# Patient Record
Sex: MALE | Race: ASIAN | Employment: OTHER | URBAN - METROPOLITAN AREA
[De-identification: names, ages, dates, MRNs, and addresses within clinical notes are randomized per-mention and may not be internally consistent; named-entity substitution may affect disease eponyms.]

---

## 2020-10-15 ENCOUNTER — APPOINTMENT (OUTPATIENT)
Dept: CT IMAGING | Age: 60
DRG: 177 | End: 2020-10-15
Attending: EMERGENCY MEDICINE
Payer: COMMERCIAL

## 2020-10-15 ENCOUNTER — HOSPITAL ENCOUNTER (INPATIENT)
Age: 60
LOS: 25 days | Discharge: NURSING, REHAB OR LONG-TERM CARE | DRG: 177 | End: 2020-11-09
Attending: EMERGENCY MEDICINE | Admitting: HOSPITALIST
Payer: COMMERCIAL

## 2020-10-15 ENCOUNTER — APPOINTMENT (OUTPATIENT)
Dept: GENERAL RADIOLOGY | Age: 60
DRG: 177 | End: 2020-10-15
Attending: EMERGENCY MEDICINE
Payer: COMMERCIAL

## 2020-10-15 DIAGNOSIS — J96.01 ACUTE RESPIRATORY FAILURE WITH HYPOXIA (HCC): Primary | ICD-10-CM

## 2020-10-15 DIAGNOSIS — J18.9 PNEUMONITIS: ICD-10-CM

## 2020-10-15 DIAGNOSIS — R53.1 WEAKNESS GENERALIZED: ICD-10-CM

## 2020-10-15 DIAGNOSIS — U07.1 COVID-19: ICD-10-CM

## 2020-10-15 DIAGNOSIS — Z20.822 SUSPECTED COVID-19 VIRUS INFECTION: ICD-10-CM

## 2020-10-15 PROBLEM — J96.00 ACUTE RESPIRATORY FAILURE (HCC): Status: ACTIVE | Noted: 2020-10-15

## 2020-10-15 PROBLEM — J96.00 ACUTE RESPIRATORY FAILURE DUE TO COVID-19 (HCC): Status: ACTIVE | Noted: 2020-10-15

## 2020-10-15 LAB
ALBUMIN SERPL-MCNC: 3.4 G/DL (ref 3.5–5)
ALBUMIN/GLOB SERPL: 0.8 {RATIO} (ref 1.1–2.2)
ALP SERPL-CCNC: 135 U/L (ref 45–117)
ALT SERPL-CCNC: 55 U/L (ref 12–78)
ANION GAP SERPL CALC-SCNC: 7 MMOL/L (ref 5–15)
ARTERIAL PATENCY WRIST A: POSITIVE
AST SERPL W P-5'-P-CCNC: 71 U/L (ref 15–37)
BASE DEFICIT BLDA-SCNC: 0.2 MMOL/L (ref 0–2)
BASOPHILS # BLD: 0 K/UL (ref 0–0.1)
BASOPHILS NFR BLD: 0 % (ref 0–1)
BDY SITE: ABNORMAL
BILIRUB SERPL-MCNC: 0.9 MG/DL (ref 0.2–1)
BNP SERPL-MCNC: 63 PG/ML
BUN SERPL-MCNC: 10 MG/DL (ref 6–20)
BUN/CREAT SERPL: 11 (ref 12–20)
CA-I BLD-MCNC: 8.5 MG/DL (ref 8.5–10.1)
CHLORIDE SERPL-SCNC: 104 MMOL/L (ref 97–108)
CK MB CFR SERPL CALC: 1 %
CK MB SERPL-MCNC: 3 NG/ML (ref 5–25)
CK SERPL-CCNC: 309 NG/ML (ref 39–308)
CO2 SERPL-SCNC: 26 MMOL/L (ref 21–32)
CREAT SERPL-MCNC: 0.94 MG/DL (ref 0.7–1.3)
DIFFERENTIAL METHOD BLD: ABNORMAL
EOSINOPHIL # BLD: 0 K/UL (ref 0–0.4)
EOSINOPHIL NFR BLD: 0 % (ref 0–7)
ERYTHROCYTE [DISTWIDTH] IN BLOOD BY AUTOMATED COUNT: 13.3 % (ref 11.5–14.5)
FIO2 ON VENT: 100 %
GAS FLOW.O2 O2 DELIVERY SYS: 15 L/MIN
GLOBULIN SER CALC-MCNC: 4.5 G/DL (ref 2–4)
GLUCOSE SERPL-MCNC: 105 MG/DL (ref 65–100)
HCO3 BLDA-SCNC: 24 MMOL/L (ref 22–26)
HCT VFR BLD AUTO: 44.2 % (ref 36.6–50.3)
HGB BLD-MCNC: 15.5 G/DL (ref 12.1–17)
IMM GRANULOCYTES # BLD AUTO: 0 K/UL (ref 0–0.04)
IMM GRANULOCYTES NFR BLD AUTO: 0 % (ref 0–0.5)
LACTATE SERPL-SCNC: 1.9 MMOL/L (ref 0.4–2)
LYMPHOCYTES # BLD: 0.8 K/UL (ref 0.8–3.5)
LYMPHOCYTES NFR BLD: 14 % (ref 12–49)
MCH RBC QN AUTO: 30.8 PG (ref 26–34)
MCHC RBC AUTO-ENTMCNC: 35.1 G/DL (ref 30–36.5)
MCV RBC AUTO: 87.7 FL (ref 80–99)
MONOCYTES # BLD: 0.6 K/UL (ref 0–1)
MONOCYTES NFR BLD: 10 % (ref 5–13)
NEUTS SEG # BLD: 4.6 K/UL (ref 1.8–8)
NEUTS SEG NFR BLD: 76 % (ref 32–75)
PCO2 BLDA: 32 MMHG (ref 35–45)
PH BLDA: 7.46 [PH] (ref 7.35–7.45)
PLATELET # BLD AUTO: 159 K/UL (ref 150–400)
PMV BLD AUTO: 9.6 FL (ref 8.9–12.9)
PO2 BLDA: 68 MMHG (ref 75–100)
POTASSIUM SERPL-SCNC: 3.7 MMOL/L (ref 3.5–5.1)
PROT SERPL-MCNC: 7.9 G/DL (ref 6.4–8.2)
RBC # BLD AUTO: 5.04 M/UL (ref 4.1–5.7)
SAO2 % BLD: 93 %
SAO2% DEVICE SAO2% SENSOR NAME: ABNORMAL
SARS-COV-2, COV2: NORMAL
SODIUM SERPL-SCNC: 137 MMOL/L (ref 136–145)
SPECIMEN SITE: ABNORMAL
TROPONIN I SERPL-MCNC: <0.05 NG/ML
WBC # BLD AUTO: 6.1 K/UL (ref 4.1–11.1)

## 2020-10-15 PROCEDURE — 82550 ASSAY OF CK (CPK): CPT

## 2020-10-15 PROCEDURE — 85379 FIBRIN DEGRADATION QUANT: CPT

## 2020-10-15 PROCEDURE — 65270000029 HC RM PRIVATE

## 2020-10-15 PROCEDURE — 86140 C-REACTIVE PROTEIN: CPT

## 2020-10-15 PROCEDURE — 82803 BLOOD GASES ANY COMBINATION: CPT

## 2020-10-15 PROCEDURE — 74011000636 HC RX REV CODE- 636: Performed by: EMERGENCY MEDICINE

## 2020-10-15 PROCEDURE — 80053 COMPREHEN METABOLIC PANEL: CPT

## 2020-10-15 PROCEDURE — 82553 CREATINE MB FRACTION: CPT

## 2020-10-15 PROCEDURE — 87040 BLOOD CULTURE FOR BACTERIA: CPT

## 2020-10-15 PROCEDURE — 85025 COMPLETE CBC W/AUTO DIFF WBC: CPT

## 2020-10-15 PROCEDURE — 83880 ASSAY OF NATRIURETIC PEPTIDE: CPT

## 2020-10-15 PROCEDURE — 94761 N-INVAS EAR/PLS OXIMETRY MLT: CPT

## 2020-10-15 PROCEDURE — 85730 THROMBOPLASTIN TIME PARTIAL: CPT

## 2020-10-15 PROCEDURE — 87635 SARS-COV-2 COVID-19 AMP PRB: CPT

## 2020-10-15 PROCEDURE — 71275 CT ANGIOGRAPHY CHEST: CPT

## 2020-10-15 PROCEDURE — 84484 ASSAY OF TROPONIN QUANT: CPT

## 2020-10-15 PROCEDURE — 85384 FIBRINOGEN ACTIVITY: CPT

## 2020-10-15 PROCEDURE — 71045 X-RAY EXAM CHEST 1 VIEW: CPT

## 2020-10-15 PROCEDURE — 85610 PROTHROMBIN TIME: CPT

## 2020-10-15 PROCEDURE — 83605 ASSAY OF LACTIC ACID: CPT

## 2020-10-15 PROCEDURE — 74011250636 HC RX REV CODE- 250/636: Performed by: EMERGENCY MEDICINE

## 2020-10-15 PROCEDURE — 93005 ELECTROCARDIOGRAM TRACING: CPT

## 2020-10-15 PROCEDURE — 82728 ASSAY OF FERRITIN: CPT

## 2020-10-15 PROCEDURE — 36415 COLL VENOUS BLD VENIPUNCTURE: CPT

## 2020-10-15 PROCEDURE — 99285 EMERGENCY DEPT VISIT HI MDM: CPT

## 2020-10-15 PROCEDURE — 83615 LACTATE (LD) (LDH) ENZYME: CPT

## 2020-10-15 PROCEDURE — 65610000006 HC RM INTENSIVE CARE

## 2020-10-15 RX ORDER — SODIUM CHLORIDE 9 MG/ML
75 INJECTION, SOLUTION INTRAVENOUS CONTINUOUS
Status: DISPENSED | OUTPATIENT
Start: 2020-10-15 | End: 2020-10-16

## 2020-10-15 RX ADMIN — IOPAMIDOL 100 ML: 755 INJECTION, SOLUTION INTRAVENOUS at 20:21

## 2020-10-15 RX ADMIN — SODIUM CHLORIDE 500 ML: 9 INJECTION, SOLUTION INTRAVENOUS at 19:46

## 2020-10-15 NOTE — ED PROVIDER NOTES
EMERGENCY DEPARTMENT HISTORY AND PHYSICAL EXAM        Date: 10/15/2020  Patient Name: An Collins Betancourt    History of Presenting Illness     Chief Complaint   Patient presents with    Shortness of Breath       History Provided By: Patient    HPI: An Collins Betancourt, 56 y.o. male who presents with dyspnea. States he has been having chest discomfort and dyspnea for about 2 weeks now. Today worsened. Patient denies any leg swelling, vomiting, fevers. Unable to obtain further history due to difficulty breathing. PCP: UNKNOWN        Past History     Past Medical History:  No past medical history on file. Past Surgical History:  No past surgical history on file. Family History:  No family history on file. Social History:  Social History     Tobacco Use    Smoking status: Not on file   Substance Use Topics    Alcohol use: Not on file    Drug use: Not on file       Allergies:  No Known Allergies    Review of Systems   Review of Systems   Constitutional: Negative for fever. HENT: Negative for congestion. Eyes: Negative for visual disturbance. Respiratory: Positive for shortness of breath. Cardiovascular: Positive for chest pain. Gastrointestinal: Negative for abdominal pain. Genitourinary: Negative for dysuria. Musculoskeletal: Negative for arthralgias. Skin: Negative for rash. Neurological: Negative for headaches. Physical Exam   General: Moderate distress. Well-nourished. Skin: No rash. Head: Normocephalic. Atraumatic. Eye: No proptosis or conjunctival injections. Respiratory: Moderate respiratory distress. Leaning forward. Tachypneic. Diminished breath sounds but no wheezing. No stridor. Gastrointestinal: Nondistended. Musculoskeletal: No obvious bony deformities. No leg edema. Psychiatric: Cooperative. Appropriate mood and affect.     Diagnostic Study Results     Labs -     Recent Results (from the past 24 hour(s))   CBC WITH AUTOMATED DIFF    Collection Time: 10/15/20  7:15 PM Result Value Ref Range    WBC 6.1 4.1 - 11.1 K/uL    RBC 5.04 4.10 - 5.70 M/uL    HGB 15.5 12.1 - 17.0 g/dL    HCT 44.2 36.6 - 50.3 %    MCV 87.7 80.0 - 99.0 FL    MCH 30.8 26.0 - 34.0 PG    MCHC 35.1 30.0 - 36.5 g/dL    RDW 13.3 11.5 - 14.5 %    PLATELET 424 741 - 955 K/uL    MPV 9.6 8.9 - 12.9 FL    NEUTROPHILS 76 (H) 32 - 75 %    LYMPHOCYTES 14 12 - 49 %    MONOCYTES 10 5 - 13 %    EOSINOPHILS 0 0 - 7 %    BASOPHILS 0 0 - 1 %    IMMATURE GRANULOCYTES 0 0.0 - 0.5 %    ABS. NEUTROPHILS 4.6 1.8 - 8.0 K/UL    ABS. LYMPHOCYTES 0.8 0.8 - 3.5 K/UL    ABS. MONOCYTES 0.6 0.0 - 1.0 K/UL    ABS. EOSINOPHILS 0.0 0.0 - 0.4 K/UL    ABS. BASOPHILS 0.0 0.0 - 0.1 K/UL    ABS. IMM. GRANS. 0.0 0.00 - 0.04 K/UL    DF AUTOMATED     METABOLIC PANEL, COMPREHENSIVE    Collection Time: 10/15/20  7:15 PM   Result Value Ref Range    Sodium 137 136 - 145 mmol/L    Potassium 3.7 3.5 - 5.1 mmol/L    Chloride 104 97 - 108 mmol/L    CO2 26 21 - 32 mmol/L    Anion gap 7 5 - 15 mmol/L    Glucose 105 (H) 65 - 100 mg/dL    BUN 10 6 - 20 mg/dL    Creatinine 0.94 0.70 - 1.30 mg/dL    BUN/Creatinine ratio 11 (L) 12 - 20      GFR est AA >60 >60 ml/min/1.73m2    GFR est non-AA >60 >60 ml/min/1.73m2    Calcium 8.5 8.5 - 10.1 mg/dL    Bilirubin, total 0.9 0.2 - 1.0 mg/dL    AST (SGOT) 71 (H) 15 - 37 U/L    ALT (SGPT) 55 12 - 78 U/L    Alk.  phosphatase 135 (H) 45 - 117 U/L    Protein, total 7.9 6.4 - 8.2 g/dL    Albumin 3.4 (L) 3.5 - 5.0 g/dL    Globulin 4.5 (H) 2.0 - 4.0 g/dL    A-G Ratio 0.8 (L) 1.1 - 2.2     LACTIC ACID    Collection Time: 10/15/20  7:15 PM   Result Value Ref Range    Lactic acid 1.9 0.4 - 2.0 mmol/L   CK W/ REFLX CKMB    Collection Time: 10/15/20  7:15 PM   Result Value Ref Range    .0 (H) 39 - 308 ng/mL   BNP    Collection Time: 10/15/20  7:15 PM   Result Value Ref Range    NT pro-BNP 63 <125 pg/mL   BLOOD GAS, ARTERIAL    Collection Time: 10/15/20  7:15 PM   Result Value Ref Range    pH 7.46 (H) 7.35 - 7.45      PCO2 32 (L) 35 - 45 mmHg    PO2 68 (L) 75 - 100 mmHg    O2 SAT 93 (L) >95 %    BICARBONATE 24 22 - 26 mmol/L    BASE DEFICIT 0.2 0 - 2 mmol/L    O2 METHOD NRB mask      O2 FLOW RATE 15 L/min    FIO2 100 %    Sample source Arterial      SITE Right Radial      KAT'S TEST Positive     TROPONIN I    Collection Time: 10/15/20  7:15 PM   Result Value Ref Range    Troponin-I, Qt. <0.05 <0.05 ng/mL   SARS-COV-2    Collection Time: 10/15/20  9:00 PM   Result Value Ref Range    SARS-CoV-2 Please find results under separate order         Radiologic Studies -   CTA CHEST W OR W WO CONT   Final Result   IMPRESSION: Widespread patchy bilateral pneumonitis      XR CHEST SNGL V   Final Result        CT Results  (Last 48 hours)               10/15/20 2021  CTA CHEST W OR W WO CONT Final result    Impression:  IMPRESSION: Widespread patchy bilateral pneumonitis       Narrative:  CT dose reduction was achieved through use of a standardized protocol tailored   for this examination and automatic exposure control for dose modulation. Contrast study maximum intensity projections shows pronounced groundglass   opacification, of variable density, mainly in the dependent portions of each   lung, apex to base. Air bronchograms are preserved in the densest portions. Mild   geographic groundglass opacification of lower density in the nondependent   portions. Central airways are open       Pulmonary arteries are well-opacified and show no filling defect or distortion. Aorta shows normal dimensions, without dissection. Tiny middle mediastinal lymph   nodes. Cardiac finding. No pleural pericardial effusion. No significant upper   abdominal or chest wall finding               CXR Results  (Last 48 hours)               10/15/20 1927  XR CHEST SNGL V Final result    Narrative:  1       Mild atelectasis in the bases with upper lungs clear. No effusion or   pneumothorax.  Normal heart and no congestion             Medical Decision Making and ED Course I reviewed the available vital signs, nursing notes, past medical history, past surgical history, family history, and social history. Vital Signs - Reviewed the patient's vital signs. Patient Vitals for the past 12 hrs:   Temp Pulse Resp BP SpO2   10/15/20 2113  88 28 (!) 127/94 95 %   10/15/20 2043  91 29 122/88 96 %   10/15/20 1837 98.8 °F (37.1 °C) 89 24 138/83 (!) 89 %       EKG interpretation: Normal sinus rhythm at 99 bpm.  Normal [place] interval, QRS duration, and QTc interval.  No ST segment abnormalities. Normal axis. Medical Decision Making:   Presented with difficulty breathing. The  differential diagnosis is pneumonia, aspiration, ACS, COVID-19. Work-up shows pneumonitis. Patient requires oxygenation is currently on BiPAP. Could have COVID-19. Will test for this. Admitted to hospitalist for further care. Procedures     Critical Care Note:   6:56 PM  Amount of critical care time: 35 minutes  Impending deterioration: Respiratory and Cardiovascular  Associated risk factors: Hypoxia and Metabolic changes  Management: Bedside Assessment and Supervision of Care  Interpretation: Xrays, Blood Gases and ECG  Interventions: BiPAP, supplemental oxygen  Case review: Hospitalist, nursing  Treatment response: Guarded  Performed by: Self  Notes: I have spent critical care time involved in lab review, decision making, bedside attention, and documentation. This time excludes time spent in any separate billed procedures. During this entire length of time I was immediately available to the patient. Dorann Lesch, DO    Disposition     Admitted    Diagnosis     Clinical impression:   1. Acute respiratory failure with hypoxia ([address]    2. Pneumonitis       Attestation:  Please note that this dictation was completed with [employer], the [employer] voice recognition software.  Quite often unanticipated grammatical, syntax, homophones, and other interpretive errors are inadvertently transcribed by the computer software. Please disregard these errors. Please excuse any errors that have escaped final proofreading. Thank you.   Taurus Atkins, DO

## 2020-10-16 LAB
ALBUMIN SERPL-MCNC: 2.8 G/DL (ref 3.5–5)
ALBUMIN/GLOB SERPL: 0.7 {RATIO} (ref 1.1–2.2)
ALP SERPL-CCNC: 121 U/L (ref 45–117)
ALT SERPL-CCNC: 51 U/L (ref 12–78)
ANION GAP SERPL CALC-SCNC: 7 MMOL/L (ref 5–15)
APPEARANCE UR: CLEAR
APTT PPP: 29.7 SEC (ref 23–35.7)
APTT PPP: 33.5 SEC (ref 23–35.7)
AST SERPL W P-5'-P-CCNC: 72 U/L (ref 15–37)
ATRIAL RATE: 99 BPM
BACTERIA URNS QL MICRO: NEGATIVE /HPF
BASOPHILS # BLD: 0 K/UL (ref 0–0.1)
BASOPHILS NFR BLD: 0 % (ref 0–1)
BILIRUB SERPL-MCNC: 0.8 MG/DL (ref 0.2–1)
BILIRUB UR QL: NEGATIVE
BUN SERPL-MCNC: 9 MG/DL (ref 6–20)
BUN/CREAT SERPL: 11 (ref 12–20)
CA-I BLD-MCNC: 8.4 MG/DL (ref 8.5–10.1)
CALCULATED P AXIS, ECG09: 38 DEGREES
CALCULATED R AXIS, ECG10: 43 DEGREES
CALCULATED T AXIS, ECG11: 46 DEGREES
CHLORIDE SERPL-SCNC: 106 MMOL/L (ref 97–108)
CK SERPL-CCNC: 306 U/L (ref 39–308)
CO2 SERPL-SCNC: 25 MMOL/L (ref 21–32)
COLOR UR: ABNORMAL
CREAT SERPL-MCNC: 0.84 MG/DL (ref 0.7–1.3)
CRP SERPL-MCNC: 12.4 MG/DL (ref 0–0.6)
D DIMER PPP FEU-MCNC: 1.11 UG/ML(FEU)
DIAGNOSIS, 93000: NORMAL
DIFFERENTIAL METHOD BLD: NORMAL
EOSINOPHIL # BLD: 0 K/UL (ref 0–0.4)
EOSINOPHIL NFR BLD: 0 % (ref 0–7)
ERYTHROCYTE [DISTWIDTH] IN BLOOD BY AUTOMATED COUNT: 13.4 % (ref 11.5–14.5)
FERRITIN SERPL-MCNC: 2182 NG/ML (ref 26–388)
FIBRINOGEN PPP-MCNC: 620 MG/DL (ref 220–535)
GLOBULIN SER CALC-MCNC: 4.2 G/DL (ref 2–4)
GLUCOSE SERPL-MCNC: 109 MG/DL (ref 65–100)
GLUCOSE UR STRIP.AUTO-MCNC: >300 MG/DL
HCT VFR BLD AUTO: 40.3 % (ref 36.6–50.3)
HGB BLD-MCNC: 14.1 G/DL (ref 12.1–17)
HGB UR QL STRIP: NEGATIVE
IMM GRANULOCYTES # BLD AUTO: 0 K/UL (ref 0–0.04)
IMM GRANULOCYTES NFR BLD AUTO: 0 % (ref 0–0.5)
INR PPP: 0.9 (ref 0.9–1.1)
KETONES UR QL STRIP.AUTO: 5 MG/DL
LDH SERPL L TO P-CCNC: 754 U/L (ref 85–241)
LEUKOCYTE ESTERASE UR QL STRIP.AUTO: NEGATIVE
LYMPHOCYTES # BLD: 0.8 K/UL (ref 0.8–3.5)
LYMPHOCYTES NFR BLD: 13 % (ref 12–49)
MCH RBC QN AUTO: 30.7 PG (ref 26–34)
MCHC RBC AUTO-ENTMCNC: 35 G/DL (ref 30–36.5)
MCV RBC AUTO: 87.8 FL (ref 80–99)
MONOCYTES # BLD: 0.7 K/UL (ref 0–1)
MONOCYTES NFR BLD: 12 % (ref 5–13)
MUCOUS THREADS URNS QL MICRO: ABNORMAL /LPF
NEUTS SEG # BLD: 4.6 K/UL (ref 1.8–8)
NEUTS SEG NFR BLD: 75 % (ref 32–75)
NITRITE UR QL STRIP.AUTO: NEGATIVE
P-R INTERVAL, ECG05: 172 MS
PH UR STRIP: 6 [PH] (ref 5–8)
PLATELET # BLD AUTO: 172 K/UL (ref 150–400)
PMV BLD AUTO: 9.9 FL (ref 8.9–12.9)
POTASSIUM SERPL-SCNC: 3.6 MMOL/L (ref 3.5–5.1)
PROCALCITONIN SERPL-MCNC: 0.14 NG/ML
PROT SERPL-MCNC: 7 G/DL (ref 6.4–8.2)
PROT UR STRIP-MCNC: 100 MG/DL
PROTHROMBIN TIME: 12.5 SEC (ref 11.9–14.7)
Q-T INTERVAL, ECG07: 342 MS
QRS DURATION, ECG06: 90 MS
QTC CALCULATION (BEZET), ECG08: 438 MS
RBC # BLD AUTO: 4.59 M/UL (ref 4.1–5.7)
RBC #/AREA URNS HPF: ABNORMAL /HPF (ref 0–5)
SODIUM SERPL-SCNC: 138 MMOL/L (ref 136–145)
SP GR UR REFRACTOMETRY: >1.03 (ref 1–1.03)
THERAPEUTIC RANGE,PTTT: NORMAL SEC (ref 68–109)
THERAPEUTIC RANGE,PTTT: NORMAL SEC (ref 68–109)
UA: UC IF INDICATED,UAUC: ABNORMAL
UROBILINOGEN UR QL STRIP.AUTO: 2 EU/DL (ref 0.1–1)
VENTRICULAR RATE, ECG03: 99 BPM
WBC # BLD AUTO: 6.1 K/UL (ref 4.1–11.1)
WBC URNS QL MICRO: ABNORMAL /HPF (ref 0–4)

## 2020-10-16 PROCEDURE — 74011250636 HC RX REV CODE- 250/636: Performed by: INTERNAL MEDICINE

## 2020-10-16 PROCEDURE — 77010033678 HC OXYGEN DAILY

## 2020-10-16 PROCEDURE — 74011250636 HC RX REV CODE- 250/636: Performed by: HOSPITALIST

## 2020-10-16 PROCEDURE — 36415 COLL VENOUS BLD VENIPUNCTURE: CPT

## 2020-10-16 PROCEDURE — 80053 COMPREHEN METABOLIC PANEL: CPT

## 2020-10-16 PROCEDURE — XW033E5 INTRODUCTION OF REMDESIVIR ANTI-INFECTIVE INTO PERIPHERAL VEIN, PERCUTANEOUS APPROACH, NEW TECHNOLOGY GROUP 5: ICD-10-PCS | Performed by: INTERNAL MEDICINE

## 2020-10-16 PROCEDURE — 74011250637 HC RX REV CODE- 250/637: Performed by: INTERNAL MEDICINE

## 2020-10-16 PROCEDURE — 74011000250 HC RX REV CODE- 250: Performed by: INTERNAL MEDICINE

## 2020-10-16 PROCEDURE — 74011250637 HC RX REV CODE- 250/637: Performed by: HOSPITALIST

## 2020-10-16 PROCEDURE — 86900 BLOOD TYPING SEROLOGIC ABO: CPT

## 2020-10-16 PROCEDURE — 65610000006 HC RM INTENSIVE CARE

## 2020-10-16 PROCEDURE — 74011000258 HC RX REV CODE- 258: Performed by: INTERNAL MEDICINE

## 2020-10-16 PROCEDURE — 99221 1ST HOSP IP/OBS SF/LOW 40: CPT | Performed by: INTERNAL MEDICINE

## 2020-10-16 PROCEDURE — 85730 THROMBOPLASTIN TIME PARTIAL: CPT

## 2020-10-16 PROCEDURE — 84145 PROCALCITONIN (PCT): CPT

## 2020-10-16 PROCEDURE — 81001 URINALYSIS AUTO W/SCOPE: CPT

## 2020-10-16 PROCEDURE — 85025 COMPLETE CBC W/AUTO DIFF WBC: CPT

## 2020-10-16 RX ORDER — DEXAMETHASONE SODIUM PHOSPHATE 4 MG/ML
4 INJECTION, SOLUTION INTRA-ARTICULAR; INTRALESIONAL; INTRAMUSCULAR; INTRAVENOUS; SOFT TISSUE EVERY 6 HOURS
Status: DISCONTINUED | OUTPATIENT
Start: 2020-10-16 | End: 2020-10-26

## 2020-10-16 RX ORDER — ENOXAPARIN SODIUM 100 MG/ML
30 INJECTION SUBCUTANEOUS EVERY 12 HOURS
Status: DISCONTINUED | OUTPATIENT
Start: 2020-10-16 | End: 2020-10-29

## 2020-10-16 RX ORDER — ACETAMINOPHEN 650 MG/1
650 SUPPOSITORY RECTAL
Status: DISCONTINUED | OUTPATIENT
Start: 2020-10-16 | End: 2020-11-09 | Stop reason: HOSPADM

## 2020-10-16 RX ORDER — SODIUM CHLORIDE 0.9 % (FLUSH) 0.9 %
5-40 SYRINGE (ML) INJECTION EVERY 8 HOURS
Status: DISCONTINUED | OUTPATIENT
Start: 2020-10-16 | End: 2020-11-09 | Stop reason: HOSPADM

## 2020-10-16 RX ORDER — ONDANSETRON 4 MG/1
4 TABLET, ORALLY DISINTEGRATING ORAL
Status: DISCONTINUED | OUTPATIENT
Start: 2020-10-16 | End: 2020-11-09 | Stop reason: HOSPADM

## 2020-10-16 RX ORDER — ACETAMINOPHEN 325 MG/1
650 TABLET ORAL
Status: DISCONTINUED | OUTPATIENT
Start: 2020-10-16 | End: 2020-11-09 | Stop reason: HOSPADM

## 2020-10-16 RX ORDER — DEXAMETHASONE 4 MG/1
6 TABLET ORAL DAILY
Status: DISCONTINUED | OUTPATIENT
Start: 2020-10-16 | End: 2020-10-16

## 2020-10-16 RX ORDER — ONDANSETRON 2 MG/ML
4 INJECTION INTRAMUSCULAR; INTRAVENOUS
Status: DISCONTINUED | OUTPATIENT
Start: 2020-10-16 | End: 2020-11-09 | Stop reason: HOSPADM

## 2020-10-16 RX ORDER — BENZONATATE 100 MG/1
200 CAPSULE ORAL EVERY 8 HOURS
Status: DISCONTINUED | OUTPATIENT
Start: 2020-10-16 | End: 2020-11-03

## 2020-10-16 RX ORDER — SODIUM CHLORIDE 0.9 % (FLUSH) 0.9 %
5-40 SYRINGE (ML) INJECTION AS NEEDED
Status: DISCONTINUED | OUTPATIENT
Start: 2020-10-16 | End: 2020-11-09 | Stop reason: HOSPADM

## 2020-10-16 RX ADMIN — BENZONATATE 200 MG: 100 CAPSULE ORAL at 14:57

## 2020-10-16 RX ADMIN — ENOXAPARIN SODIUM 30 MG: 30 INJECTION SUBCUTANEOUS at 17:46

## 2020-10-16 RX ADMIN — ACETAMINOPHEN 650 MG: 325 TABLET, FILM COATED ORAL at 12:12

## 2020-10-16 RX ADMIN — DEXAMETHASONE SODIUM PHOSPHATE 4 MG: 4 INJECTION, SOLUTION INTRA-ARTICULAR; INTRALESIONAL; INTRAMUSCULAR; INTRAVENOUS; SOFT TISSUE at 11:39

## 2020-10-16 RX ADMIN — SODIUM CHLORIDE 200 MG: 9 INJECTION, SOLUTION INTRAVENOUS at 14:54

## 2020-10-16 RX ADMIN — DEXAMETHASONE SODIUM PHOSPHATE 4 MG: 4 INJECTION, SOLUTION INTRA-ARTICULAR; INTRALESIONAL; INTRAMUSCULAR; INTRAVENOUS; SOFT TISSUE at 17:46

## 2020-10-16 RX ADMIN — ENOXAPARIN SODIUM 30 MG: 30 INJECTION SUBCUTANEOUS at 09:11

## 2020-10-16 RX ADMIN — Medication 10 ML: at 22:38

## 2020-10-16 RX ADMIN — Medication 10 ML: at 14:57

## 2020-10-16 RX ADMIN — TOCILIZUMAB 400 MG: 20 INJECTION, SOLUTION, CONCENTRATE INTRAVENOUS at 13:35

## 2020-10-16 RX ADMIN — SODIUM CHLORIDE 75 ML/HR: 9 INJECTION, SOLUTION INTRAVENOUS at 03:00

## 2020-10-16 RX ADMIN — BENZONATATE 200 MG: 100 CAPSULE ORAL at 22:36

## 2020-10-16 RX ADMIN — DEXAMETHASONE SODIUM PHOSPHATE 4 MG: 4 INJECTION, SOLUTION INTRA-ARTICULAR; INTRALESIONAL; INTRAMUSCULAR; INTRAVENOUS; SOFT TISSUE at 22:37

## 2020-10-16 RX ADMIN — DEXAMETHASONE 6 MG: 4 TABLET ORAL at 05:35

## 2020-10-16 RX ADMIN — ACETAMINOPHEN 650 MG: 325 TABLET, FILM COATED ORAL at 05:35

## 2020-10-16 RX ADMIN — AZITHROMYCIN MONOHYDRATE 500 MG: 500 INJECTION, POWDER, LYOPHILIZED, FOR SOLUTION INTRAVENOUS at 11:38

## 2020-10-16 NOTE — ROUTINE PROCESS
TRANSFER - OUT REPORT: 
 
Verbal report given to Flex RN(name) on An Jenniffer Gunnels  being transferred to 285(unit) for routine progression of care Report consisted of patients Situation, Background, Assessment and  
Recommendations(SBAR). Information from the following report(s) SBAR, ED Summary, Intake/Output, MAR and Recent Results was reviewed with the receiving nurse. Lines:  
Peripheral IV 10/15/20 Right Antecubital (Active) Site Assessment Clean, dry, & intact 10/15/20 1940 Phlebitis Assessment 0 10/15/20 1940 Infiltration Assessment 0 10/15/20 1940 Dressing Status Clean, dry, & intact 10/15/20 1940 Dressing Type Transparent 10/15/20 1940 Hub Color/Line Status Flushed;Pink 10/15/20 1940 Peripheral IV 10/15/20 Left Antecubital (Active) Site Assessment Clean, dry, & intact 10/15/20 2350 Phlebitis Assessment 0 10/15/20 2350 Infiltration Assessment 0 10/15/20 2350 Dressing Type Transparent 10/15/20 2350 Hub Color/Line Status Pink;Flushed 10/15/20 2350 Opportunity for questions and clarification was provided. Patient transported with: 
 Monitor Registered Nurse

## 2020-10-16 NOTE — PROGRESS NOTES
Hospitalist Progress Note               Daily Progress Note: 10/16/2020      Subjective: The patient is seen for follow  up.   56 y.o. male with no significant medical problems presents to the emergency room complaining of shortness of breath. Medications reviewed  Current Facility-Administered Medications   Medication Dose Route Frequency    enoxaparin (LOVENOX) injection 30 mg  30 mg SubCUTAneous Q12H    acetaminophen (TYLENOL) tablet 650 mg  650 mg Oral Q6H PRN    Or    acetaminophen (TYLENOL) suppository 650 mg  650 mg Rectal Q6H PRN    sodium chloride (NS) flush 5-40 mL  5-40 mL IntraVENous Q8H    sodium chloride (NS) flush 5-40 mL  5-40 mL IntraVENous PRN    ondansetron (ZOFRAN ODT) tablet 4 mg  4 mg Oral Q6H PRN    Or    ondansetron (ZOFRAN) injection 4 mg  4 mg IntraVENous Q6H PRN    azithromycin (ZITHROMAX) 500 mg in 0.9% sodium chloride 250 mL (VIAL-MATE)  500 mg IntraVENous Q24H    tocilizumab 400 mg in 0.9% sodium chloride 100 mL infusion  400 mg IntraVENous DAILY    dexamethasone (DECADRON) 4 mg/mL injection 4 mg  4 mg IntraVENous Q6H    remdesivir 100 mg in 0.9% sodium chloride 250 mL IVPB  100 mg IntraVENous Q24H    remdesivir 200 mg in 0.9% sodium chloride 250 mL IVPB  200 mg IntraVENous ONCE    benzonatate (TESSALON) capsule 200 mg  200 mg Oral Q8H    0.9% sodium chloride infusion  75 mL/hr IntraVENous CONTINUOUS       Review of Systems:   A comprehensive review of systems was negative except for that written in the HPI. Objective:   Physical Exam:     Visit Vitals  BP (!) 121/45 (BP 1 Location: Left arm)   Pulse 100   Temp 99 °F (37.2 °C)   Resp 29   Ht 5' 9\" (1.753 m)   Wt 77.1 kg (170 lb)   SpO2 97%   BMI 25.10 kg/m²    O2 Flow Rate (L/min): 50 l/min O2 Device: Hi flow nasal cannula    Temp (24hrs), Av.6 °F (37.6 °C), Min:98.8 °F (37.1 °C), Max:100.5 °F (38.1 °C)    No intake/output data recorded. No intake/output data recorded.     PHYSICAL EXAM:  General: Alert and awake. Skin: Extremities and face reveal no rashes. HEENT: Sclerae anicteric. Extra-occular muscles are intact. No oral ulcers. No ENT discharge. The neck is supple. Cardiovascular: Regular rate and rhythm. No murmurs, gallops, or rubs. PMI nondisplaced. Carotids without bruits. Respiratory: Comfortable breathing with no accessory muscle use. Clear breath sounds with no wheezes, rales, or rhonchi. GI: Abdomen nondistended, soft, and nontender. Normal active bowel sounds. No enlargement of the liver or spleen. No masses palpable. Rectal: Deferred   Musculoskeletal: No pitting edema of the lower legs. Extremities have good range of motion. No costovertebral tenderness. Neurological: Gross memory appears intact. Patient is alert and oriented. Power 5/5, Cranial nerves II-XII intact  Psychiatric: Mood appears appropriate with judgement intact.      Data Review:       Recent Days:  Recent Labs     10/16/20  0530 10/15/20  1915   WBC 6.1 6.1   HGB 14.1 15.5   HCT 40.3 44.2    159     Recent Labs     10/16/20  0530 10/15/20  2347 10/15/20  1915     --  137   K 3.6  --  3.7     --  104   CO2 25  --  26   *  --  105*   BUN 9  --  10   CREA 0.84  --  0.94   CA 8.4*  --  8.5   ALB 2.8*  --  3.4*   TBILI 0.8  --  0.9   ALT 51  --  55   INR  --  0.9  --      Recent Labs     10/15/20  1915   PH 7.46*   PCO2 32*   PO2 68*   HCO3 24   FIO2 100       24 Hour Results:  Recent Results (from the past 24 hour(s))   EKG, 12 LEAD, INITIAL    Collection Time: 10/15/20  6:48 PM   Result Value Ref Range    Ventricular Rate 99 BPM    Atrial Rate 99 BPM    P-R Interval 172 ms    QRS Duration 90 ms    Q-T Interval 342 ms    QTC Calculation (Bezet) 438 ms    Calculated P Axis 38 degrees    Calculated R Axis 43 degrees    Calculated T Axis 46 degrees    Diagnosis       Normal sinus rhythm  Normal ECG  No previous ECGs available  Confirmed by Chika Ortiz MD, Hosea Russo ([id number]) on 10/16/2020 7:55:13 AM     CULTURE, BLOOD, PAIRED    Collection Time: 10/15/20  7:10 PM    Specimen: Blood   Result Value Ref Range    Special Requests: No Special Requests      Culture result: No growth after 12 hours     CBC WITH AUTOMATED DIFF    Collection Time: 10/15/20  7:15 PM   Result Value Ref Range    WBC 6.1 4.1 - 11.1 K/uL    RBC 5.04 4.10 - 5.70 M/uL    HGB 15.5 12.1 - 17.0 g/dL    HCT 44.2 36.6 - 50.3 %    MCV 87.7 80.0 - 99.0 FL    MCH 30.8 26.0 - 34.0 PG    MCHC 35.1 30.0 - 36.5 g/dL    RDW 13.3 11.5 - 14.5 %    PLATELET 349 024 - 853 K/uL    MPV 9.6 8.9 - 12.9 FL    NEUTROPHILS 76 (H) 32 - 75 %    LYMPHOCYTES 14 12 - 49 %    MONOCYTES 10 5 - 13 %    EOSINOPHILS 0 0 - 7 %    BASOPHILS 0 0 - 1 %    IMMATURE GRANULOCYTES 0 0.0 - 0.5 %    ABS. NEUTROPHILS 4.6 1.8 - 8.0 K/UL    ABS. LYMPHOCYTES 0.8 0.8 - 3.5 K/UL    ABS. MONOCYTES 0.6 0.0 - 1.0 K/UL    ABS. EOSINOPHILS 0.0 0.0 - 0.4 K/UL    ABS. BASOPHILS 0.0 0.0 - 0.1 K/UL    ABS. IMM. GRANS. 0.0 0.00 - 0.04 K/UL    DF AUTOMATED     METABOLIC PANEL, COMPREHENSIVE    Collection Time: 10/15/20  7:15 PM   Result Value Ref Range    Sodium 137 136 - 145 mmol/L    Potassium 3.7 3.5 - 5.1 mmol/L    Chloride 104 97 - 108 mmol/L    CO2 26 21 - 32 mmol/L    Anion gap 7 5 - 15 mmol/L    Glucose 105 (H) 65 - 100 mg/dL    BUN 10 6 - 20 mg/dL    Creatinine 0.94 0.70 - 1.30 mg/dL    BUN/Creatinine ratio 11 (L) 12 - 20      GFR est AA >60 >60 ml/min/1.73m2    GFR est non-AA >60 >60 ml/min/1.73m2    Calcium 8.5 8.5 - 10.1 mg/dL    Bilirubin, total 0.9 0.2 - 1.0 mg/dL    AST (SGOT) 71 (H) 15 - 37 U/L    ALT (SGPT) 55 12 - 78 U/L    Alk.  phosphatase 135 (H) 45 - 117 U/L    Protein, total 7.9 6.4 - 8.2 g/dL    Albumin 3.4 (L) 3.5 - 5.0 g/dL    Globulin 4.5 (H) 2.0 - 4.0 g/dL    A-G Ratio 0.8 (L) 1.1 - 2.2     LACTIC ACID    Collection Time: 10/15/20  7:15 PM   Result Value Ref Range    Lactic acid 1.9 0.4 - 2.0 mmol/L   CK W/ REFLX CKMB    Collection Time: 10/15/20  7:15 PM   Result Value Ref Range    CK 309.0 (H) 39 - 308 ng/mL   BNP    Collection Time: 10/15/20  7:15 PM   Result Value Ref Range    NT pro-BNP 63 <125 pg/mL   BLOOD GAS, ARTERIAL    Collection Time: 10/15/20  7:15 PM   Result Value Ref Range    pH 7.46 (H) 7.35 - 7.45      PCO2 32 (L) 35 - 45 mmHg    PO2 68 (L) 75 - 100 mmHg    O2 SAT 93 (L) >95 %    BICARBONATE 24 22 - 26 mmol/L    BASE DEFICIT 0.2 0 - 2 mmol/L    O2 METHOD NRB mask      O2 FLOW RATE 15 L/min    FIO2 100 %    Sample source Arterial      SITE Right Radial      KAT'S TEST Positive     TROPONIN I    Collection Time: 10/15/20  7:15 PM   Result Value Ref Range    Troponin-I, Qt. <0.05 <0.05 ng/mL   CK-MB,QUANT.     Collection Time: 10/15/20  7:15 PM   Result Value Ref Range    CK - MB 3.0 <3.6 ng/mL    CK-MB Index 1.0     SARS-COV-2    Collection Time: 10/15/20  9:00 PM   Result Value Ref Range    SARS-CoV-2 Please find results under separate order     LD    Collection Time: 10/15/20 11:47 PM   Result Value Ref Range     (H) 85 - 241 U/L   FERRITIN    Collection Time: 10/15/20 11:47 PM   Result Value Ref Range    Ferritin 2,182 (H) 26 - 388 ng/mL   C REACTIVE PROTEIN, QT    Collection Time: 10/15/20 11:47 PM   Result Value Ref Range    C-Reactive protein 12.40 (H) 0.00 - 0.60 mg/dL   CK    Collection Time: 10/15/20 11:47 PM   Result Value Ref Range     39 - 308 U/L   PROTHROMBIN TIME + INR    Collection Time: 10/15/20 11:47 PM   Result Value Ref Range    Prothrombin time 12.5 11.9 - 14.7 sec    INR 0.9 0.9 - 1.1     PTT    Collection Time: 10/15/20 11:47 PM   Result Value Ref Range    aPTT 33.5 23.0 - 35.7 sec    aPTT, therapeutic range   68 - 109 sec   D DIMER    Collection Time: 10/15/20 11:47 PM   Result Value Ref Range    D DIMER 1.11 (H) <0.50 ug/ml(FEU)   FIBRINOGEN    Collection Time: 10/15/20 11:47 PM   Result Value Ref Range    Fibrinogen 620 (H) 220 - 535 mg/dL   TYPE & SCREEN    Collection Time: 10/16/20  5:15 AM   Result Value Ref Range    Crossmatch Expiration 10/19/2020,2359     ABO/Rh(D) PENDING     Antibody screen PENDING    PROCALCITONIN    Collection Time: 10/16/20  5:30 AM   Result Value Ref Range    Procalcitonin 0.14 (H) 0 ng/mL   CBC WITH AUTOMATED DIFF    Collection Time: 10/16/20  5:30 AM   Result Value Ref Range    WBC 6.1 4.1 - 11.1 K/uL    RBC 4.59 4. 10 - 5.70 M/uL    HGB 14.1 12.1 - 17.0 g/dL    HCT 40.3 36.6 - 50.3 %    MCV 87.8 80.0 - 99.0 FL    MCH 30.7 26.0 - 34.0 PG    MCHC 35.0 30.0 - 36.5 g/dL    RDW 13.4 11.5 - 14.5 %    PLATELET 162 937 - 131 K/uL    MPV 9.9 8.9 - 12.9 FL    NEUTROPHILS 75 32 - 75 %    LYMPHOCYTES 13 12 - 49 %    MONOCYTES 12 5 - 13 %    EOSINOPHILS 0 0 - 7 %    BASOPHILS 0 0 - 1 %    IMMATURE GRANULOCYTES 0 0.0 - 0.5 %    ABS. NEUTROPHILS 4.6 1.8 - 8.0 K/UL    ABS. LYMPHOCYTES 0.8 0.8 - 3.5 K/UL    ABS. MONOCYTES 0.7 0.0 - 1.0 K/UL    ABS. EOSINOPHILS 0.0 0.0 - 0.4 K/UL    ABS. BASOPHILS 0.0 0.0 - 0.1 K/UL    ABS. IMM. GRANS. 0.0 0.00 - 0.04 K/UL    DF AUTOMATED     METABOLIC PANEL, COMPREHENSIVE    Collection Time: 10/16/20  5:30 AM   Result Value Ref Range    Sodium 138 136 - 145 mmol/L    Potassium 3.6 3.5 - 5.1 mmol/L    Chloride 106 97 - 108 mmol/L    CO2 25 21 - 32 mmol/L    Anion gap 7 5 - 15 mmol/L    Glucose 109 (H) 65 - 100 mg/dL    BUN 9 6 - 20 mg/dL    Creatinine 0.84 0.70 - 1.30 mg/dL    BUN/Creatinine ratio 11 (L) 12 - 20      GFR est AA >60 >60 ml/min/1.73m2    GFR est non-AA >60 >60 ml/min/1.73m2    Calcium 8.4 (L) 8.5 - 10.1 mg/dL    Bilirubin, total 0.8 0.2 - 1.0 mg/dL    AST (SGOT) 72 (H) 15 - 37 U/L    ALT (SGPT) 51 12 - 78 U/L    Alk.  phosphatase 121 (H) 45 - 117 U/L    Protein, total 7.0 6.4 - 8.2 g/dL    Albumin 2.8 (L) 3.5 - 5.0 g/dL    Globulin 4.2 (H) 2.0 - 4.0 g/dL    A-G Ratio 0.7 (L) 1.1 - 2.2     PTT    Collection Time: 10/16/20  5:30 AM   Result Value Ref Range    aPTT 29.7 23.0 - 35.7 sec    aPTT, therapeutic range   68 - 109 sec       CTA CHEST W OR W WO CONT   Final Result   IMPRESSION: Widespread patchy bilateral pneumonitis      XR CHEST SNGL V   Final Result      XR CHEST PORT    (Results Pending)          Assessment/     Problem List:  Hospital Problems  Date Reviewed: 10/15/2020          Codes Class Noted POA    Acute respiratory failure due to COVID-19 [facility]) ICD-10-CM: U07.1, J96.00  ICD-9-CM: 518.81, 079.89  10/15/2020 Yes        Pneumonia ICD-10-CM: J18.9  ICD-9-CM: 839  10/15/2020 Yes        Acute respiratory failure (HCC) ICD-10-CM: J96.00  ICD-9-CM: 518.81  10/15/2020 Unknown                     Plan:  Acute hypoxic respiratory failure: Etiology most likely pneumonia suspected from COVID-19. On high flow nasal cannula. Started on zithromax, actemra, decadrone and remdisavir.      Acute pneumonia confirmed with x-ray and CT scan with widespread patchy infiltrate highly suggestive of viral pneumonia. 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     Dehydration: Secondary to above, started on IV fluids.     Admitted to ICU due to acute respiratory failure, his full CODE STATUS, has no home medications. Her spouse and family who helps making decisions. Care Plan discussed with: Patient/Family and Nurse    Total time spent with patient: 30 minutes.     Alondra Daley MD

## 2020-10-16 NOTE — PROGRESS NOTES
SKIN ASSESSMENT ON ADMISSION:    A full skin assessment and whole assessment was done on Mr. Narciso Burns upon admission to unit at 0105am., on 10/16/20. There are no open wounds or skin breaks. Sacrum is without skin breaks and a mepilex was placed as a protective measure. VSS with the exception of temperature of 100.5 orally.

## 2020-10-16 NOTE — CONSULTS
Consult Date: 10/16/2020    Consults:  Covid-19 pneumonia    Subjective      This is 56year old male with 2 week history of fever, malaise and SOB triaged from [facility] ED for further evaluation. He was afebrile and hypoxic. WBC was normal but procalcitonin, LDH, CRP and ferritin were all elevated. CXR showed mild atelectasis in the bases with upper lungs clear with no  effusion or pneumothorax (images reviewed by me, see below). He was admitted to ICU with severe sepsis with acute respiratory failure secondary to pneumonia including concern for Covid-19 pneumonia. Seen by Pulmonary and started on Decadron, Remdesivir and Actemra. ID has been consulted for this same reason. Patient seen in the ICU. He is resting comfortably but on high flow nasal O2. He affirms SOB and states that he was told his oxygen was low. 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 He reports a friend with pneumonia but reportedly not Covid-19. Also report fever and body aches. History reviewed. No pertinent past medical history. History reviewed. No pertinent surgical history.   Family History   Problem Relation Age of Onset    No Known Problems Mother     No Known Problems Father       Social History     Tobacco Use    Smoking status: Never Smoker    Smokeless tobacco: Never Used   Substance Use Topics    Alcohol use: Never     Frequency: Never       Current Facility-Administered Medications   Medication Dose Route Frequency Provider Last Rate Last Dose    enoxaparin (LOVENOX) injection 30 mg  30 mg SubCUTAneous Q12H Clayton Garcia MD   30 mg at 10/16/20 0911    acetaminophen (TYLENOL) tablet 650 mg  650 mg Oral Q6H PRN Clayton Garcia MD   650 mg at 10/16/20 1212    Or    acetaminophen (TYLENOL) suppository 650 mg  650 mg Rectal Q6H PRN Clayton Garcia MD        sodium chloride (NS) flush 5-40 mL  5-40 mL IntraVENous Q8H Garry Augustin MD        sodium chloride (NS) flush 5-40 mL  5-40 mL IntraVENous PRN Garry Augustin MD        ondansetron (ZOFRAN ODT) tablet 4 mg  4 mg Oral Q6H PRN Garry Augustin MD        Or    ondansetron [facility]) injection 4 mg  4 mg IntraVENous Q6H PRN Garry Augustin MD        azithromycin (ZITHROMAX) 500 mg in 0.9% sodium chloride 250 mL (VIAL-MATE)  500 mg IntraVENous Q24H Linda Mccarty MD   500 mg at 10/16/20 1138    tocilizumab 400 mg in 0.9% sodium chloride 100 mL infusion  400 mg IntraVENous DAILY Joy Street MD        dexamethasone (DECADRON) 4 mg/mL injection 4 mg  4 mg IntraVENous Q6H Joy Street MD   4 mg at 10/16/20 1139    remdesivir 200 mg in 0.9% sodium chloride 250 mL IVPB  200 mg IntraVENous ONCE Joy Street MD        benzonatate (TESSALON) capsule 200 mg  200 mg Oral Q8H Joy Street MD        [START ON 10/17/2020] remdesivir 100 mg in 0.9% sodium chloride 250 mL IVPB  100 mg IntraVENous Q24H Joy Street MD            Review of Systems   Constitutional: Positive for fever. Negative for chills. HENT: Negative. Eyes: Negative. Respiratory: Positive for shortness of breath. Negative for cough. Cardiovascular: Negative. Gastrointestinal: Negative. Endocrine: Negative. Genitourinary: Negative. Musculoskeletal: Positive for myalgias. Skin: Negative. Allergic/Immunologic: Negative. Neurological: Negative. Hematological: Negative. Psychiatric/Behavioral: Negative. Objective     Vital signs for last 24 hours:  Visit Vitals  BP (!) 144/99 (BP 1 Location: Left arm)   Pulse 100   Temp 98.9 °F (37.2 °C)   Resp 30   Ht 5' 9\" (1.753 m)   Wt 170 lb (77.1 kg)   SpO2 99%   BMI 25.10 kg/m²       Intake/Output this shift:  Current Shift: No intake/output data recorded. Last 3 Shifts: No intake/output data recorded.     Data Review:   Recent Results (from the past 24 hour(s))   EKG, 12 LEAD, INITIAL Collection Time: 10/15/20  6:48 PM   Result Value Ref Range    Ventricular Rate 99 BPM    Atrial Rate 99 BPM    P-R Interval 172 ms    QRS Duration 90 ms    Q-T Interval 342 ms    QTC Calculation (Bezet) 438 ms    Calculated P Axis 38 degrees    Calculated R Axis 43 degrees    Calculated T Axis 46 degrees    Diagnosis       Normal sinus rhythm  Normal ECG  No previous ECGs available  Confirmed by Charlie Aragon MD, German Mast ([id number]) on 10/16/2020 7:55:13 AM     CULTURE, BLOOD, PAIRED    Collection Time: 10/15/20  7:10 PM    Specimen: Blood   Result Value Ref Range    Special Requests: No Special Requests      Culture result: No growth after 12 hours     CBC WITH AUTOMATED DIFF    Collection Time: 10/15/20  7:15 PM   Result Value Ref Range    WBC 6.1 4.1 - 11.1 K/uL    RBC 5.04 4.10 - 5.70 M/uL    HGB 15.5 12.1 - 17.0 g/dL    HCT 44.2 36.6 - 50.3 %    MCV 87.7 80.0 - 99.0 FL    MCH 30.8 26.0 - 34.0 PG    MCHC 35.1 30.0 - 36.5 g/dL    RDW 13.3 11.5 - 14.5 %    PLATELET 804 142 - 790 K/uL    MPV 9.6 8.9 - 12.9 FL    NEUTROPHILS 76 (H) 32 - 75 %    LYMPHOCYTES 14 12 - 49 %    MONOCYTES 10 5 - 13 %    EOSINOPHILS 0 0 - 7 %    BASOPHILS 0 0 - 1 %    IMMATURE GRANULOCYTES 0 0.0 - 0.5 %    ABS. NEUTROPHILS 4.6 1.8 - 8.0 K/UL    ABS. LYMPHOCYTES 0.8 0.8 - 3.5 K/UL    ABS. MONOCYTES 0.6 0.0 - 1.0 K/UL    ABS. EOSINOPHILS 0.0 0.0 - 0.4 K/UL    ABS. BASOPHILS 0.0 0.0 - 0.1 K/UL    ABS. IMM.  GRANS. 0.0 0.00 - 0.04 K/UL    DF AUTOMATED     METABOLIC PANEL, COMPREHENSIVE    Collection Time: 10/15/20  7:15 PM   Result Value Ref Range    Sodium 137 136 - 145 mmol/L    Potassium 3.7 3.5 - 5.1 mmol/L    Chloride 104 97 - 108 mmol/L    CO2 26 21 - 32 mmol/L    Anion gap 7 5 - 15 mmol/L    Glucose 105 (H) 65 - 100 mg/dL    BUN 10 6 - 20 mg/dL    Creatinine 0.94 0.70 - 1.30 mg/dL    BUN/Creatinine ratio 11 (L) 12 - 20      GFR est AA >60 >60 ml/min/1.73m2    GFR est non-AA >60 >60 ml/min/1.73m2    Calcium 8.5 8.5 - 10.1 mg/dL    Bilirubin, total 0.9 0.2 - 1.0 mg/dL    AST (SGOT) 71 (H) 15 - 37 U/L    ALT (SGPT) 55 12 - 78 U/L    Alk. phosphatase 135 (H) 45 - 117 U/L    Protein, total 7.9 6.4 - 8.2 g/dL    Albumin 3.4 (L) 3.5 - 5.0 g/dL    Globulin 4.5 (H) 2.0 - 4.0 g/dL    A-G Ratio 0.8 (L) 1.1 - 2.2     LACTIC ACID    Collection Time: 10/15/20  7:15 PM   Result Value Ref Range    Lactic acid 1.9 0.4 - 2.0 mmol/L   CK W/ REFLX CKMB    Collection Time: 10/15/20  7:15 PM   Result Value Ref Range    .0 (H) 39 - 308 ng/mL   BNP    Collection Time: 10/15/20  7:15 PM   Result Value Ref Range    NT pro-BNP 63 <125 pg/mL   BLOOD GAS, ARTERIAL    Collection Time: 10/15/20  7:15 PM   Result Value Ref Range    pH 7.46 (H) 7.35 - 7.45      PCO2 32 (L) 35 - 45 mmHg    PO2 68 (L) 75 - 100 mmHg    O2 SAT 93 (L) >95 %    BICARBONATE 24 22 - 26 mmol/L    BASE DEFICIT 0.2 0 - 2 mmol/L    O2 METHOD NRB mask      O2 FLOW RATE 15 L/min    FIO2 100 %    Sample source Arterial      SITE Right Radial      KAT'S TEST Positive     TROPONIN I    Collection Time: 10/15/20  7:15 PM   Result Value Ref Range    Troponin-I, Qt. <0.05 <0.05 ng/mL   CK-MB,QUANT.     Collection Time: 10/15/20  7:15 PM   Result Value Ref Range    CK - MB 3.0 <3.6 ng/mL    CK-MB Index 1.0     SARS-COV-2    Collection Time: 10/15/20  9:00 PM   Result Value Ref Range    SARS-CoV-2 Please find results under separate order     LD    Collection Time: 10/15/20 11:47 PM   Result Value Ref Range     (H) 85 - 241 U/L   FERRITIN    Collection Time: 10/15/20 11:47 PM   Result Value Ref Range    Ferritin 2,182 (H) 26 - 388 ng/mL   C REACTIVE PROTEIN, QT    Collection Time: 10/15/20 11:47 PM   Result Value Ref Range    C-Reactive protein 12.40 (H) 0.00 - 0.60 mg/dL   CK    Collection Time: 10/15/20 11:47 PM   Result Value Ref Range     39 - 308 U/L   PROTHROMBIN TIME + INR    Collection Time: 10/15/20 11:47 PM   Result Value Ref Range    Prothrombin time 12.5 11.9 - 14.7 sec    INR 0.9 0.9 - 1.1     PTT Collection Time: 10/15/20 11:47 PM   Result Value Ref Range    aPTT 33.5 23.0 - 35.7 sec    aPTT, therapeutic range   68 - 109 sec   D DIMER    Collection Time: 10/15/20 11:47 PM   Result Value Ref Range    D DIMER 1.11 (H) <0.50 ug/ml(FEU)   FIBRINOGEN    Collection Time: 10/15/20 11:47 PM   Result Value Ref Range    Fibrinogen 620 (H) 220 - 535 mg/dL   TYPE & SCREEN    Collection Time: 10/16/20  5:15 AM   Result Value Ref Range    Crossmatch Expiration 10/19/2020,2359     ABO/Rh(D) PENDING     Antibody screen PENDING    PROCALCITONIN    Collection Time: 10/16/20  5:30 AM   Result Value Ref Range    Procalcitonin 0.14 (H) 0 ng/mL   CBC WITH AUTOMATED DIFF    Collection Time: 10/16/20  5:30 AM   Result Value Ref Range    WBC 6.1 4.1 - 11.1 K/uL    RBC 4.59 4. 10 - 5.70 M/uL    HGB 14.1 12.1 - 17.0 g/dL    HCT 40.3 36.6 - 50.3 %    MCV 87.8 80.0 - 99.0 FL    MCH 30.7 26.0 - 34.0 PG    MCHC 35.0 30.0 - 36.5 g/dL    RDW 13.4 11.5 - 14.5 %    PLATELET 349 709 - 634 K/uL    MPV 9.9 8.9 - 12.9 FL    NEUTROPHILS 75 32 - 75 %    LYMPHOCYTES 13 12 - 49 %    MONOCYTES 12 5 - 13 %    EOSINOPHILS 0 0 - 7 %    BASOPHILS 0 0 - 1 %    IMMATURE GRANULOCYTES 0 0.0 - 0.5 %    ABS. NEUTROPHILS 4.6 1.8 - 8.0 K/UL    ABS. LYMPHOCYTES 0.8 0.8 - 3.5 K/UL    ABS. MONOCYTES 0.7 0.0 - 1.0 K/UL    ABS. EOSINOPHILS 0.0 0.0 - 0.4 K/UL    ABS. BASOPHILS 0.0 0.0 - 0.1 K/UL    ABS. IMM.  GRANS. 0.0 0.00 - 0.04 K/UL    DF AUTOMATED     METABOLIC PANEL, COMPREHENSIVE    Collection Time: 10/16/20  5:30 AM   Result Value Ref Range    Sodium 138 136 - 145 mmol/L    Potassium 3.6 3.5 - 5.1 mmol/L    Chloride 106 97 - 108 mmol/L    CO2 25 21 - 32 mmol/L    Anion gap 7 5 - 15 mmol/L    Glucose 109 (H) 65 - 100 mg/dL    BUN 9 6 - 20 mg/dL    Creatinine 0.84 0.70 - 1.30 mg/dL    BUN/Creatinine ratio 11 (L) 12 - 20      GFR est AA >60 >60 ml/min/1.73m2    GFR est non-AA >60 >60 ml/min/1.73m2    Calcium 8.4 (L) 8.5 - 10.1 mg/dL    Bilirubin, total 0.8 0.2 - 1.0 mg/dL    AST (SGOT) 72 (H) 15 - 37 U/L    ALT (SGPT) 51 12 - 78 U/L    Alk. phosphatase 121 (H) 45 - 117 U/L    Protein, total 7.0 6.4 - 8.2 g/dL    Albumin 2.8 (L) 3.5 - 5.0 g/dL    Globulin 4.2 (H) 2.0 - 4.0 g/dL    A-G Ratio 0.7 (L) 1.1 - 2.2     PTT    Collection Time: 10/16/20  5:30 AM   Result Value Ref Range    aPTT 29.7 23.0 - 35.7 sec    aPTT, therapeutic range   68 - 109 sec     CXR (10/15) see HPI      Physical Exam  Vitals signs and nursing note reviewed. Constitutional:       General: He is not in acute distress. Appearance: Normal appearance. He is ill-appearing. He is not toxic-appearing or diaphoretic. HENT:      Head: Normocephalic and atraumatic. Nose: Nose normal.      Comments: High flow nasal O2  Eyes:      Extraocular Movements: Extraocular movements intact. Pupils: Pupils are equal, round, and reactive to light. Neck:      Musculoskeletal: Neck supple. Cardiovascular:      Rate and Rhythm: Normal rate and regular rhythm. Heart sounds: No murmur. Pulmonary:      Breath sounds: Rales present. No wheezing or rhonchi. Abdominal:      General: Abdomen is flat. Palpations: Abdomen is soft. Tenderness: There is no abdominal tenderness. Genitourinary:     Comments: No Granados  Musculoskeletal:      Right lower leg: No edema. Left lower leg: No edema. Skin:     Findings: No rash. Neurological:      General: No focal deficit present. Mental Status: He is alert and oriented to person, place, and time. Psychiatric:         Mood and Affect: Mood normal.         Behavior: Behavior normal.         Thought Content: Thought content normal.         Judgment: Judgment normal.         ASSESSMENT/PLAN:    1. Possible pneumonitis, etiology unclear, rule out Covid-19  2. Acute respiratory failure, on high flow nasal O2  3. Elevated CRP, LDH and ferritin, possibly secondary to #1.     Comment:  Moderate to high index of suspicion for Covid-19 given potential exposure despite masking, supported by elevated CRP, LDH and ferritin with normal WBC. 1. Continue Remdesivir, Decadron, Azithromycin  2. Follow-up Covid-19 results  3. Follow-up blood cultures  4. Monitor CRP, LDH, ferritin    Reynaldo Dc MD

## 2020-10-16 NOTE — CONSULTS
Consult  Pulmonary, Critical Care    Name: Ekaterina Green MRN: [id number]   : 1960 Hospital: [address] [place] [address]   Date: 10/16/2020  Admission date: 10/15/2020 Hospital Day: 2       Subjective/Interval History:   Patient seen in the ICU on high flow nasal oxygen at 70% with oxygen saturation 99. He gives a history of gradual worsening illness over the last week to 10 days with nonproductive cough fever generalized malaise nausea denies any vomiting although the ER note states that he had some vomiting. He seems awake and alert. Does not have any Covid contacts that he is aware of. States that his sense of taste and smell have been normal but terribly anorexic has not eaten anything in 3 or 4 days. Patient Active Problem List   Diagnosis Code    Acute respiratory failure due to COVID-19 ([address] U07.1, J96.00    Pneumonia J18.9    Acute respiratory failure ([address] J96.00       IMPRESSION:   1. Acute hypoxic respiratory failure requiring high flow nasal oxygen  2. Diffuse pulmonary infiltrates questionable atypical pneumonia very likely COVID-19 pneumonia  3. Nausea probable due to COVID-19 infection  4. Body mass index is 25.1 kg/m². RECOMMENDATIONS/PLAN:   1. Rehab seen on admission  2. Continue nasal high flow oxygen taper if possible  3. We will change Decadron to IV  4. Will empirically start remdesivir and Actemra  5. Await COVID-19 testing  6. We will suppress cough Tessalon         Subjective/Initial History:   I have reviewed the flowsheet and previous days notes. Seen earlier today on rounds. I was asked by Severo Montane, MD to see An Marybeth Green a 56 y.o.  male  in consultation for a chief complaint of acute hypoxic respiratory failure cough and community-acquired pneumonia          Pt now in CCU. Patient PCP: UNKNOWN  PMH:  has no past medical history on file. PSH:   has no past surgical history on file.    FHX: family history includes No Known Problems in his father and mother. SHX:  reports that he has never smoked. He has never used smokeless tobacco. He reports that he does not drink alcohol or use drugs. Systemic review:  General no chronic illness but over the last week he has had fever generalized malaise weight loss no appetite  Eyes no double vision or momentary blindness  ENT no sinus congestion drainage or facial pain  Skeletal has diffuse aches and pains no swollen tender joints  Endocrinologic no polyuria polydipsia  Neurologic no seizures or syncope  Gastrointestinal normally he has no problems but over this last week he has had nausea anorexia marked weight loss has not had any alteration of his sense of taste or smell  Genitourinary no discomfort or drainage on urination  Cardiovascular no history of heart disease chest pain has felt sweaty but no history of ankle edema.   Respiratory as mentioned above    No Known Allergies   MEDS:   Current Facility-Administered Medications   Medication    enoxaparin (LOVENOX) injection 30 mg    acetaminophen (TYLENOL) tablet 650 mg    Or    acetaminophen (TYLENOL) suppository 650 mg    sodium chloride (NS) flush 5-40 mL    sodium chloride (NS) flush 5-40 mL    ondansetron (ZOFRAN ODT) tablet 4 mg    Or    ondansetron (ZOFRAN) injection 4 mg    azithromycin (ZITHROMAX) 500 mg in 0.9% sodium chloride 250 mL (VIAL-MATE)    remdesivir 200 mg in 0.9% sodium chloride 250 mL IVPB    tocilizumab 400 mg in 0.9% sodium chloride 100 mL infusion    dexamethasone (DECADRON) 4 mg/mL injection 4 mg    [START ON 10/17/2020] remdesivir 100 mg in 0.9% sodium chloride 250 mL IVPB    0.9% sodium chloride infusion        Current Facility-Administered Medications:     enoxaparin (LOVENOX) injection 30 mg, 30 mg, SubCUTAneous, Q12H, Prudencio Story MD, 30 mg at 10/16/20 0911    acetaminophen (TYLENOL) tablet 650 mg, 650 mg, Oral, Q6H PRN, 650 mg at 10/16/20 0535 **OR** acetaminophen (TYLENOL) suppository 650 mg, 650 mg, Rectal, Q6H PRN, Malu Meneses MD    sodium chloride (NS) flush 5-40 mL, 5-40 mL, IntraVENous, Q8H, Parvin Sherman MD    sodium chloride (NS) flush 5-40 mL, 5-40 mL, IntraVENous, PRN, Malu Meneses MD    ondansetron (ZOFRAN ODT) tablet 4 mg, 4 mg, Oral, Q6H PRN **OR** ondansetron (ZOFRAN) injection 4 mg, 4 mg, IntraVENous, Q6H PRN, Malu Meneses MD    azithromycin (ZITHROMAX) 500 mg in 0.9% sodium chloride 250 mL (VIAL-MATE), 500 mg, IntraVENous, Q24H, Nabila Napoles MD    remdesivir 200 mg in 0.9% sodium chloride 250 mL IVPB, 200 mg, IntraVENous, ONCE, Leilani Bowie MD    tocilizumab 400 mg in 0.9% sodium chloride 100 mL infusion, 400 mg, IntraVENous, DAILY, Leilani Bowie MD    dexamethasone (DECADRON) 4 mg/mL injection 4 mg, 4 mg, IntraVENous, Q6H, Leilani Bowie MD    [START ON 10/17/2020] remdesivir 100 mg in 0.9% sodium chloride 250 mL IVPB, 100 mg, IntraVENous, Q24H, Leilani Bowie MD    0.9% sodium chloride infusion, 75 mL/hr, IntraVENous, CONTINUOUS, Malu Meneses MD, Last Rate: 75 mL/hr at 10/16/20 0300, 75 mL/hr at 10/16/20 0300      Objective:     Vital Signs: Telemetry:    normal sinus rhythm Intake/Output:   Visit Vitals  BP (!) 121/45 (BP 1 Location: Left arm)   Pulse 100   Temp 99 °F (37.2 °C)   Resp 29   Ht 5' 9\" (1.753 m)   Wt 77.1 kg (170 lb)   SpO2 97%   BMI 25.10 kg/m²       Temp (24hrs), Av.6 °F (37.6 °C), Min:98.8 °F (37.1 °C), Max:100.5 °F (38.1 °C)        O2 Device: Hi flow nasal cannula O2 Flow Rate (L/min): 50 l/min         Body mass index is 25.1 kg/m². Wt Readings from Last 4 Encounters:   10/15/20 77.1 kg (170 lb)        No intake or output data in the 24 hours ending 10/16/20 1027    Last shift:      No intake/output data recorded. Last 3 shifts: No intake/output data recorded. Ventilator Settings:      Mode Rate TV Press PEEP FiO2 PIP Min.  Vent               85 %            Physical Exam:    General:  male; no distress chronic cough mildly diaphoretic on nasal high flow oxygen  HEENT: NCAT, oral mucosa clear  Eyes: anicteric; conjunctiva clear extraocular movements intact   neck: no node no JVD no accessory muscle use  Chest: no deformity,   Cardiac: Regular rate and rhythm no murmurs no ankle edema  Lungs: Clear anteriorly with rales laterally and posteriorly  Abd: Soft nontender normal bowel sounds no organomegaly  Ext: no edema; no joint swelling; No clubbing  :clear urine  Neuro: Awake alert speech is clear moves all 4 extremities well  Psych- no agitation, oriented to person;   Skin: warm,, no cyanosis; very mildly diaphoretic  Pulses: Pulses intact  Capillary: Normal capillary refill      Labs:    Recent Labs     10/16/20  0530 10/15/20  2347 10/15/20  1915   WBC 6.1  --  6.1   HGB 14.1  --  15.5     --  159   INR  --  0.9  --    APTT 29.7 33.5  --      Recent Labs     10/16/20  0530 10/15/20  1915    137   K 3.6 3.7    104   CO2 25 26   * 105*   BUN 9 10   CREA 0.84 0.94   CA 8.4* 8.5   LAC  --  1.9   ALB 2.8* 3.4*   ALT 51 55     Recent Labs     10/15/20  1915   PH 7.46*   PCO2 32*   PO2 68*   HCO3 24   FIO2 100   On nasal high flow 60%  Recent Labs     10/15/20  2347 10/15/20  1915     --    CKNDX  --  1.0   TROIQ  --  <0.05       Procalcitonin 0.14  CRP 12.4  Lab Results   Component Value Date/Time    Culture result: No growth after 12 hours 10/15/2020 07:10 PM      Lab Results   Component Value Date/Time     10/15/2020 11:47 PM       Imaging:  I have personally reviewed the patients radiographs and have reviewed the reports:    CXR Results  (Last 48 hours)               10/15/20 1927  XR CHEST SNGL V Final result    Narrative:  1       Mild atelectasis in the bases with upper lungs clear. No effusion or   pneumothorax.  Normal heart and no congestion  Disagree to me there is bilateral infiltrate           Results from [facility] Encounter encounter on 10/15/20   XR CHEST SNGL V    Narrative 1    Mild atelectasis in the bases with upper lungs clear. No effusion or  pneumothorax. Normal heart and no congestion     Results from Hospital Encounter encounter on 10/15/20   CTA CHEST W OR W WO CONT    Narrative CT dose reduction was achieved through use of a standardized protocol tailored  for this examination and automatic exposure control for dose modulation. Contrast study maximum intensity projections shows pronounced groundglass  opacification, of variable density, mainly in the dependent portions of each  lung, apex to base. Air bronchograms are preserved in the densest portions. Mild  geographic groundglass opacification of lower density in the nondependent  portions. Central airways are open    Pulmonary arteries are well-opacified and show no filling defect or distortion. Aorta shows normal dimensions, without dissection. Tiny middle mediastinal lymph  nodes. Cardiac finding. No pleural pericardial effusion. No significant upper  abdominal or chest wall finding      Impression IMPRESSION: Widespread patchy bilateral pneumonitis       Discussion patient with very high likelihood of COVID-19 pneumonitis with hypoxia diffuse patchy infiltrates on CT scan and elevated LDH. Will start remdesivir and Actemra today. Switch Decadron to IV dosing. Continue nasal high flow oxygen. He is at high risk of requiring intubation and mechanical ventilation if he should have worsening. Time of care 50 minutes         Thank you for allowing us to participate in the care of this patient.   We will follow along with you    Weston Estrada MD

## 2020-10-16 NOTE — PROGRESS NOTES
Patient complaining of lower abdomen tenderness. Lower right abdomen tender when touched. Patient states he cannot void. Called Doctor Lesia Yuan. Urinary catheter ordered and placed and 900cc of jone urine immediately drained and catheter is still draining urine.

## 2020-10-16 NOTE — H&P
History and Physical              Subjective :   Chief Complaint : Fever, shortness of breath, hypoxia    Source of information : Patient himself, ED provider. History of present illness:   56 y.o. male with no significant medical problems presents to the emergency room complaining of shortness of breath. States for the last 10 days having fever associated with body aches. It is getting worse in intensity, started having nonproductive cough, tiredness and nausea. He also started having some vomiting. As started having difficulty breathing and no improvement in his symptoms went to the patient first, was found with hypoxia recommended to come to the emergency room. He is in significant respiratory distress, required high flow nasal cannula, patient did not realize how bad it is. He is stable when I seen him but still having temperature. Past medical history no medical problems    Past surgical history: Denies any significant medical problems. Family History   Problem Relation Age of Onset    No Known Problems Mother     No Known Problems Father       Social History     Tobacco Use    Smoking status: Never Smoker    Smokeless tobacco: Never Used   Substance Use Topics    Alcohol use: Never     Frequency: Never       No home medications. No Known Allergies          Review of Systems:  Constitutional: Appetite is not good, denies weight loss, +++ fever, +++ chills, generalized body aches. Eye: No recent visual disturbances, no discharge, no double vision. Ear/nose/mouth/throat : No hearing disturbance, no ear pain, no nasal congestion, no sore throat, no trouble swallowing. Respiratory : ++ trouble breathing, ++ nonproductive cough, +++ shortness of breath, no hemoptysis, no wheezing. Cardiovascular : No chest pain, no palpitation, no racing of heart, no orthopnea, no paroxysmal nocturnal dyspnea, no peripheral edema.   Gastrointestinal : No nausea, no vomiting, no diarrhea, No constipation, No heartburn, No abdominal pain. Genitourinary : No dysuria, no hematuria, no increased frequency, No incontinence. Lymphatics : No swollen glands -Neck, axillary, inguinal.  Endocrine : No excessive thirst, No polyuria No cold intolerance, No heat intolerance. Immunologic : No hives, urticaria, No seasonal allergies. Musculoskeletal : No joint swelling, No pain, No effusion,  No back pain, No neck pain. Integumentary : No rash, No pruritus, No ecchymosis. Hematology : No petechiae, No easy bruising,  No tendency to bleed easy. Neurology : Denies change in mental status, No abnormal balance, No headache, No confusion, No numbness or tingling. Psychiatric : No mood swings, No anxiety, No depression. Vitals:     Patient Vitals for the past 12 hrs:   Temp Pulse Resp BP SpO2   10/15/20 2113  88 28 (!) 127/94 95 %   10/15/20 2043  91 29 122/88 96 %   10/15/20 1837 98.8 °F (37.1 °C) 89 24 138/83 (!) 89 %       Physical Exam:   General : Looks very tired, no acute distress on high flow nasal cannula when I seen him. HEENT : PERRLA, normal oral mucosa, atraumatic normocephalic, Normal ear and nose. Neck : Supple, no JVD, no masses noted, no carotid bruit. Lungs : Breath sounds with good air entry bilaterally, no wheezes or rales, no accessory muscle use. CVS : Rhythm rate regular, S1+, S2+, no murmur or gallop. Abdomen : Soft, nontender, no organomegaly, bowel sounds active. Extremities : No edema noted,  pedal pulses palpable. Musculoskeletal : Fair range of motion, no joint swelling or effusion, muscle tone and power appears normal.   Skin : Moist, warm, skin turgor, no pathological rash. Lymphatic : No cervical lymphadenopathy. Neurological : Awake, alert, oriented to time place person. No neurological deficits. Psychiatric : Mood and affect appears appropriate to the situation.          Data Review:   Recent Results (from the past 24 hour(s))   CBC WITH AUTOMATED DIFF    Collection Time: 10/15/20 7:15 PM   Result Value Ref Range    WBC 6.1 4.1 - 11.1 K/uL    RBC 5.04 4.10 - 5.70 M/uL    HGB 15.5 12.1 - 17.0 g/dL    HCT 44.2 36.6 - 50.3 %    MCV 87.7 80.0 - 99.0 FL    MCH 30.8 26.0 - 34.0 PG    MCHC 35.1 30.0 - 36.5 g/dL    RDW 13.3 11.5 - 14.5 %    PLATELET 199 072 - 861 K/uL    MPV 9.6 8.9 - 12.9 FL    NEUTROPHILS 76 (H) 32 - 75 %    LYMPHOCYTES 14 12 - 49 %    MONOCYTES 10 5 - 13 %    EOSINOPHILS 0 0 - 7 %    BASOPHILS 0 0 - 1 %    IMMATURE GRANULOCYTES 0 0.0 - 0.5 %    ABS. NEUTROPHILS 4.6 1.8 - 8.0 K/UL    ABS. LYMPHOCYTES 0.8 0.8 - 3.5 K/UL    ABS. MONOCYTES 0.6 0.0 - 1.0 K/UL    ABS. EOSINOPHILS 0.0 0.0 - 0.4 K/UL    ABS. BASOPHILS 0.0 0.0 - 0.1 K/UL    ABS. IMM. GRANS. 0.0 0.00 - 0.04 K/UL    DF AUTOMATED     METABOLIC PANEL, COMPREHENSIVE    Collection Time: 10/15/20  7:15 PM   Result Value Ref Range    Sodium 137 136 - 145 mmol/L    Potassium 3.7 3.5 - 5.1 mmol/L    Chloride 104 97 - 108 mmol/L    CO2 26 21 - 32 mmol/L    Anion gap 7 5 - 15 mmol/L    Glucose 105 (H) 65 - 100 mg/dL    BUN 10 6 - 20 mg/dL    Creatinine 0.94 0.70 - 1.30 mg/dL    BUN/Creatinine ratio 11 (L) 12 - 20      GFR est AA >60 >60 ml/min/1.73m2    GFR est non-AA >60 >60 ml/min/1.73m2    Calcium 8.5 8.5 - 10.1 mg/dL    Bilirubin, total 0.9 0.2 - 1.0 mg/dL    AST (SGOT) 71 (H) 15 - 37 U/L    ALT (SGPT) 55 12 - 78 U/L    Alk.  phosphatase 135 (H) 45 - 117 U/L    Protein, total 7.9 6.4 - 8.2 g/dL    Albumin 3.4 (L) 3.5 - 5.0 g/dL    Globulin 4.5 (H) 2.0 - 4.0 g/dL    A-G Ratio 0.8 (L) 1.1 - 2.2     LACTIC ACID    Collection Time: 10/15/20  7:15 PM   Result Value Ref Range    Lactic acid 1.9 0.4 - 2.0 mmol/L   CK W/ REFLX CKMB    Collection Time: 10/15/20  7:15 PM   Result Value Ref Range    .0 (H) 39 - 308 ng/mL   BNP    Collection Time: 10/15/20  7:15 PM   Result Value Ref Range    NT pro-BNP 63 <125 pg/mL   BLOOD GAS, ARTERIAL    Collection Time: 10/15/20  7:15 PM   Result Value Ref Range    pH 7.46 (H) 7.35 - 7.45      PCO2 32 (L) 35 - 45 mmHg    PO2 68 (L) 75 - 100 mmHg    O2 SAT 93 (L) >95 %    BICARBONATE 24 22 - 26 mmol/L    BASE DEFICIT 0.2 0 - 2 mmol/L    O2 METHOD NRB mask      O2 FLOW RATE 15 L/min    FIO2 100 %    Sample source Arterial      SITE Right Radial      KAT'S TEST Positive     TROPONIN I    Collection Time: 10/15/20  7:15 PM   Result Value Ref Range    Troponin-I, Qt. <0.05 <0.05 ng/mL   SARS-COV-2    Collection Time: 10/15/20  9:00 PM   Result Value Ref Range    SARS-CoV-2 Please find results under separate order         Radiologic Studies :   CT Results  (Last 48 hours)               10/15/20 2021  CTA CHEST W OR W WO CONT Final result    Impression:  IMPRESSION: Widespread patchy bilateral pneumonitis       Narrative:  CT dose reduction was achieved through use of a standardized protocol tailored   for this examination and automatic exposure control for dose modulation. Contrast study maximum intensity projections shows pronounced groundglass   opacification, of variable density, mainly in the dependent portions of each   lung, apex to base. Air bronchograms are preserved in the densest portions. Mild   geographic groundglass opacification of lower density in the nondependent   portions. Central airways are open       Pulmonary arteries are well-opacified and show no filling defect or distortion. Aorta shows normal dimensions, without dissection. Tiny middle mediastinal lymph   nodes. Cardiac finding. No pleural pericardial effusion. No significant upper   abdominal or chest wall finding               CXR Results  (Last 48 hours)               10/15/20 1927  XR CHEST SNGL V Final result    Narrative:  1       Mild atelectasis in the bases with upper lungs clear. No effusion or   pneumothorax. Normal heart and no congestion               Assessment and Plan :   Acute hypoxic respiratory failure: Etiology most likely pneumonia suspected from COVID-19.   On high flow nasal cannula    Acute pneumonia confirmed with x-ray and CT scan with widespread patchy infiltrate highly suggestive of viral pneumonia. 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 Dehydration: Secondary to above, started on IV fluids. Admitted to ICU due to acute respiratory failure, his full CODE STATUS, has no home medications. Her spouse and family who helps making decisions. CC : No primary care physician. Signed By: Raquel Brown MD     October 15, 2020      This dictation was done by dragon, computer voice recognition software. Often unanticipated grammatical, syntax, [place] phones and other interpretive errors are inadvertently transcribed. Please excuse errors that have escaped final proofreading.

## 2020-10-17 ENCOUNTER — APPOINTMENT (OUTPATIENT)
Dept: GENERAL RADIOLOGY | Age: 60
DRG: 177 | End: 2020-10-17
Attending: INTERNAL MEDICINE
Payer: COMMERCIAL

## 2020-10-17 LAB
SARS-COV-2, COV2: NORMAL
SARS-COV-2, COV2NT: NOT DETECTED

## 2020-10-17 PROCEDURE — 74011250637 HC RX REV CODE- 250/637: Performed by: INTERNAL MEDICINE

## 2020-10-17 PROCEDURE — 74011250636 HC RX REV CODE- 250/636: Performed by: HOSPITALIST

## 2020-10-17 PROCEDURE — 77010033678 HC OXYGEN DAILY

## 2020-10-17 PROCEDURE — 71045 X-RAY EXAM CHEST 1 VIEW: CPT

## 2020-10-17 PROCEDURE — 5A09357 ASSISTANCE WITH RESPIRATORY VENTILATION, LESS THAN 24 CONSECUTIVE HOURS, CONTINUOUS POSITIVE AIRWAY PRESSURE: ICD-10-PCS | Performed by: EMERGENCY MEDICINE

## 2020-10-17 PROCEDURE — 74011250636 HC RX REV CODE- 250/636: Performed by: INTERNAL MEDICINE

## 2020-10-17 PROCEDURE — 65610000006 HC RM INTENSIVE CARE

## 2020-10-17 PROCEDURE — 87635 SARS-COV-2 COVID-19 AMP PRB: CPT

## 2020-10-17 PROCEDURE — 94660 CPAP INITIATION&MGMT: CPT

## 2020-10-17 PROCEDURE — 74011000258 HC RX REV CODE- 258: Performed by: INTERNAL MEDICINE

## 2020-10-17 PROCEDURE — 74011250637 HC RX REV CODE- 250/637: Performed by: HOSPITALIST

## 2020-10-17 PROCEDURE — 99232 SBSQ HOSP IP/OBS MODERATE 35: CPT | Performed by: INTERNAL MEDICINE

## 2020-10-17 RX ADMIN — BENZONATATE 200 MG: 100 CAPSULE ORAL at 21:25

## 2020-10-17 RX ADMIN — ENOXAPARIN SODIUM 30 MG: 30 INJECTION SUBCUTANEOUS at 07:41

## 2020-10-17 RX ADMIN — BENZONATATE 200 MG: 100 CAPSULE ORAL at 07:41

## 2020-10-17 RX ADMIN — Medication 10 ML: at 22:00

## 2020-10-17 RX ADMIN — DEXAMETHASONE SODIUM PHOSPHATE 4 MG: 4 INJECTION, SOLUTION INTRA-ARTICULAR; INTRALESIONAL; INTRAMUSCULAR; INTRAVENOUS; SOFT TISSUE at 12:00

## 2020-10-17 RX ADMIN — ACETAMINOPHEN 650 MG: 325 TABLET, FILM COATED ORAL at 21:26

## 2020-10-17 RX ADMIN — DEXAMETHASONE SODIUM PHOSPHATE 4 MG: 4 INJECTION, SOLUTION INTRA-ARTICULAR; INTRALESIONAL; INTRAMUSCULAR; INTRAVENOUS; SOFT TISSUE at 07:41

## 2020-10-17 RX ADMIN — ACETAMINOPHEN 650 MG: 325 TABLET, FILM COATED ORAL at 16:18

## 2020-10-17 RX ADMIN — Medication 10 ML: at 07:43

## 2020-10-17 RX ADMIN — BENZONATATE 200 MG: 100 CAPSULE ORAL at 14:41

## 2020-10-17 RX ADMIN — AZITHROMYCIN MONOHYDRATE 500 MG: 500 INJECTION, POWDER, LYOPHILIZED, FOR SOLUTION INTRAVENOUS at 10:36

## 2020-10-17 RX ADMIN — DEXAMETHASONE SODIUM PHOSPHATE 4 MG: 4 INJECTION, SOLUTION INTRA-ARTICULAR; INTRALESIONAL; INTRAMUSCULAR; INTRAVENOUS; SOFT TISSUE at 17:26

## 2020-10-17 RX ADMIN — TOCILIZUMAB 400 MG: 20 INJECTION, SOLUTION, CONCENTRATE INTRAVENOUS at 12:08

## 2020-10-17 RX ADMIN — ENOXAPARIN SODIUM 30 MG: 30 INJECTION SUBCUTANEOUS at 17:27

## 2020-10-17 NOTE — PROGRESS NOTES
Consult  Pulmonary, Critical Care    Name: Nguyễn Tobar MRN: [id number]   : 1960 Hospital: [facility]   Date: 10/17/2020  Admission date: 10/15/2020 Hospital Day: 3       Subjective/Interval History:   Patient seen in the ICU on high flow nasal oxygen at 70% with oxygen saturation 99. He gives a history of gradual worsening illness over the last week to 10 days with nonproductive cough fever generalized malaise nausea denies any vomiting although the ER note states that he had some vomiting. He seems awake and alert. Does not have any Covid contacts that he is aware of. States that his sense of taste and smell have been normal but terribly anorexic has not eaten anything in 3 or 4 days. 10/17 still feels bad Cough shortness of breath but no nausea today    Patient Active Problem List   Diagnosis Code    Acute respiratory failure due to COVID-19 ([address] U07.1, J96.00    Pneumonia J18.9    Acute respiratory failure ([address] J96.00       IMPRESSION:   1. Acute hypoxic respiratory failure requiring high flow nasal oxygen and is on 70% high flow  2. Diffuse pulmonary infiltrates questionable atypical pneumonia possible COVID-19  3. Nausea probable due to COVID-19 infection  Body mass index is 25.1 kg/m². RECOMMENDATIONS/PLAN:   1. Initial COVID-19 test is negative but ferritin LDH CRP are all elevated chest x-ray is consistent with her with we will repeat testing today continue Decadron and Actemra will hold Remdesivir  2. Continue high flow nasal oxygen  3. Repeat COVID-19  4. Check Legionella antigen and titer continue azithromycin  5. We will suppress cough Tessalon         Subjective/Initial History:   I have reviewed the flowsheet and previous days notes. Seen earlier today on rounds. I was asked by Wolfgang Scott MD to see Nguyễn Tobar a 56 y.o.    male  in consultation for a chief complaint of acute hypoxic respiratory failure cough and community-acquired pneumonia          Pt now in CCU. Patient PCP: UNKNOWN  PMH:  has no past medical history on file. PSH:   has no past surgical history on file. FHX: family history includes No Known Problems in his father and mother. SHX:  reports that he has never smoked. He has never used smokeless tobacco. He reports that he does not drink alcohol or use drugs. Systemic review:  General no chronic illness but over the last week he has had fever generalized malaise weight loss no appetite  Eyes no double vision or momentary blindness  ENT no sinus congestion drainage or facial pain  Skeletal has diffuse aches and pains no swollen tender joints  Endocrinologic no polyuria polydipsia  Neurologic no seizures or syncope  Gastrointestinal normally he has no problems but over this last week he has had nausea anorexia marked weight loss has not had any alteration of his sense of taste or smell  Genitourinary no discomfort or drainage on urination  Cardiovascular no history of heart disease chest pain has felt sweaty but no history of ankle edema.   Respiratory as mentioned above    No Known Allergies   MEDS:   Current Facility-Administered Medications   Medication    tocilizumab (ACTEMRA) 400 mg in 0.9% sodium chloride 100 mL infusion    enoxaparin (LOVENOX) injection 30 mg    acetaminophen (TYLENOL) tablet 650 mg    Or    acetaminophen (TYLENOL) suppository 650 mg    sodium chloride (NS) flush 5-40 mL    sodium chloride (NS) flush 5-40 mL    ondansetron (ZOFRAN ODT) tablet 4 mg    Or    ondansetron (ZOFRAN) injection 4 mg    azithromycin (ZITHROMAX) 500 mg in 0.9% sodium chloride 250 mL (VIAL-MATE)    dexamethasone (DECADRON) 4 mg/mL injection 4 mg    benzonatate (TESSALON) capsule 200 mg        Current Facility-Administered Medications:     tocilizumab (ACTEMRA) 400 mg in 0.9% sodium chloride 100 mL infusion, 400 mg, IntraVENous, ONCE, Anthony Bedolla MD    enoxaparin (LOVENOX) injection 30 mg, 30 mg, SubCUTAneous, Q12H, Lidia Vickers MD, 30 mg at 10/17/20 0741    acetaminophen (TYLENOL) tablet 650 mg, 650 mg, Oral, Q6H PRN, 650 mg at 10/16/20 1212 **OR** acetaminophen (TYLENOL) suppository 650 mg, 650 mg, Rectal, Q6H PRN, Lidia Vickers MD    sodium chloride (NS) flush 5-40 mL, 5-40 mL, IntraVENous, Q8H, Lidia Vickers MD, 10 mL at 10/17/20 0743    sodium chloride (NS) flush 5-40 mL, 5-40 mL, IntraVENous, PRN, Lidia Vickers MD    ondansetron (ZOFRAN ODT) tablet 4 mg, 4 mg, Oral, Q6H PRN **OR** ondansetron (ZOFRAN) injection 4 mg, 4 mg, IntraVENous, Q6H PRN, Lidia Vickers MD    azithromycin (ZITHROMAX) 500 mg in 0.9% sodium chloride 250 mL (VIAL-MATE), 500 mg, IntraVENous, Q24H, Chris Carney MD, 500 mg at 10/17/20 1036    dexamethasone (DECADRON) 4 mg/mL injection 4 mg, 4 mg, IntraVENous, Q6H, Emily Dugan MD, 4 mg at 10/17/20 0741    benzonatate (TESSALON) capsule 200 mg, 200 mg, Oral, Q8H, Emily Dugan MD, 200 mg at 10/17/20 0741      Objective:     Vital Signs: Telemetry:    normal sinus rhythm Intake/Output:   Visit Vitals  /76   Pulse 100   Temp 98.3 °F (36.8 °C)   Resp 28   Ht 5' 9\" (1.753 m)   Wt 77.1 kg (170 lb)   SpO2 97%   BMI 25.10 kg/m²       Temp (24hrs), Av.6 °F (37 °C), Min:98.3 °F (36.8 °C), Max:98.9 °F (37.2 °C)        O2 Device: Heated, Hi flow nasal cannula O2 Flow Rate (L/min): 60 l/min         Body mass index is 25.1 kg/m². Wt Readings from Last 4 Encounters:   10/15/20 77.1 kg (170 lb)          Intake/Output Summary (Last 24 hours) at 10/17/2020 1047  Last data filed at 10/16/2020 1859  Gross per 24 hour   Intake 2200 ml   Output 800 ml   Net 1400 ml       Last shift:      No intake/output data recorded. Last 3 shifts: 10/15 1901 - 10/17 0700  In: 2200 [P.O.:800; I.V.:1400]  Out: 800 [Urine:800]   Ventilator Settings:      Mode Rate TV Press PEEP FiO2 PIP Min.  Vent               70 %            Physical Exam:    General:  male; no distress chronic cough nonproductive  HEENT: NCAT, oral mucosa clear  Eyes: anicteric; conjunctiva clear extraocular movements intact   neck: no node no JVD no accessory muscle use  Chest: no deformity,   Cardiac: Regular rate and rhythm no murmurs no ankle edema  Lungs: Clear anteriorly with rales laterally and posteriorly  Abd: Soft nontender normal bowel sounds no organomegaly  Ext: no edema; no joint swelling; No clubbing  :clear urine  Neuro: Awake alert speech is clear moves all 4 extremities well  Psych- no agitation, oriented to person;   Skin: warm,, no cyanosis; very mildly diaphoretic  Pulses: Pulses intact  Capillary: Normal capillary refill      Labs:    Recent Labs     10/16/20  0530 10/15/20  2347 10/15/20  1915   WBC 6.1  --  6.1   HGB 14.1  --  15.5     --  159   INR  --  0.9  --    APTT 29.7 33.5  --      Recent Labs     10/16/20  0530 10/15/20  1915    137   K 3.6 3.7    104   CO2 25 26   * 105*   BUN 9 10   CREA 0.84 0.94   CA 8.4* 8.5   LAC  --  1.9   ALB 2.8* 3.4*   ALT 51 55     Recent Labs     10/15/20  1915   PH 7.46*   PCO2 32*   PO2 68*   HCO3 24   FIO2 100   On nasal high flow 60%  Recent Labs     10/15/20  2347 10/15/20  1915     --    CKNDX  --  1.0   TROIQ  --  <0.05   10/17 nasal high flow oxygen 70% for oxygen saturation 95%  10/14 COVID-19 test negative  10 2180    Procalcitonin 0.14  CRP 12.4  Lab Results   Component Value Date/Time    Culture result: No growth after 12 hours 10/15/2020 07:10 PM      Lab Results   Component Value Date/Time     10/15/2020 11:47 PM       Imaging:  I have personally reviewed the patients radiographs and have reviewed the reports:    CXR Results  (Last 48 hours)  10/17 chest x-ray unchanged diffuse bilateral infiltrate               10/15/20 1927  XR CHEST SNGL V Final result    Narrative:  1       Mild atelectasis in the bases with upper lungs clear.  No effusion or pneumothorax. Normal heart and no congestion  Disagree to me there is bilateral infiltrate           Results from Hospital Encounter encounter on 10/15/20   XR CHEST PORT    Narrative Chest single view. Comparison single view chest October 15, 2020    Hazy alveolar opacities generally similar compared to prior imaging. Cardiac and  mediastinal structures unchanged. No pneumothorax or pleural effusion. XR CHEST SNGL V    Narrative 1    Mild atelectasis in the bases with upper lungs clear. No effusion or  pneumothorax. Normal heart and no congestion     Results from Hospital Encounter encounter on 10/15/20   CTA CHEST W OR W WO CONT    Narrative CT dose reduction was achieved through use of a standardized protocol tailored  for this examination and automatic exposure control for dose modulation. Contrast study maximum intensity projections shows pronounced groundglass  opacification, of variable density, mainly in the dependent portions of each  lung, apex to base. Air bronchograms are preserved in the densest portions. Mild  geographic groundglass opacification of lower density in the nondependent  portions. Central airways are open    Pulmonary arteries are well-opacified and show no filling defect or distortion. Aorta shows normal dimensions, without dissection. Tiny middle mediastinal lymph  nodes. Cardiac finding. No pleural pericardial effusion. No significant upper  abdominal or chest wall finding      Impression IMPRESSION: Widespread patchy bilateral pneumonitis       Clinical picture consistent with COVID-19 infection with pneumonitis with diffuse patchy pulmonary infiltrate elevation in ferritin LDH and CRP nonproductive cough and GI symptoms and yet initial COVID-19 test negative will repeat test today continue azithromycin Actemra and Decadron will hold Remdesivir    Time of care 40 minutes         Thank you for allowing us to participate in the care of this patient.   We will follow along with you    Reyes Junker, MD

## 2020-10-17 NOTE — PROGRESS NOTES
Hospitalist Progress Note               Daily Progress Note: 10/17/2020      Subjective: The patient is seen for follow  up.   56 y.o. male with no significant medical problems presents to the emergency room complaining of shortness of breath. Patient had a urinary retention which was resolved with Granados catheter placement. Patient continues to be on high flow oxygen. Medications reviewed  Current Facility-Administered Medications   Medication Dose Route Frequency    enoxaparin (LOVENOX) injection 30 mg  30 mg SubCUTAneous Q12H    acetaminophen (TYLENOL) tablet 650 mg  650 mg Oral Q6H PRN    Or    acetaminophen (TYLENOL) suppository 650 mg  650 mg Rectal Q6H PRN    sodium chloride (NS) flush 5-40 mL  5-40 mL IntraVENous Q8H    sodium chloride (NS) flush 5-40 mL  5-40 mL IntraVENous PRN    ondansetron (ZOFRAN ODT) tablet 4 mg  4 mg Oral Q6H PRN    Or    ondansetron (ZOFRAN) injection 4 mg  4 mg IntraVENous Q6H PRN    azithromycin (ZITHROMAX) 500 mg in 0.9% sodium chloride 250 mL (VIAL-MATE)  500 mg IntraVENous Q24H    dexamethasone (DECADRON) 4 mg/mL injection 4 mg  4 mg IntraVENous Q6H    benzonatate (TESSALON) capsule 200 mg  200 mg Oral Q8H    remdesivir 100 mg in 0.9% sodium chloride 250 mL IVPB  100 mg IntraVENous Q24H       Review of Systems:   A comprehensive review of systems was negative except for that written in the HPI. Objective:   Physical Exam:     Visit Vitals  /76   Pulse 100   Temp 98.3 °F (36.8 °C)   Resp 28   Ht 5' 9\" (1.753 m)   Wt 77.1 kg (170 lb)   SpO2 97%   BMI 25.10 kg/m²    O2 Flow Rate (L/min): 60 l/min O2 Device: Heated, Hi flow nasal cannula    Temp (24hrs), Av.6 °F (37 °C), Min:98.3 °F (36.8 °C), Max:98.9 °F (37.2 °C)    No intake/output data recorded. 10/15 1901 - 10/17 0700  In: 2200 [P.O.:800; I.V.:1400]  Out: 800 [Urine:800]    PHYSICAL EXAM:  General: Alert and awake. Skin: Extremities and face reveal no rashes. HEENT: Sclerae anicteric. Extra-occular muscles are intact. No oral ulcers. No ENT discharge. The neck is supple. Cardiovascular: Regular rate and rhythm. No murmurs, gallops, or rubs. PMI nondisplaced. Carotids without bruits. Respiratory: Comfortable breathing with no accessory muscle use. Clear breath sounds with no wheezes, rales, or rhonchi. GI: Abdomen nondistended, soft, and nontender. Normal active bowel sounds. No enlargement of the liver or spleen. No masses palpable. Rectal: Deferred   Musculoskeletal: No pitting edema of the lower legs. Extremities have good range of motion. No costovertebral tenderness. Neurological: Gross memory appears intact. Patient is alert and oriented. Power 5/5, Cranial nerves II-XII intact  Psychiatric: Mood appears appropriate with judgement intact.      Data Review:       Recent Days:  Recent Labs     10/16/20  0530 10/15/20  1915   WBC 6.1 6.1   HGB 14.1 15.5   HCT 40.3 44.2    159     Recent Labs     10/16/20  0530 10/15/20  2347 10/15/20  1915     --  137   K 3.6  --  3.7     --  104   CO2 25  --  26   *  --  105*   BUN 9  --  10   CREA 0.84  --  0.94   CA 8.4*  --  8.5   ALB 2.8*  --  3.4*   TBILI 0.8  --  0.9   ALT 51  --  55   INR  --  0.9  --      Recent Labs     10/15/20  1915   PH 7.46*   PCO2 32*   PO2 68*   HCO3 24   FIO2 100       24 Hour Results:  Recent Results (from the past 24 hour(s))   URINALYSIS W/ REFLEX CULTURE    Collection Time: 10/16/20  7:45 PM    Specimen: Urine   Result Value Ref Range    Color Yellow/Straw      Appearance Clear Clear      Specific gravity >1.030 (H) 1.003 - 1.030    pH (UA) 6.0 5.0 - 8.0      Protein 100 (A) Negative mg/dL    Glucose >300 (A) Negative mg/dL    Ketone 5 (A) Negative mg/dL    Bilirubin Negative Negative      Blood Negative Negative      Urobilinogen 2.0 (H) 0.1 - 1.0 EU/dL    Nitrites Negative Negative      Leukocyte Esterase Negative Negative      UA:UC IF INDICATED Culture not indicated by UA result Culture not indicated by UA result      WBC 0-4 0 - 4 /hpf    RBC 0-5 0 - 5 /hpf    Bacteria Negative Negative /hpf    Mucus Trace /lpf       XR CHEST PORT   Final Result      CTA CHEST W OR W WO CONT   Final Result   IMPRESSION: Widespread patchy bilateral pneumonitis      XR CHEST SNGL V   Final Result             Assessment/     Problem List:  Hospital Problems  Date Reviewed: 10/15/2020          Codes Class Noted POA    Acute respiratory failure due to COVID-19 [facility]) ICD-10-CM: U07.1, J96.00  ICD-9-CM: 518.81, 079.89  10/15/2020 Yes        Pneumonia ICD-10-CM: J18.9  ICD-9-CM: 387  10/15/2020 Yes        Acute respiratory failure (HCC) ICD-10-CM: J96.00  ICD-9-CM: 518.81  10/15/2020 Unknown                     Plan:  Acute hypoxic respiratory failure: Etiology most likely pneumonia suspected from COVID-19. On high flow nasal cannula. Started on zithromax, [place] and remdisavir. Clinically showing some improvement     Acute pneumonia confirmed with x-ray and CT scan with widespread patchy infiltrate highly suggestive of viral pneumonia. Patient is highly suspicious for Covid pneumonia. His Covid test has come back negative. Will repeat the test to confirm with the second result.     Dehydration: Secondary to above, started on IV fluids.     Admitted to ICU due to acute respiratory failure, his full CODE STATUS, has no home medications. Her spouse and family who helps making decisions. Care Plan discussed with: Patient/Family and Nurse    Total time spent with patient: 30 minutes.     Toney Siddiqui MD

## 2020-10-17 NOTE — PROGRESS NOTES
Spiritual Care Assessment/Progress Note  [facility]      NAME: Dhruv Tobar      MRN: [id number]  AGE: 56 y.o.  SEX: male  Zoroastrianism Affiliation: Unknown   Language: English     10/17/2020     Total Time (in minutes): 15     Spiritual Assessment begun in [facility] CCU through conversation with:         [x]Patient        [] Family    [] Friend(s)        Reason for Consult: Request by staff     Spiritual beliefs: (Please include comment if needed)     [] Identifies with a martin tradition:         [] Supported by a martin community:            [] Claims no spiritual orientation:           [] Seeking spiritual identity:                [x] Adheres to an individual form of spirituality:           [] Not able to assess:                           Identified resources for coping:      [] Prayer                               [] Music                  [] Guided Imagery     [] Family/friends                 [] Pet visits     [] Devotional reading                         [] Unknown     [x] Other: all Zoroastrianism       Interventions offered during this visit: (See comments for more details)    Patient Interventions: Catharsis/review of pertinent events in supportive environment, Initial/Spiritual assessment, Critical care, Normalization of emotional/spiritual concerns, Prayer (assurance of), Zoroastrianism beliefs/image of God discussed, Life review/legacy           Plan of Care:     [] Support spiritual and/or cultural needs    [] Support AMD and/or advance care planning process      [] Support grieving process   [] Coordinate Rites and/or Rituals    [] Coordination with community clergy   [] No spiritual needs identified at this time   [] Detailed Plan of Care below (See Comments)  [] Make referral to Music Therapy  [] Make referral to Pet Therapy     [] Make referral to Addiction services  [] Make referral to [facility]  [] Make referral to Spiritual Care Partner  [] No future visits requested        [x] Follow up visits as needed     Comments: While rounding the ICU unit, patient's nurse requested to visit his [address] patient. Only the patient was present during the visit. Patient shared about his family and immigration history as well as his recent medical history which he suspects is covid. Patient stated he does not adhere to any particular Roman Catholic and seeks support from various traditions. Patient shared thoughts that came to his mind while in the hospital such as his mortality and caring for all living creatures. Patient seemed glad his wife is not sick and seems to grieve not coming to hospital sooner. I facilitated story-telling and reflected his regret of trying to improve at home. I normalized his concerns and desire to get better. I advised of  availability should he need further support. Chaplains will follow as able and/or needed.   Chaplain Alexia Gustafson M.Div.

## 2020-10-17 NOTE — PROGRESS NOTES
Infectious Disease Progress Note               Subjective:   Pt seen on f/u for Dr Divya Upton. He was seen on consult by Dr Divya Upton on 10/16 for acute respiratory failure due to suspected COVID-19 however test for COVID-19 came back negative today. He remains in the ICU  on high flow oxygen, but reported feeling much better today. Remdesivir was discontinued earlier today by Dr Nery Tariq. He is s/p 2 doses of Actemra. Objective:   Physical Exam:     Visit Vitals  /61   Pulse 100   Temp 98.4 °F (36.9 °C)   Resp 25   Ht 5' 9\" (1.753 m)   Wt 77.1 kg (170 lb)   SpO2 96%   BMI 25.10 kg/m²    O2 Flow Rate (L/min): 60 l/min O2 Device: Heated, Hi flow nasal cannula    Temp (24hrs), Av.3 °F (36.8 °C), Min:98.2 °F (36.8 °C), Max:98.4 °F (36.9 °C)    No intake/output data recorded. 10/15 1901 - 10/17 0700  In: 2200 [P.O.:800; I.V.:1400]  Out: 800 [Urine:800]    General: NAD, alert, AAO x 4  HEENT: JUHI, Moist mucosa, High flow oxygen in place  Lungs: Decrease at the bases, no wheeze/rhonchi  Heart: S1S2+, RRR, no murmur  Abdo: Soft, NT, ND, +BS  Exts: No edema, + pulses b/l   Skin: No wounds, No rashes or lesions    Data Review:       Recent Days:  Recent Labs     10/16/20  0530 10/15/20  1915   WBC 6.1 6.1   HGB 14.1 15.5   HCT 40.3 44.2    159     Recent Labs     10/16/20  0530 10/15/20  1915   BUN 9 10   CREA 0.84 0.94       Lab Results   Component Value Date/Time    C-Reactive protein 12.40 (H) 10/15/2020 11:47 PM       Microbiology   - Blood Cx 10/15: Neg     Diagnostics   CXR Results  (Last 48 hours)               10/17/20 0308  XR CHEST PORT Final result    Narrative:  Chest single view. Comparison single view chest October 15, 2020       Hazy alveolar opacities generally similar compared to prior imaging. Cardiac and   mediastinal structures unchanged. No pneumothorax or pleural effusion.        10/15/20 1927  XR CHEST SNGL V Final result    Narrative:  1       Mild atelectasis in the bases with upper lungs clear. No effusion or   pneumothorax. Normal heart and no congestion            CT chest 10/15: Widespread patchy bilateral pneumonitis    Assessment/Plan     1. Acute hypoxic respiratory failure due to suspected COVID-19       Widespread patchy bilateral pneumonitis on CT chest (10/15)        Remains on high flow O2, reports non-productive cough        Normal WBC, LDH of 754 and Ferritin 2,182       Pt tested neg for COVID-19, ? False neg, agree w repeating test given high index of suspicion for COVID-19       S/p 2 doses of Actemra. Remdesivir discontinued by Pulmonary        Continue on decadron and Azithromycin       Routine labs in the morning     2. CAP likely due to atypical PNA: Agree w testing urine for legionella Ag     3. Generalized weakness: Due to acute illness    4.  Elevated inflammatory markers due to # 1    Lam Kong MD    10/17/2020

## 2020-10-18 ENCOUNTER — APPOINTMENT (OUTPATIENT)
Dept: GENERAL RADIOLOGY | Age: 60
DRG: 177 | End: 2020-10-18
Attending: INTERNAL MEDICINE
Payer: COMMERCIAL

## 2020-10-18 LAB
ALBUMIN SERPL-MCNC: 2.8 G/DL (ref 3.5–5)
ANION GAP SERPL CALC-SCNC: 5 MMOL/L (ref 5–15)
APTT PPP: 24.8 SEC (ref 23–35.7)
BASOPHILS # BLD: 0 K/UL (ref 0–0.1)
BASOPHILS NFR BLD: 0 % (ref 0–1)
BUN SERPL-MCNC: 19 MG/DL (ref 6–20)
BUN/CREAT SERPL: 26 (ref 12–20)
CA-I BLD-MCNC: 8.4 MG/DL (ref 8.5–10.1)
CHLORIDE SERPL-SCNC: 107 MMOL/L (ref 97–108)
CO2 SERPL-SCNC: 26 MMOL/L (ref 21–32)
CREAT SERPL-MCNC: 0.74 MG/DL (ref 0.7–1.3)
CRP SERPL-MCNC: 3 MG/DL (ref 0–0.6)
DIFFERENTIAL METHOD BLD: ABNORMAL
EOSINOPHIL # BLD: 0 K/UL (ref 0–0.4)
EOSINOPHIL NFR BLD: 0 % (ref 0–7)
ERYTHROCYTE [DISTWIDTH] IN BLOOD BY AUTOMATED COUNT: 13 % (ref 11.5–14.5)
FIBRINOGEN PPP-MCNC: 517 MG/DL (ref 220–535)
GLUCOSE SERPL-MCNC: 129 MG/DL (ref 65–100)
HCT VFR BLD AUTO: 41.8 % (ref 36.6–50.3)
HGB BLD-MCNC: 15 G/DL (ref 12.1–17)
IMM GRANULOCYTES # BLD AUTO: 0 K/UL (ref 0–0.04)
IMM GRANULOCYTES NFR BLD AUTO: 0 % (ref 0–0.5)
INR PPP: 1.1 (ref 0.9–1.1)
LYMPHOCYTES # BLD: 0.8 K/UL (ref 0.8–3.5)
LYMPHOCYTES NFR BLD: 6 % (ref 12–49)
MCH RBC QN AUTO: 31.2 PG (ref 26–34)
MCHC RBC AUTO-ENTMCNC: 35.9 G/DL (ref 30–36.5)
MCV RBC AUTO: 86.9 FL (ref 80–99)
MONOCYTES # BLD: 1.2 K/UL (ref 0–1)
MONOCYTES NFR BLD: 9 % (ref 5–13)
NEUTS SEG # BLD: 11.6 K/UL (ref 1.8–8)
NEUTS SEG NFR BLD: 85 % (ref 32–75)
PHOSPHATE SERPL-MCNC: 3.5 MG/DL (ref 2.6–4.7)
PLATELET # BLD AUTO: 288 K/UL (ref 150–400)
PMV BLD AUTO: 9.7 FL (ref 8.9–12.9)
POTASSIUM SERPL-SCNC: 3.6 MMOL/L (ref 3.5–5.1)
PROTHROMBIN TIME: 13.9 SEC (ref 11.9–14.7)
RBC # BLD AUTO: 4.81 M/UL (ref 4.1–5.7)
SODIUM SERPL-SCNC: 138 MMOL/L (ref 136–145)
THERAPEUTIC RANGE,PTTT: NORMAL SEC (ref 68–109)
WBC # BLD AUTO: 13.6 K/UL (ref 4.1–11.1)

## 2020-10-18 PROCEDURE — 74011000258 HC RX REV CODE- 258: Performed by: INTERNAL MEDICINE

## 2020-10-18 PROCEDURE — 85610 PROTHROMBIN TIME: CPT

## 2020-10-18 PROCEDURE — 85730 THROMBOPLASTIN TIME PARTIAL: CPT

## 2020-10-18 PROCEDURE — 80069 RENAL FUNCTION PANEL: CPT

## 2020-10-18 PROCEDURE — 94660 CPAP INITIATION&MGMT: CPT

## 2020-10-18 PROCEDURE — 74011250636 HC RX REV CODE- 250/636: Performed by: HOSPITALIST

## 2020-10-18 PROCEDURE — 74011250636 HC RX REV CODE- 250/636: Performed by: INTERNAL MEDICINE

## 2020-10-18 PROCEDURE — 36415 COLL VENOUS BLD VENIPUNCTURE: CPT

## 2020-10-18 PROCEDURE — 86140 C-REACTIVE PROTEIN: CPT

## 2020-10-18 PROCEDURE — 74011250637 HC RX REV CODE- 250/637: Performed by: HOSPITALIST

## 2020-10-18 PROCEDURE — 74011000250 HC RX REV CODE- 250: Performed by: INTERNAL MEDICINE

## 2020-10-18 PROCEDURE — 65610000006 HC RM INTENSIVE CARE

## 2020-10-18 PROCEDURE — 99232 SBSQ HOSP IP/OBS MODERATE 35: CPT | Performed by: INTERNAL MEDICINE

## 2020-10-18 PROCEDURE — 85384 FIBRINOGEN ACTIVITY: CPT

## 2020-10-18 PROCEDURE — 77010033678 HC OXYGEN DAILY

## 2020-10-18 PROCEDURE — 74011250637 HC RX REV CODE- 250/637: Performed by: INTERNAL MEDICINE

## 2020-10-18 PROCEDURE — 85025 COMPLETE CBC W/AUTO DIFF WBC: CPT

## 2020-10-18 PROCEDURE — 71045 X-RAY EXAM CHEST 1 VIEW: CPT

## 2020-10-18 RX ORDER — DEXTROMETHORPHAN POLISTIREX 30 MG/5ML
30 SUSPENSION ORAL EVERY 12 HOURS
Status: DISCONTINUED | OUTPATIENT
Start: 2020-10-18 | End: 2020-10-26

## 2020-10-18 RX ORDER — CODEINE SULFATE 30 MG/1
30 TABLET ORAL
Status: DISCONTINUED | OUTPATIENT
Start: 2020-10-18 | End: 2020-10-26

## 2020-10-18 RX ADMIN — DEXTROMETHORPHAN 30 MG: 30 SUSPENSION, EXTENDED RELEASE ORAL at 22:05

## 2020-10-18 RX ADMIN — ACETAMINOPHEN 650 MG: 325 TABLET, FILM COATED ORAL at 05:47

## 2020-10-18 RX ADMIN — BENZONATATE 200 MG: 100 CAPSULE ORAL at 22:05

## 2020-10-18 RX ADMIN — ENOXAPARIN SODIUM 30 MG: 30 INJECTION SUBCUTANEOUS at 09:19

## 2020-10-18 RX ADMIN — DEXAMETHASONE SODIUM PHOSPHATE 4 MG: 4 INJECTION, SOLUTION INTRA-ARTICULAR; INTRALESIONAL; INTRAMUSCULAR; INTRAVENOUS; SOFT TISSUE at 23:46

## 2020-10-18 RX ADMIN — Medication 10 ML: at 05:48

## 2020-10-18 RX ADMIN — Medication 10 ML: at 14:31

## 2020-10-18 RX ADMIN — AZITHROMYCIN MONOHYDRATE 500 MG: 500 INJECTION, POWDER, LYOPHILIZED, FOR SOLUTION INTRAVENOUS at 11:19

## 2020-10-18 RX ADMIN — DEXAMETHASONE SODIUM PHOSPHATE 4 MG: 4 INJECTION, SOLUTION INTRA-ARTICULAR; INTRALESIONAL; INTRAMUSCULAR; INTRAVENOUS; SOFT TISSUE at 11:19

## 2020-10-18 RX ADMIN — CODEINE SULFATE 30 MG: 30 TABLET ORAL at 23:46

## 2020-10-18 RX ADMIN — DEXAMETHASONE SODIUM PHOSPHATE 4 MG: 4 INJECTION, SOLUTION INTRA-ARTICULAR; INTRALESIONAL; INTRAMUSCULAR; INTRAVENOUS; SOFT TISSUE at 17:28

## 2020-10-18 RX ADMIN — DEXAMETHASONE SODIUM PHOSPHATE 4 MG: 4 INJECTION, SOLUTION INTRA-ARTICULAR; INTRALESIONAL; INTRAMUSCULAR; INTRAVENOUS; SOFT TISSUE at 00:00

## 2020-10-18 RX ADMIN — SODIUM CHLORIDE 100 MG: 9 INJECTION, SOLUTION INTRAVENOUS at 14:28

## 2020-10-18 RX ADMIN — CODEINE SULFATE 30 MG: 30 TABLET ORAL at 11:19

## 2020-10-18 RX ADMIN — DEXAMETHASONE SODIUM PHOSPHATE 4 MG: 4 INJECTION, SOLUTION INTRA-ARTICULAR; INTRALESIONAL; INTRAMUSCULAR; INTRAVENOUS; SOFT TISSUE at 05:48

## 2020-10-18 RX ADMIN — DEXTROMETHORPHAN 30 MG: 30 SUSPENSION, EXTENDED RELEASE ORAL at 11:19

## 2020-10-18 RX ADMIN — Medication 10 ML: at 23:48

## 2020-10-18 RX ADMIN — CODEINE SULFATE 30 MG: 30 TABLET ORAL at 17:28

## 2020-10-18 RX ADMIN — ENOXAPARIN SODIUM 30 MG: 30 INJECTION SUBCUTANEOUS at 17:27

## 2020-10-18 RX ADMIN — ACETAMINOPHEN 650 MG: 325 TABLET, FILM COATED ORAL at 22:05

## 2020-10-18 RX ADMIN — BENZONATATE 200 MG: 100 CAPSULE ORAL at 14:28

## 2020-10-18 RX ADMIN — BENZONATATE 200 MG: 100 CAPSULE ORAL at 05:47

## 2020-10-18 NOTE — PROGRESS NOTES
Infectious Disease Progress Note               Subjective:   Pt seen and examined at bedside. Remains on high flow O2 w O2 sats in the low 90s. Still with intermittent non-productive cough. Reported feeling slightly better today, hemodynamically stable  Objective:   Physical Exam:     Visit Vitals  /80   Pulse 100   Temp 98.4 °F (36.9 °C)   Resp (!) 33   Ht 5' 9\" (1.753 m)   Wt 77.1 kg (170 lb)   SpO2 91%   BMI 25.10 kg/m²    O2 Flow Rate (L/min): 60 l/min O2 Device: Hi flow nasal cannula    Temp (24hrs), Av.4 °F (36.9 °C), Min:98.1 °F (36.7 °C), Max:98.6 °F (37 °C)    No intake/output data recorded. 10/16 190 - 10/18 0700  In: 1550 [P.O.:400; I.V.:350]  Out: 550 [Urine:550]    General: NAD, alert, AAO x 4  HEENT: JUHI, Moist mucosa, High flow oxygen in place  Lungs: Decrease at the bases, no wheeze/rhonchi  Heart: S1S2+, RRR, no murmur  Abdo: Soft, NT, ND, +BS  Exts: No edema, + pulses b/l   Skin: No wounds, No rashes or lesions    Data Review:       Recent Days:  Recent Labs     10/18/20  0610 10/16/20  0530 10/15/20  1915   WBC 13.6* 6.1 6.1   HGB 15.0 14.1 15.5   HCT 41.8 40.3 44.2    172 159     Recent Labs     10/18/20  0610 10/16/20  0530 10/15/20  1915   BUN 19 9 10   CREA 0.74 0.84 0.94       Lab Results   Component Value Date/Time    C-Reactive protein 3.00 (H) 10/18/2020 06:10 AM       Microbiology   - Blood Cx 10/15: Neg     Diagnostics   CXR Results  (Last 48 hours)               10/18/20 0410  XR CHEST PORT Final result    Narrative:  Chest single view. Comparison single view chest 2020       Patchy alveolar opacities are advanced throughout the lungs. Consider   infectious/inflammatory process. Cardiac and mediastinal structures unchanged. No pneumothorax or sizable pleural effusion. 10/17/20 0308  XR CHEST PORT Final result    Narrative:  Chest single view.        Comparison single view chest October 15, 2020       Hazy alveolar opacities generally similar compared to prior imaging. Cardiac and   mediastinal structures unchanged. No pneumothorax or pleural effusion. CT chest 10/15: Widespread patchy bilateral pneumonitis    Assessment/Plan     1. Acute hypoxic respiratory failure due to suspected COVID-19       Widespread patchy b/l pneumonitis on CT chest (10/15), remains on high flow O2, tachypneic        Pt tested neg for COVID-19, suspected false neg, test repeated on 10/17       S/p 2 doses of Actemra, will resume Remdesivir due to high index of suspicion for COVID-19 pending repeat test       Continue Decadron and Azithromycin. CBC, CRP, and ferritin w morning labs     2. CAP likely due to atypical PNA: Urine legionella Ag is pending       Continue on Azithromycin for now      3. Generalized weakness: Due to acute illness, subjectively improved     4.  Elevated inflammatory markers due to # 1, repeat w AM labs     Court Gamino MD    10/18/2020

## 2020-10-18 NOTE — PROGRESS NOTES
Consult  Pulmonary, Critical Care    Name: Fabian Tobar MRN: [id number]   : 1960 Hospital: [address] [place] [address]   Date: 10/18/2020  Admission date: 10/15/2020 Hospital Day: 4       Subjective/Interval History:   Patient seen in the ICU on high flow nasal oxygen at 70% with oxygen saturation 99. He gives a history of gradual worsening illness over the last week to 10 days with nonproductive cough fever generalized malaise nausea denies any vomiting although the ER note states that he had some vomiting. He seems awake and alert. Does not have any Covid contacts that he is aware of. States that his sense of taste and smell have been normal but terribly anorexic has not eaten anything in 3 or 4 days. 10/17 still feels bad Cough shortness of breath but no nausea today   10/18 still complains of severe cough nonproductive although in general feels a little better    Patient Active Problem List   Diagnosis Code    Acute respiratory failure due to COVID-19 ([facility] [address] U07.1, J96.00    Pneumonia J18.9    Acute respiratory failure ([address] J96.00       IMPRESSION:   1. Acute hypoxic respiratory failure requiring high flow nasal oxygen and is on 65% high flow  2. Diffuse pulmonary infiltrates questionable atypical pneumonia possible COVID-19 repeat testing pending  3. Nausea probable due to COVID-19 infection  4. Unremitting cough  Body mass index is 25.1 kg/m². RECOMMENDATIONS/PLAN:   1. Initial COVID-19 test is negative but ferritin LDH CRP are all elevated chest x-ray is consistent with Covid pneumonitis  repeat testing 10/17 pending continue Decadron and Actemra will hold Remdesivir  2. Continue high flow nasal oxygen  3. We will add codeine and Delsym for cough already on Tessalon  4. Repeat COVID-19 testing pending  5. Check Legionella antigen and titer continue azithromycin  6. Subjective/Initial History:   I have reviewed the flowsheet and previous days notes.  Seen earlier today on breanne. I was asked by Huber Mendez MD to see Heather Townsend An a 56 y.o.  male  in consultation for a chief complaint of acute hypoxic respiratory failure cough and community-acquired pneumonia          Pt now in CCU. Patient PCP: UNKNOWN  PMH:  has no past medical history on file. PSH:   has no past surgical history on file. FHX: family history includes No Known Problems in his father and mother. SHX:  reports that he has never smoked. He has never used smokeless tobacco. He reports that he does not drink alcohol or use drugs. Systemic review:  General no chronic illness but over the last week he has had fever generalized malaise weight loss no appetite  Eyes no double vision or momentary blindness  ENT no sinus congestion drainage or facial pain  Skeletal has diffuse aches and pains no swollen tender joints  Endocrinologic no polyuria polydipsia  Neurologic no seizures or syncope  Gastrointestinal normally he has no problems but over this last week he has had nausea anorexia marked weight loss has not had any alteration of his sense of taste or smell  Genitourinary no discomfort or drainage on urination  Cardiovascular no history of heart disease chest pain has felt sweaty but no history of ankle edema.   Respiratory as mentioned above    No Known Allergies   MEDS:   Current Facility-Administered Medications   Medication    codeine sulfate tablet 30 mg    dextromethorphan (DELSYM) 30 mg/5 mL syrup 30 mg    enoxaparin (LOVENOX) injection 30 mg    acetaminophen (TYLENOL) tablet 650 mg    Or    acetaminophen (TYLENOL) suppository 650 mg    sodium chloride (NS) flush 5-40 mL    sodium chloride (NS) flush 5-40 mL    ondansetron (ZOFRAN ODT) tablet 4 mg    Or    ondansetron (ZOFRAN) injection 4 mg    azithromycin (ZITHROMAX) 500 mg in 0.9% sodium chloride 250 mL (VIAL-MATE)    dexamethasone (DECADRON) 4 mg/mL injection 4 mg    benzonatate (TESSALON) capsule 200 mg        Current Facility-Administered Medications:     codeine sulfate tablet 30 mg, 30 mg, Oral, Q8H RT, Michelle Celeste MD    dextromethorphan (DELSYM) 30 mg/5 mL syrup 30 mg, 30 mg, Oral, Q12H, Michelle Celeste MD    enoxaparin (LOVENOX) injection 30 mg, 30 mg, SubCUTAneous, Q12H, Apryl Claudio MD, 30 mg at 10/18/20 0919    acetaminophen (TYLENOL) tablet 650 mg, 650 mg, Oral, Q6H PRN, 650 mg at 10/18/20 0547 **OR** acetaminophen (TYLENOL) suppository 650 mg, 650 mg, Rectal, Q6H PRN, Apryl Claudio MD    sodium chloride (NS) flush 5-40 mL, 5-40 mL, IntraVENous, Q8H, Apryl Claudio MD, 10 mL at 10/18/20 0548    sodium chloride (NS) flush 5-40 mL, 5-40 mL, IntraVENous, PRN, Apryl Claudio MD    ondansetron (ZOFRAN ODT) tablet 4 mg, 4 mg, Oral, Q6H PRN **OR** ondansetron (ZOFRAN) injection 4 mg, 4 mg, IntraVENous, Q6H PRN, Apryl Claudio MD    azithromycin (ZITHROMAX) 500 mg in 0.9% sodium chloride 250 mL (VIAL-MATE), 500 mg, IntraVENous, Q24H, Hailey Galeana MD, 500 mg at 10/17/20 1036    dexamethasone (DECADRON) 4 mg/mL injection 4 mg, 4 mg, IntraVENous, Q6H, Michelle Celeste MD, 4 mg at 10/18/20 0548    benzonatate (TESSALON) capsule 200 mg, 200 mg, Oral, Q8H, Michelle Celeste MD, 200 mg at 10/18/20 0547      Objective:     Vital Signs: Telemetry:    normal sinus rhythm Intake/Output:   Visit Vitals  BP (!) 122/57   Pulse 100   Temp 98.3 °F (36.8 °C)   Resp 14   Ht 5' 9\" (1.753 m)   Wt 77.1 kg (170 lb)   SpO2 95%   BMI 25.10 kg/m²       Temp (24hrs), Av.3 °F (36.8 °C), Min:98.1 °F (36.7 °C), Max:98.6 °F (37 °C)        O2 Device: Hi flow nasal cannula O2 Flow Rate (L/min): 60 l/min         Body mass index is 25.1 kg/m².     Wt Readings from Last 4 Encounters:   10/15/20 77.1 kg (170 lb)          Intake/Output Summary (Last 24 hours) at 10/18/2020 1006  Last data filed at 10/18/2020 0618  Gross per 24 hour   Intake 1550 ml   Output 550 ml   Net 1000 ml       Last shift: No intake/output data recorded. Last 3 shifts: 10/16 1901 - 10/18 0700  In: 1550 [P.O.:400; I.V.:350]  Out: 550 [Urine:550]   Ventilator Settings:      Mode Rate TV Press PEEP FiO2 PIP Min. Vent               65 %            Physical Exam:    General:  male; no distress chronic cough nonproductive  HEENT: NCAT, oral mucosa clear  Eyes: anicteric; conjunctiva clear extraocular movements intact   neck: no node no JVD no accessory muscle use  Chest: no deformity,   Cardiac: Regular rate and rhythm no murmurs no ankle edema  Lungs: Clear anteriorly with rales laterally and posteriorly  Abd: Soft nontender normal bowel sounds no organomegaly  Ext: no edema; no joint swelling;  No clubbing  :clear urine  Neuro: Awake alert speech is clear moves all 4 extremities well  Psych- no agitation, oriented to person;   Skin: warm,, no cyanosis; very mildly diaphoretic  Pulses: Pulses intact  Capillary: Normal capillary refill      Labs:    Recent Labs     10/18/20  0610 10/16/20  0530 10/15/20  2347 10/15/20  1915   WBC 13.6* 6.1  --  6.1   HGB 15.0 14.1  --  15.5    172  --  159   INR 1.1  --  0.9  --    APTT 24.8 29.7 33.5  --      Recent Labs     10/18/20  0610 10/16/20  0530 10/15/20  1915    138 137   K 3.6 3.6 3.7    106 104   CO2 26 25 26   * 109* 105*   BUN 19 9 10   CREA 0.74 0.84 0.94   CA 8.4* 8.4* 8.5   PHOS 3.5  --   --    LAC  --   --  1.9   ALB 2.8* 2.8* 3.4*   ALT  --  51 55     Recent Labs     10/15/20  1915   PH 7.46*   PCO2 32*   PO2 68*   HCO3 24   FIO2 100   On nasal high flow 60%  10/18 nasal high flow 65% with oxygen saturation 94%  Recent Labs     10/15/20  2347 10/15/20  1915     --    CKNDX  --  1.0   TROIQ  --  <0.05     10/14 COVID-19 test negative  10 2180    Procalcitonin 0.14  CRP 12.4  Lab Results   Component Value Date/Time    Culture result: No growth after 12 hours 10/15/2020 07:10 PM      Lab Results   Component Value Date/Time     10/15/2020 11:47 PM       Imaging:  I have personally reviewed the patients radiographs and have reviewed the reports:    CXR Results  (Last 48 hours)  10/17 chest x-ray unchanged diffuse bilateral infiltrate               10/15/20 1927  XR CHEST SNGL V Final result    Narrative:  1       Mild atelectasis in the bases with upper lungs clear. No effusion or   pneumothorax. Normal heart and no congestion  Disagree to me there is bilateral infiltrate           Results from Hospital Encounter encounter on 10/15/20   XR CHEST PORT    Narrative Chest single view. Comparison single view chest October 17, 2020    Patchy alveolar opacities are advanced throughout the lungs. Consider  infectious/inflammatory process. Cardiac and mediastinal structures unchanged. No pneumothorax or sizable pleural effusion. XR CHEST PORT    Narrative Chest single view. Comparison single view chest October 15, 2020    Hazy alveolar opacities generally similar compared to prior imaging. Cardiac and  mediastinal structures unchanged. No pneumothorax or pleural effusion. XR CHEST SNGL V    Narrative 1    Mild atelectasis in the bases with upper lungs clear. No effusion or  pneumothorax. Normal heart and no congestion     Results from Hospital Encounter encounter on 10/15/20   CTA CHEST W OR W WO CONT    Narrative CT dose reduction was achieved through use of a standardized protocol tailored  for this examination and automatic exposure control for dose modulation. Contrast study maximum intensity projections shows pronounced groundglass  opacification, of variable density, mainly in the dependent portions of each  lung, apex to base. Air bronchograms are preserved in the densest portions. Mild  geographic groundglass opacification of lower density in the nondependent  portions. Central airways are open    Pulmonary arteries are well-opacified and show no filling defect or distortion. Aorta shows normal dimensions, without dissection.  Tiny middle mediastinal lymph  nodes. Cardiac finding. No pleural pericardial effusion. No significant upper  abdominal or chest wall finding      Impression IMPRESSION: Widespread patchy bilateral pneumonitis       Clinical picture consistent with COVID-19 infection with pneumonitis with diffuse patchy pulmonary infiltrate elevation in ferritin LDH and CRP nonproductive cough and GI symptoms and yet initial COVID-19 test negative will repeat test today continue azithromycin Actemra and Decadron will hold Remdesivir    Time of care 30 minutes         Thank you for allowing us to participate in the care of this patient.   We will follow along with you    Nestor Del Real MD

## 2020-10-18 NOTE — PROGRESS NOTES
Hospitalist Progress Note               Daily Progress Note: 10/18/2020      Subjective: The patient is seen for follow  up.   56 y.o. male with no significant medical problems presents to the emergency room complaining of shortness of breath. Patient had a urinary retention which was resolved with Granados catheter placement. Patient continues to be on high flow oxygen. Medications reviewed  Current Facility-Administered Medications   Medication Dose Route Frequency    codeine sulfate tablet 30 mg  30 mg Oral Q8H RT    dextromethorphan (DELSYM) 30 mg/5 mL syrup 30 mg  30 mg Oral Q12H    enoxaparin (LOVENOX) injection 30 mg  30 mg SubCUTAneous Q12H    acetaminophen (TYLENOL) tablet 650 mg  650 mg Oral Q6H PRN    Or    acetaminophen (TYLENOL) suppository 650 mg  650 mg Rectal Q6H PRN    sodium chloride (NS) flush 5-40 mL  5-40 mL IntraVENous Q8H    sodium chloride (NS) flush 5-40 mL  5-40 mL IntraVENous PRN    ondansetron (ZOFRAN ODT) tablet 4 mg  4 mg Oral Q6H PRN    Or    ondansetron (ZOFRAN) injection 4 mg  4 mg IntraVENous Q6H PRN    azithromycin (ZITHROMAX) 500 mg in 0.9% sodium chloride 250 mL (VIAL-MATE)  500 mg IntraVENous Q24H    dexamethasone (DECADRON) 4 mg/mL injection 4 mg  4 mg IntraVENous Q6H    benzonatate (TESSALON) capsule 200 mg  200 mg Oral Q8H       Review of Systems:   A comprehensive review of systems was negative except for that written in the HPI. Objective:   Physical Exam:     Visit Vitals  /74   Pulse 100   Temp 98.4 °F (36.9 °C)   Resp 20   Ht 5' 9\" (1.753 m)   Wt 77.1 kg (170 lb)   SpO2 91%   BMI 25.10 kg/m²    O2 Flow Rate (L/min): 60 l/min O2 Device: Hi flow nasal cannula    Temp (24hrs), Av.4 °F (36.9 °C), Min:98.1 °F (36.7 °C), Max:98.6 °F (37 °C)    No intake/output data recorded. 10/16 1901 - 10/18 0700  In: 1550 [P.O.:400; I.V.:350]  Out: 550 [Urine:550]    PHYSICAL EXAM:  General: Alert and awake. Skin: Extremities and face reveal no rashes. HEENT: Sclerae anicteric. Extra-occular muscles are intact. No oral ulcers. No ENT discharge. The neck is supple. Cardiovascular: Regular rate and rhythm. No murmurs, gallops, or rubs. PMI nondisplaced. Carotids without bruits. Respiratory: Comfortable breathing with no accessory muscle use. Clear breath sounds with no wheezes, rales, or rhonchi. GI: Abdomen nondistended, soft, and nontender. Normal active bowel sounds. No enlargement of the liver or spleen. No masses palpable. Rectal: Deferred   Musculoskeletal: No pitting edema of the lower legs. Extremities have good range of motion. No costovertebral tenderness. Neurological: Gross memory appears intact. Patient is alert and oriented. Power 5/5, Cranial nerves II-XII intact  Psychiatric: Mood appears appropriate with judgement intact.      Data Review:       Recent Days:  Recent Labs     10/18/20  0610 10/16/20  0530 10/15/20  1915   WBC 13.6* 6.1 6.1   HGB 15.0 14.1 15.5   HCT 41.8 40.3 44.2    172 159     Recent Labs     10/18/20  0610 10/16/20  0530 10/15/20  2347 10/15/20  1915    138  --  137   K 3.6 3.6  --  3.7    106  --  104   CO2 26 25  --  26   * 109*  --  105*   BUN 19 9  --  10   CREA 0.74 0.84  --  0.94   CA 8.4* 8.4*  --  8.5   PHOS 3.5  --   --   --    ALB 2.8* 2.8*  --  3.4*   TBILI  --  0.8  --  0.9   ALT  --  51  --  55   INR 1.1  --  0.9  --      Recent Labs     10/15/20  1915   PH 7.46*   PCO2 32*   PO2 68*   HCO3 24   FIO2 100       24 Hour Results:  Recent Results (from the past 24 hour(s))   PROTHROMBIN TIME + INR    Collection Time: 10/18/20  6:10 AM   Result Value Ref Range    Prothrombin time 13.9 11.9 - 14.7 sec    INR 1.1 0.9 - 1.1     PTT    Collection Time: 10/18/20  6:10 AM   Result Value Ref Range    aPTT 24.8 23.0 - 35.7 sec    aPTT, therapeutic range   68 - 109 sec   FIBRINOGEN    Collection Time: 10/18/20  6:10 AM   Result Value Ref Range    Fibrinogen 517 220 - 535 mg/dL   RENAL FUNCTION PANEL    Collection Time: 10/18/20  6:10 AM   Result Value Ref Range    Sodium 138 136 - 145 mmol/L    Potassium 3.6 3.5 - 5.1 mmol/L    Chloride 107 97 - 108 mmol/L    CO2 26 21 - 32 mmol/L    Anion gap 5 5 - 15 mmol/L    Glucose 129 (H) 65 - 100 mg/dL    BUN 19 6 - 20 mg/dL    Creatinine 0.74 0.70 - 1.30 mg/dL    BUN/Creatinine ratio 26 (H) 12 - 20      GFR est AA >60 >60 ml/min/1.73m2    GFR est non-AA >60 >60 ml/min/1.73m2    Calcium 8.4 (L) 8.5 - 10.1 mg/dL    Phosphorus 3.5 2.6 - 4.7 mg/dL    Albumin 2.8 (L) 3.5 - 5.0 g/dL   CBC WITH AUTOMATED DIFF    Collection Time: 10/18/20  6:10 AM   Result Value Ref Range    WBC 13.6 (H) 4.1 - 11.1 K/uL    RBC 4.81 4.10 - 5.70 M/uL    HGB 15.0 12.1 - 17.0 g/dL    HCT 41.8 36.6 - 50.3 %    MCV 86.9 80.0 - 99.0 FL    MCH 31.2 26.0 - 34.0 PG    MCHC 35.9 30.0 - 36.5 g/dL    RDW 13.0 11.5 - 14.5 %    PLATELET 679 085 - 921 K/uL    MPV 9.7 8.9 - 12.9 FL    NEUTROPHILS 85 (H) 32 - 75 %    LYMPHOCYTES 6 (L) 12 - 49 %    MONOCYTES 9 5 - 13 %    EOSINOPHILS 0 0 - 7 %    BASOPHILS 0 0 - 1 %    IMMATURE GRANULOCYTES 0 0.0 - 0.5 %    ABS. NEUTROPHILS 11.6 (H) 1.8 - 8.0 K/UL    ABS. LYMPHOCYTES 0.8 0.8 - 3.5 K/UL    ABS. MONOCYTES 1.2 (H) 0.0 - 1.0 K/UL    ABS. EOSINOPHILS 0.0 0.0 - 0.4 K/UL    ABS. BASOPHILS 0.0 0.0 - 0.1 K/UL    ABS. IMM.  GRANS. 0.0 0.00 - 0.04 K/UL    DF AUTOMATED     C REACTIVE PROTEIN, QT    Collection Time: 10/18/20  6:10 AM   Result Value Ref Range    C-Reactive protein 3.00 (H) 0.00 - 0.60 mg/dL       XR CHEST PORT   Final Result      XR CHEST PORT   Final Result      CTA CHEST W OR W WO CONT   Final Result   IMPRESSION: Widespread patchy bilateral pneumonitis      XR CHEST SNGL V   Final Result             Assessment/     Problem List:  Hospital Problems  Date Reviewed: 10/15/2020          Codes Class Noted POA    Acute respiratory failure due to COVID-19 [facility]) ICD-10-CM: U07.1, J96.00  ICD-9-CM: 518.81, 079.89  10/15/2020 Yes        Pneumonia ICD-10-CM: J18.9  ICD-9-CM: 830  10/15/2020 Yes        Acute respiratory failure [facility]) ICD-10-CM: J96.00  ICD-9-CM: 518.81  10/15/2020 Unknown                     Plan:  Acute hypoxic respiratory failure: Etiology most likely pneumonia suspected from COVID-19. On high flow nasal cannula. Started on zithromax, [place] and remdisavir. Remdesavir was held but after discussion has been restrted as per ID due to high suspicion of COVID. Clinically showing some improvement     Acute pneumonia confirmed with x-ray and CT scan with widespread patchy infiltrate highly suggestive of viral pneumonia. Patient is highly suspicious for Covid pneumonia. His Covid test has come back negative. Will repeat the test to confirm with the second result.     Dehydration: Secondary to above, started on IV fluids.     Admitted to ICU due to acute respiratory failure, his full CODE STATUS, has no home medications. Her spouse and family who helps making decisions.     Care Plan discussed with: Patient/Family and Nurse        Mary , MD

## 2020-10-19 LAB
ALBUMIN SERPL-MCNC: 3 G/DL (ref 3.5–5)
ANION GAP SERPL CALC-SCNC: 6 MMOL/L (ref 5–15)
APTT PPP: 25.7 SEC (ref 23–35.7)
BASOPHILS # BLD: 0 K/UL (ref 0–0.1)
BASOPHILS NFR BLD: 0 % (ref 0–1)
BUN SERPL-MCNC: 21 MG/DL (ref 6–20)
BUN/CREAT SERPL: 27 (ref 12–20)
CA-I BLD-MCNC: 8.5 MG/DL (ref 8.5–10.1)
CHLORIDE SERPL-SCNC: 106 MMOL/L (ref 97–108)
CO2 SERPL-SCNC: 26 MMOL/L (ref 21–32)
CREAT SERPL-MCNC: 0.78 MG/DL (ref 0.7–1.3)
CRP SERPL-MCNC: 1.39 MG/DL (ref 0–0.6)
DIFFERENTIAL METHOD BLD: ABNORMAL
EOSINOPHIL # BLD: 0 K/UL (ref 0–0.4)
EOSINOPHIL NFR BLD: 0 % (ref 0–7)
ERYTHROCYTE [DISTWIDTH] IN BLOOD BY AUTOMATED COUNT: 12.9 % (ref 11.5–14.5)
FERRITIN SERPL-MCNC: 1510 NG/ML (ref 26–388)
FIBRINOGEN PPP-MCNC: 447 MG/DL (ref 220–535)
FLUAV AG NPH QL IA: NEGATIVE
FLUBV AG NOSE QL IA: NEGATIVE
GLUCOSE SERPL-MCNC: 138 MG/DL (ref 65–100)
HCT VFR BLD AUTO: 43.2 % (ref 36.6–50.3)
HGB BLD-MCNC: 15.5 G/DL (ref 12.1–17)
IMM GRANULOCYTES # BLD AUTO: 0.1 K/UL (ref 0–0.04)
IMM GRANULOCYTES NFR BLD AUTO: 1 % (ref 0–0.5)
INR PPP: 1 (ref 0.9–1.1)
LDH SERPL L TO P-CCNC: 615 U/L (ref 85–241)
LYMPHOCYTES # BLD: 0.8 K/UL (ref 0.8–3.5)
LYMPHOCYTES NFR BLD: 6 % (ref 12–49)
MCH RBC QN AUTO: 31.1 PG (ref 26–34)
MCHC RBC AUTO-ENTMCNC: 35.9 G/DL (ref 30–36.5)
MCV RBC AUTO: 86.6 FL (ref 80–99)
MONOCYTES # BLD: 0.9 K/UL (ref 0–1)
MONOCYTES NFR BLD: 7 % (ref 5–13)
NEUTS SEG # BLD: 11.6 K/UL (ref 1.8–8)
NEUTS SEG NFR BLD: 86 % (ref 32–75)
PHOSPHATE SERPL-MCNC: 3.9 MG/DL (ref 2.6–4.7)
PLATELET # BLD AUTO: 327 K/UL (ref 150–400)
PMV BLD AUTO: 10 FL (ref 8.9–12.9)
POTASSIUM SERPL-SCNC: 3.8 MMOL/L (ref 3.5–5.1)
PROTHROMBIN TIME: 13.7 SEC (ref 11.9–14.7)
RBC # BLD AUTO: 4.99 M/UL (ref 4.1–5.7)
SODIUM SERPL-SCNC: 138 MMOL/L (ref 136–145)
THERAPEUTIC RANGE,PTTT: NORMAL SEC (ref 68–109)
WBC # BLD AUTO: 13.3 K/UL (ref 4.1–11.1)

## 2020-10-19 PROCEDURE — 85610 PROTHROMBIN TIME: CPT

## 2020-10-19 PROCEDURE — 74011250636 HC RX REV CODE- 250/636: Performed by: INTERNAL MEDICINE

## 2020-10-19 PROCEDURE — 74011250637 HC RX REV CODE- 250/637: Performed by: INTERNAL MEDICINE

## 2020-10-19 PROCEDURE — 85730 THROMBOPLASTIN TIME PARTIAL: CPT

## 2020-10-19 PROCEDURE — 99232 SBSQ HOSP IP/OBS MODERATE 35: CPT | Performed by: INTERNAL MEDICINE

## 2020-10-19 PROCEDURE — 83615 LACTATE (LD) (LDH) ENZYME: CPT

## 2020-10-19 PROCEDURE — 85384 FIBRINOGEN ACTIVITY: CPT

## 2020-10-19 PROCEDURE — 74011000258 HC RX REV CODE- 258: Performed by: INTERNAL MEDICINE

## 2020-10-19 PROCEDURE — 77010033711 HC HIGH FLOW OXYGEN

## 2020-10-19 PROCEDURE — 74011250636 HC RX REV CODE- 250/636: Performed by: HOSPITALIST

## 2020-10-19 PROCEDURE — 74011000250 HC RX REV CODE- 250: Performed by: INTERNAL MEDICINE

## 2020-10-19 PROCEDURE — 65610000006 HC RM INTENSIVE CARE

## 2020-10-19 PROCEDURE — 85025 COMPLETE CBC W/AUTO DIFF WBC: CPT

## 2020-10-19 PROCEDURE — 36415 COLL VENOUS BLD VENIPUNCTURE: CPT

## 2020-10-19 PROCEDURE — 87804 INFLUENZA ASSAY W/OPTIC: CPT

## 2020-10-19 PROCEDURE — 86140 C-REACTIVE PROTEIN: CPT

## 2020-10-19 PROCEDURE — 80069 RENAL FUNCTION PANEL: CPT

## 2020-10-19 PROCEDURE — 82728 ASSAY OF FERRITIN: CPT

## 2020-10-19 RX ORDER — FUROSEMIDE 10 MG/ML
40 INJECTION INTRAMUSCULAR; INTRAVENOUS ONCE
Status: COMPLETED | OUTPATIENT
Start: 2020-10-19 | End: 2020-10-19

## 2020-10-19 RX ADMIN — Medication 10 ML: at 22:00

## 2020-10-19 RX ADMIN — BENZONATATE 200 MG: 100 CAPSULE ORAL at 15:30

## 2020-10-19 RX ADMIN — ENOXAPARIN SODIUM 30 MG: 30 INJECTION SUBCUTANEOUS at 06:09

## 2020-10-19 RX ADMIN — BENZONATATE 200 MG: 100 CAPSULE ORAL at 06:10

## 2020-10-19 RX ADMIN — ENOXAPARIN SODIUM 30 MG: 30 INJECTION SUBCUTANEOUS at 18:36

## 2020-10-19 RX ADMIN — DEXAMETHASONE SODIUM PHOSPHATE 4 MG: 4 INJECTION, SOLUTION INTRA-ARTICULAR; INTRALESIONAL; INTRAMUSCULAR; INTRAVENOUS; SOFT TISSUE at 11:12

## 2020-10-19 RX ADMIN — CODEINE SULFATE 30 MG: 30 TABLET ORAL at 15:30

## 2020-10-19 RX ADMIN — SODIUM CHLORIDE 100 MG: 9 INJECTION, SOLUTION INTRAVENOUS at 14:00

## 2020-10-19 RX ADMIN — CODEINE SULFATE 30 MG: 30 TABLET ORAL at 08:45

## 2020-10-19 RX ADMIN — AZITHROMYCIN MONOHYDRATE 500 MG: 500 INJECTION, POWDER, LYOPHILIZED, FOR SOLUTION INTRAVENOUS at 10:17

## 2020-10-19 RX ADMIN — DEXAMETHASONE SODIUM PHOSPHATE 4 MG: 4 INJECTION, SOLUTION INTRA-ARTICULAR; INTRALESIONAL; INTRAMUSCULAR; INTRAVENOUS; SOFT TISSUE at 18:36

## 2020-10-19 RX ADMIN — FUROSEMIDE 40 MG: 10 INJECTION, SOLUTION INTRAMUSCULAR; INTRAVENOUS at 10:18

## 2020-10-19 RX ADMIN — DEXAMETHASONE SODIUM PHOSPHATE 4 MG: 4 INJECTION, SOLUTION INTRA-ARTICULAR; INTRALESIONAL; INTRAMUSCULAR; INTRAVENOUS; SOFT TISSUE at 06:09

## 2020-10-19 RX ADMIN — Medication 10 ML: at 15:33

## 2020-10-19 RX ADMIN — DEXTROMETHORPHAN 30 MG: 30 SUSPENSION, EXTENDED RELEASE ORAL at 08:45

## 2020-10-19 RX ADMIN — DEXTROMETHORPHAN 30 MG: 30 SUSPENSION, EXTENDED RELEASE ORAL at 21:50

## 2020-10-19 RX ADMIN — BENZONATATE 200 MG: 100 CAPSULE ORAL at 21:50

## 2020-10-19 NOTE — PROGRESS NOTES
Progress Note  Date:10/19/2020       Room:285/01  Patient Abby Tobar     YOB: 1960     Age:59 y.o. Subjective    Subjective   Patient followed for possible pneumoniitis but tested negative for Covid-19. Still remains in the ICU on high flow nasal O2. Still high index of suspicion for Covid-19 and testing has been repeated. Review of Systems  Objective         Vitals Last 24 Hours:  TEMPERATURE:  Temp  Av.1 °F (36.7 °C)  Min: 97.7 °F (36.5 °C)  Max: 98.5 °F (36.9 °C)  RESPIRATIONS RANGE: Resp  Av.7  Min: 17  Max: 27  PULSE OXIMETRY RANGE: SpO2  Av %  Min: 86 %  Max: 98 %  PULSE RANGE: Pulse  Av  Min: 55  Max: 75  BLOOD PRESSURE RANGE: Systolic (43FEJ), NSD:991 , Min:131 , LXF:358   ; Diastolic (05VXR), XVR:82, Min:85, Max:97    I/O (24Hr): Intake/Output Summary (Last 24 hours) at 10/19/2020 1343  Last data filed at 10/19/2020 5403  Gross per 24 hour   Intake 900 ml   Output 1825 ml   Net -925 ml     Objective  Labs/Imaging/Diagnostics    Labs:  CBC:  Recent Labs     10/19/20  0900 10/18/20  0610   WBC 13.3* 13.6*   RBC 4.99 4.81   HGB 15.5 15.0   HCT 43.2 41.8   MCV 86.6 86.9   RDW 12.9 13.0    288     CHEMISTRIES:  Recent Labs     10/19/20  0900 10/18/20  0610    138   K 3.8 3.6    107   CO2 26 26   BUN 21* 19   CA 8.5 8.4*   PHOS 3.9 3.5   PT/INR:  Recent Labs     10/19/20  0900 10/18/20  0610   INR 1.0 1.1     APTT:  Recent Labs     10/19/20  0900 10/18/20  0610   APTT 25.7 24.8     LIVER PROFILE:No results for input(s): AST, ALT in the last 72 hours. No lab exists for component: Thresa Pinky, ALKPHOS  Lab Results   Component Value Date/Time    ALT (SGPT) 51 10/16/2020 05:30 AM    AST (SGOT) 72 (H) 10/16/2020 05:30 AM    Alk. phosphatase 121 (H) 10/16/2020 05:30 AM    Bilirubin, total 0.8 10/16/2020 05:30 AM       Imaging Last 24 Hours:  No results found.   Assessment//Plan   Active Problems:    Acute respiratory failure due to COVID-19 [facility]) (10/15/2020)      Pneumonia (10/15/2020)      Acute respiratory failure ([facility] [address] (10/15/2020)      Assessment & Plan  1. Possible pneumonitis, etiology unclear, rule out Covid-19  2. Acute respiratory failure, on high flow nasal O2  3. Elevated CRP, LDH and ferritin, possibly secondary to #1, decreasing.     Comment:  Ongoint concern for Covid-19 despite negative initial test results. LDH and CRP decreasing suggesting clinical response to treatment.      1. Continue Remdesivir, Decadron, Azithromycin pending repeat Covid-19 results  2. Follow-up repeat Covid-19 results  3. Follow-up blood cultures  4.  In am, repeat CBC, CRP, LDH, ferritin    Electronically signed by Juventino Rangel MD on 10/19/2020 at 1:43 PM

## 2020-10-19 NOTE — PROGRESS NOTES
Hospitalist Progress Note               Daily Progress Note: 10/19/2020      Subjective: The patient is seen for follow  up.   56 y.o. male with no significant medical problems presents to the emergency room complaining of shortness of breath. Patient had a urinary retention which was resolved with Granados catheter placement. Patient continues to be on high flow oxygen. Medications reviewed  Current Facility-Administered Medications   Medication Dose Route Frequency    codeine sulfate tablet 30 mg  30 mg Oral Q8H RT    dextromethorphan (DELSYM) 30 mg/5 mL syrup 30 mg  30 mg Oral Q12H    remdesivir 100 mg in 0.9% sodium chloride 250 mL IVPB  100 mg IntraVENous Q24H    enoxaparin (LOVENOX) injection 30 mg  30 mg SubCUTAneous Q12H    acetaminophen (TYLENOL) tablet 650 mg  650 mg Oral Q6H PRN    Or    acetaminophen (TYLENOL) suppository 650 mg  650 mg Rectal Q6H PRN    sodium chloride (NS) flush 5-40 mL  5-40 mL IntraVENous Q8H    sodium chloride (NS) flush 5-40 mL  5-40 mL IntraVENous PRN    ondansetron (ZOFRAN ODT) tablet 4 mg  4 mg Oral Q6H PRN    Or    ondansetron (ZOFRAN) injection 4 mg  4 mg IntraVENous Q6H PRN    azithromycin (ZITHROMAX) 500 mg in 0.9% sodium chloride 250 mL (VIAL-MATE)  500 mg IntraVENous Q24H    dexamethasone (DECADRON) 4 mg/mL injection 4 mg  4 mg IntraVENous Q6H    benzonatate (TESSALON) capsule 200 mg  200 mg Oral Q8H       Review of Systems:   A comprehensive review of systems was negative except for that written in the HPI. Objective:   Physical Exam:     Visit Vitals  BP (!) 138/94   Pulse 75   Temp 97.8 °F (36.6 °C)   Resp 22   Ht 5' 9\" (1.753 m)   Wt 77.1 kg (170 lb)   SpO2 94%   BMI 25.10 kg/m²    O2 Flow Rate (L/min): 60 l/min O2 Device: Hi flow nasal cannula    Temp (24hrs), Av.1 °F (36.7 °C), Min:97.7 °F (36.5 °C), Max:98.5 °F (36.9 °C)    No intake/output data recorded. 101901 - 10/19 0700  In: 900 [P.O.:400;  I.V.:500]  Out: 6270 [VQUZF:0044]    PHYSICAL EXAM:  General: Alert and awake. Skin: Extremities and face reveal no rashes. HEENT: Sclerae anicteric. Extra-occular muscles are intact. No oral ulcers. No ENT discharge. The neck is supple. Cardiovascular: Regular rate and rhythm. No murmurs, gallops, or rubs. PMI nondisplaced. Carotids without bruits. Respiratory: Comfortable breathing with no accessory muscle use. Clear breath sounds with no wheezes, rales, or rhonchi. GI: Abdomen nondistended, soft, and nontender. Normal active bowel sounds. No enlargement of the liver or spleen. No masses palpable. Rectal: Deferred   Musculoskeletal: No pitting edema of the lower legs. Extremities have good range of motion. No costovertebral tenderness. Neurological: Gross memory appears intact. Patient is alert and oriented. Power 5/5, Cranial nerves II-XII intact  Psychiatric: Mood appears appropriate with judgement intact. Data Review:       Recent Days:  Recent Labs     10/19/20  0900 10/18/20  0610   WBC 13.3* 13.6*   HGB 15.5 15.0   HCT 43.2 41.8    288     Recent Labs     10/19/20  0900 10/18/20  0610    138   K 3.8 3.6    107   CO2 26 26   * 129*   BUN 21* 19   CREA 0.78 0.74   CA 8.5 8.4*   PHOS 3.9 3.5   ALB 3.0* 2.8*   INR 1.0 1.1     No results for input(s): PH, PCO2, PO2, HCO3, FIO2 in the last 72 hours.     24 Hour Results:  Recent Results (from the past 24 hour(s))   PROTHROMBIN TIME + INR    Collection Time: 10/19/20  9:00 AM   Result Value Ref Range    Prothrombin time 13.7 11.9 - 14.7 sec    INR 1.0 0.9 - 1.1     PTT    Collection Time: 10/19/20  9:00 AM   Result Value Ref Range    aPTT 25.7 23.0 - 35.7 sec    aPTT, therapeutic range   68 - 109 sec   FIBRINOGEN    Collection Time: 10/19/20  9:00 AM   Result Value Ref Range    Fibrinogen 447 220 - 535 mg/dL   CBC WITH AUTOMATED DIFF    Collection Time: 10/19/20  9:00 AM   Result Value Ref Range    WBC 13.3 (H) 4.1 - 11.1 K/uL    RBC 4.99 4.10 - 5.70 M/uL    HGB 15.5 12.1 - 17.0 g/dL    HCT 43.2 36.6 - 50.3 %    MCV 86.6 80.0 - 99.0 FL    MCH 31.1 26.0 - 34.0 PG    MCHC 35.9 30.0 - 36.5 g/dL    RDW 12.9 11.5 - 14.5 %    PLATELET 454 906 - 099 K/uL    MPV 10.0 8.9 - 12.9 FL    NEUTROPHILS 86 (H) 32 - 75 %    LYMPHOCYTES 6 (L) 12 - 49 %    MONOCYTES 7 5 - 13 %    EOSINOPHILS 0 0 - 7 %    BASOPHILS 0 0 - 1 %    IMMATURE GRANULOCYTES 1 (H) 0.0 - 0.5 %    ABS. NEUTROPHILS 11.6 (H) 1.8 - 8.0 K/UL    ABS. LYMPHOCYTES 0.8 0.8 - 3.5 K/UL    ABS. MONOCYTES 0.9 0.0 - 1.0 K/UL    ABS. EOSINOPHILS 0.0 0.0 - 0.4 K/UL    ABS. BASOPHILS 0.0 0.0 - 0.1 K/UL    ABS. IMM.  GRANS. 0.1 (H) 0.00 - 0.04 K/UL    DF AUTOMATED     RENAL FUNCTION PANEL    Collection Time: 10/19/20  9:00 AM   Result Value Ref Range    Sodium 138 136 - 145 mmol/L    Potassium 3.8 3.5 - 5.1 mmol/L    Chloride 106 97 - 108 mmol/L    CO2 26 21 - 32 mmol/L    Anion gap 6 5 - 15 mmol/L    Glucose 138 (H) 65 - 100 mg/dL    BUN 21 (H) 6 - 20 mg/dL    Creatinine 0.78 0.70 - 1.30 mg/dL    BUN/Creatinine ratio 27 (H) 12 - 20      GFR est AA >60 >60 ml/min/1.73m2    GFR est non-AA >60 >60 ml/min/1.73m2    Calcium 8.5 8.5 - 10.1 mg/dL    Phosphorus 3.9 2.6 - 4.7 mg/dL    Albumin 3.0 (L) 3.5 - 5.0 g/dL   C REACTIVE PROTEIN, QT    Collection Time: 10/19/20  9:00 AM   Result Value Ref Range    C-Reactive protein 1.39 (H) 0.00 - 0.60 mg/dL   LD    Collection Time: 10/19/20  9:00 AM   Result Value Ref Range     (H) 85 - 241 U/L       XR CHEST PORT   Final Result      XR CHEST PORT   Final Result      CTA CHEST W OR W WO CONT   Final Result   IMPRESSION: Widespread patchy bilateral pneumonitis      XR CHEST SNGL V   Final Result      XR CHEST PORT    (Results Pending)          Assessment/     Problem List:  Hospital Problems  Date Reviewed: 10/15/2020          Codes Class Noted POA    Acute respiratory failure due to COVID-19 [facility]) ICD-10-CM: U07.1, J96.00  ICD-9-CM: 518.81, 079.89  10/15/2020 Yes        Pneumonia ICD-10-CM: J18.9  ICD-9-CM: 829  10/15/2020 Yes        Acute respiratory failure [facility]) ICD-10-CM: J96.00  ICD-9-CM: 518.81  10/15/2020 Unknown                     Plan:  Acute hypoxic respiratory failure: Etiology most likely pneumonia suspected from COVID-19. On high flow nasal cannula. Started on zithromax, Ivin Espinal and remdisavir. Remdesavir was held but after discussion has been restrted as per ID due to high suspicion of COVID. Clinically showing some improvement. COVID test was sent again. Will also do influenza A/B testing to r/o that as well.      Acute pneumonia confirmed with x-ray and CT scan with widespread patchy infiltrate highly suggestive of viral pneumonia. Patient is highly suspicious for Covid pneumonia. His Covid test has come back negative. Will repeat the test to confirm with the second result.     Dehydration: Secondary to above, started on IV fluids.     Admitted to ICU due to acute respiratory failure, his full CODE STATUS, has no home medications. Her spouse and family who helps making decisions.     Care Plan discussed with: Patient/Family and Nurse        Megha Roca MD

## 2020-10-19 NOTE — PROGRESS NOTES
Consult  Pulmonary, Critical Care    Name: Wing Smooth Tobar MRN: [id number]   : 1960 Hospital: [facility]   Date: 10/19/2020  Admission date: 10/15/2020 Hospital Day: 5       Subjective/Interval History:   Patient seen in the ICU on high flow nasal oxygen at 70% with oxygen saturation 99. He gives a history of gradual worsening illness over the last week to 10 days with nonproductive cough fever generalized malaise nausea denies any vomiting although the ER note states that he had some vomiting. He seems awake and alert. Does not have any Covid contacts that he is aware of. States that his sense of taste and smell have been normal but terribly anorexic has not eaten anything in 3 or 4 days. 10/17 still feels bad Cough shortness of breath but no nausea today   10/18 still complains of severe cough nonproductive although in general feels a little better  10/19 continues to have worsening hypoxia has had decreased by flow to 100% this morning. Still complains of cough shortness of breath and just generally feeling well    Patient Active Problem List   Diagnosis Code    Acute respiratory failure due to COVID-19 ([facility] [address] U07.1, J96.00    Pneumonia J18.9    Acute respiratory failure ([facility] [address] J96.00       IMPRESSION:   1. Acute hypoxic respiratory failure requiring high flow nasal oxygen now up to 100%  2. Diffuse pulmonary infiltrates questionable atypical pneumonia possible COVID-19 repeat testing pending  3. Nausea probable due to COVID-19 infection  4. Unremitting cough  Body mass index is 25.1 kg/m². RECOMMENDATIONS/PLAN:   1. Initial COVID-19 test is negative but ferritin LDH CRP are all elevated chest x-ray is consistent with Covid pneumonitis  repeat testing 10/17 pending continue Decadron and Actemra Remdesivir was resumed yesterday  2. Continue high flow nasal oxygen if any worsening hypoxia may need BiPAP  3. Continue codeine Delsym and Tessalon for severe cough  4.  Repeat COVID-19 testing pending  5. Check Legionella antigen and titer continue azithromycin  6. We will give 1 dose Lasix today follow renal function         Subjective/Initial History:   I have reviewed the flowsheet and previous days notes. Seen earlier today on rounds. I was asked by Vivian Oliveira MD to see Jerene Alpers An a 56 y.o.  male  in consultation for a chief complaint of acute hypoxic respiratory failure cough and community-acquired pneumonia          Pt now in CCU. Patient PCP: UNKNOWN  PMH:  has no past medical history on file. PSH:   has no past surgical history on file. FHX: family history includes No Known Problems in his father and mother. SHX:  reports that he has never smoked. He has never used smokeless tobacco. He reports that he does not drink alcohol or use drugs. Systemic review:  General no chronic illness but over the last week he has had fever generalized malaise weight loss no appetite  Eyes no double vision or momentary blindness  ENT no sinus congestion drainage or facial pain  Skeletal has diffuse aches and pains no swollen tender joints  Endocrinologic no polyuria polydipsia  Neurologic no seizures or syncope  Gastrointestinal normally he has no problems but over this last week he has had nausea anorexia marked weight loss has not had any alteration of his sense of taste or smell  Genitourinary no discomfort or drainage on urination  Cardiovascular no history of heart disease chest pain has felt sweaty but no history of ankle edema.   Respiratory as mentioned above    No Known Allergies   MEDS:   Current Facility-Administered Medications   Medication    furosemide (LASIX) injection 40 mg    codeine sulfate tablet 30 mg    dextromethorphan (DELSYM) 30 mg/5 mL syrup 30 mg    remdesivir 100 mg in 0.9% sodium chloride 250 mL IVPB    enoxaparin (LOVENOX) injection 30 mg    acetaminophen (TYLENOL) tablet 650 mg    Or    acetaminophen (TYLENOL) suppository 650 mg  sodium chloride (NS) flush 5-40 mL    sodium chloride (NS) flush 5-40 mL    ondansetron (ZOFRAN ODT) tablet 4 mg    Or    ondansetron (ZOFRAN) injection 4 mg    azithromycin (ZITHROMAX) 500 mg in 0.9% sodium chloride 250 mL (VIAL-MATE)    dexamethasone (DECADRON) 4 mg/mL injection 4 mg    benzonatate (TESSALON) capsule 200 mg        Current Facility-Administered Medications:     furosemide (LASIX) injection 40 mg, 40 mg, IntraVENous, ONCE, Krystal Gonzales MD    codeine sulfate tablet 30 mg, 30 mg, Oral, Q8H RT, Krystal Gonzales MD, 30 mg at 10/19/20 0845    dextromethorphan (DELSYM) 30 mg/5 mL syrup 30 mg, 30 mg, Oral, Q12H, Krystal Gonzales MD, 30 mg at 10/19/20 0845    remdesivir 100 mg in 0.9% sodium chloride 250 mL IVPB, 100 mg, IntraVENous, Q24H, Alfa Juarez MD, 100 mg at 10/18/20 1428    enoxaparin (LOVENOX) injection 30 mg, 30 mg, SubCUTAneous, Q12H, Chika Maya MD, 30 mg at 10/19/20 0609    acetaminophen (TYLENOL) tablet 650 mg, 650 mg, Oral, Q6H PRN, 650 mg at 10/18/20 2205 **OR** acetaminophen (TYLENOL) suppository 650 mg, 650 mg, Rectal, Q6H PRN, Chika Maya MD    sodium chloride (NS) flush 5-40 mL, 5-40 mL, IntraVENous, Q8H, Chika Maya MD, 10 mL at 10/18/20 2348    sodium chloride (NS) flush 5-40 mL, 5-40 mL, IntraVENous, PRN, Chika Maya MD    ondansetron (ZOFRAN ODT) tablet 4 mg, 4 mg, Oral, Q6H PRN **OR** ondansetron (ZOFRAN) injection 4 mg, 4 mg, IntraVENous, Q6H PRN, Chika Maya MD    azithromycin (ZITHROMAX) 500 mg in 0.9% sodium chloride 250 mL (VIAL-MATE), 500 mg, IntraVENous, Q24H, Alexx Vallecillo MD, 500 mg at 10/18/20 1119    dexamethasone (DECADRON) 4 mg/mL injection 4 mg, 4 mg, IntraVENous, Q6H, Krystal Gonzales MD, 4 mg at 10/19/20 0609    benzonatate (TESSALON) capsule 200 mg, 200 mg, Oral, Q8H, Krystal Gonzales MD, 200 mg at 10/19/20 0610      Objective:     Vital Signs: Telemetry:    normal sinus rhythm Intake/Output:   Visit Vitals  BP (!) 138/94   Pulse 75   Temp 97.7 °F (36.5 °C)   Resp 22   Ht 5' 9\" (1.753 m)   Wt 77.1 kg (170 lb)   SpO2 93%   BMI 25.10 kg/m²       Temp (24hrs), Av.2 °F (36.8 °C), Min:97.7 °F (36.5 °C), Max:98.5 °F (36.9 °C)        O2 Device: Hi flow nasal cannula O2 Flow Rate (L/min): 60 l/min         Body mass index is 25.1 kg/m². Wt Readings from Last 4 Encounters:   10/15/20 77.1 kg (170 lb)          Intake/Output Summary (Last 24 hours) at 10/19/2020 1002  Last data filed at 10/19/2020 8422  Gross per 24 hour   Intake 900 ml   Output 1825 ml   Net -925 ml       Last shift:      No intake/output data recorded. Last 3 shifts: 10/17 1901 - 10/19 0700  In: 900 [P.O.:400; I.V.:500]  Out: 5849 [Urine:2375]   Ventilator Settings:      Mode Rate TV Press PEEP FiO2 PIP Min. Vent               100 %            Physical Exam:    General:  male; moderate distress chronic cough nonproductive  HEENT: NCAT, oral mucosa clear  Eyes: anicteric; conjunctiva clear extraocular movements intact   neck: no node neck veins visible  Chest: no deformity,   Cardiac: Regular rate and rhythm no murmurs trace ankle edema  Lungs: Clear anteriorly with rales laterally and posteriorly  Abd: Soft nontender normal bowel sounds no organomegaly  Ext: Mild edema; no joint swelling;  No clubbing  :clear urine  Neuro: Awake alert speech is clear moves all 4 extremities well  Psych- no agitation, oriented to person;   Skin: warm,, no cyanosis; very mildly diaphoretic  Pulses: Pulses intact  Capillary: Normal capillary refill      Labs:    Recent Labs     10/19/20  0900 10/18/20  0610   WBC 13.3* 13.6*   HGB 15.5 15.0    288   INR 1.0 1.1   APTT 25.7 24.8     Recent Labs     10/19/20  0900 10/18/20  0610    138   K 3.8 3.6    107   CO2 26 26   * 129*   BUN 21* 19   CREA 0.78 0.74   CA 8.5 8.4*   PHOS 3.9 3.5   ALB 3.0* 2.8*     10/19 100% nasal high flow with oxygen saturation 95%  10/14 COVID-19 test negative  Ferritin 2180    Procalcitonin 0.14  CRP 12.4  Lab Results   Component Value Date/Time    Culture result: No growth 4 days 10/15/2020 07:10 PM      Lab Results   Component Value Date/Time     10/15/2020 11:47 PM       Imaging:  I have personally reviewed the patients radiographs and have reviewed the reports:    CXR Results  (Last 48 hours)  10/17 chest x-ray unchanged diffuse bilateral infiltrate               10/15/20 1927  XR CHEST SNGL V Final result    Narrative:  1       Mild atelectasis in the bases with upper lungs clear. No effusion or   pneumothorax. Normal heart and no congestion  Disagree to me there is bilateral infiltrate           Results from Hospital Encounter encounter on 10/15/20   XR CHEST PORT    Narrative Chest single view. Comparison single view chest October 17, 2020    Patchy alveolar opacities are advanced throughout the lungs. Consider  infectious/inflammatory process. Cardiac and mediastinal structures unchanged. No pneumothorax or sizable pleural effusion. XR CHEST PORT    Narrative Chest single view. Comparison single view chest October 15, 2020    Hazy alveolar opacities generally similar compared to prior imaging. Cardiac and  mediastinal structures unchanged. No pneumothorax or pleural effusion. XR CHEST SNGL V    Narrative 1    Mild atelectasis in the bases with upper lungs clear. No effusion or  pneumothorax. Normal heart and no congestion     Results from Hospital Encounter encounter on 10/15/20   CTA CHEST W OR W WO CONT    Narrative CT dose reduction was achieved through use of a standardized protocol tailored  for this examination and automatic exposure control for dose modulation. Contrast study maximum intensity projections shows pronounced groundglass  opacification, of variable density, mainly in the dependent portions of each  lung, apex to base. Air bronchograms are preserved in the densest portions. Mild  geographic groundglass opacification of lower density in the nondependent  portions. Central airways are open    Pulmonary arteries are well-opacified and show no filling defect or distortion. Aorta shows normal dimensions, without dissection. Tiny middle mediastinal lymph  nodes. Cardiac finding. No pleural pericardial effusion. No significant upper  abdominal or chest wall finding      Impression IMPRESSION: Widespread patchy bilateral pneumonitis       Clinical picture consistent with COVID-19 infection with pneumonitis with diffuse patchy pulmonary infiltrate elevation in ferritin LDH and CRP nonproductive cough and GI symptoms and yet initial COVID-19 test negative repeat testing pending continue azithromycin Actemra and Decadron 10/18 Remdesivir resumed by infectious disease    Time of care 30 minutes         Thank you for allowing us to participate in the care of this patient.   We will follow along with you    Zayra Dow MD

## 2020-10-20 ENCOUNTER — APPOINTMENT (OUTPATIENT)
Dept: GENERAL RADIOLOGY | Age: 60
DRG: 177 | End: 2020-10-20
Attending: INTERNAL MEDICINE
Payer: COMMERCIAL

## 2020-10-20 LAB
ABO + RH BLD: NORMAL
ALBUMIN SERPL-MCNC: 3 G/DL (ref 3.5–5)
ANION GAP SERPL CALC-SCNC: 7 MMOL/L (ref 5–15)
APTT PPP: 24.1 SEC (ref 23–35.7)
BASOPHILS # BLD: 0 K/UL (ref 0–0.1)
BASOPHILS NFR BLD: 0 % (ref 0–1)
BLOOD GROUP ANTIBODIES SERPL: NEGATIVE
BNP SERPL-MCNC: 149 PG/ML
BUN SERPL-MCNC: 26 MG/DL (ref 6–20)
BUN/CREAT SERPL: 28 (ref 12–20)
CA-I BLD-MCNC: 8.4 MG/DL (ref 8.5–10.1)
CHLORIDE SERPL-SCNC: 107 MMOL/L (ref 97–108)
CO2 SERPL-SCNC: 25 MMOL/L (ref 21–32)
CREAT SERPL-MCNC: 0.94 MG/DL (ref 0.7–1.3)
CRP SERPL-MCNC: 0.87 MG/DL (ref 0–0.6)
DIFFERENTIAL METHOD BLD: ABNORMAL
EOSINOPHIL # BLD: 0 K/UL (ref 0–0.4)
EOSINOPHIL NFR BLD: 0 % (ref 0–7)
ERYTHROCYTE [DISTWIDTH] IN BLOOD BY AUTOMATED COUNT: 13 % (ref 11.5–14.5)
FERRITIN SERPL-MCNC: 1400 NG/ML (ref 26–388)
GLUCOSE SERPL-MCNC: 136 MG/DL (ref 65–100)
HCT VFR BLD AUTO: 44.9 % (ref 36.6–50.3)
HGB BLD-MCNC: 16.1 G/DL (ref 12.1–17)
IMM GRANULOCYTES # BLD AUTO: 0.1 K/UL (ref 0–0.04)
IMM GRANULOCYTES NFR BLD AUTO: 1 % (ref 0–0.5)
LDH SERPL L TO P-CCNC: 565 U/L (ref 85–241)
LYMPHOCYTES # BLD: 1.9 K/UL (ref 0.8–3.5)
LYMPHOCYTES NFR BLD: 14 % (ref 12–49)
MCH RBC QN AUTO: 31 PG (ref 26–34)
MCHC RBC AUTO-ENTMCNC: 35.9 G/DL (ref 30–36.5)
MCV RBC AUTO: 86.3 FL (ref 80–99)
MONOCYTES # BLD: 0.1 K/UL (ref 0–1)
MONOCYTES NFR BLD: 1 % (ref 5–13)
NEUTS SEG # BLD: 11.7 K/UL (ref 1.8–8)
NEUTS SEG NFR BLD: 84 % (ref 32–75)
PHOSPHATE SERPL-MCNC: 4.4 MG/DL (ref 2.6–4.7)
PLATELET # BLD AUTO: 335 K/UL (ref 150–400)
PMV BLD AUTO: 9.7 FL (ref 8.9–12.9)
POTASSIUM SERPL-SCNC: 3.9 MMOL/L (ref 3.5–5.1)
RBC # BLD AUTO: 5.2 M/UL (ref 4.1–5.7)
SARS-COV-2, COV2: NORMAL
SARS-COV-2, COV2NT: ABNORMAL
SODIUM SERPL-SCNC: 139 MMOL/L (ref 136–145)
SPECIMEN EXP DATE BLD: NORMAL
THERAPEUTIC RANGE,PTTT: NORMAL SEC (ref 68–109)
WBC # BLD AUTO: 13.8 K/UL (ref 4.1–11.1)

## 2020-10-20 PROCEDURE — 87077 CULTURE AEROBIC IDENTIFY: CPT

## 2020-10-20 PROCEDURE — 92610 EVALUATE SWALLOWING FUNCTION: CPT

## 2020-10-20 PROCEDURE — 99233 SBSQ HOSP IP/OBS HIGH 50: CPT | Performed by: INTERNAL MEDICINE

## 2020-10-20 PROCEDURE — 82728 ASSAY OF FERRITIN: CPT

## 2020-10-20 PROCEDURE — 85025 COMPLETE CBC W/AUTO DIFF WBC: CPT

## 2020-10-20 PROCEDURE — 77010033678 HC OXYGEN DAILY

## 2020-10-20 PROCEDURE — 87635 SARS-COV-2 COVID-19 AMP PRB: CPT

## 2020-10-20 PROCEDURE — 87186 SC STD MICRODIL/AGAR DIL: CPT

## 2020-10-20 PROCEDURE — 74011250636 HC RX REV CODE- 250/636

## 2020-10-20 PROCEDURE — 74011250636 HC RX REV CODE- 250/636: Performed by: HOSPITALIST

## 2020-10-20 PROCEDURE — 74011000258 HC RX REV CODE- 258

## 2020-10-20 PROCEDURE — 74011000258 HC RX REV CODE- 258: Performed by: INTERNAL MEDICINE

## 2020-10-20 PROCEDURE — 83880 ASSAY OF NATRIURETIC PEPTIDE: CPT

## 2020-10-20 PROCEDURE — 74011250636 HC RX REV CODE- 250/636: Performed by: INTERNAL MEDICINE

## 2020-10-20 PROCEDURE — 87040 BLOOD CULTURE FOR BACTERIA: CPT

## 2020-10-20 PROCEDURE — 85730 THROMBOPLASTIN TIME PARTIAL: CPT

## 2020-10-20 PROCEDURE — 80069 RENAL FUNCTION PANEL: CPT

## 2020-10-20 PROCEDURE — 74011250637 HC RX REV CODE- 250/637: Performed by: INTERNAL MEDICINE

## 2020-10-20 PROCEDURE — 74011000250 HC RX REV CODE- 250: Performed by: INTERNAL MEDICINE

## 2020-10-20 PROCEDURE — 87070 CULTURE OTHR SPECIMN AEROBIC: CPT

## 2020-10-20 PROCEDURE — 74011250637 HC RX REV CODE- 250/637: Performed by: FAMILY MEDICINE

## 2020-10-20 PROCEDURE — 65610000006 HC RM INTENSIVE CARE

## 2020-10-20 PROCEDURE — 36415 COLL VENOUS BLD VENIPUNCTURE: CPT

## 2020-10-20 PROCEDURE — 86140 C-REACTIVE PROTEIN: CPT

## 2020-10-20 PROCEDURE — 71045 X-RAY EXAM CHEST 1 VIEW: CPT

## 2020-10-20 PROCEDURE — 86900 BLOOD TYPING SEROLOGIC ABO: CPT

## 2020-10-20 PROCEDURE — 83615 LACTATE (LD) (LDH) ENZYME: CPT

## 2020-10-20 RX ORDER — PIPERACILLIN SODIUM, TAZOBACTAM SODIUM 3; .375 G/15ML; G/15ML
INJECTION, POWDER, LYOPHILIZED, FOR SOLUTION INTRAVENOUS
Status: COMPLETED
Start: 2020-10-20 | End: 2020-10-20

## 2020-10-20 RX ORDER — SODIUM CHLORIDE 9 MG/ML
250 INJECTION, SOLUTION INTRAVENOUS AS NEEDED
Status: DISCONTINUED | OUTPATIENT
Start: 2020-10-20 | End: 2020-11-09 | Stop reason: HOSPADM

## 2020-10-20 RX ORDER — CHOLECALCIFEROL (VITAMIN D3) 125 MCG
10 CAPSULE ORAL
Status: DISCONTINUED | OUTPATIENT
Start: 2020-10-20 | End: 2020-11-09 | Stop reason: HOSPADM

## 2020-10-20 RX ORDER — FUROSEMIDE 10 MG/ML
40 INJECTION INTRAMUSCULAR; INTRAVENOUS ONCE
Status: COMPLETED | OUTPATIENT
Start: 2020-10-20 | End: 2020-10-20

## 2020-10-20 RX ORDER — SODIUM CHLORIDE 900 MG/100ML
INJECTION INTRAVENOUS
Status: COMPLETED
Start: 2020-10-20 | End: 2020-10-20

## 2020-10-20 RX ORDER — FUROSEMIDE 10 MG/ML
INJECTION INTRAMUSCULAR; INTRAVENOUS
Status: COMPLETED
Start: 2020-10-20 | End: 2020-10-20

## 2020-10-20 RX ORDER — HYDROXYZINE PAMOATE 25 MG/1
25 CAPSULE ORAL
Status: DISCONTINUED | OUTPATIENT
Start: 2020-10-20 | End: 2020-11-09 | Stop reason: HOSPADM

## 2020-10-20 RX ADMIN — BENZONATATE 200 MG: 100 CAPSULE ORAL at 22:22

## 2020-10-20 RX ADMIN — Medication 10 ML: at 05:56

## 2020-10-20 RX ADMIN — DEXTROMETHORPHAN 30 MG: 30 SUSPENSION, EXTENDED RELEASE ORAL at 08:08

## 2020-10-20 RX ADMIN — DEXAMETHASONE SODIUM PHOSPHATE 4 MG: 4 INJECTION, SOLUTION INTRA-ARTICULAR; INTRALESIONAL; INTRAMUSCULAR; INTRAVENOUS; SOFT TISSUE at 11:21

## 2020-10-20 RX ADMIN — SALINE NASAL SPRAY 2 SPRAY: 1.5 SOLUTION NASAL at 10:55

## 2020-10-20 RX ADMIN — CODEINE SULFATE 30 MG: 30 TABLET ORAL at 08:08

## 2020-10-20 RX ADMIN — SODIUM CHLORIDE 100 MG: 9 INJECTION, SOLUTION INTRAVENOUS at 13:49

## 2020-10-20 RX ADMIN — DEXAMETHASONE SODIUM PHOSPHATE 4 MG: 4 INJECTION, SOLUTION INTRA-ARTICULAR; INTRALESIONAL; INTRAMUSCULAR; INTRAVENOUS; SOFT TISSUE at 17:34

## 2020-10-20 RX ADMIN — DEXAMETHASONE SODIUM PHOSPHATE 4 MG: 4 INJECTION, SOLUTION INTRA-ARTICULAR; INTRALESIONAL; INTRAMUSCULAR; INTRAVENOUS; SOFT TISSUE at 00:01

## 2020-10-20 RX ADMIN — BENZONATATE 200 MG: 100 CAPSULE ORAL at 06:05

## 2020-10-20 RX ADMIN — SALINE NASAL SPRAY 2 SPRAY: 1.5 SOLUTION NASAL at 22:23

## 2020-10-20 RX ADMIN — PIPERACILLIN SODIUM AND TAZOBACTAM SODIUM 3.38 G: 3; .375 INJECTION, POWDER, LYOPHILIZED, FOR SOLUTION INTRAVENOUS at 10:55

## 2020-10-20 RX ADMIN — SALINE NASAL SPRAY 2 SPRAY: 1.5 SOLUTION NASAL at 13:50

## 2020-10-20 RX ADMIN — HYDROXYZINE PAMOATE 25 MG: 25 CAPSULE ORAL at 22:22

## 2020-10-20 RX ADMIN — TOCILIZUMAB 400 MG: 20 INJECTION, SOLUTION, CONCENTRATE INTRAVENOUS at 11:21

## 2020-10-20 RX ADMIN — CODEINE SULFATE 30 MG: 30 TABLET ORAL at 15:22

## 2020-10-20 RX ADMIN — ENOXAPARIN SODIUM 30 MG: 30 INJECTION SUBCUTANEOUS at 06:05

## 2020-10-20 RX ADMIN — DEXTROMETHORPHAN 30 MG: 30 SUSPENSION, EXTENDED RELEASE ORAL at 22:22

## 2020-10-20 RX ADMIN — Medication 10 ML: at 22:23

## 2020-10-20 RX ADMIN — CODEINE SULFATE 30 MG: 30 TABLET ORAL at 02:30

## 2020-10-20 RX ADMIN — Medication 10 ML: at 13:50

## 2020-10-20 RX ADMIN — BENZONATATE 200 MG: 100 CAPSULE ORAL at 13:49

## 2020-10-20 RX ADMIN — AZITHROMYCIN MONOHYDRATE 500 MG: 500 INJECTION, POWDER, LYOPHILIZED, FOR SOLUTION INTRAVENOUS at 11:21

## 2020-10-20 RX ADMIN — FUROSEMIDE 40 MG: 10 INJECTION, SOLUTION INTRAMUSCULAR; INTRAVENOUS at 10:58

## 2020-10-20 RX ADMIN — PIPERACILLIN SODIUM AND TAZOBACTAM SODIUM 3.38 G: 3; .375 INJECTION, POWDER, LYOPHILIZED, FOR SOLUTION INTRAVENOUS at 17:47

## 2020-10-20 RX ADMIN — DEXAMETHASONE SODIUM PHOSPHATE 4 MG: 4 INJECTION, SOLUTION INTRA-ARTICULAR; INTRALESIONAL; INTRAMUSCULAR; INTRAVENOUS; SOFT TISSUE at 05:55

## 2020-10-20 RX ADMIN — PIPERACILLIN AND TAZOBACTAM 3.38 G: 3; .375 INJECTION, POWDER, LYOPHILIZED, FOR SOLUTION INTRAVENOUS at 10:58

## 2020-10-20 RX ADMIN — SODIUM CHLORIDE: 900 INJECTION INTRAVENOUS at 11:00

## 2020-10-20 RX ADMIN — SALINE NASAL SPRAY 2 SPRAY: 1.5 SOLUTION NASAL at 17:34

## 2020-10-20 RX ADMIN — ENOXAPARIN SODIUM 30 MG: 30 INJECTION SUBCUTANEOUS at 17:34

## 2020-10-20 RX ADMIN — FUROSEMIDE 40 MG: 10 INJECTION INTRAMUSCULAR; INTRAVENOUS at 10:58

## 2020-10-20 RX ADMIN — MELATONIN TAB 5 MG 10 MG: 5 TAB at 22:22

## 2020-10-20 NOTE — PROGRESS NOTES
Reason for Admission:   PNA and Acute Respiratory Failure                   RUR Score:  13%                   Plan for utilizing home health:    No HH. PCP: First and Last name:  Danilo Lopez MD    Name of Practice:    Are you a current patient: Yes/No: Yes   Approximate date of last visit: Sep 23rd, 2020   Can you participate in a virtual visit with your PCP: Yes                    Current Advanced Directive/Advance Care Plan: FULL Code. No POA or Advanced Medical Directive. Spouse (Leydi Tobar) is the primary decision maker. Mrs. Tobar can be reached @ [phone]. Transition of Care Plan:                        PCP: [place]. [place]iv [place]   [phone]      Address listed in chart/on demographics isn't where the patient physically resides, per the spouse. Last time see in PCP office Sep 23rd, 2020. [address]) is used to p/u medications. Independent w/all ADL's & IADL's and required no assistance. Prior to this inpatient admission, spouse states he was \"healthy. \"  No prior HH, SNF, and, or IRF. Patient is able to drive himself to all medical appointments'. SW to continue to follow re: discharge needs and disposition. Care Management Interventions  PCP Verified by CM: Yes  Last Visit to PCP: 09/23/20  Mode of Transport at Discharge:  Other (see comment)(Family)  Transition of Care Consult (CM Consult): Discharge Planning  Discharge Durable Medical Equipment: (No home O2 or DME)  Current Support Network: Lives with Spouse, Own Home(One story home)  Confirm Follow Up Transport: Family  Discharge Location  Discharge Placement: (Home )      ELIZABETH Horowitz

## 2020-10-20 NOTE — PROGRESS NOTES
Visited the patient in ICU by physician's referral.  Visit began with the patient, and during visit physician and nurse came in to provide care. Patient shared his experience of being in the hospital for the past 6 days and he seemed frustrated that his condition is not improving. Patient seemed to process his emotions as he shared his concerns about his well being and his hopes and expectations of improving more quickly. I reflected his emotions of frustration and normalized his wanting to get better and be able to go home. I provided silent support while physician gave him update about treatments being provided. I provided empathic listening as he shared about his family, espeically about his daughters. He requested regular visitations and I assured him that I would visit as often as I am able.   Chaplain Wan Jackson M.Div.

## 2020-10-20 NOTE — PROGRESS NOTES
Consult  Pulmonary, Critical Care    Name: Melchor Tobar MRN: [id number]   : 1960 Hospital: [address] [place] [address]   Date: 10/20/2020  Admission date: 10/15/2020 Hospital Day: 6       Subjective/Interval History:   Patient seen in the ICU on high flow nasal oxygen at 70% with oxygen saturation 99. He gives a history of gradual worsening illness over the last week to 10 days with nonproductive cough fever generalized malaise nausea denies any vomiting although the ER note states that he had some vomiting. He seems awake and alert. Does not have any Covid contacts that he is aware of. States that his sense of taste and smell have been normal but terribly anorexic has not eaten anything in 3 or 4 days. 10/17 still feels bad Cough shortness of breath but no nausea today   10/18 still complains of severe cough nonproductive although in general feels a little better  10/20 remains 100% high flow nasal oxygen. Continues to complain of it irritating him. Requested nasal saline last evening but it has not been started will reorder it. We will let him try nonrebreather mask with nasal high flow for meals    Patient Active Problem List   Diagnosis Code    Acute respiratory failure due to COVID-19 ([facility] U07.1, J96.00    Pneumonia J18.9    Acute respiratory failure ([facility]) J96.00       IMPRESSION:   1. Acute hypoxic respiratory failure requiring high flow nasal oxygen now up to 100%  2. Diffuse pulmonary infiltrates questionable atypical pneumonia possible COVID-19 repeat testing pending  3. Nausea probable due to COVID-19 infection  4. Unremitting cough  Body mass index is 27.25 kg/m². RECOMMENDATIONS/PLAN:   1. Initial COVID-19 test is negative repeat testing indeterminate but ferritin LDH CRP are all elevated chest x-ray is consistent with Covid pneumonitis  continue Decadron and Actemra Remdesivir   2. We will use nonrebreather mask as long as oxygen saturation greater than 90%  3.  Continue codeine Delsym and Tessalon for severe cough  4. Repeat COVID-19 testing determinate  5. Check Legionella antigen and titer continue azithromycin  6. We will repeat 1 dose Lasix today follow renal function         Subjective/Initial History:   I have reviewed the flowsheet and previous days notes. Seen earlier today on rounds. I was asked by Michael Good MD to see Cara Gagnon An a 56 y.o.  male  in consultation for a chief complaint of acute hypoxic respiratory failure cough and community-acquired pneumonia          Pt now in CCU. Patient PCP: UNKNOWN  PMH:  has no past medical history on file. PSH:   has no past surgical history on file. FHX: family history includes No Known Problems in his father and mother. SHX:  reports that he has never smoked. He has never used smokeless tobacco. He reports that he does not drink alcohol or use drugs. Systemic review:  General no chronic illness but over the last week he has had fever generalized malaise weight loss no appetite  Eyes no double vision or momentary blindness  ENT no sinus congestion drainage or facial pain  Skeletal has diffuse aches and pains no swollen tender joints  Endocrinologic no polyuria polydipsia  Neurologic no seizures or syncope  Gastrointestinal normally he has no problems but over this last week he has had nausea anorexia marked weight loss has not had any alteration of his sense of taste or smell  Genitourinary no discomfort or drainage on urination  Cardiovascular no history of heart disease chest pain has felt sweaty but no history of ankle edema.   Respiratory as mentioned above    No Known Allergies   MEDS:   Current Facility-Administered Medications   Medication    piperacillin-tazobactam (ZOSYN) 3.375 g in 0.9% sodium chloride (MBP/ADV) 100 mL MBP    0.9% sodium chloride infusion 250 mL    tocilizumab 400 mg in 0.9% sodium chloride 80 mL infusion    sodium chloride (OCEAN) 0.65 % nasal squeeze bottle 2 Spray  furosemide (LASIX) injection 40 mg    codeine sulfate tablet 30 mg    dextromethorphan (DELSYM) 30 mg/5 mL syrup 30 mg    remdesivir 100 mg in 0.9% sodium chloride 250 mL IVPB    enoxaparin (LOVENOX) injection 30 mg    acetaminophen (TYLENOL) tablet 650 mg    Or    acetaminophen (TYLENOL) suppository 650 mg    sodium chloride (NS) flush 5-40 mL    sodium chloride (NS) flush 5-40 mL    ondansetron (ZOFRAN ODT) tablet 4 mg    Or    ondansetron (ZOFRAN) injection 4 mg    azithromycin (ZITHROMAX) 500 mg in 0.9% sodium chloride 250 mL (VIAL-MATE)    dexamethasone (DECADRON) 4 mg/mL injection 4 mg    benzonatate (TESSALON) capsule 200 mg        Current Facility-Administered Medications:     piperacillin-tazobactam (ZOSYN) 3.375 g in 0.9% sodium chloride (MBP/ADV) 100 mL MBP, 3.375 g, IntraVENous, Q8H, Simnoa Gonzales MD    0.9% sodium chloride infusion 250 mL, 250 mL, IntraVENous, PRN, Aurelia Fan MD    tocilizumab 400 mg in 0.9% sodium chloride 80 mL infusion, 400 mg, IntraVENous, ONCE, Aurelia Fan MD    sodium chloride (OCEAN) 0.65 % nasal squeeze bottle 2 Spray, 2 Spray, Both Nostrils, Q4HWA, Lisa Knott MD    furosemide (LASIX) injection 40 mg, 40 mg, IntraVENous, ONCE, Lisa Knott MD    codeine sulfate tablet 30 mg, 30 mg, Oral, Q8H RT, Lisa Knott MD, 30 mg at 10/20/20 0808    dextromethorphan (DELSYM) 30 mg/5 mL syrup 30 mg, 30 mg, Oral, Q12H, Lisa Knott MD, 30 mg at 10/20/20 0808    remdesivir 100 mg in 0.9% sodium chloride 250 mL IVPB, 100 mg, IntraVENous, Q24H, Alfa Juarez MD, 100 mg at 10/19/20 1400    enoxaparin (LOVENOX) injection 30 mg, 30 mg, SubCUTAneous, Q12H, Kobe Ceballos MD, 30 mg at 10/20/20 0605    acetaminophen (TYLENOL) tablet 650 mg, 650 mg, Oral, Q6H PRN, 650 mg at 10/18/20 2205 **OR** acetaminophen (TYLENOL) suppository 650 mg, 650 mg, Rectal, Q6H PRN, Kobe Ceballos MD    sodium chloride (NS) flush 5-40 mL, 5-40 mL, IntraVENous, Q8H, Malu Meneses MD, 10 mL at 10/20/20 0556    sodium chloride (NS) flush 5-40 mL, 5-40 mL, IntraVENous, PRN, Malu Meneses MD    ondansetron (ZOFRAN ODT) tablet 4 mg, 4 mg, Oral, Q6H PRN **OR** ondansetron (ZOFRAN) injection 4 mg, 4 mg, IntraVENous, Q6H PRN, Malu Meneses MD    azithromycin (ZITHROMAX) 500 mg in 0.9% sodium chloride 250 mL (VIAL-MATE), 500 mg, IntraVENous, Q24H, Nabila Napoles MD, 500 mg at 10/19/20 1017    dexamethasone (DECADRON) 4 mg/mL injection 4 mg, 4 mg, IntraVENous, Q6H, Leilani Bowie MD, 4 mg at 10/20/20 0555    benzonatate (TESSALON) capsule 200 mg, 200 mg, Oral, Q8H, Leilani Bowie MD, 200 mg at 10/20/20 0605      Objective:     Vital Signs: Telemetry:    normal sinus rhythm Intake/Output:   Visit Vitals  /68   Pulse 76   Temp 97.7 °F (36.5 °C)   Resp 27   Ht 5' 9\" (1.753 m)   Wt 83.7 kg (184 lb 8.4 oz)   SpO2 95%   BMI 27.25 kg/m²       Temp (24hrs), Av.1 °F (36.7 °C), Min:97.7 °F (36.5 °C), Max:98.4 °F (36.9 °C)        O2 Device: Non-rebreather mask O2 Flow Rate (L/min): 15 l/min         Body mass index is 27.25 kg/m². Wt Readings from Last 4 Encounters:   10/20/20 83.7 kg (184 lb 8.4 oz)          Intake/Output Summary (Last 24 hours) at 10/20/2020 0951  Last data filed at 10/20/2020 0345  Gross per 24 hour   Intake    Output 2950 ml   Net -2950 ml       Last shift:      No intake/output data recorded. Last 3 shifts: 10/18 1901 - 10/20 0700  In: -   Out: 1150 [[id number]]   Ventilator Settings:      Mode Rate TV Press PEEP FiO2 PIP Min.  Vent               100 %            Physical Exam:    General:  male; moderate distress chronic cough nonproductive  HEENT: NCAT, oral mucosa clear  Eyes: anicteric; conjunctiva clear extraocular movements intact   neck: no node neck veins visible  Chest: no deformity,   Cardiac: Regular rate and rhythm no murmurs trace ankle edema  Lungs: Clear anteriorly with rales laterally and posteriorly  Abd: Soft nontender normal bowel sounds no organomegaly  Ext: Mild edema; no joint swelling; No clubbing  :clear urine  Neuro: Awake alert speech is clear moves all 4 extremities well  Psych- no agitation, oriented to person;   Skin: warm,, no cyanosis; very mildly diaphoretic  Pulses: Pulses intact  Capillary: Normal capillary refill      Labs:    Recent Labs     10/20/20  0550 10/19/20  0900 10/18/20  0610   WBC 13.8* 13.3* 13.6*   HGB 16.1 15.5 15.0    327 288   INR  --  1.0 1.1   APTT 24.1 25.7 24.8     Recent Labs     10/20/20  0550 10/19/20  0900 10/18/20  0610    138 138   K 3.9 3.8 3.6    106 107   CO2 25 26 26   * 138* 129*   BUN 26* 21* 19   CREA 0.94 0.78 0.74   CA 8.4* 8.5 8.4*   PHOS 4.4 3.9 3.5   ALB 3.0* 3.0* 2.8*     10/2000% nasal high flow with oxygen saturation 93%  10/20 nonrebreather mask oxygen saturation 91 to 92%  10/14 COVID-19 test negative  10/17 COVID-19 test indeterminate  Ferritin 1510, previous 2180  , previous 615, 754  Procalcitonin 0.14  CRP 0.87, previous 1.39, 3.0, 12.4  Lab Results   Component Value Date/Time    Culture result: No growth 4 days 10/15/2020 07:10 PM      Lab Results   Component Value Date/Time     10/15/2020 11:47 PM       Imaging:  I have personally reviewed the patients radiographs and have reviewed the reports:    CXR Results  (Last 48 hours)  10/20 chest x-ray with diffuse patchy infiltrates may be slightly less dense  10/17 chest x-ray unchanged diffuse bilateral infiltrate               10/15/20 1927  XR CHEST SNGL V Final result    Narrative:  1       Mild atelectasis in the bases with upper lungs clear. No effusion or   pneumothorax.  Normal heart and no congestion  Disagree to me there is bilateral infiltrate           Results from Hospital Encounter encounter on 10/15/20   XR CHEST PORT    Narrative Chest single view       Comparison single view chest October 18, 2020    Patchy reticular markings through lungs, generally same appearance when  differences in technique are taken into account. Cardiac and mediastinal  structures unchanged. No pneumothorax or sizable pleural effusion. XR CHEST PORT    Narrative Chest single view. Comparison single view chest October 17, 2020    Patchy alveolar opacities are advanced throughout the lungs. Consider  infectious/inflammatory process. Cardiac and mediastinal structures unchanged. No pneumothorax or sizable pleural effusion. XR CHEST PORT    Narrative Chest single view. Comparison single view chest October 15, 2020    Hazy alveolar opacities generally similar compared to prior imaging. Cardiac and  mediastinal structures unchanged. No pneumothorax or pleural effusion. Results from [place] encounter on 10/15/20   CTA CHEST W OR W WO CONT    Narrative CT dose reduction was achieved through use of a standardized protocol tailored  for this examination and automatic exposure control for dose modulation. Contrast study maximum intensity projections shows pronounced groundglass  opacification, of variable density, mainly in the dependent portions of each  lung, apex to base. Air bronchograms are preserved in the densest portions. Mild  geographic groundglass opacification of lower density in the nondependent  portions. Central airways are open    Pulmonary arteries are well-opacified and show no filling defect or distortion. Aorta shows normal dimensions, without dissection. Tiny middle mediastinal lymph  nodes. Cardiac finding. No pleural pericardial effusion.  No significant upper  abdominal or chest wall finding      Impression IMPRESSION: Widespread patchy bilateral pneumonitis       Clinical picture consistent with COVID-19 infection with pneumonitis with diffuse patchy pulmonary infiltrate elevation in ferritin LDH and CRP nonproductive cough and GI symptoms and yet initial COVID-19 test negative repeat testing indeterminate continue azithromycin Actemra  Decadron and Remdesivir and will repeat Lasix today continue to follow renal function    Time of care 30 minutes         Thank you for allowing us to participate in the care of this patient.   We will follow along with you    Zohaib Modi MD

## 2020-10-20 NOTE — PROGRESS NOTES
Hospitalist Progress Note               Daily Progress Note: 10/20/2020      Subjective: The patient is seen for follow  up.   56 y.o. male with no significant medical problems presents to the emergency room complaining of shortness of breath. Patient had a urinary retention which was resolved with Granados catheter placement. Patient continues to be on high flow oxygen. Seen and evaluated at bedside, patient feels significantly anxious, patient states there is minimal improvement in his symptoms, patient significantly anxious, overnight events reviewed, discussed with RN and patient at bedside    Medications reviewed  Current Facility-Administered Medications   Medication Dose Route Frequency    codeine sulfate tablet 30 mg  30 mg Oral Q8H RT    dextromethorphan (DELSYM) 30 mg/5 mL syrup 30 mg  30 mg Oral Q12H    remdesivir 100 mg in 0.9% sodium chloride 250 mL IVPB  100 mg IntraVENous Q24H    enoxaparin (LOVENOX) injection 30 mg  30 mg SubCUTAneous Q12H    acetaminophen (TYLENOL) tablet 650 mg  650 mg Oral Q6H PRN    Or    acetaminophen (TYLENOL) suppository 650 mg  650 mg Rectal Q6H PRN    sodium chloride (NS) flush 5-40 mL  5-40 mL IntraVENous Q8H    sodium chloride (NS) flush 5-40 mL  5-40 mL IntraVENous PRN    ondansetron (ZOFRAN ODT) tablet 4 mg  4 mg Oral Q6H PRN    Or    ondansetron (ZOFRAN) injection 4 mg  4 mg IntraVENous Q6H PRN    azithromycin (ZITHROMAX) 500 mg in 0.9% sodium chloride 250 mL (VIAL-MATE)  500 mg IntraVENous Q24H    dexamethasone (DECADRON) 4 mg/mL injection 4 mg  4 mg IntraVENous Q6H    benzonatate (TESSALON) capsule 200 mg  200 mg Oral Q8H       Review of Systems:   A comprehensive review of systems was negative except for that written in the HPI.     Objective:   Physical Exam:     Visit Vitals  /68   Pulse 76   Temp 98.2 °F (36.8 °C)   Resp 27   Ht 5' 9\" (1.753 m)   Wt 83.7 kg (184 lb 8.4 oz)   SpO2 94%   BMI 27.25 kg/m²    O2 Flow Rate (L/min): 40 l/min O2 Device: Hi flow nasal cannula    Temp (24hrs), Av.2 °F (36.8 °C), Min:97.8 °F (36.6 °C), Max:98.4 °F (36.9 °C)    No intake/output data recorded. 10/18 190 - 10/20 0700  In: -   Out: 3775 [[id number]]    PHYSICAL EXAM:  General: Alert and awake. Skin: Extremities and face reveal no rashes. HEENT: Sclerae anicteric. Extra-occular muscles are intact. No oral ulcers. No ENT discharge. The neck is supple. Cardiovascular: Regular rate and rhythm. No murmurs, gallops, or rubs. PMI nondisplaced. Carotids without bruits. Respiratory: Comfortable breathing with no accessory muscle use. Clear breath sounds with no wheezes, rales, or rhonchi. GI: Abdomen nondistended, soft, and nontender. Normal active bowel sounds. No enlargement of the liver or spleen. No masses palpable. Rectal: Deferred   Musculoskeletal: No pitting edema of the lower legs. Extremities have good range of motion. No costovertebral tenderness. Neurological: Gross memory appears intact. Patient is alert and oriented. Power 5/5, Cranial nerves II-XII intact  Psychiatric: Mood appears appropriate with judgement intact. Data Review:       Recent Days:  Recent Labs     10/20/20  0550 10/19/20  0900 10/18/20  0610   WBC 13.8* 13.3* 13.6*   HGB 16.1 15.5 15.0   HCT 44.9 43.2 41.8    327 288     Recent Labs     10/20/20  0550 10/19/20  0900 10/18/20  0610    138 138   K 3.9 3.8 3.6    106 107   CO2 25 26 26   * 138* 129*   BUN 26* 21* 19   CREA 0.94 0.78 0.74   CA 8.4* 8.5 8.4*   PHOS 4.4 3.9 3.5   ALB 3.0* 3.0* 2.8*   INR  --  1.0 1.1     No results for input(s): PH, PCO2, PO2, HCO3, FIO2 in the last 72 hours.     24 Hour Results:  Recent Results (from the past 24 hour(s))   PROTHROMBIN TIME + INR    Collection Time: 10/19/20  9:00 AM   Result Value Ref Range    Prothrombin time 13.7 11.9 - 14.7 sec    INR 1.0 0.9 - 1.1     PTT    Collection Time: 10/19/20  9:00 AM   Result Value Ref Range    aPTT 25.7 23.0 - 35.7 sec    aPTT, therapeutic range   68 - 109 sec   FIBRINOGEN    Collection Time: 10/19/20  9:00 AM   Result Value Ref Range    Fibrinogen 447 220 - 535 mg/dL   CBC WITH AUTOMATED DIFF    Collection Time: 10/19/20  9:00 AM   Result Value Ref Range    WBC 13.3 (H) 4.1 - 11.1 K/uL    RBC 4.99 4. 10 - 5.70 M/uL    HGB 15.5 12.1 - 17.0 g/dL    HCT 43.2 36.6 - 50.3 %    MCV 86.6 80.0 - 99.0 FL    MCH 31.1 26.0 - 34.0 PG    MCHC 35.9 30.0 - 36.5 g/dL    RDW 12.9 11.5 - 14.5 %    PLATELET 547 761 - 524 K/uL    MPV 10.0 8.9 - 12.9 FL    NEUTROPHILS 86 (H) 32 - 75 %    LYMPHOCYTES 6 (L) 12 - 49 %    MONOCYTES 7 5 - 13 %    EOSINOPHILS 0 0 - 7 %    BASOPHILS 0 0 - 1 %    IMMATURE GRANULOCYTES 1 (H) 0.0 - 0.5 %    ABS. NEUTROPHILS 11.6 (H) 1.8 - 8.0 K/UL    ABS. LYMPHOCYTES 0.8 0.8 - 3.5 K/UL    ABS. MONOCYTES 0.9 0.0 - 1.0 K/UL    ABS. EOSINOPHILS 0.0 0.0 - 0.4 K/UL    ABS. BASOPHILS 0.0 0.0 - 0.1 K/UL    ABS. IMM.  GRANS. 0.1 (H) 0.00 - 0.04 K/UL    DF AUTOMATED     RENAL FUNCTION PANEL    Collection Time: 10/19/20  9:00 AM   Result Value Ref Range    Sodium 138 136 - 145 mmol/L    Potassium 3.8 3.5 - 5.1 mmol/L    Chloride 106 97 - 108 mmol/L    CO2 26 21 - 32 mmol/L    Anion gap 6 5 - 15 mmol/L    Glucose 138 (H) 65 - 100 mg/dL    BUN 21 (H) 6 - 20 mg/dL    Creatinine 0.78 0.70 - 1.30 mg/dL    BUN/Creatinine ratio 27 (H) 12 - 20      GFR est AA >60 >60 ml/min/1.73m2    GFR est non-AA >60 >60 ml/min/1.73m2    Calcium 8.5 8.5 - 10.1 mg/dL    Phosphorus 3.9 2.6 - 4.7 mg/dL    Albumin 3.0 (L) 3.5 - 5.0 g/dL   C REACTIVE PROTEIN, QT    Collection Time: 10/19/20  9:00 AM   Result Value Ref Range    C-Reactive protein 1.39 (H) 0.00 - 0.60 mg/dL   FERRITIN    Collection Time: 10/19/20  9:00 AM   Result Value Ref Range    Ferritin 1,510 (H) 26 - 388 ng/mL   LD    Collection Time: 10/19/20  9:00 AM   Result Value Ref Range     (H) 85 - 241 U/L   INFLUENZA A & B AG (RAPID TEST)    Collection Time: 10/19/20  1:45 PM   Result Value Ref Range    Influenza A Antigen Negative Negative      Influenza B Antigen Negative Negative     PTT    Collection Time: 10/20/20  5:50 AM   Result Value Ref Range    aPTT 24.1 23.0 - 35.7 sec    aPTT, therapeutic range   68 - 109 sec   RENAL FUNCTION PANEL    Collection Time: 10/20/20  5:50 AM   Result Value Ref Range    Sodium 139 136 - 145 mmol/L    Potassium 3.9 3.5 - 5.1 mmol/L    Chloride 107 97 - 108 mmol/L    CO2 25 21 - 32 mmol/L    Anion gap 7 5 - 15 mmol/L    Glucose 136 (H) 65 - 100 mg/dL    BUN 26 (H) 6 - 20 mg/dL    Creatinine 0.94 0.70 - 1.30 mg/dL    BUN/Creatinine ratio 28 (H) 12 - 20      GFR est AA >60 >60 ml/min/1.73m2    GFR est non-AA >60 >60 ml/min/1.73m2    Calcium 8.4 (L) 8.5 - 10.1 mg/dL    Phosphorus 4.4 2.6 - 4.7 mg/dL    Albumin 3.0 (L) 3.5 - 5.0 g/dL   CBC WITH AUTOMATED DIFF    Collection Time: 10/20/20  5:50 AM   Result Value Ref Range    WBC 13.8 (H) 4.1 - 11.1 K/uL    RBC 5.20 4. 10 - 5.70 M/uL    HGB 16.1 12.1 - 17.0 g/dL    HCT 44.9 36.6 - 50.3 %    MCV 86.3 80.0 - 99.0 FL    MCH 31.0 26.0 - 34.0 PG    MCHC 35.9 30.0 - 36.5 g/dL    RDW 13.0 11.5 - 14.5 %    PLATELET 811 488 - 894 K/uL    MPV 9.7 8.9 - 12.9 FL    NEUTROPHILS 84 (H) 32 - 75 %    LYMPHOCYTES 14 12 - 49 %    MONOCYTES 1 (L) 5 - 13 %    EOSINOPHILS 0 0 - 7 %    BASOPHILS 0 0 - 1 %    IMMATURE GRANULOCYTES 1 (H) 0.0 - 0.5 %    ABS. NEUTROPHILS 11.7 (H) 1.8 - 8.0 K/UL    ABS. LYMPHOCYTES 1.9 0.8 - 3.5 K/UL    ABS. MONOCYTES 0.1 0.0 - 1.0 K/UL    ABS. EOSINOPHILS 0.0 0.0 - 0.4 K/UL    ABS. BASOPHILS 0.0 0.0 - 0.1 K/UL    ABS. IMM.  GRANS. 0.1 (H) 0.00 - 0.04 K/UL    DF AUTOMATED     BNP    Collection Time: 10/20/20  5:50 AM   Result Value Ref Range    NT pro- (H) <125 pg/mL   C REACTIVE PROTEIN, QT    Collection Time: 10/20/20  5:50 AM   Result Value Ref Range    C-Reactive protein 0.87 (H) 0.00 - 0.60 mg/dL   LD    Collection Time: 10/20/20  5:50 AM   Result Value Ref Range     (H) 85 - 241 U/L       XR CHEST PORT   Final Result      XR CHEST PORT   Final Result      XR CHEST PORT   Final Result      CTA CHEST W OR W WO CONT   Final Result   IMPRESSION: Widespread patchy bilateral pneumonitis      XR CHEST SNGL V   Final Result      XR CHEST PORT    (Results Pending)          Assessment/     Problem List:  Hospital Problems  Date Reviewed: 10/15/2020          Codes Class Noted POA    Acute respiratory failure due to COVID-19 [facility]) ICD-10-CM: U07.1, J96.00  ICD-9-CM: 518.81, 079.89  10/15/2020 Yes        Pneumonia ICD-10-CM: J18.9  ICD-9-CM: 123  10/15/2020 Yes        Acute respiratory failure (HCC) ICD-10-CM: J96.00  ICD-9-CM: 518.81  10/15/2020 Unknown                     Plan:    Acute hypoxic respiratory failure/pneumoniapatient presents with acute hypoxic respiratory failure 2/2 to pneumonia high flow nasal cannula at 40 L, bacterial versus viral in etiology, patient does not meet sepsis criteria at this time, of note patient's COVID-19 test was indeterminate  Obtain repeat COVID-19 testing  Obtain sputum cultures  Obtain repeat blood cultures  Continue azithromycin for antibiotic coverage, add Zosyn for additional antibiotic coverage  Continue Decadron  Continue with Redemsivir  Continue lovenox  Continue to monitor patient's respiratory status  Pulmonology recommendations appreciated, will continue to follow  Infectious disease recommendations appreciated, will continue to follow    Dehydrationcurrently resolved    PpxLovenox  FENcurrently on clear liquids, bedside swallow eval, advance diet, replete potassium and magnesium  Full code, patient surrogate decision-maker is his wife,  Dispositioncontinued ICU care pending improvement in respiratory status,  Critical care time spent 50 minutes involving direct patient care as well as reviewing patient's labs and coordination of care with nursing staff    Care Plan discussed with: Patient/Family and Nurse        Viji Mcdonald MD

## 2020-10-20 NOTE — PROGRESS NOTES
Progress Note  Date:10/20/2020       Room:[facility]  Patient Name:King SADIA Tobar     YOB: 1960     Age:59 y.o. Subjective    Subjective   Patient followed for presumptive Covid-19 pneumoniitis. Initial test was negative but second test is indeterminate. Still remains in the ICU, now on Ventimask. Now with productive cough with purulent sputum observed. He does no think he has improved. Remains on Remdesivir and Decadron. Zosyn started this morning. Review of Systems  Objective         Vitals Last 24 Hours:  TEMPERATURE:  Temp  Av.2 °F (36.8 °C)  Min: 97.8 °F (36.6 °C)  Max: 98.4 °F (36.9 °C)  RESPIRATIONS RANGE: Resp  Av.9  Min: 15  Max: 30  PULSE OXIMETRY RANGE: SpO2  Av.4 %  Min: 90 %  Max: 96 %  PULSE RANGE: Pulse  Av  Min: 54  Max: 80  BLOOD PRESSURE RANGE: Systolic (41SOG), OVT:940 , Min:104 , DRQ:106   ; Diastolic (43QTU), DFQ:27, Min:68, Max:113    I/O (24Hr): Intake/Output Summary (Last 24 hours) at 10/20/2020 0855  Last data filed at 10/20/2020 0345  Gross per 24 hour   Intake    Output 2950 ml   Net -2950 ml     Objective:  Vital signs: (most recent): Blood pressure 125/68, pulse 76, temperature 98.2 °F (36.8 °C), resp. rate 27, height 5' 9\" (1.753 m), weight 184 lb 8.4 oz (83.7 kg), SpO2 94 %.       General: Moderately ill-appearing male, NAD  HEENT: high flow nasal O2  Neck: supple  Lungs: clear bilaterally  Heart: RRR  : No Granados    Labs/Imaging/Diagnostics    Labs:  CBC:  Recent Labs     10/20/20  0550 10/19/20  0900 10/18/20  0610   WBC 13.8* 13.3* 13.6*   RBC 5.20 4.99 4.81   HGB 16.1 15.5 15.0   HCT 44.9 43.2 41.8   MCV 86.3 86.6 86.9   RDW 13.0 12.9 13.0    327 288     CHEMISTRIES:  Recent Labs     10/20/20  0550 10/19/20  0900 10/18/20  0610    138 138   K 3.9 3.8 3.6    106 107   CO2 25 26 26   BUN 26* 21* 19   CA 8.4* 8.5 8.4*   PHOS 4.4 3.9 3.5   PT/INR:  Recent Labs     10/19/20  0900 10/18/20  0610 INR 1.0 1.1     APTT:  Recent Labs     10/20/20  0550 10/19/20  0900 10/18/20  0610   APTT 24.1 25.7 24.8     LIVER PROFILE:No results for input(s): AST, ALT in the last 72 hours. No lab exists for component: Rolanda Lyme, ALKPHOS  Lab Results   Component Value Date/Time    ALT (SGPT) 51 10/16/2020 05:30 AM    AST (SGOT) 72 (H) 10/16/2020 05:30 AM    Alk. phosphatase 121 (H) 10/16/2020 05:30 AM    Bilirubin, total 0.8 10/16/2020 05:30 AM       Imaging Last 24 Hours:  Xr Chest Port    Result Date: 10/20/2020  Chest single view Comparison single view chest October 18, 2020 Patchy reticular markings through lungs, generally same appearance when differences in technique are taken into account. Cardiac and mediastinal structures unchanged. No pneumothorax or sizable pleural effusion. Assessment//Plan   Active Problems:    Acute respiratory failure due to COVID-19 [facility]) (10/15/2020)      Pneumonia (10/15/2020)      Acute respiratory failure ([facility] [address] (10/15/2020)      Assessment & Plan  1. Probable Covid-19 pneumonitis, with indeterminate result, on Remdesivir, Decadron, Azithromycinetiology unclear, rule out Covid-19  2. Acute respiratory failure, on high flow nasal O2  3. Elevated CRP, LDH and ferritin, possibly secondary to #1, decreasing. 4. Possible HCAP with purulent sputum, Day #1 IV Zosyn.     Comment:  Given clinical picture, an indeterminate Covid-19 result probably warrants continued specific therapy. CRP and LDH have been steadily decreasing.      1. Continue Remdesivir, Decadron, Azithromycin pending repeat Covid-19 results  2. Follow-up blood cultures and sputum culture  3. Continue IV Zosyn  4. In am, repeat CBC, CRP, LDH  5. Will order convalescent plasma  6.  Order Actemra 400 mg IV       Electronically signed by Greer Noyola MD on 10/20/2020 at 1:43 PM

## 2020-10-20 NOTE — PROGRESS NOTES
SPEECH LANGUAGE PATHOLOGY BEDSIDE SWALLOW EVALUATIONS  Patient: Anthony Tobar  (62 y.o. )  Date: 10/20/2020  Primary Diagnosis:   Acute respiratory failure  Precautions:  Droplet plus    ASSESSMENT :  Patient presents w/ wfl oropharyngeal fxn. Patient on non re-breather, per MD intermittent coughing spells w/ desaturations. Patient reports poor appetite and cough makes him not want to eat. Oral phase c/b is largely wfl. Pharyngeal phase c/b timely swallow and HLE is wfl upon palpation. Delayed cough following solid trials w/ desaturation to mid 80s. Overall, patient's swallow fxn appears wfl. However, given respiratory status he is at risk for aspiration s/t breathing swallow coordination. Patient anxiety negatively impacting overall fxn. Patient will benefit from skilled intervention to address the above impairments. Patients rehabilitation potential is considered to be Good     PLAN :  Recommendations and Planned Interventions:  Rec cont full liquid w/ diet upgrade to regular solids as tolerated. Rec strict asp/GERD precautions w/ energy conservation strategies. Frequency/Duration: Patient will be followed by speech-language pathology 4 times a week to address goals. Discharge Recommendations: To Be Determined     SUBJECTIVE:   Patient reports sick for 6 days and not getting better. .    OBJECTIVE:     History reviewed. No pertinent past medical history. CXR Results  (Last 48 hours)                 10/20/20 0410  XR CHEST PORT Final result    Narrative:  Chest single view            Comparison single view chest October 18, 2020       Patchy reticular markings through lungs, generally same appearance when   differences in technique are taken into account. Cardiac and mediastinal   structures unchanged. No pneumothorax or sizable pleural effusion.                Diet prior to admission: Regular  Current Diet:  DIET FULL LIQUID     Cognitive and Communication Status:  Neurologic State: Alert  Orientation Level: Oriented X4  Cognition: Appropriate decision making        Swallowing Evaluation:   Oral Assessment:  Oral Assessment  Labial: No impairment  Dentition: Intact  Oral Hygiene: wfl  Lingual: No impairment  Velum: No impairment  Mandible: No impairment  Gag Reflex: No impairment  P.O. Trials:  Patient Position: upright in bed  Vocal quality prior to P.O.: No impairment  Consistency Presented: Puree; Solid; Thin liquid  How Presented: Self-fed/presented;Cup/sip;Spoon;Straw     Bolus Acceptance: No impairment  Bolus Formation/Control: No impairment     Propulsion: No impairment  Oral Residue: None  Initiation of Swallow: No impairment  Laryngeal Elevation: Functional  Aspiration Signs/Symptoms: Strong cough         Oral Phase Severity: No impairment  Pharyngeal Phase Severity : No impairment  Voice:  Vocal Quality: No impairment     Pain:  Pain Scale 1: Numeric (0 - 10)  Pain Intensity 1: 0     After treatment:   Patient left in no apparent distress in bed, Call bell within reach, and Nursing notified    COMMUNICATION/EDUCATION:   Patient was educated regarding his deficit(s) of dysphagia as this relates to his diagnosis of respiratory failure. He demonstrated Fair understanding as evidenced by engagement. Language barrier negatively impacted communication. He declined . The patient's plan of care including recommendations, planned interventions, and recommended diet changes were discussed with: Registered nurse and Physician. Patient/family have participated as able in goal setting and plan of care. Problem: Dysphagia (Adult)  Goal: *Acute Goals and Plan of Care (Insert Text)  Description: Speech Therapy Swallow Goals  Initiated 10/20/2020  -Patient will tolerate full diet with thin liquids without clinical indicators of aspiration given no cues within 7 day(s).          [ ] Not met  [ ]  MET   [ ] Progressing  [ ] Cammy Burrell  -Patient will tolerate PO trials without clinical indicators of aspiration given no cues within 7 day(s). [ ] Not met  [ ]  MET   [ ] Progressing  [ ] Storm Alvarez  -Patient will participate in modified barium swallow study within 7 day(s). [ ] Not met  [ ]  MET   [ ] Progressing  [ ] Storm Alvarez  -Patient will demonstrate understanding of swallow safety precautions and aspiration precautions, diet recs with no cues within 7 day(s).         [ ] Not met  [ ]  MET   [ ] Celina Ward  [ ] Discontinue    Outcome: Initial   Thank you for this referral.  Navid Ayers M.S., M.Ed., CCC-SLP  Time Calculation: 20 mins

## 2020-10-21 ENCOUNTER — APPOINTMENT (OUTPATIENT)
Dept: GENERAL RADIOLOGY | Age: 60
DRG: 177 | End: 2020-10-21
Attending: INTERNAL MEDICINE
Payer: COMMERCIAL

## 2020-10-21 LAB
ALBUMIN SERPL-MCNC: 3 G/DL (ref 3.5–5)
ALBUMIN SERPL-MCNC: 3 G/DL (ref 3.5–5)
ALBUMIN/GLOB SERPL: 0.9 {RATIO} (ref 1.1–2.2)
ALP SERPL-CCNC: 124 U/L (ref 45–117)
ALT SERPL-CCNC: 68 U/L (ref 12–78)
ANION GAP SERPL CALC-SCNC: 6 MMOL/L (ref 5–15)
ANION GAP SERPL CALC-SCNC: 7 MMOL/L (ref 5–15)
APTT PPP: 24 SEC (ref 23–35.7)
AST SERPL W P-5'-P-CCNC: 24 U/L (ref 15–37)
BACTERIA SPEC CULT: NORMAL
BASOPHILS # BLD: 0 K/UL (ref 0–0.1)
BASOPHILS NFR BLD: 0 % (ref 0–1)
BILIRUB SERPL-MCNC: 0.8 MG/DL (ref 0.2–1)
BUN SERPL-MCNC: 32 MG/DL (ref 6–20)
BUN SERPL-MCNC: 32 MG/DL (ref 6–20)
BUN/CREAT SERPL: 39 (ref 12–20)
BUN/CREAT SERPL: 39 (ref 12–20)
CA-I BLD-MCNC: 8.4 MG/DL (ref 8.5–10.1)
CA-I BLD-MCNC: 8.5 MG/DL (ref 8.5–10.1)
CHLORIDE SERPL-SCNC: 105 MMOL/L (ref 97–108)
CHLORIDE SERPL-SCNC: 105 MMOL/L (ref 97–108)
CO2 SERPL-SCNC: 27 MMOL/L (ref 21–32)
CO2 SERPL-SCNC: 27 MMOL/L (ref 21–32)
CREAT SERPL-MCNC: 0.83 MG/DL (ref 0.7–1.3)
CREAT SERPL-MCNC: 0.83 MG/DL (ref 0.7–1.3)
CRP SERPL-MCNC: 0.43 MG/DL (ref 0–0.6)
DIFFERENTIAL METHOD BLD: ABNORMAL
EOSINOPHIL # BLD: 0 K/UL (ref 0–0.4)
EOSINOPHIL NFR BLD: 0 % (ref 0–7)
ERYTHROCYTE [DISTWIDTH] IN BLOOD BY AUTOMATED COUNT: 13 % (ref 11.5–14.5)
GLOBULIN SER CALC-MCNC: 3.4 G/DL (ref 2–4)
GLUCOSE SERPL-MCNC: 169 MG/DL (ref 65–100)
GLUCOSE SERPL-MCNC: 170 MG/DL (ref 65–100)
HCT VFR BLD AUTO: 44.5 % (ref 36.6–50.3)
HGB BLD-MCNC: 16 G/DL (ref 12.1–17)
IMM GRANULOCYTES # BLD AUTO: 0.1 K/UL (ref 0–0.04)
IMM GRANULOCYTES NFR BLD AUTO: 0 % (ref 0–0.5)
LDH SERPL L TO P-CCNC: 452 U/L (ref 85–241)
LYMPHOCYTES # BLD: 0.9 K/UL (ref 0.8–3.5)
LYMPHOCYTES NFR BLD: 6 % (ref 12–49)
MCH RBC QN AUTO: 31.6 PG (ref 26–34)
MCHC RBC AUTO-ENTMCNC: 36 G/DL (ref 30–36.5)
MCV RBC AUTO: 87.8 FL (ref 80–99)
MONOCYTES # BLD: 1.3 K/UL (ref 0–1)
MONOCYTES NFR BLD: 8 % (ref 5–13)
NEUTS SEG # BLD: 13.4 K/UL (ref 1.8–8)
NEUTS SEG NFR BLD: 86 % (ref 32–75)
PHOSPHATE SERPL-MCNC: 4.7 MG/DL (ref 2.6–4.7)
PLATELET # BLD AUTO: 371 K/UL (ref 150–400)
PMV BLD AUTO: 10.2 FL (ref 8.9–12.9)
POTASSIUM SERPL-SCNC: 3.9 MMOL/L (ref 3.5–5.1)
POTASSIUM SERPL-SCNC: 4 MMOL/L (ref 3.5–5.1)
PROT SERPL-MCNC: 6.4 G/DL (ref 6.4–8.2)
RBC # BLD AUTO: 5.07 M/UL (ref 4.1–5.7)
SODIUM SERPL-SCNC: 138 MMOL/L (ref 136–145)
SODIUM SERPL-SCNC: 139 MMOL/L (ref 136–145)
SPECIAL REQUESTS,SREQ: NORMAL
THERAPEUTIC RANGE,PTTT: NORMAL SEC (ref 68–109)
WBC # BLD AUTO: 15.7 K/UL (ref 4.1–11.1)

## 2020-10-21 PROCEDURE — 83615 LACTATE (LD) (LDH) ENZYME: CPT

## 2020-10-21 PROCEDURE — 65270000029 HC RM PRIVATE

## 2020-10-21 PROCEDURE — 80069 RENAL FUNCTION PANEL: CPT

## 2020-10-21 PROCEDURE — 36415 COLL VENOUS BLD VENIPUNCTURE: CPT

## 2020-10-21 PROCEDURE — 74011250636 HC RX REV CODE- 250/636: Performed by: HOSPITALIST

## 2020-10-21 PROCEDURE — 85730 THROMBOPLASTIN TIME PARTIAL: CPT

## 2020-10-21 PROCEDURE — 80053 COMPREHEN METABOLIC PANEL: CPT

## 2020-10-21 PROCEDURE — 77010033678 HC OXYGEN DAILY

## 2020-10-21 PROCEDURE — 94640 AIRWAY INHALATION TREATMENT: CPT

## 2020-10-21 PROCEDURE — 99233 SBSQ HOSP IP/OBS HIGH 50: CPT | Performed by: INTERNAL MEDICINE

## 2020-10-21 PROCEDURE — 86140 C-REACTIVE PROTEIN: CPT

## 2020-10-21 PROCEDURE — 71045 X-RAY EXAM CHEST 1 VIEW: CPT

## 2020-10-21 PROCEDURE — 85025 COMPLETE CBC W/AUTO DIFF WBC: CPT

## 2020-10-21 PROCEDURE — 74011250636 HC RX REV CODE- 250/636: Performed by: INTERNAL MEDICINE

## 2020-10-21 PROCEDURE — 74011000250 HC RX REV CODE- 250: Performed by: INTERNAL MEDICINE

## 2020-10-21 PROCEDURE — 74011250637 HC RX REV CODE- 250/637: Performed by: INTERNAL MEDICINE

## 2020-10-21 PROCEDURE — 74011250637 HC RX REV CODE- 250/637: Performed by: FAMILY MEDICINE

## 2020-10-21 PROCEDURE — 74011000258 HC RX REV CODE- 258: Performed by: INTERNAL MEDICINE

## 2020-10-21 RX ORDER — ALBUTEROL SULFATE 90 UG/1
2 AEROSOL, METERED RESPIRATORY (INHALATION)
Status: DISCONTINUED | OUTPATIENT
Start: 2020-10-21 | End: 2020-10-22 | Stop reason: ALTCHOICE

## 2020-10-21 RX ADMIN — DEXAMETHASONE SODIUM PHOSPHATE 4 MG: 4 INJECTION, SOLUTION INTRA-ARTICULAR; INTRALESIONAL; INTRAMUSCULAR; INTRAVENOUS; SOFT TISSUE at 21:43

## 2020-10-21 RX ADMIN — DEXAMETHASONE SODIUM PHOSPHATE 4 MG: 4 INJECTION, SOLUTION INTRA-ARTICULAR; INTRALESIONAL; INTRAMUSCULAR; INTRAVENOUS; SOFT TISSUE at 00:00

## 2020-10-21 RX ADMIN — CODEINE SULFATE 30 MG: 30 TABLET ORAL at 08:36

## 2020-10-21 RX ADMIN — DEXTROMETHORPHAN 30 MG: 30 SUSPENSION, EXTENDED RELEASE ORAL at 21:22

## 2020-10-21 RX ADMIN — SODIUM CHLORIDE 100 MG: 9 INJECTION, SOLUTION INTRAVENOUS at 14:59

## 2020-10-21 RX ADMIN — BENZONATATE 200 MG: 100 CAPSULE ORAL at 05:47

## 2020-10-21 RX ADMIN — ALBUTEROL SULFATE 2 PUFF: 108 AEROSOL, METERED RESPIRATORY (INHALATION) at 13:37

## 2020-10-21 RX ADMIN — CODEINE SULFATE 30 MG: 30 TABLET ORAL at 17:47

## 2020-10-21 RX ADMIN — DEXTROMETHORPHAN 30 MG: 30 SUSPENSION, EXTENDED RELEASE ORAL at 08:36

## 2020-10-21 RX ADMIN — BENZONATATE 200 MG: 100 CAPSULE ORAL at 14:58

## 2020-10-21 RX ADMIN — PIPERACILLIN SODIUM AND TAZOBACTAM SODIUM 3.38 G: 3; .375 INJECTION, POWDER, LYOPHILIZED, FOR SOLUTION INTRAVENOUS at 10:31

## 2020-10-21 RX ADMIN — SALINE NASAL SPRAY 2 SPRAY: 1.5 SOLUTION NASAL at 05:48

## 2020-10-21 RX ADMIN — Medication 10 ML: at 05:47

## 2020-10-21 RX ADMIN — CODEINE SULFATE 30 MG: 30 TABLET ORAL at 00:00

## 2020-10-21 RX ADMIN — HYDROXYZINE PAMOATE 25 MG: 25 CAPSULE ORAL at 22:31

## 2020-10-21 RX ADMIN — AZITHROMYCIN MONOHYDRATE 500 MG: 500 INJECTION, POWDER, LYOPHILIZED, FOR SOLUTION INTRAVENOUS at 10:31

## 2020-10-21 RX ADMIN — SALINE NASAL SPRAY 2 SPRAY: 1.5 SOLUTION NASAL at 10:32

## 2020-10-21 RX ADMIN — SALINE NASAL SPRAY 2 SPRAY: 1.5 SOLUTION NASAL at 15:00

## 2020-10-21 RX ADMIN — DEXAMETHASONE SODIUM PHOSPHATE 4 MG: 4 INJECTION, SOLUTION INTRA-ARTICULAR; INTRALESIONAL; INTRAMUSCULAR; INTRAVENOUS; SOFT TISSUE at 05:47

## 2020-10-21 RX ADMIN — PIPERACILLIN SODIUM AND TAZOBACTAM SODIUM 3.38 G: 3; .375 INJECTION, POWDER, LYOPHILIZED, FOR SOLUTION INTRAVENOUS at 05:46

## 2020-10-21 RX ADMIN — MELATONIN TAB 5 MG 10 MG: 5 TAB at 22:31

## 2020-10-21 RX ADMIN — Medication 10 ML: at 17:49

## 2020-10-21 RX ADMIN — BENZONATATE 200 MG: 100 CAPSULE ORAL at 21:22

## 2020-10-21 RX ADMIN — PIPERACILLIN SODIUM AND TAZOBACTAM SODIUM 3.38 G: 3; .375 INJECTION, POWDER, LYOPHILIZED, FOR SOLUTION INTRAVENOUS at 17:47

## 2020-10-21 RX ADMIN — ALBUTEROL SULFATE 2 PUFF: 108 AEROSOL, METERED RESPIRATORY (INHALATION) at 20:33

## 2020-10-21 RX ADMIN — SALINE NASAL SPRAY 2 SPRAY: 1.5 SOLUTION NASAL at 21:22

## 2020-10-21 RX ADMIN — DEXAMETHASONE SODIUM PHOSPHATE 4 MG: 4 INJECTION, SOLUTION INTRA-ARTICULAR; INTRALESIONAL; INTRAMUSCULAR; INTRAVENOUS; SOFT TISSUE at 12:20

## 2020-10-21 RX ADMIN — Medication 10 ML: at 21:22

## 2020-10-21 RX ADMIN — ENOXAPARIN SODIUM 30 MG: 30 INJECTION SUBCUTANEOUS at 05:48

## 2020-10-21 RX ADMIN — ENOXAPARIN SODIUM 30 MG: 30 INJECTION SUBCUTANEOUS at 17:47

## 2020-10-21 NOTE — PROGRESS NOTES
Progress Note  Date:10/21/2020       Room:[facility]  Patient Name:King SADIA Tobar     YOB: 1960     Age:59 y.o. Subjective    Subjective   Patient followed for presumptive Covid-19 pneumoniitis. Initial test was negative but second test is indeterminate. Still remains in the ICU, now on Ventimask. Blood cultures negative and sputum culture pending. According to Staff, patient refused convalescent plasma. Remains on Remdesivir and Decadron and received Actemra; also on IV Zosyn. He appears to be high flow O2 and nonrebreather mask. He states that he feels \"better\" today. He affirmed refusal of convalescent plasma appearing to indicate that it would be last resort. Objective         Vitals Last 24 Hours:  TEMPERATURE:  Temp  Av.9 °F (36.6 °C)  Min: 96.5 °F (35.8 °C)  Max: 98.5 °F (36.9 °C)  RESPIRATIONS RANGE: Resp  Av.3  Min: 19  Max: 29  PULSE OXIMETRY RANGE: SpO2  Av.3 %  Min: 89 %  Max: 98 %  PULSE RANGE: Pulse  Av  Min: 56  Max: 96  BLOOD PRESSURE RANGE: Systolic (75GMA), JYK:090 , Min:96 , IGE:379   ; Diastolic (11TQA), SDD:69, Min:63, Max:110    I/O (24Hr): Intake/Output Summary (Last 24 hours) at 10/21/2020 1004  Last data filed at 10/20/2020 2231  Gross per 24 hour   Intake    Output 2700 ml   Net -2700 ml     Objective:  Vital signs: (most recent): Blood pressure 96/63, pulse 69, temperature 98.1 °F (36.7 °C), resp. rate 25, height 5' 9\" (1.753 m), weight 180 lb 5.4 oz (81.8 kg), SpO2 93 %.       General: Moderately ill-appearing male, NAD  HEENT: eyes open, high flow nasal O2, ?nonrebreather mask  Neck: supple  Lungs: clear bilaterally  Heart: RRR  : Granados catheter with moderate urine sediment    Labs/Imaging/Diagnostics    Labs:  CBC:  Recent Labs     10/21/20  0515 10/20/20  0550 10/19/20  0900   WBC 15.7* 13.8* 13.3*   RBC 5.07 5.20 4.99   HGB 16.0 16.1 15.5   HCT 44.5 44.9 43.2   MCV 87.8 86.3 86.6   RDW 13.0 13.0 12.9   PLT 371 335 327     CHEMISTRIES:  Recent Labs     10/21/20  0515 10/20/20  0550 10/19/20  0900     138 139 138   K 4.0  3.9 3.9 3.8     105 107 106   CO2 27  27 25 26   BUN 32*  32* 26* 21*   CA 8.5  8.4* 8.4* 8.5   PHOS 4.7 4.4 3.9   PT/INR:  Recent Labs     10/19/20  0900   INR 1.0     APTT:  Recent Labs     10/21/20  0515 10/20/20  0550 10/19/20  0900   APTT 24.0 24.1 25.7     LIVER PROFILE:  Recent Labs     10/21/20  0515   AST 24   ALT 68     Lab Results   Component Value Date/Time    ALT (SGPT) 68 10/21/2020 05:15 AM    AST (SGOT) 24 10/21/2020 05:15 AM    Alk. phosphatase 124 (H) 10/21/2020 05:15 AM    Bilirubin, total 0.8 10/21/2020 05:15 AM      (565  CRP 0.43 (12.40    Imaging Last 24 Hours:  Xr Chest Port    Result Date: 10/21/2020  Chest single view. Comparison single view chest October 20, 2020 Unchanged appearance for the lungs; patchy peripheral reticular markings throughout the lungs. Cardiac and mediastinal structures unchanged. No pneumothorax or sizable pleural effusion. Assessment//Plan   Active Problems:    Acute respiratory failure due to COVID-19 [facility]) (10/15/2020)      Pneumonia (10/15/2020)      Acute respiratory failure ([facility] [address] (10/15/2020)      Assessment & Plan  1. Probable Covid-19 pneumonitis, with indeterminate result, on Remdesivir, Decadron, Azithromycin, Actemra x 1, patient refused convalescent plasma. 2. Acute respiratory failure, on high flow nasal O2  3. Elevated CRP, LDH and ferritin, possibly secondary to #1, decreasing. 4. Possible HCAP with purulent sputum, culture pending, Day #2 IV Zosyn.     Comment:  Given clinical picture, an indeterminate Covid-19 result probably warrants continued specific therapy. CRP and LDH have been steadily decreasing.      1. Continue Remdesivir, Decadron, Azithromycin   2. Follow-up blood cultures and sputum culture  3. Continue IV Zosyn  4.  In am, repeat CRP, LDH       Electronically signed by Arnoldo Wahl MD on 10/21/2020 at 1:43 PM

## 2020-10-21 NOTE — PROGRESS NOTES
Patient was explained what convalescent plasma was and the purpose of using it was. Patient refused the plasma. He states that plasma will be his last option and he does NOT want it right now. Blood bank made aware of same.

## 2020-10-21 NOTE — PROGRESS NOTES
Pulmonary Note  Pulmonary, Critical Care    Name: Bernard Tobar MRN: [id number]   : 1960 Hospital: [address] [place] [address]   Date: 10/21/2020  Admission date: 10/15/2020 Hospital Day: 7       Subjective/Interval History:   Patient seen in the ICU on high flow nasal oxygen at 70% with oxygen saturation 99. He gives a history of gradual worsening illness over the last week to 10 days with nonproductive cough fever generalized malaise nausea denies any vomiting although the ER note states that he had some vomiting. He seems awake and alert. Does not have any Covid contacts that he is aware of. States that his sense of taste and smell have been normal but terribly anorexic has not eaten anything in 3 or 4 days. 10/17 still feels bad Cough shortness of breath but no nausea today   10/18 still complains of severe cough nonproductive although in general feels a little better  10/20 remains 100% high flow nasal oxygen. Continues to complain of it irritating him. Requested nasal saline last evening but it has not been started will reorder it. We will let him try nonrebreather mask with nasal high flow for meals  10/21 he is alert awake on high flow nasal cannula tolerating well refusing plasma convalescent  Patient Active Problem List   Diagnosis Code    Acute respiratory failure due to COVID-19 ([facility] [address] U07.1, J96.00    Pneumonia J18.9    Acute respiratory failure ([facility] [address] J96.00       IMPRESSION:   1. Acute hypoxic respiratory failure requiring high flow nasal oxygen now up to 100%  2. History of asthma  3. Diffuse pulmonary infiltrates questionable atypical pneumonia possible COVID-19 repeat testing pending  4. Nausea probable due to COVID-19 infection  5. Unremitting cough  Body mass index is 26.63 kg/m². RECOMMENDATIONS/PLAN:   1.  Initial COVID-19 test is negative repeat testing indeterminate but ferritin LDH CRP are all elevated chest x-ray is consistent with Covid pneumonitis  continue Decadron and Actemra Remdesivir   2. We will use nonrebreather mask as long as oxygen saturation greater than 90%  3. Continue codeine Delsym and Tessalon for severe cough  4. Using Ventolin inhaler will resume cannot give nebulizer treatment because of COVID-19 results pending  5. Repeat COVID-19 testing pending  6. Check Legionella antigen and titer continue azithromycin  7. Refusing convalescent plasma treatment          Subjective/Initial History:   I have reviewed the flowsheet and previous days notes. Seen earlier today on rounds. I was asked by Severo Montane, MD to see Radha Goodman An a 56 y.o.  male  in consultation for a chief complaint of acute hypoxic respiratory failure cough and community-acquired pneumonia          Pt now in CCU. Patient PCP: UNKNOWN  PMH:  has no past medical history on file. PSH:   has no past surgical history on file. FHX: family history includes No Known Problems in his father and mother. SHX:  reports that he has never smoked. He has never used smokeless tobacco. He reports that he does not drink alcohol or use drugs. Systemic review:  General no chronic illness but over the last week he has had fever generalized malaise weight loss no appetite  Eyes no double vision or momentary blindness  ENT no sinus congestion drainage or facial pain  Skeletal has diffuse aches and pains no swollen tender joints  Endocrinologic no polyuria polydipsia  Neurologic no seizures or syncope  Gastrointestinal normally he has no problems but over this last week he has had nausea anorexia marked weight loss has not had any alteration of his sense of taste or smell  Genitourinary no discomfort or drainage on urination  Cardiovascular no history of heart disease chest pain has felt sweaty but no history of ankle edema.   Respiratory as mentioned above    No Known Allergies   MEDS:   Current Facility-Administered Medications   Medication    piperacillin-tazobactam (ZOSYN) 3.375 g in 0.9% sodium chloride (MBP/ADV) 100 mL MBP    0.9% sodium chloride infusion 250 mL    sodium chloride (OCEAN) 0.65 % nasal squeeze bottle 2 Spray    hydrOXYzine pamoate (VISTARIL) capsule 25 mg    melatonin tablet 10 mg    codeine sulfate tablet 30 mg    dextromethorphan (DELSYM) 30 mg/5 mL syrup 30 mg    remdesivir 100 mg in 0.9% sodium chloride 250 mL IVPB    enoxaparin (LOVENOX) injection 30 mg    acetaminophen (TYLENOL) tablet 650 mg    Or    acetaminophen (TYLENOL) suppository 650 mg    sodium chloride (NS) flush 5-40 mL    sodium chloride (NS) flush 5-40 mL    ondansetron (ZOFRAN ODT) tablet 4 mg    Or    ondansetron (ZOFRAN) injection 4 mg    azithromycin (ZITHROMAX) 500 mg in 0.9% sodium chloride 250 mL (VIAL-MATE)    dexamethasone (DECADRON) 4 mg/mL injection 4 mg    benzonatate (TESSALON) capsule 200 mg        Current Facility-Administered Medications:     piperacillin-tazobactam (ZOSYN) 3.375 g in 0.9% sodium chloride (MBP/ADV) 100 mL MBP, 3.375 g, IntraVENous, Q8H, Simona Gonzales MD, Last Rate: 25 mL/hr at 10/21/20 0546, 3.375 g at 10/21/20 0546    0.9% sodium chloride infusion 250 mL, 250 mL, IntraVENous, PRN, Yessi Gagnon MD    sodium chloride (OCEAN) 0.65 % nasal squeeze bottle 2 Spray, 2 Spray, Both Nostrils, Q4HWA, Linus Handley MD, 2 Spray at 10/21/20 0548    hydrOXYzine pamoate (VISTARIL) capsule 25 mg, 25 mg, Oral, TID PRN, Sue Parekh MD, 25 mg at 10/20/20 2222    melatonin tablet 10 mg, 10 mg, Oral, QHS PRN, Sue Parekh MD, 10 mg at 10/20/20 2222    codeine sulfate tablet 30 mg, 30 mg, Oral, Q8H RT, Linus Handley MD, 30 mg at 10/21/20 0836    dextromethorphan (DELSYM) 30 mg/5 mL syrup 30 mg, 30 mg, Oral, Q12H, Linus Handley MD, 30 mg at 10/21/20 0836    remdesivir 100 mg in 0.9% sodium chloride 250 mL IVPB, 100 mg, IntraVENous, Q24H, Alfa Juarez MD, 100 mg at 10/20/20 1349    enoxaparin (LOVENOX) injection 30 mg, 30 mg, SubCUTAneous, Q12H, Sierra Avila MD, 30 mg at 10/21/20 0548    acetaminophen (TYLENOL) tablet 650 mg, 650 mg, Oral, Q6H PRN, 650 mg at 10/18/20 2205 **OR** acetaminophen (TYLENOL) suppository 650 mg, 650 mg, Rectal, Q6H PRN, Sierra Avila MD    sodium chloride (NS) flush 5-40 mL, 5-40 mL, IntraVENous, Q8H, Sierra Avila MD, 10 mL at 10/21/20 0547    sodium chloride (NS) flush 5-40 mL, 5-40 mL, IntraVENous, PRN, Sierra Avila MD    ondansetron (ZOFRAN ODT) tablet 4 mg, 4 mg, Oral, Q6H PRN **OR** ondansetron (ZOFRAN) injection 4 mg, 4 mg, IntraVENous, Q6H PRN, Sierra Avila MD    azithromycin (ZITHROMAX) 500 mg in 0.9% sodium chloride 250 mL (VIAL-MATE), 500 mg, IntraVENous, Q24H, Tessa Mckeon MD, 500 mg at 10/20/20 1121    dexamethasone (DECADRON) 4 mg/mL injection 4 mg, 4 mg, IntraVENous, Q6H, Linwood Morrissey MD, 4 mg at 10/21/20 0547    benzonatate (TESSALON) capsule 200 mg, 200 mg, Oral, Q8H, Linwood Morrissey MD, 200 mg at 10/21/20 0547      Objective:     Vital Signs: Telemetry:    normal sinus rhythm Intake/Output:   Visit Vitals  BP 96/63   Pulse 69   Temp 98.1 °F (36.7 °C)   Resp 25   Ht 5' 9\" (1.753 m)   Wt 81.8 kg (180 lb 5.4 oz)   SpO2 93%   BMI 26.63 kg/m²       Temp (24hrs), Av.9 °F (36.6 °C), Min:96.5 °F (35.8 °C), Max:98.5 °F (36.9 °C)        O2 Device: Heated, Hi flow nasal cannula, Non-rebreather mask O2 Flow Rate (L/min): 15 l/min         Body mass index is 26.63 kg/m². Wt Readings from Last 4 Encounters:   10/21/20 81.8 kg (180 lb 5.4 oz)          Intake/Output Summary (Last 24 hours) at 10/21/2020 0841  Last data filed at 10/20/2020 2231  Gross per 24 hour   Intake    Output 2700 ml   Net -2700 ml       Last shift:      No intake/output data recorded. Last 3 shifts: 10/19 1901 - 10/21 0700  In: -   Out: 3650 [Urine:3650]   Ventilator Settings:      Mode Rate TV Press PEEP FiO2 PIP Min.  Vent               100 %            Physical Exam:    General:  male; moderate distress chronic cough nonproductive  HEENT: NCAT, oral mucosa clear  Eyes: anicteric; conjunctiva clear extraocular movements intact   neck: no node neck veins visible  Chest: no deformity,   Cardiac: Regular rate and rhythm no murmurs trace ankle edema  Lungs: Clear anteriorly with rales laterally and posteriorly  Abd: Soft nontender normal bowel sounds no organomegaly  Ext: Mild edema; no joint swelling;  No clubbing  :clear urine  Neuro: Awake alert speech is clear moves all 4 extremities well  Psych- no agitation, oriented to person;   Skin: warm,, no cyanosis; very mildly diaphoretic  Pulses: Pulses intact  Capillary: Normal capillary refill      Labs:    Recent Labs     10/21/20  0515 10/20/20  0550 10/19/20  0900   WBC 15.7* 13.8* 13.3*   HGB 16.0 16.1 15.5    335 327   INR  --   --  1.0   APTT 24.0 24.1 25.7     Recent Labs     10/21/20  0515 10/20/20  0550 10/19/20  0900     138 139 138   K 4.0  3.9 3.9 3.8     105 107 106   CO2 27  27 25 26   *  169* 136* 138*   BUN 32*  32* 26* 21*   CREA 0.83  0.83 0.94 0.78   CA 8.5  8.4* 8.4* 8.5   PHOS 4.7 4.4 3.9   ALB 3.0*  3.0* 3.0* 3.0*   ALT 68  --   --      10/2000% nasal high flow with oxygen saturation 93%  10/20 nonrebreather mask oxygen saturation 91 to 92%  10/14 COVID-19 test negative  10/17 COVID-19 test indeterminate  Ferritin 1510, previous 2180  , previous 615, 754  Procalcitonin 0.14  CRP 0.87, previous 1.39, 3.0, 12.4  Lab Results   Component Value Date/Time    Culture result: No growth 5 days 10/15/2020 07:10 PM      Lab Results   Component Value Date/Time     10/15/2020 11:47 PM       Imaging:  I have personally reviewed the patients radiographs and have reviewed the reports:    CXR Results  (Last 48 hours)  10/20 chest x-ray with diffuse patchy infiltrates may be slightly less dense  10/17 chest x-ray unchanged diffuse bilateral infiltrate 10/15/20 1927  XR CHEST SNGL V Final result    Narrative:  1       Mild atelectasis in the bases with upper lungs clear. No effusion or   pneumothorax. Normal heart and no congestion  Disagree to me there is bilateral infiltrate           Results from Hospital Encounter encounter on 10/15/20   XR CHEST PORT    Narrative Chest single view. Comparison single view chest October 20, 2020    Unchanged appearance for the lungs; patchy peripheral reticular markings  throughout the lungs. Cardiac and mediastinal structures unchanged. No  pneumothorax or sizable pleural effusion. XR CHEST PORT    Narrative Chest single view       Comparison single view chest October 18, 2020    Patchy reticular markings through lungs, generally same appearance when  differences in technique are taken into account. Cardiac and mediastinal  structures unchanged. No pneumothorax or sizable pleural effusion. XR CHEST PORT    Narrative Chest single view. Comparison single view chest October 17, 2020    Patchy alveolar opacities are advanced throughout the lungs. Consider  infectious/inflammatory process. Cardiac and mediastinal structures unchanged. No pneumothorax or sizable pleural effusion. Results from [place] encounter on 10/15/20   CTA CHEST W OR W WO CONT    Narrative CT dose reduction was achieved through use of a standardized protocol tailored  for this examination and automatic exposure control for dose modulation. Contrast study maximum intensity projections shows pronounced groundglass  opacification, of variable density, mainly in the dependent portions of each  lung, apex to base. Air bronchograms are preserved in the densest portions. Mild  geographic groundglass opacification of lower density in the nondependent  portions. Central airways are open    Pulmonary arteries are well-opacified and show no filling defect or distortion. Aorta shows normal dimensions, without dissection.  Tiny middle mediastinal lymph  nodes. Cardiac finding. No pleural pericardial effusion. No significant upper  abdominal or chest wall finding      Impression IMPRESSION: Widespread patchy bilateral pneumonitis       Clinical picture consistent with COVID-19 infection with pneumonitis with diffuse patchy pulmonary infiltrate elevation in ferritin LDH and CRP nonproductive cough and GI symptoms and yet initial COVID-19 test negative repeat testing indeterminate continue azithromycin Actemra  Decadron and Remdesivir and will repeat Lasix today continue to follow renal function    Time of care 30 minutes         Thank you for allowing us to participate in the care of this patient.   We will follow along with you    Tiburcio Gilbert MD

## 2020-10-21 NOTE — PROGRESS NOTES
Spiritual Care Assessment/Progress Note  [facility]      NAME: Pricilla Tobar      MRN: [id number]  AGE: 56 y.o.  SEX: male  Hinduism Affiliation: Unknown   Language: English     10/21/2020     Total Time (in minutes): 37     Spiritual Assessment begun in [facility] CCU through conversation with:         [x]Patient        [] Family    [] Friend(s)        Reason for Consult: Request by patient     Spiritual beliefs: (Please include comment if needed)     [] Identifies with a martin tradition:         [] Supported by a martin community:            [] Claims no spiritual orientation:           [x] Seeking spiritual identity:                [] Adheres to an individual form of spirituality:           [] Not able to assess:                           Identified resources for coping:      [x] Prayer                               [] Music                  [] Guided Imagery     [x] Family/friends                 [] Pet visits     [] Devotional reading                         [] Unknown     [] Other:                                              Interventions offered during this visit: (See comments for more details)    Patient Interventions: Catharsis/review of pertinent events in supportive environment, Life review/legacy, Normalization of emotional/spiritual concerns, Prayer (actual), Hinduism beliefs/image of God discussed, Reframing           Plan of Care:     [] Support spiritual and/or cultural needs    [] Support AMD and/or advance care planning process      [] Support grieving process   [] Coordinate Rites and/or Rituals    [] Coordination with community clergy   [] No spiritual needs identified at this time   [] Detailed Plan of Care below (See Comments)  [] Make referral to Music Therapy  [] Make referral to Pet Therapy     [] Make referral to Addiction services  [] Make referral to [facility]  [] Make referral to Spiritual Care Partner  [] No future visits requested        [x] Follow up visits as needed     Comments:  Visited the patient in response to his request for follow-up visit. Only the patient was present during the visit. Patient shared about his difficulty in the hospital stay and urgency to improve in his condition. Patient shared extensively about his experiences visiting various local churches and some of the challenges and struggles he has had. The patient seems to have given these spiritual experiences deep consideration as evidenced by his journaling over period of time. Nevertheless, Mr. Tobar seems to be open to spiritual realities and the sacred. Per patient's request, I provided prayer. I attempted to normalize his anxieties and fears about his health condition. I reframed his experiences with people of martin and affirmed his understanding. I advised of  availability should he need further support. Chaplains will follow as able and/or needed.   MIKAYLA ConnellyDiv.

## 2020-10-21 NOTE — PROGRESS NOTES
Patient has rested well overnight. Slept without incident. Spoke with daughter last evening who has concerns that patient isn't understanding what's going on with his care. She asked if we could get a  when speaking with patient. Patient has seemed to understand when communicating. Answers questions appropriately when asked and also asks appropriate questions. Will have  phone sent into room.

## 2020-10-21 NOTE — PROGRESS NOTES
Hospitalist Progress Note               Daily Progress Note: 10/21/2020      Subjective: The patient is seen for follow  up.   56 y.o. male with no significant medical problems presents to the emergency room complaining of shortness of breath. Patient had a urinary retention which was resolved with Granados catheter placement. Patient continues to be on high flow oxygen.     Seen and evaluated at bedside, no acute events overnight, patient's anxiety improved, discussed with RN and patient at bedside    Medications reviewed  Current Facility-Administered Medications   Medication Dose Route Frequency    piperacillin-tazobactam (ZOSYN) 3.375 g in 0.9% sodium chloride (MBP/ADV) 100 mL MBP  3.375 g IntraVENous Q8H    0.9% sodium chloride infusion 250 mL  250 mL IntraVENous PRN    sodium chloride (OCEAN) 0.65 % nasal squeeze bottle 2 Spray  2 [place] Both Nostrils Q4HWA    hydrOXYzine pamoate (VISTARIL) capsule 25 mg  25 mg Oral TID PRN    melatonin tablet 10 mg  10 mg Oral QHS PRN    codeine sulfate tablet 30 mg  30 mg Oral Q8H RT    dextromethorphan (DELSYM) 30 mg/5 mL syrup 30 mg  30 mg Oral Q12H    remdesivir 100 mg in 0.9% sodium chloride 250 mL IVPB  100 mg IntraVENous Q24H    enoxaparin (LOVENOX) injection 30 mg  30 mg SubCUTAneous Q12H    acetaminophen (TYLENOL) tablet 650 mg  650 mg Oral Q6H PRN    Or    acetaminophen (TYLENOL) suppository 650 mg  650 mg Rectal Q6H PRN    sodium chloride (NS) flush 5-40 mL  5-40 mL IntraVENous Q8H    sodium chloride (NS) flush 5-40 mL  5-40 mL IntraVENous PRN    ondansetron (ZOFRAN ODT) tablet 4 mg  4 mg Oral Q6H PRN    Or    ondansetron (ZOFRAN) injection 4 mg  4 mg IntraVENous Q6H PRN    azithromycin (ZITHROMAX) 500 mg in 0.9% sodium chloride 250 mL (VIAL-MATE)  500 mg IntraVENous Q24H    dexamethasone (DECADRON) 4 mg/mL injection 4 mg  4 mg IntraVENous Q6H    benzonatate (TESSALON) capsule 200 mg  200 mg Oral Q8H       Review of Systems:   A comprehensive review of systems was negative except for that written in the HPI. Objective:   Physical Exam:     Visit Vitals  BP 96/63   Pulse 69   Temp 98.1 °F (36.7 °C)   Resp 25   Ht 5' 9\" (1.753 m)   Wt 81.8 kg (180 lb 5.4 oz)   SpO2 93%   BMI 26.63 kg/m²    O2 Flow Rate (L/min): 15 l/min O2 Device: Heated, Hi flow nasal cannula, Non-rebreather mask    Temp (24hrs), Av.9 °F (36.6 °C), Min:96.5 °F (35.8 °C), Max:98.5 °F (36.9 °C)    No intake/output data recorded. 10/19 1901 - 10/21 0700  In: -   Out: 3650 [Urine:3650]    PHYSICAL EXAM:  General: Alert and awake. Skin: Extremities and face reveal no rashes. HEENT: Sclerae anicteric. Extra-occular muscles are intact. No oral ulcers. No ENT discharge. The neck is supple. Cardiovascular: Regular rate and rhythm. No murmurs, gallops, or rubs. PMI nondisplaced. Carotids without bruits. Respiratory: Comfortable breathing with no accessory muscle use. Clear breath sounds with no wheezes, rales, or rhonchi. GI: Abdomen nondistended, soft, and nontender. Normal active bowel sounds. No enlargement of the liver or spleen. No masses palpable. Rectal: Deferred   Musculoskeletal: No pitting edema of the lower legs. Extremities have good range of motion. No costovertebral tenderness. Neurological: Gross memory appears intact. Patient is alert and oriented. Power 5/5, Cranial nerves II-XII intact  Psychiatric: Mood appears appropriate with judgement intact.      Data Review:       Recent Days:  Recent Labs     10/21/20  0515 10/20/20  0550 10/19/20  0900   WBC 15.7* 13.8* 13.3*   HGB 16.0 16.1 15.5   HCT 44.5 44.9 43.2    335 327     Recent Labs     10/21/20  0515 10/20/20  0550 10/19/20  0900     138 139 138   K 4.0  3.9 3.9 3.8     105 107 106   CO2 27  27 25 26   *  169* 136* 138*   BUN 32*  32* 26* 21*   CREA 0.83  0.83 0.94 0.78   CA 8.5  8.4* 8.4* 8.5   PHOS 4.7 4.4 3.9   ALB 3.0*  3.0* 3.0* 3.0*   TBILI 0.8  --   --    ALT 68 --   --    INR  --   --  1.0     No results for input(s): PH, PCO2, PO2, HCO3, FIO2 in the last 72 hours. 24 Hour Results:  Recent Results (from the past 24 hour(s))   TYPE & SCREEN    Collection Time: 10/20/20 11:30 AM   Result Value Ref Range    Crossmatch Expiration 10/23/2020,2359     ABO/Rh(D) B Positive     Antibody screen Negative    PTT    Collection Time: 10/21/20  5:15 AM   Result Value Ref Range    aPTT 24.0 23.0 - 35.7 sec    aPTT, therapeutic range   68 - 467 sec   METABOLIC PANEL, COMPREHENSIVE    Collection Time: 10/21/20  5:15 AM   Result Value Ref Range    Sodium 139 136 - 145 mmol/L    Potassium 4.0 3.5 - 5.1 mmol/L    Chloride 105 97 - 108 mmol/L    CO2 27 21 - 32 mmol/L    Anion gap 7 5 - 15 mmol/L    Glucose 170 (H) 65 - 100 mg/dL    BUN 32 (H) 6 - 20 mg/dL    Creatinine 0.83 0.70 - 1.30 mg/dL    BUN/Creatinine ratio 39 (H) 12 - 20      GFR est AA >60 >60 ml/min/1.73m2    GFR est non-AA >60 >60 ml/min/1.73m2    Calcium 8.5 8.5 - 10.1 mg/dL    Bilirubin, total 0.8 0.2 - 1.0 mg/dL    AST (SGOT) 24 15 - 37 U/L    ALT (SGPT) 68 12 - 78 U/L    Alk. phosphatase 124 (H) 45 - 117 U/L    Protein, total 6.4 6.4 - 8.2 g/dL    Albumin 3.0 (L) 3.5 - 5.0 g/dL    Globulin 3.4 2.0 - 4.0 g/dL    A-G Ratio 0.9 (L) 1.1 - 2.2     C REACTIVE PROTEIN, QT    Collection Time: 10/21/20  5:15 AM   Result Value Ref Range    C-Reactive protein 0.43 0.00 - 0.60 mg/dL   LD    Collection Time: 10/21/20  5:15 AM   Result Value Ref Range     (H) 85 - 241 U/L   CBC WITH AUTOMATED DIFF    Collection Time: 10/21/20  5:15 AM   Result Value Ref Range    WBC 15.7 (H) 4.1 - 11.1 K/uL    RBC 5.07 4. 10 - 5.70 M/uL    HGB 16.0 12.1 - 17.0 g/dL    HCT 44.5 36.6 - 50.3 %    MCV 87.8 80.0 - 99.0 FL    MCH 31.6 26.0 - 34.0 PG    MCHC 36.0 30.0 - 36.5 g/dL    RDW 13.0 11.5 - 14.5 %    PLATELET 869 464 - 499 K/uL    MPV 10.2 8.9 - 12.9 FL    NEUTROPHILS 86 (H) 32 - 75 %    LYMPHOCYTES 6 (L) 12 - 49 %    MONOCYTES 8 5 - 13 % EOSINOPHILS 0 0 - 7 %    BASOPHILS 0 0 - 1 %    IMMATURE GRANULOCYTES 0 0.0 - 0.5 %    ABS. NEUTROPHILS 13.4 (H) 1.8 - 8.0 K/UL    ABS. LYMPHOCYTES 0.9 0.8 - 3.5 K/UL    ABS. MONOCYTES 1.3 (H) 0.0 - 1.0 K/UL    ABS. EOSINOPHILS 0.0 0.0 - 0.4 K/UL    ABS. BASOPHILS 0.0 0.0 - 0.1 K/UL    ABS. IMM.  GRANS. 0.1 (H) 0.00 - 0.04 K/UL    DF AUTOMATED     RENAL FUNCTION PANEL    Collection Time: 10/21/20  5:15 AM   Result Value Ref Range    Sodium 138 136 - 145 mmol/L    Potassium 3.9 3.5 - 5.1 mmol/L    Chloride 105 97 - 108 mmol/L    CO2 27 21 - 32 mmol/L    Anion gap 6 5 - 15 mmol/L    Glucose 169 (H) 65 - 100 mg/dL    BUN 32 (H) 6 - 20 mg/dL    Creatinine 0.83 0.70 - 1.30 mg/dL    BUN/Creatinine ratio 39 (H) 12 - 20      GFR est AA >60 >60 ml/min/1.73m2    GFR est non-AA >60 >60 ml/min/1.73m2    Calcium 8.4 (L) 8.5 - 10.1 mg/dL    Phosphorus 4.7 2.6 - 4.7 mg/dL    Albumin 3.0 (L) 3.5 - 5.0 g/dL       XR CHEST PORT   Final Result      XR CHEST PORT   Final Result      XR CHEST PORT   Final Result      XR CHEST PORT   Final Result      CTA CHEST W OR W WO CONT   Final Result   IMPRESSION: Widespread patchy bilateral pneumonitis      XR CHEST SNGL V   Final Result      XR CHEST PORT    (Results Pending)          Assessment/     Problem List:  Hospital Problems  Date Reviewed: 10/15/2020          Codes Class Noted POA    Acute respiratory failure due to COVID-19 [facility]) ICD-10-CM: U07.1, J96.00  ICD-9-CM: 518.81, 079.89  10/15/2020 Yes        Pneumonia ICD-10-CM: J18.9  ICD-9-CM: 090  10/15/2020 Yes        Acute respiratory failure (HCC) ICD-10-CM: J96.00  ICD-9-CM: 518.81  10/15/2020 Unknown                     Plan:    Acute hypoxic respiratory failure/pneumoniapatient presents with acute hypoxic respiratory failure 2/2 to pneumonia high flow nasal cannula at 40 L, bacterial versus viral in etiology, patient does not meet sepsis criteria at this time, of note patient's COVID-19 test was indeterminate  Follow-up repeat COVID-19 testing  Follow-up sputum cultures  Follow-up repeat blood cultures  Continue Zosyn and azithromycin for antibiotic coverage  Continue Decadron  Continue with Redemsivir  Continue lovenox  Continue to monitor patient's respiratory status  Pulmonology recommendations appreciated, will continue to follow  Infectious disease recommendations appreciated, will continue to follow    Dehydrationcurrently resolved    PpxLovenox  FENcurrently on clear liquids, bedside swallow eval, advance diet, replete potassium and magnesium  Full code, patient surrogate decision-maker is his wife,  Dispositiontransfer to telemetry  Critical care time spent 50 minutes involving direct patient care as well as reviewing patient's labs and coordination of care with nursing staff    Care Plan discussed with: Patient/Family and Nurse        Paul Molina MD

## 2020-10-22 ENCOUNTER — APPOINTMENT (OUTPATIENT)
Dept: GENERAL RADIOLOGY | Age: 60
DRG: 177 | End: 2020-10-22
Attending: INTERNAL MEDICINE
Payer: COMMERCIAL

## 2020-10-22 LAB
ALBUMIN SERPL-MCNC: 2.7 G/DL (ref 3.5–5)
ALBUMIN/GLOB SERPL: 0.8 {RATIO} (ref 1.1–2.2)
ALP SERPL-CCNC: 106 U/L (ref 45–117)
ALT SERPL-CCNC: 55 U/L (ref 12–78)
ANION GAP SERPL CALC-SCNC: 8 MMOL/L (ref 5–15)
APTT PPP: 26.1 SEC (ref 23–35.7)
ARTERIAL PATENCY WRIST A: ABNORMAL
AST SERPL W P-5'-P-CCNC: 20 U/L (ref 15–37)
BASE EXCESS BLDA CALC-SCNC: 2 MMOL/L (ref 0–2)
BDY SITE: ABNORMAL
BILIRUB SERPL-MCNC: 0.9 MG/DL (ref 0.2–1)
BUN SERPL-MCNC: 26 MG/DL (ref 6–20)
BUN/CREAT SERPL: 32 (ref 12–20)
CA-I BLD-MCNC: 8.3 MG/DL (ref 8.5–10.1)
CHLORIDE SERPL-SCNC: 105 MMOL/L (ref 97–108)
CO2 SERPL-SCNC: 26 MMOL/L (ref 21–32)
CREAT SERPL-MCNC: 0.81 MG/DL (ref 0.7–1.3)
CRP SERPL-MCNC: <0.29 MG/DL (ref 0–0.6)
EPAP/CPAP/PEEP, PAPEEP: 0
ERYTHROCYTE [DISTWIDTH] IN BLOOD BY AUTOMATED COUNT: 12.1 % (ref 11.5–14.5)
FIO2 ON VENT: 100 %
GAS FLOW.O2 O2 DELIVERY SYS: 40 L/MIN
GLOBULIN SER CALC-MCNC: 3.5 G/DL (ref 2–4)
GLUCOSE SERPL-MCNC: 160 MG/DL (ref 65–100)
HCO3 BLDA-SCNC: 26 MMOL/L (ref 22–26)
HCT VFR BLD AUTO: 44 % (ref 36.6–50.3)
HGB BLD-MCNC: 15.4 G/DL (ref 12.1–17)
LDH SERPL L TO P-CCNC: 689 U/L (ref 85–241)
MCH RBC QN AUTO: 31 PG (ref 26–34)
MCHC RBC AUTO-ENTMCNC: 35 G/DL (ref 30–36.5)
MCV RBC AUTO: 88.5 FL (ref 80–99)
PCO2 BLDA: 37 MMHG (ref 35–45)
PH BLDA: 7.45 [PH] (ref 7.35–7.45)
PLATELET # BLD AUTO: 338 K/UL (ref 150–400)
PMV BLD AUTO: 10 FL (ref 8.9–12.9)
PO2 BLDA: 67 MMHG (ref 75–100)
POTASSIUM SERPL-SCNC: 3.9 MMOL/L (ref 3.5–5.1)
PROT SERPL-MCNC: 6.2 G/DL (ref 6.4–8.2)
RBC # BLD AUTO: 4.97 M/UL (ref 4.1–5.7)
SAO2 % BLD: 94 %
SAO2% DEVICE SAO2% SENSOR NAME: ABNORMAL
SARS-COV-2, COV2NT: NOT DETECTED
SODIUM SERPL-SCNC: 139 MMOL/L (ref 136–145)
SPECIMEN SITE: ABNORMAL
THERAPEUTIC RANGE,PTTT: NORMAL SEC (ref 68–109)
WBC # BLD AUTO: 15.3 K/UL (ref 4.1–11.1)

## 2020-10-22 PROCEDURE — 82803 BLOOD GASES ANY COMBINATION: CPT

## 2020-10-22 PROCEDURE — 86140 C-REACTIVE PROTEIN: CPT

## 2020-10-22 PROCEDURE — 77010033678 HC OXYGEN DAILY

## 2020-10-22 PROCEDURE — 74011000258 HC RX REV CODE- 258: Performed by: INTERNAL MEDICINE

## 2020-10-22 PROCEDURE — 36415 COLL VENOUS BLD VENIPUNCTURE: CPT

## 2020-10-22 PROCEDURE — 74011250636 HC RX REV CODE- 250/636: Performed by: HOSPITALIST

## 2020-10-22 PROCEDURE — 36600 WITHDRAWAL OF ARTERIAL BLOOD: CPT

## 2020-10-22 PROCEDURE — 74011250637 HC RX REV CODE- 250/637: Performed by: INTERNAL MEDICINE

## 2020-10-22 PROCEDURE — 80053 COMPREHEN METABOLIC PANEL: CPT

## 2020-10-22 PROCEDURE — 74011250636 HC RX REV CODE- 250/636: Performed by: INTERNAL MEDICINE

## 2020-10-22 PROCEDURE — 85027 COMPLETE CBC AUTOMATED: CPT

## 2020-10-22 PROCEDURE — 85730 THROMBOPLASTIN TIME PARTIAL: CPT

## 2020-10-22 PROCEDURE — 65270000029 HC RM PRIVATE

## 2020-10-22 PROCEDURE — 83615 LACTATE (LD) (LDH) ENZYME: CPT

## 2020-10-22 PROCEDURE — 94640 AIRWAY INHALATION TREATMENT: CPT

## 2020-10-22 PROCEDURE — 99233 SBSQ HOSP IP/OBS HIGH 50: CPT | Performed by: INTERNAL MEDICINE

## 2020-10-22 PROCEDURE — 74011000250 HC RX REV CODE- 250: Performed by: INTERNAL MEDICINE

## 2020-10-22 PROCEDURE — 71045 X-RAY EXAM CHEST 1 VIEW: CPT

## 2020-10-22 RX ORDER — IPRATROPIUM BROMIDE AND ALBUTEROL SULFATE 2.5; .5 MG/3ML; MG/3ML
3 SOLUTION RESPIRATORY (INHALATION)
Status: DISCONTINUED | OUTPATIENT
Start: 2020-10-22 | End: 2020-11-06

## 2020-10-22 RX ADMIN — ALBUTEROL SULFATE 2 PUFF: 108 AEROSOL, METERED RESPIRATORY (INHALATION) at 09:26

## 2020-10-22 RX ADMIN — IPRATROPIUM BROMIDE AND ALBUTEROL SULFATE 3 ML: .5; 3 SOLUTION RESPIRATORY (INHALATION) at 14:02

## 2020-10-22 RX ADMIN — BENZONATATE 200 MG: 100 CAPSULE ORAL at 06:27

## 2020-10-22 RX ADMIN — BENZONATATE 200 MG: 100 CAPSULE ORAL at 22:49

## 2020-10-22 RX ADMIN — IPRATROPIUM BROMIDE AND ALBUTEROL SULFATE 3 ML: .5; 3 SOLUTION RESPIRATORY (INHALATION) at 19:32

## 2020-10-22 RX ADMIN — DEXAMETHASONE SODIUM PHOSPHATE 4 MG: 4 INJECTION, SOLUTION INTRA-ARTICULAR; INTRALESIONAL; INTRAMUSCULAR; INTRAVENOUS; SOFT TISSUE at 06:27

## 2020-10-22 RX ADMIN — DEXTROMETHORPHAN 30 MG: 30 SUSPENSION, EXTENDED RELEASE ORAL at 08:20

## 2020-10-22 RX ADMIN — SALINE NASAL SPRAY 2 SPRAY: 1.5 SOLUTION NASAL at 06:35

## 2020-10-22 RX ADMIN — ENOXAPARIN SODIUM 30 MG: 30 INJECTION SUBCUTANEOUS at 06:27

## 2020-10-22 RX ADMIN — DEXTROMETHORPHAN 30 MG: 30 SUSPENSION, EXTENDED RELEASE ORAL at 20:32

## 2020-10-22 RX ADMIN — DEXAMETHASONE SODIUM PHOSPHATE 4 MG: 4 INJECTION, SOLUTION INTRA-ARTICULAR; INTRALESIONAL; INTRAMUSCULAR; INTRAVENOUS; SOFT TISSUE at 11:34

## 2020-10-22 RX ADMIN — DEXAMETHASONE SODIUM PHOSPHATE 4 MG: 4 INJECTION, SOLUTION INTRA-ARTICULAR; INTRALESIONAL; INTRAMUSCULAR; INTRAVENOUS; SOFT TISSUE at 00:36

## 2020-10-22 RX ADMIN — CODEINE SULFATE 30 MG: 30 TABLET ORAL at 08:21

## 2020-10-22 RX ADMIN — ALBUTEROL SULFATE 2 PUFF: 108 AEROSOL, METERED RESPIRATORY (INHALATION) at 02:57

## 2020-10-22 RX ADMIN — PIPERACILLIN SODIUM AND TAZOBACTAM SODIUM 3.38 G: 3; .375 INJECTION, POWDER, LYOPHILIZED, FOR SOLUTION INTRAVENOUS at 17:34

## 2020-10-22 RX ADMIN — CODEINE SULFATE 30 MG: 30 TABLET ORAL at 17:38

## 2020-10-22 RX ADMIN — ENOXAPARIN SODIUM 30 MG: 30 INJECTION SUBCUTANEOUS at 17:34

## 2020-10-22 RX ADMIN — VANCOMYCIN HYDROCHLORIDE 1000 MG: 1 INJECTION, POWDER, LYOPHILIZED, FOR SOLUTION INTRAVENOUS at 12:14

## 2020-10-22 RX ADMIN — DEXAMETHASONE SODIUM PHOSPHATE 4 MG: 4 INJECTION, SOLUTION INTRA-ARTICULAR; INTRALESIONAL; INTRAMUSCULAR; INTRAVENOUS; SOFT TISSUE at 17:34

## 2020-10-22 RX ADMIN — AZITHROMYCIN MONOHYDRATE 500 MG: 500 INJECTION, POWDER, LYOPHILIZED, FOR SOLUTION INTRAVENOUS at 11:27

## 2020-10-22 RX ADMIN — CODEINE SULFATE 30 MG: 30 TABLET ORAL at 00:37

## 2020-10-22 RX ADMIN — SALINE NASAL SPRAY 2 SPRAY: 1.5 SOLUTION NASAL at 09:38

## 2020-10-22 RX ADMIN — BENZONATATE 200 MG: 100 CAPSULE ORAL at 15:12

## 2020-10-22 RX ADMIN — Medication 10 ML: at 09:38

## 2020-10-22 RX ADMIN — SALINE NASAL SPRAY 2 SPRAY: 1.5 SOLUTION NASAL at 22:56

## 2020-10-22 RX ADMIN — VANCOMYCIN HYDROCHLORIDE 1000 MG: 1 INJECTION, POWDER, LYOPHILIZED, FOR SOLUTION INTRAVENOUS at 22:50

## 2020-10-22 RX ADMIN — SALINE NASAL SPRAY 2 SPRAY: 1.5 SOLUTION NASAL at 15:13

## 2020-10-22 RX ADMIN — SALINE NASAL SPRAY 2 SPRAY: 1.5 SOLUTION NASAL at 20:32

## 2020-10-22 RX ADMIN — PIPERACILLIN SODIUM AND TAZOBACTAM SODIUM 3.38 G: 3; .375 INJECTION, POWDER, LYOPHILIZED, FOR SOLUTION INTRAVENOUS at 03:02

## 2020-10-22 RX ADMIN — PIPERACILLIN SODIUM AND TAZOBACTAM SODIUM 3.38 G: 3; .375 INJECTION, POWDER, LYOPHILIZED, FOR SOLUTION INTRAVENOUS at 09:36

## 2020-10-22 RX ADMIN — Medication 10 ML: at 15:12

## 2020-10-22 NOTE — PROGRESS NOTES
Report called to Esau Bermeo . Patient alert and oriented on NRB at [address] [place] [address]. VSS. Telemetry monitor intact.  Patients daughter Tavares Sellers updated on patients status and transfer to [facility].

## 2020-10-22 NOTE — PROGRESS NOTES
Problem: Falls - Risk of  Goal: *Absence of Falls  Description: Document Chary Rivero Fall Risk and appropriate interventions in the flowsheet. Outcome: Progressing Towards Goal  Note: Fall Risk Interventions:  Mobility Interventions: Patient to call before getting OOB              Elimination Interventions: Call light in reach              Problem: Pressure Injury - Risk of  Goal: *Prevention of pressure injury  Description: Document Johnnie Scale and appropriate interventions in the flowsheet.   Outcome: Progressing Towards Goal  Note: Pressure Injury Interventions:  Sensory Interventions: Minimize linen layers    Moisture Interventions: Absorbent underpads    Activity Interventions: Pressure redistribution bed/mattress(bed type)    Mobility Interventions: HOB 30 degrees or less    Nutrition Interventions: Document food/fluid/supplement intake    Friction and Shear Interventions: HOB 30 degrees or less

## 2020-10-22 NOTE — PROGRESS NOTES
Communicated to patient via  phone, regarding medications being given. Patient states even if she tells me I am on  Medication making me sleepy and I don't understand. Made aware  Daughter is concern we are not using the  phone to communicate with him. Reina Monroy ok! Dr Jose Daniel Carreno at bed side. Patient on multiple antibiotics, Pharmacy called for clarification of compatibility of antibiotics. Zosyn Held  Zithromax initiated.  Will initiate vanco mycin(se MAR)

## 2020-10-22 NOTE — PROGRESS NOTES
2 person skin assessment completed. With Henrry Mccauley Nurse Ruchi,  Kamryn Stacy discoloration to left lateral back noted, small  Red spots to left hand, patient states its from the needles and the blood. No skin breakdown noted. Performed and agree with the 2 nurse skin assessment documented BY Marcelina rea.  Asha Stanley

## 2020-10-22 NOTE — PROGRESS NOTES
Patient complain of not drinking coffee or tea, requesting fruit and apple sauce. Dietary called. Fruit plate ordered and delivered. Patient tolerated 75 persent of breakfast and 90 percent fruit plate. Denies pain.

## 2020-10-22 NOTE — PROGRESS NOTES
Visited the patient in 4th floor med oncology unit in response to patient's request. Only the patient was present during the visit. Patient shared about his ongoing medical concern for improvement. Patients seemed more hopeful about his recovery and not as pessimistic as before. Patient seemed more relaxed and shared about his life, both personal interests and work-related. I facilitated story telling and normalized his concern. I advised of  availability. Chaplains will follow as able and/or needed.   Lizy Paul M.Div.

## 2020-10-22 NOTE — PROGRESS NOTES
Progress Note  Date:10/22/2020       Room:[facility]  Patient Name:Ekaterina Denny     YOB: 1960     Age:60 y.o. Subjective    Subjective   Patient followed for presumptive Covid-19 pneumoniitis. Initial test was negative but second test is indeterminate. Still remains in the ICU, now on Ventimask. Blood cultures negative and sputum culture with GPC in clusters on Gram stain. Patient has been transferred out of the ICU. He remains afebrile with leukocytosis attributed to steroids and normal CRP. He remains on Remdesivir and Decadron and received Actemra; also on IV Zosyn. He refused convalescent plasma. Spoke with him through [employer]  today. He feels like he is getting better, still with productive cough. Objective         Vitals Last 24 Hours:  TEMPERATURE:  Temp  Av.9 °F (36.6 °C)  Min: 97.3 °F (36.3 °C)  Max: 98.4 °F (36.9 °C)  RESPIRATIONS RANGE: Resp  Av  Min: 18  Max: 18  PULSE OXIMETRY RANGE: SpO2  Av.9 %  Min: 84 %  Max: 95 %  PULSE RANGE: Pulse  Av  Min: 58  Max: 92  BLOOD PRESSURE RANGE: Systolic (03DKE), TPZ:342 , Min:108 , HEV:062   ; Diastolic (12DFU), YG, Min:76, Max:98    I/O (24Hr): Intake/Output Summary (Last 24 hours) at 10/22/2020 0944  Last data filed at 10/21/2020 1754  Gross per 24 hour   Intake    Output 825 ml   Net -825 ml     Objective:  Vital signs: (most recent): Blood pressure 132/85, pulse (!) 58, temperature 97.3 °F (36.3 °C), resp. rate 18, height 5' 9\" (1.753 m), weight 180 lb 5.4 oz (81.8 kg), SpO2 92 %.       General: Moderately ill-appearing male, NAD  HEENT: eyes open, high flow nasal O2  Neck: supple  Lungs: clear bilaterally  Heart: RRR  : Granados catheter with moderate urine sediment    Labs/Imaging/Diagnostics    Labs:  CBC:  Recent Labs     10/22/20  0750 10/21/20  0515 10/20/20  0550   WBC 15.3* 15.7* 13.8*   RBC 4.97 5.07 5.20   HGB 15.4 16.0 16.1   HCT 44.0 44.5 44.9   MCV 88.5 87.8 86.3   RDW 12.1 13.0 13.0    371 335     CHEMISTRIES:  Recent Labs     10/21/20  0515 10/20/20  0550     138 139   K 4.0  3.9 3.9     105 107   CO2 27  27 25   BUN 32*  32* 26*   CA 8.5  8.4* 8.4*   PHOS 4.7 4.4   PT/INR:  No results for input(s): INR, INREXT, INREXT in the last 72 hours. No lab exists for component: PROTIME  APTT:  Recent Labs     10/22/20  0750 10/21/20  0515 10/20/20  0550   APTT 26.1 24.0 24.1     LIVER PROFILE:  Recent Labs     10/21/20  0515   AST 24   ALT 68     Lab Results   Component Value Date/Time    ALT (SGPT) 68 10/21/2020 05:15 AM    AST (SGOT) 24 10/21/2020 05:15 AM    Alk. phosphatase 124 (H) 10/21/2020 05:15 AM    Bilirubin, total 0.8 10/21/2020 05:15 AM      (565  CRP 0.43 (12.40    Sputum culture (10/20) 3+ Gram positive cocci in clusters  Blood cultures (10/20) No growth at 1 day    Imaging Last 24 Hours:  Xr Chest Port    Result Date: 10/22/2020  Portable upright chest 8:16 AM compared to October 21, 2020. INDICATION: Covid 19. Heart size is stable. Bilateral predominantly peripheral airspace disease shows some improvement but has not resolved. No gross effusion or pneumothorax. No acute bony findings. IMPRESSION: Improving bilateral airspace disease. Assessment//Plan   Active Problems:    Acute respiratory failure due to COVID-19 [facility]) (10/15/2020)      Pneumonia (10/15/2020)      Acute respiratory failure ([facility] [address] (10/15/2020)      Assessment & Plan  1. Probable Covid-19 pneumonitis, with indeterminate result, status post Remdesivir, Decadron, Azithromycin, Actemra, patient refused convalescent plasma. 2. Acute respiratory failure, on high flow nasal O2  3. Elevated CRP, LDH and ferritin, secondary to #1, decreasing. 4. Possible HCAP with purulent sputum, culture pending, Day #3 IV Zosyn.     Comment:  Sputum culture Gram stain showing GPC in clusters consistent with S. Aureus, including MRSA. Leukocytosis related to steroids. CRP now normal.     1. Continue Remdesivir, Decadron, Azithromycin   2. Start IV Vancomycin pending sputum culture, done  3. Follow-up blood cultures and sputum culture  4. Continue IV Zosyn  5.  In am, repeat CRP, LDH, ferritin       Electronically signed by Jostin Angel MD on 10/22/2020 at 1:43 PM

## 2020-10-22 NOTE — PROGRESS NOTES
Consult for Vancomycin Dosing by Pharmacy by Dr. Mitali Burger provided for this 56y.o. year old male , for indication of PNA. Day of Therapy: 1  Goal of Level(s): 15-20mcg/dL    Other Current Antibiotics:Zosyn, Azithromycin           Serum Creatinine Creatinine   Date Value Ref Range Status   10/21/2020 0.83 0.70 - 1.30 mg/dL Final   10/21/2020 0.83 0.70 - 1.30 mg/dL Final   10/20/2020 0.94 0.70 - 1.30 mg/dL Final      Creatinine Clearance Estimated Creatinine Clearance: 92.3 mL/min (based on SCr of 0.83 mg/dL). BUN Lab Results   Component Value Date/Time    BUN 32 (H) 10/21/2020 05:15 AM    BUN 32 (H) 10/21/2020 05:15 AM      WBC Lab Results   Component Value Date/Time    WBC 15.3 (H) 10/22/2020 07:50 AM      Temp 97.3 °F (36.3 °C)       New Regimen: start Vancomycin dose of 1000 mg q12h. A trough level was scheduled for 10/23/2020 at 2100. Pharmacy to follow daily and will make changes to dose and/or frequency based on clinical status.     _________________________________     Pharmacist Federico Scott, PHARMD

## 2020-10-22 NOTE — PROGRESS NOTES
Hospitalist Progress Note               Daily Progress Note: 10/22/2020      Subjective: The patient is seen for follow  up.   56 y.o. male with no significant medical problems presents to the emergency room complaining of shortness of breath. Patient had a urinary retention which was resolved with Granados catheter placement. Patient continues to be on high flow oxygen.     Seen and evaluated at bedside, no acute events overnight, patient's anxiety improved, discussed with RN and patient at bedside    Medications reviewed  Current Facility-Administered Medications   Medication Dose Route Frequency    vancomycin (VANCOCIN) 1,000 mg in 0.9% sodium chloride (MBP/ADV) 250 mL adv  1,000 mg IntraVENous Q12H    albuterol (PROVENTIL HFA, VENTOLIN HFA, PROAIR HFA) inhaler 2 Puff  2 Puff Inhalation Q6H RT    piperacillin-tazobactam (ZOSYN) 3.375 g in 0.9% sodium chloride (MBP/ADV) 100 mL MBP  3.375 g IntraVENous Q8H    0.9% sodium chloride infusion 250 mL  250 mL IntraVENous PRN    sodium chloride (OCEAN) 0.65 % nasal squeeze bottle 2 Spray  2 [place] Both Nostrils Q4HWA    hydrOXYzine pamoate (VISTARIL) capsule 25 mg  25 mg Oral TID PRN    melatonin tablet 10 mg  10 mg Oral QHS PRN    codeine sulfate tablet 30 mg  30 mg Oral Q8H RT    dextromethorphan (DELSYM) 30 mg/5 mL syrup 30 mg  30 mg Oral Q12H    enoxaparin (LOVENOX) injection 30 mg  30 mg SubCUTAneous Q12H    acetaminophen (TYLENOL) tablet 650 mg  650 mg Oral Q6H PRN    Or    acetaminophen (TYLENOL) suppository 650 mg  650 mg Rectal Q6H PRN    sodium chloride (NS) flush 5-40 mL  5-40 mL IntraVENous Q8H    sodium chloride (NS) flush 5-40 mL  5-40 mL IntraVENous PRN    ondansetron (ZOFRAN ODT) tablet 4 mg  4 mg Oral Q6H PRN    Or    ondansetron (ZOFRAN) injection 4 mg  4 mg IntraVENous Q6H PRN    azithromycin (ZITHROMAX) 500 mg in 0.9% sodium chloride 250 mL (VIAL-MATE)  500 mg IntraVENous Q24H    dexamethasone (DECADRON) 4 mg/mL injection 4 mg  4 mg IntraVENous Q6H    benzonatate (TESSALON) capsule 200 mg  200 mg Oral Q8H       Review of Systems:   A comprehensive review of systems was negative except for that written in the HPI. Objective:   Physical Exam:     Visit Vitals  /85 (BP 1 Location: Right arm, BP Patient Position: At rest)   Pulse (!) 58   Temp 97.3 °F (36.3 °C)   Resp 18   Ht 5' 9\" (1.753 m)   Wt 81.8 kg (180 lb 5.4 oz)   SpO2 92%   BMI 26.63 kg/m²    O2 Flow Rate (L/min): 40 l/min O2 Device: Hi flow nasal cannula    Temp (24hrs), Av.9 °F (36.6 °C), Min:97.3 °F (36.3 °C), Max:98.4 °F (36.9 °C)    No intake/output data recorded. 10/20 1901 - 10/22 0700  In: -   Out: 1325 [PWVWS:3384]    PHYSICAL EXAM:  General: Alert and awake. Skin: Extremities and face reveal no rashes. HEENT: Sclerae anicteric. Extra-occular muscles are intact. No oral ulcers. No ENT discharge. The neck is supple. Cardiovascular: Regular rate and rhythm. No murmurs, gallops, or rubs. PMI nondisplaced. Carotids without bruits. Respiratory: Comfortable breathing with no accessory muscle use. Clear breath sounds with no wheezes, rales, or rhonchi. GI: Abdomen nondistended, soft, and nontender. Normal active bowel sounds. No enlargement of the liver or spleen. No masses palpable. Rectal: Deferred   Musculoskeletal: No pitting edema of the lower legs. Extremities have good range of motion. No costovertebral tenderness. Neurological: Gross memory appears intact. Patient is alert and oriented. Power 5/5, Cranial nerves II-XII intact  Psychiatric: Mood appears appropriate with judgement intact.      Data Review:       Recent Days:  Recent Labs     10/22/20  0750 10/21/20  0515 10/20/20  0550   WBC 15.3* 15.7* 13.8*   HGB 15.4 16.0 16.1   HCT 44.0 44.5 44.9    371 335     Recent Labs     10/21/20  0515 10/20/20  0550     138 139   K 4.0  3.9 3.9     105 107   CO2 27  27 25   *  169* 136*   BUN 32*  32* 26*   CREA 0.83  0.83 0.94   CA 8.5  8.4* 8.4*   PHOS 4.7 4.4   ALB 3.0*  3.0* 3.0*   TBILI 0.8  --    ALT 68  --      Recent Labs     10/22/20  0400   PH 7.449   PCO2 37   PO2 67*   HCO3 26   FIO2 100.0       24 Hour Results:  Recent Results (from the past 24 hour(s))   BLOOD GAS, ARTERIAL    Collection Time: 10/22/20  4:00 AM   Result Value Ref Range    pH 7.449 7.35 - 7.45      PCO2 37 35 - 45 mmHg    PO2 67 (L) 75 - 100 mmHg    O2 SAT 94 (L) >95 %    BICARBONATE 26 22 - 26 mmol/L    BASE EXCESS 2.0 0 - 2 mmol/L    O2 METHOD High Flow O2      O2 FLOW RATE 40.0 L/min    FIO2 100.0 %    EPAP/CPAP/PEEP 0      Sample source Arterial      SITE Right Radial      KAT'S TEST PASS     PTT    Collection Time: 10/22/20  7:50 AM   Result Value Ref Range    aPTT 26.1 23.0 - 35.7 sec    aPTT, therapeutic range   68 - 109 sec   CBC W/O DIFF    Collection Time: 10/22/20  7:50 AM   Result Value Ref Range    WBC 15.3 (H) 4.1 - 11.1 K/uL    RBC 4.97 4.10 - 5.70 M/uL    HGB 15.4 12.1 - 17.0 g/dL    HCT 44.0 36.6 - 50.3 %    MCV 88.5 80.0 - 99.0 FL    MCH 31.0 26.0 - 34.0 PG    MCHC 35.0 30.0 - 36.5 g/dL    RDW 12.1 11.5 - 14.5 %    PLATELET 161 662 - 130 K/uL    MPV 10.0 8.9 - 12.9 FL       XR CHEST PORT   Final Result   IMPRESSION: Improving bilateral airspace disease.       XR CHEST PORT   Final Result      XR CHEST PORT   Final Result      XR CHEST PORT   Final Result      XR CHEST PORT   Final Result      CTA CHEST W OR W WO CONT   Final Result   IMPRESSION: Widespread patchy bilateral pneumonitis      XR CHEST SNGL V   Final Result      XR CHEST PORT    (Results Pending)          Assessment/     Problem List:  Hospital Problems  Date Reviewed: 10/15/2020          Codes Class Noted POA    Acute respiratory failure due to COVID-19 [facility]) ICD-10-CM: U07.1, J96.00  ICD-9-CM: 518.81, 079.89  10/15/2020 Yes        Pneumonia ICD-10-CM: J18.9  ICD-9-CM: 834  10/15/2020 Yes        Acute respiratory failure (HCC) ICD-10-CM: J96.00  ICD-9-CM: 518.81 10/15/2020 Unknown                     Plan:    Acute hypoxic respiratory failure/pneumoniapatient presents with acute hypoxic respiratory failure 2/2 to pneumonia high flow nasal cannula at 40 L, bacterial versus viral in etiology, patient does not meet sepsis criteria at this time, of note patient's 3rd covid test was negative  Follow-up sputum culture for sensitivities,  Currently showing gram positive cocci  Follow-up repeat blood cultures  Continue Zosyn and azithromycin for antibiotic coverage  Continue to Vancomycin  Continue Decadron  Discontinue Remdemsivir  Continue lovenox  Continue to monitor patient's respiratory status  Pulmonology recommendations appreciated, will continue to follow  Infectious disease recommendations appreciated, will continue to follow    Dehydrationcurrently resolved    PpxLovenox  FENcurrently on clear liquids, bedside swallow eval, advance diet, replete potassium and magnesium  Full code, patient surrogate decision-maker is his wife, updated patients daughter  Dispositioncontinued inpatient treatment, pending clinical improvement    Care Plan discussed with: Patient/Family and Nurse        Enedina Sanchez MD

## 2020-10-22 NOTE — PROGRESS NOTES
Pulmonary Note      Name: Ekaterina Mcdonald MRN: [id number]   : 1960 Hospital: [address] [place] [address]   Date: 10/22/2020  Admission date: 10/15/2020 Hospital Day: 8       Subjective/Interval History:   Patient seen in the ICU on high flow nasal oxygen at 70% with oxygen saturation 99. He gives a history of gradual worsening illness over the last week to 10 days with nonproductive cough fever generalized malaise nausea denies any vomiting although the ER note states that he had some vomiting. He seems awake and alert. Does not have any Covid contacts that he is aware of. States that his sense of taste and smell have been normal but terribly anorexic has not eaten anything in 3 or 4 days. 10/17 still feels bad Cough shortness of breath but no nausea today   10/18 still complains of severe cough nonproductive although in general feels a little better  10/20 remains 100% high flow nasal oxygen. Continues to complain of it irritating him. Requested nasal saline last evening but it has not been started will reorder it. We will let him try nonrebreather mask with nasal high flow for meals  10/21 he is alert awake on high flow nasal cannula tolerating well refusing plasma convalescent  10/22 he is on high flow nasal cannula along with 100% nonrebreather mask  Patient Active Problem List   Diagnosis Code    Acute respiratory failure due to COVID-19 ([facility] U07.1, J96.00    Pneumonia J18.9    Acute respiratory failure ([facility]) J96.00       IMPRESSION:   1. Acute hypoxic respiratory failure requiring high flow nasal oxygen now up to 100%  2. History of asthma  3. Community-acquired versus aspiration with diffuse pulmonary infiltrates questionable atypical pneumonia possible COVID-19 repeat testing Negative  4. Nausea probable due to COVID-19 infection  5. Unremitting cough [place] to reflux  Body mass index is 26.63 kg/m². RECOMMENDATIONS/PLAN:   1.  Initial COVID-19 test is negative repeat testing negative also but ferritin LDH CRP are all elevated chest x-ray is consistent with Covid pneumonitis  continue Decadron and Actemra Remdesivir   2. We will use nonrebreather mask as long as oxygen saturation greater than 90%  3. Positive for gram-positive cocci patient already on mycin and Zosyn  4. Continue codeine Delsym and Tessalon for severe cough  5. Start patient on nebulizer treatment  6. Repeat COVID-19 test negative  7. Check Legionella antigen and titer continue azithromycin  8. Refusing convalescent plasma treatment          Subjective/Initial History:   I have reviewed the flowsheet and previous days notes. Seen earlier today on rounds. I was asked by Huber Mendez MD to see An Crescencio Kruse a 56 y.o.  male  in consultation for a chief complaint of acute hypoxic respiratory failure cough and community-acquired pneumonia          Pt now in CCU. Patient PCP: UNKNOWN  PMH:  has no past medical history on file. PSH:   has no past surgical history on file. FHX: family history includes No Known Problems in his father and mother. SHX:  reports that he has never smoked. He has never used smokeless tobacco. He reports that he does not drink alcohol or use drugs. Systemic review:  General no chronic illness but over the last week he has had fever generalized malaise weight loss no appetite  Eyes no double vision or momentary blindness  ENT no sinus congestion drainage or facial pain  Skeletal has diffuse aches and pains no swollen tender joints  Endocrinologic no polyuria polydipsia  Neurologic no seizures or syncope  Gastrointestinal normally he has no problems but over this last week he has had nausea anorexia marked weight loss has not had any alteration of his sense of taste or smell  Genitourinary no discomfort or drainage on urination  Cardiovascular no history of heart disease chest pain has felt sweaty but no history of ankle edema.   Respiratory as mentioned above    No Known Allergies MEDS:   Current Facility-Administered Medications   Medication    vancomycin (VANCOCIN) 1,000 mg in 0.9% sodium chloride (MBP/ADV) 250 mL adv    albuterol (PROVENTIL HFA, VENTOLIN HFA, PROAIR HFA) inhaler 2 Puff    piperacillin-tazobactam (ZOSYN) 3.375 g in 0.9% sodium chloride (MBP/ADV) 100 mL MBP    0.9% sodium chloride infusion 250 mL    sodium chloride (OCEAN) 0.65 % nasal squeeze bottle 2 Spray    hydrOXYzine pamoate (VISTARIL) capsule 25 mg    melatonin tablet 10 mg    codeine sulfate tablet 30 mg    dextromethorphan (DELSYM) 30 mg/5 mL syrup 30 mg    enoxaparin (LOVENOX) injection 30 mg    acetaminophen (TYLENOL) tablet 650 mg    Or    acetaminophen (TYLENOL) suppository 650 mg    sodium chloride (NS) flush 5-40 mL    sodium chloride (NS) flush 5-40 mL    ondansetron (ZOFRAN ODT) tablet 4 mg    Or    ondansetron (ZOFRAN) injection 4 mg    azithromycin (ZITHROMAX) 500 mg in 0.9% sodium chloride 250 mL (VIAL-MATE)    dexamethasone (DECADRON) 4 mg/mL injection 4 mg    benzonatate (TESSALON) capsule 200 mg        Current Facility-Administered Medications:     vancomycin (VANCOCIN) 1,000 mg in 0.9% sodium chloride (MBP/ADV) 250 mL adv, 1,000 mg, IntraVENous, Q12H, Simona Gonzales MD    albuterol (PROVENTIL HFA, VENTOLIN HFA, PROAIR HFA) inhaler 2 Puff, 2 Puff, Inhalation, Q6H RT, Aretha Fraga MD, 2 Puff at 10/22/20 0926    piperacillin-tazobactam (ZOSYN) 3.375 g in 0.9% sodium chloride (MBP/ADV) 100 mL MBP, 3.375 g, IntraVENous, Q8H, Simona Gonzales MD, Last Rate: 25 mL/hr at 10/22/20 0936, 3.375 g at 10/22/20 0936    0.9% sodium chloride infusion 250 mL, 250 mL, IntraVENous, PRN, Alexia Whittaker MD    sodium chloride (OCEAN) 0.65 % nasal squeeze bottle 2 Spray, 2 Spray, Both Nostrils, Q4HWA, Aretha Fraga MD, 2 [place] at 10/22/20 3737    hydrOXYzine pamoate (VISTARIL) capsule 25 mg, 25 mg, Oral, TID PRN, Primo Boyer MD, 25 mg at 10/21/20 1401   melatonin tablet 10 mg, 10 mg, Oral, QHS PRN, Jinny Dumont MD, 10 mg at 10/21/20 2231    codeine sulfate tablet 30 mg, 30 mg, Oral, Q8H RT, Carlito Pérez MD, 30 mg at 10/22/20 3557    dextromethorphan (DELSYM) 30 mg/5 mL syrup 30 mg, 30 mg, Oral, Q12H, Carlito Pérez MD, 30 mg at 10/22/20 0820    enoxaparin (LOVENOX) injection 30 mg, 30 mg, SubCUTAneous, Q12H, Debbi Garcias MD, 30 mg at 10/22/20 0627    acetaminophen (TYLENOL) tablet 650 mg, 650 mg, Oral, Q6H PRN, 650 mg at 10/18/20 2205 **OR** acetaminophen (TYLENOL) suppository 650 mg, 650 mg, Rectal, Q6H PRN, Debbi Garcias MD    sodium chloride (NS) flush 5-40 mL, 5-40 mL, IntraVENous, Q8H, Debbi Garcias MD, Stopped at 10/22/20 0635    sodium chloride (NS) flush 5-40 mL, 5-40 mL, IntraVENous, PRN, Debbi Garcias MD, 10 mL at 10/22/20 0938    ondansetron (ZOFRAN ODT) tablet 4 mg, 4 mg, Oral, Q6H PRN **OR** ondansetron (ZOFRAN) injection 4 mg, 4 mg, IntraVENous, Q6H PRN, Debbi Garcias MD    azithromycin (ZITHROMAX) 500 mg in 0.9% sodium chloride 250 mL (VIAL-MATE), 500 mg, IntraVENous, Q24H, Marietat Hendricks MD, 500 mg at 10/21/20 1031    dexamethasone (DECADRON) 4 mg/mL injection 4 mg, 4 mg, IntraVENous, Q6H, Carlito Pérez MD, 4 mg at 10/22/20 3781    benzonatate (TESSALON) capsule 200 mg, 200 mg, Oral, Q8H, Carlito Pérez MD, 200 mg at 10/22/20 6418      Objective:     Vital Signs: Telemetry:    normal sinus rhythm Intake/Output:   Visit Vitals  /85 (BP 1 Location: Right arm, BP Patient Position: At rest)   Pulse (!) 58   Temp 97.3 °F (36.3 °C)   Resp 18   Ht 5' 9\" (1.753 m)   Wt 81.8 kg (180 lb 5.4 oz)   SpO2 92%   BMI 26.63 kg/m²       Temp (24hrs), Av.9 °F (36.6 °C), Min:97.3 °F (36.3 °C), Max:98.4 °F (36.9 °C)        O2 Device: Hi flow nasal cannula O2 Flow Rate (L/min): 40 l/min         Body mass index is 26.63 kg/m².     Wt Readings from Last 4 Encounters:   10/21/20 81.8 kg (180 lb 5.4 oz)          Intake/Output Summary (Last 24 hours) at 10/22/2020 1031  Last data filed at 10/21/2020 1754  Gross per 24 hour   Intake    Output 825 ml   Net -825 ml       Last shift:      No intake/output data recorded. Last 3 shifts: 10/20 1901 - 10/22 0700  In: -   Out: 2639 [Urine:1325]   Ventilator Settings:      Mode Rate TV Press PEEP FiO2 PIP Min. Vent               100 %            Physical Exam:    General:  male; moderate distress chronic cough nonproductive  HEENT: NCAT, oral mucosa clear  Eyes: anicteric; conjunctiva clear extraocular movements intact   neck: no node neck veins visible  Chest: no deformity,   Cardiac: Regular rate and rhythm no murmurs trace ankle edema  Lungs: Clear anteriorly with rales laterally and posteriorly  Abd: Soft nontender normal bowel sounds no organomegaly  Ext: Mild edema; no joint swelling;  No clubbing  :clear urine  Neuro: Awake alert speech is clear moves all 4 extremities well  Psych- no agitation, oriented to person;   Skin: warm,, no cyanosis; very mildly diaphoretic  Pulses: Pulses intact  Capillary: Normal capillary refill      Labs:    Recent Labs     10/22/20  0750 10/21/20  0515 10/20/20  0550   WBC 15.3* 15.7* 13.8*   HGB 15.4 16.0 16.1    371 335   APTT 26.1 24.0 24.1     Recent Labs     10/21/20  0515 10/20/20  0550     138 139   K 4.0  3.9 3.9     105 107   CO2 27  27 25   *  169* 136*   BUN 32*  32* 26*   CREA 0.83  0.83 0.94   CA 8.5  8.4* 8.4*   PHOS 4.7 4.4   ALB 3.0*  3.0* 3.0*   ALT 68  --      10/2000% nasal high flow with oxygen saturation 93%  10/20 nonrebreather mask oxygen saturation 91 to 92%  10/14 COVID-19 test negative  10/17 COVID-19 test indeterminate  Ferritin 1510, previous 2180  , previous 615, 754  Procalcitonin 0.14  CRP 0.87, previous 1.39, 3.0, 12.4  Lab Results   Component Value Date/Time    Culture result: No growth 2 days 10/20/2020 11:30 AM    Culture result: PENDING 10/20/2020 08:15 AM    Culture result: No growth 6 days 10/15/2020 07:10 PM      Lab Results   Component Value Date/Time     10/15/2020 11:47 PM       Imaging:  I have personally reviewed the patients radiographs and have reviewed the reports:    CXR Results  (Last 48 hours)  10/20 chest x-ray with diffuse patchy infiltrates may be slightly less dense  10/17 chest x-ray unchanged diffuse bilateral infiltrate               10/15/20 1927  XR CHEST SNGL V Final result    Narrative:  1       Mild atelectasis in the bases with upper lungs clear. No effusion or   pneumothorax. Normal heart and no congestion  Disagree to me there is bilateral infiltrate           Results from Hospital Encounter encounter on 10/15/20   XR CHEST PORT    Narrative Portable upright chest 8:16 AM compared to October 21, 2020. INDICATION: Covid 19. Heart size is stable. Bilateral predominantly peripheral airspace disease shows  some improvement but has not resolved. No gross effusion or pneumothorax. No  acute bony findings. Impression IMPRESSION: Improving bilateral airspace disease. XR CHEST PORT    Narrative Chest single view. Comparison single view chest October 20, 2020    Unchanged appearance for the lungs; patchy peripheral reticular markings  throughout the lungs. Cardiac and mediastinal structures unchanged. No  pneumothorax or sizable pleural effusion. XR CHEST PORT    Narrative Chest single view       Comparison single view chest October 18, 2020    Patchy reticular markings through lungs, generally same appearance when  differences in technique are taken into account. Cardiac and mediastinal  structures unchanged. No pneumothorax or sizable pleural effusion. Results from [place] encounter on 10/15/20   CTA CHEST W OR W WO CONT    Narrative CT dose reduction was achieved through use of a standardized protocol tailored  for this examination and automatic exposure control for dose modulation.     Contrast study maximum intensity projections shows pronounced groundglass  opacification, of variable density, mainly in the dependent portions of each  lung, apex to base. Air bronchograms are preserved in the densest portions. Mild  geographic groundglass opacification of lower density in the nondependent  portions. Central airways are open    Pulmonary arteries are well-opacified and show no filling defect or distortion. Aorta shows normal dimensions, without dissection. Tiny middle mediastinal lymph  nodes. Cardiac finding. No pleural pericardial effusion. No significant upper  abdominal or chest wall finding      Impression IMPRESSION: Widespread patchy bilateral pneumonitis       Clinical picture consistent with COVID-19 infection with pneumonitis with diffuse patchy pulmonary infiltrate elevation in ferritin LDH and CRP nonproductive cough and GI symptoms and yet initial COVID-19 test negative repeat testing indeterminate continue azithromycin Actemra  Decadron and Remdesivir and will repeat Lasix today continue to follow renal function    Time of care 30 minutes         Thank you for allowing us to participate in the care of this patient.   We will follow along with you    Mortimer Polio, MD

## 2020-10-23 ENCOUNTER — APPOINTMENT (OUTPATIENT)
Dept: GENERAL RADIOLOGY | Age: 60
DRG: 177 | End: 2020-10-23
Attending: INTERNAL MEDICINE
Payer: COMMERCIAL

## 2020-10-23 LAB
APTT PPP: 25.6 SEC (ref 23–35.7)
BACTERIA SPEC CULT: NORMAL
BASOPHILS # BLD: 0 K/UL (ref 0–0.1)
BASOPHILS NFR BLD: 0 % (ref 0–1)
CRP SERPL-MCNC: <0.29 MG/DL (ref 0–0.6)
DIFFERENTIAL METHOD BLD: ABNORMAL
EOSINOPHIL # BLD: 0 K/UL (ref 0–0.4)
EOSINOPHIL NFR BLD: 0 % (ref 0–7)
ERYTHROCYTE [DISTWIDTH] IN BLOOD BY AUTOMATED COUNT: 13 % (ref 11.5–14.5)
FERRITIN SERPL-MCNC: 1187 NG/ML (ref 26–388)
GRAM STN SPEC: NORMAL
HCT VFR BLD AUTO: 46.4 % (ref 36.6–50.3)
HGB BLD-MCNC: 16.1 G/DL (ref 12.1–17)
IMM GRANULOCYTES # BLD AUTO: 0.3 K/UL (ref 0–0.04)
IMM GRANULOCYTES NFR BLD AUTO: 2 % (ref 0–0.5)
LDH SERPL L TO P-CCNC: 404 U/L (ref 85–241)
LYMPHOCYTES # BLD: 0.9 K/UL (ref 0.8–3.5)
LYMPHOCYTES NFR BLD: 5 % (ref 12–49)
MCH RBC QN AUTO: 30.7 PG (ref 26–34)
MCHC RBC AUTO-ENTMCNC: 34.7 G/DL (ref 30–36.5)
MCV RBC AUTO: 88.4 FL (ref 80–99)
MONOCYTES # BLD: 1.3 K/UL (ref 0–1)
MONOCYTES NFR BLD: 8 % (ref 5–13)
NEUTS SEG # BLD: 14.7 K/UL (ref 1.8–8)
NEUTS SEG NFR BLD: 85 % (ref 32–75)
NRBC # BLD: 0 K/UL (ref 0–0.01)
NRBC BLD-RTO: 0 PER 100 WBC
PLATELET # BLD AUTO: 320 K/UL (ref 150–400)
PMV BLD AUTO: 10.2 FL (ref 8.9–12.9)
RBC # BLD AUTO: 5.25 M/UL (ref 4.1–5.7)
SPECIAL REQUESTS,SREQ: NORMAL
THERAPEUTIC RANGE,PTTT: NORMAL SEC (ref 68–109)
WBC # BLD AUTO: 16.9 K/UL (ref 4.1–11.1)

## 2020-10-23 PROCEDURE — 86140 C-REACTIVE PROTEIN: CPT

## 2020-10-23 PROCEDURE — 92526 ORAL FUNCTION THERAPY: CPT

## 2020-10-23 PROCEDURE — 74011250636 HC RX REV CODE- 250/636: Performed by: INTERNAL MEDICINE

## 2020-10-23 PROCEDURE — 74011000250 HC RX REV CODE- 250: Performed by: INTERNAL MEDICINE

## 2020-10-23 PROCEDURE — 85025 COMPLETE CBC W/AUTO DIFF WBC: CPT

## 2020-10-23 PROCEDURE — 74011250637 HC RX REV CODE- 250/637: Performed by: INTERNAL MEDICINE

## 2020-10-23 PROCEDURE — 85730 THROMBOPLASTIN TIME PARTIAL: CPT

## 2020-10-23 PROCEDURE — 82728 ASSAY OF FERRITIN: CPT

## 2020-10-23 PROCEDURE — 74011000258 HC RX REV CODE- 258: Performed by: INTERNAL MEDICINE

## 2020-10-23 PROCEDURE — 99232 SBSQ HOSP IP/OBS MODERATE 35: CPT | Performed by: INTERNAL MEDICINE

## 2020-10-23 PROCEDURE — 77010033711 HC HIGH FLOW OXYGEN

## 2020-10-23 PROCEDURE — 71045 X-RAY EXAM CHEST 1 VIEW: CPT

## 2020-10-23 PROCEDURE — 65270000029 HC RM PRIVATE

## 2020-10-23 PROCEDURE — 74011250636 HC RX REV CODE- 250/636: Performed by: HOSPITALIST

## 2020-10-23 PROCEDURE — 36415 COLL VENOUS BLD VENIPUNCTURE: CPT

## 2020-10-23 PROCEDURE — 83615 LACTATE (LD) (LDH) ENZYME: CPT

## 2020-10-23 PROCEDURE — 94640 AIRWAY INHALATION TREATMENT: CPT

## 2020-10-23 RX ADMIN — CODEINE SULFATE 30 MG: 30 TABLET ORAL at 17:09

## 2020-10-23 RX ADMIN — Medication 10 ML: at 06:00

## 2020-10-23 RX ADMIN — DEXTROMETHORPHAN 30 MG: 30 SUSPENSION, EXTENDED RELEASE ORAL at 21:59

## 2020-10-23 RX ADMIN — Medication 10 ML: at 14:30

## 2020-10-23 RX ADMIN — DEXAMETHASONE SODIUM PHOSPHATE 4 MG: 4 INJECTION, SOLUTION INTRA-ARTICULAR; INTRALESIONAL; INTRAMUSCULAR; INTRAVENOUS; SOFT TISSUE at 17:07

## 2020-10-23 RX ADMIN — PIPERACILLIN SODIUM AND TAZOBACTAM SODIUM 3.38 G: 3; .375 INJECTION, POWDER, LYOPHILIZED, FOR SOLUTION INTRAVENOUS at 03:06

## 2020-10-23 RX ADMIN — BENZONATATE 200 MG: 100 CAPSULE ORAL at 14:29

## 2020-10-23 RX ADMIN — IPRATROPIUM BROMIDE AND ALBUTEROL SULFATE 3 ML: .5; 3 SOLUTION RESPIRATORY (INHALATION) at 20:18

## 2020-10-23 RX ADMIN — IPRATROPIUM BROMIDE AND ALBUTEROL SULFATE 3 ML: .5; 3 SOLUTION RESPIRATORY (INHALATION) at 01:34

## 2020-10-23 RX ADMIN — DEXAMETHASONE SODIUM PHOSPHATE 4 MG: 4 INJECTION, SOLUTION INTRA-ARTICULAR; INTRALESIONAL; INTRAMUSCULAR; INTRAVENOUS; SOFT TISSUE at 00:58

## 2020-10-23 RX ADMIN — SALINE NASAL SPRAY 2 SPRAY: 1.5 SOLUTION NASAL at 17:08

## 2020-10-23 RX ADMIN — SALINE NASAL SPRAY 2 SPRAY: 1.5 SOLUTION NASAL at 11:52

## 2020-10-23 RX ADMIN — BENZONATATE 200 MG: 100 CAPSULE ORAL at 21:59

## 2020-10-23 RX ADMIN — PIPERACILLIN SODIUM AND TAZOBACTAM SODIUM 3.38 G: 3; .375 INJECTION, POWDER, LYOPHILIZED, FOR SOLUTION INTRAVENOUS at 11:49

## 2020-10-23 RX ADMIN — DEXTROMETHORPHAN 30 MG: 30 SUSPENSION, EXTENDED RELEASE ORAL at 11:50

## 2020-10-23 RX ADMIN — DEXAMETHASONE SODIUM PHOSPHATE 4 MG: 4 INJECTION, SOLUTION INTRA-ARTICULAR; INTRALESIONAL; INTRAMUSCULAR; INTRAVENOUS; SOFT TISSUE at 06:37

## 2020-10-23 RX ADMIN — CODEINE SULFATE 30 MG: 30 TABLET ORAL at 00:58

## 2020-10-23 RX ADMIN — PIPERACILLIN SODIUM AND TAZOBACTAM SODIUM 3.38 G: 3; .375 INJECTION, POWDER, LYOPHILIZED, FOR SOLUTION INTRAVENOUS at 17:07

## 2020-10-23 RX ADMIN — CODEINE SULFATE 30 MG: 30 TABLET ORAL at 11:51

## 2020-10-23 RX ADMIN — ENOXAPARIN SODIUM 30 MG: 30 INJECTION SUBCUTANEOUS at 06:37

## 2020-10-23 RX ADMIN — IPRATROPIUM BROMIDE AND ALBUTEROL SULFATE 3 ML: .5; 3 SOLUTION RESPIRATORY (INHALATION) at 08:08

## 2020-10-23 RX ADMIN — BENZONATATE 200 MG: 100 CAPSULE ORAL at 06:37

## 2020-10-23 RX ADMIN — SALINE NASAL SPRAY 2 SPRAY: 1.5 SOLUTION NASAL at 14:31

## 2020-10-23 RX ADMIN — IPRATROPIUM BROMIDE AND ALBUTEROL SULFATE 3 ML: .5; 3 SOLUTION RESPIRATORY (INHALATION) at 14:35

## 2020-10-23 RX ADMIN — DEXAMETHASONE SODIUM PHOSPHATE 4 MG: 4 INJECTION, SOLUTION INTRA-ARTICULAR; INTRALESIONAL; INTRAMUSCULAR; INTRAVENOUS; SOFT TISSUE at 14:29

## 2020-10-23 RX ADMIN — SALINE NASAL SPRAY 2 SPRAY: 1.5 SOLUTION NASAL at 06:40

## 2020-10-23 NOTE — PROGRESS NOTES
Nutrition Assessment     Type and Reason for Visit: RD nutrition re-screen/LOS    Nutrition Recommendations/Plan:   Continue Regular diet  Reduce Ensure Enlive from TID to daily    Nutrition Assessment: Body aches, emesis pta. Concern for COVID pna, however COVID negative x2. Currently on HF NC. Previously in ICU, transferred to med floor (10/20) Regular diet ordered. Pt previously on Full Lq diet 5x days d/t pt anxiety over desatting with PO per SLP report. RD ordered supplementation of Ensure Enlive TID on Full Lq day 5 d/t concern for inadequate nutrition, however diet advanced to Regular same day per RN. Noted pt food preferences for items like fruit trays. Continued Ensure Enlive TID d/t concern for inadequate intake, however PO intakes documented >80% the past few days. Spoke with RN who reports good intakes, consuming some Ensure. RD to reduce frequency of Ensure. Labs and meds reviewed. Malnutrition Assessment:  Malnutrition Status: No malnutrition     Nutrition Related Findings:  Unable to perform NFPE 2/2 COVID unit. Last BM 10/20. No documented n/v or c/d. No dysphagia per SLP eval. No edema.       Current Nutrition Therapies:  DIET NUTRITIONAL SUPPLEMENTS Breakfast, Lunch, Dinner; Ensure [employer]  DIET REGULAR    Anthropometric Measures:  · Height:  5' 9\" (175.3 cm)  · Current Body Wt:  81.8 kg (180 lb 5.4 oz)  · BMI: 26.6    Nutrition Diagnosis:   · No nutrition diagnosis at this time related to   as evidenced by        Nutrition Intervention:  Food and/or Nutrient Delivery: Continue current diet, Modify oral nutrition supplement  Nutrition Education and Counseling: No recommendations at this time  Coordination of Nutrition Care: Continued inpatient monitoring      Nutrition Monitoring and Evaluation:   Behavioral-Environmental Outcomes:  N/A  Food/Nutrient Intake Outcomes: Food and nutrient intake  Physical Signs/Symptoms Outcomes:  N/A    Discharge Planning:    No discharge needs at this time Electronically signed by Magdalena Narvaez on 10/23/2020 at 10:45 AM    Contact Number:

## 2020-10-23 NOTE — PROGRESS NOTES
Nurse notified attending physician, Dr. Stephane Rockwell, of patient's new onset of hematuria. Nurse received permission to hold Lovenox med.

## 2020-10-23 NOTE — PROGRESS NOTES
Problem: Dysphagia (Adult)  Goal: *Acute Goals and Plan of Care (Insert Text)  Description: Speech Therapy Swallow Goals  Initiated 10/20/2020  -Patient will tolerate regular diet with thin liquids without clinical indicators of aspiration given no cues within 7 day(s). [ ] Not met  [ x]  MET   [ ] Progressing  [ ] Discontinue  -Patient will tolerate PO trials without clinical indicators of aspiration given no cues within 7 day(s). [ ] Not met  [x ]  MET   [ ] Progressing  [ ] [place]  -Patient will participate in modified barium swallow study within 7 day(s). [ ] Not met  [ ]  MET   [ ] Progressing  [ x] Discontinue  -Patient will demonstrate understanding of swallow safety precautions and aspiration precautions, diet recs with no cues within 7 day(s). [ ] Not met  [x ]  MET   [ ] Progressing  [ ] Discontinue  Outcome: Resolved/Met     SPEECH LANGUAGE PATHOLOGY DYSPHAGIA TREATMENT  Patient: Ekaterina Ponce (62 y.o. male)  Date: 10/23/2020  Diagnosis: Pneumonia [J18.9]  Acute respiratory failure ([facility] [J96.00]   <principal problem not specified>       Precautions: aspiration, presumed droplet plus due to suspected COVID 19. ASSESSMENT:  Pt seen for tx, pt declines need for  phone and is able to communicate with clinician with only occasional need for repetition, demonstrates understanding. Pt on HFNC with 02 sats 96% at start, decreases to 93% with intake and returns to 96% after rest. Pt's oropharyngeal sw function appears [place] for trials provided. Pt with delayed dry cough, appears incidental, ? Hyperactive airway vs dry cracker? Pt with negative COVID-19 testing per results. PLAN:  Recommendations and Planned Interventions:  Pt appears appropriate to cont current regular/thin diet with aspiration and GERD precautions. Pt denies dysphagia.  Pt able to demonstrate appropriate awareness of sw safety precautions and need for slow rate of intake for respiratory preservation. No further SLP Intervention is indicated, please order MBS if aspiration concerns are present. Discharge Recommendations: To Be Determined     SUBJECTIVE:   Patient alert, agreeable, pleasant. OBJECTIVE:     CXR Results  (Last 48 hours)                 10/23/20 0852  XR CHEST PORT Final result    Narrative:  1 view comparison yesterday       Little change in patchy interstitial infiltrates throughout much of each lung. No effusion or pneumothorax. Normal heart and stable mediastinal       10/22/20 0830  XR CHEST PORT Final result    Impression:  IMPRESSION: Improving bilateral airspace disease. Narrative:  Portable upright chest 8:16 AM compared to October 21, 2020. INDICATION: Covid 19. Heart size is stable. Bilateral predominantly peripheral airspace disease shows   some improvement but has not resolved. No gross effusion or pneumothorax. No   acute bony findings. Cognitive and Communication Status:  Neurologic State: Alert  Orientation Level: Oriented X4  Cognition: Appropriate for age attention/concentration    Dysphagia Treatment:  Oral Assessment:  Oral Assessment  Labial: No impairment  Dentition: Intact  Oral Hygiene: WFL  Lingual: No impairment  P.O. Trials:     Vocal quality prior to P.O.: No impairment  Consistency Presented: Solid; Thin liquid  How Presented: Self-fed/presented; Successive swallows     Propulsion: No impairment  Oral Residue: None     Aspiration Signs/Symptoms: Strong cough  Pharyngeal Phase Characteristics: No impairment, issues, or problems     Oral Phase Severity: No impairment  Pharyngeal Phase Severity : No impairment    Pain:  Pain Scale 1: Numeric (0 - 10)  Pain Intensity 1: 0       After treatment:   Patient left in no apparent distress in bed, Call bell within reach, and Nursing notified    COMMUNICATION/EDUCATION:   Patient was educated regarding swallow safety precautions, energy conservation with intake and diet recs He demonstrated Good understanding as evidenced by teach back. The patient's plan of care including recommendations, planned interventions, and recommended diet changes were discussed with: Registered nurse.      Ita Griggs M.S., CCC-SLP  Time Calculation: 14 mins

## 2020-10-23 NOTE — PROGRESS NOTES
Bedside and Verbal shift change report given to Diann Vogt RN (oncoming nurse) by Mariah Robledo LPN (offgoing nurse). Report included the following information SBAR and MAR.

## 2020-10-23 NOTE — PROGRESS NOTES
Pulmonary Note      Name: Ekaterina Cesar MRN: [id number]   : 1960 Hospital: [address] [place] [address]   Date: 10/23/2020  Admission date: 10/15/2020 Hospital Day: 9       Subjective/Interval History:   Patient seen in the ICU on high flow nasal oxygen at 70% with oxygen saturation 99. He gives a history of gradual worsening illness over the last week to 10 days with nonproductive cough fever generalized malaise nausea denies any vomiting although the ER note states that he had some vomiting. He seems awake and alert. Does not have any Covid contacts that he is aware of. States that his sense of taste and smell have been normal but terribly anorexic has not eaten anything in 3 or 4 days. 10/17 still feels bad Cough shortness of breath but no nausea today   10/18 still complains of severe cough nonproductive although in general feels a little better  10/20 remains 100% high flow nasal oxygen. Continues to complain of it irritating him. Requested nasal saline last evening but it has not been started will reorder it. We will let him try nonrebreather mask with nasal high flow for meals  10/23 on 100% high flow nasal oxygen with oxygen saturation 98%. He states he has less cough and less shortness of breath  Patient Active Problem List   Diagnosis Code    Acute respiratory failure due to COVID-19 ([facility]) U07.1, J96.00    Pneumonia J18.9    Acute respiratory failure ([facility] J96.00       IMPRESSION:   1. Acute hypoxic respiratory failure requiring high flow nasal oxygen   2. History of asthma  3. Community-acquired versus aspiration with diffuse pulmonary infiltrates questionable atypical pneumonia possible COVID-19 repeat testing Negative  4. Nausea probable due to COVID-19 infection  5. Unremitting cough secondary to pneumonia or reflux  Body mass index is 26.63 kg/m². RECOMMENDATIONS/PLAN:   1.  Initial COVID-19 test is negative repeat testing indeterminant  but ferritin LDH CRP are all elevated chest x-ray is consistent with Covid pneumonitis  continue Decadron has completed 2 doses Actemra and 5 days of Remdesivir  2. We will start tapering high flow nasal oxygen  3. Sputum culture with normal smita  4. Continue codeine Delsym and Tessalon for severe cough  5. Continue nebulizer treatment  6. Repeat COVID-19 test negative  7. Check Legionella antigen and titer continue azithromycin  8. Refusing convalescent plasma treatment          Subjective/Initial History:   I have reviewed the flowsheet and previous days notes. Seen earlier today on rounds. I was asked by Gigi Hawk MD to see An Adrián Blanchard a 56 y.o.  male  in consultation for a chief complaint of acute hypoxic respiratory failure cough and community-acquired pneumonia          Pt now in CCU. Patient PCP: UNKNOWN  PMH:  has no past medical history on file. PSH:   has no past surgical history on file. FHX: family history includes No Known Problems in his father and mother. SHX:  reports that he has never smoked. He has never used smokeless tobacco. He reports that he does not drink alcohol or use drugs. Systemic review:  General no chronic illness but over the last week he has had fever generalized malaise weight loss no appetite  Eyes no double vision or momentary blindness  ENT no sinus congestion drainage or facial pain  Skeletal has diffuse aches and pains no swollen tender joints  Endocrinologic no polyuria polydipsia  Neurologic no seizures or syncope  Gastrointestinal normally he has no problems but over this last week he has had nausea anorexia marked weight loss has not had any alteration of his sense of taste or smell  Genitourinary no discomfort or drainage on urination  Cardiovascular no history of heart disease chest pain has felt sweaty but no history of ankle edema.   Respiratory as mentioned above    No Known Allergies   MEDS:   Current Facility-Administered Medications   Medication    albuterol-ipratropium (DUO-NEB) 2.5 MG-0.5 MG/3 ML    Draw trough vancomycin level  On 10/23/2020 at 2100.     piperacillin-tazobactam (ZOSYN) 3.375 g in 0.9% sodium chloride (MBP/ADV) 100 mL MBP    0.9% sodium chloride infusion 250 mL    sodium chloride (OCEAN) 0.65 % nasal squeeze bottle 2 Spray    hydrOXYzine pamoate (VISTARIL) capsule 25 mg    melatonin tablet 10 mg    codeine sulfate tablet 30 mg    dextromethorphan (DELSYM) 30 mg/5 mL syrup 30 mg    enoxaparin (LOVENOX) injection 30 mg    acetaminophen (TYLENOL) tablet 650 mg    Or    acetaminophen (TYLENOL) suppository 650 mg    sodium chloride (NS) flush 5-40 mL    sodium chloride (NS) flush 5-40 mL    ondansetron (ZOFRAN ODT) tablet 4 mg    Or    ondansetron (ZOFRAN) injection 4 mg    dexamethasone (DECADRON) 4 mg/mL injection 4 mg    benzonatate (TESSALON) capsule 200 mg        Current Facility-Administered Medications:     albuterol-ipratropium (DUO-NEB) 2.5 MG-0.5 MG/3 ML, 3 mL, Nebulization, Q6H RT, Kannan Conde MD, 3 mL at 10/23/20 0808    Draw trough vancomycin level  On 10/23/2020 at 2100., , Other, ONCE, Max Suarez MD    piperacillin-tazobactam (ZOSYN) 3.375 g in 0.9% sodium chloride (MBP/ADV) 100 mL MBP, 3.375 g, IntraVENous, Q8H, KardarSimona MD, Last Rate: 25 mL/hr at 10/23/20 1149, 3.375 g at 10/23/20 1149    0.9% sodium chloride infusion 250 mL, 250 mL, IntraVENous, PRN, Max Suarez MD    sodium chloride (OCEAN) 0.65 % nasal squeeze bottle 2 Spray, 2 Spray, Both Nostrils, Q4HWA, Omar Lemon MD, 2 [place] at 10/23/20 1152    hydrOXYzine pamoate (VISTARIL) capsule 25 mg, 25 mg, Oral, TID PRN, Colletta Barre, MD, 25 mg at 10/21/20 2231    melatonin tablet 10 mg, 10 mg, Oral, QHS PRN, Colletta Barre, MD, 10 mg at 10/21/20 2231    codeine sulfate tablet 30 mg, 30 mg, Oral, Q8H RT, Omar Lemon MD, 30 mg at 10/23/20 1151    dextromethorphan (DELSYM) 30 mg/5 mL syrup 30 mg, 30 mg, Oral, Q12H, Omar Lemon MD, 30 mg at 10/23/20 1150    enoxaparin (LOVENOX) injection 30 mg, 30 mg, SubCUTAneous, Q12H, Ramesh Cordova MD, 30 mg at 10/23/20 2571    acetaminophen (TYLENOL) tablet 650 mg, 650 mg, Oral, Q6H PRN, 650 mg at 10/18/20 2205 **OR** acetaminophen (TYLENOL) suppository 650 mg, 650 mg, Rectal, Q6H PRN, Ramesh Cordova MD    sodium chloride (NS) flush 5-40 mL, 5-40 mL, IntraVENous, Q8H, Ramesh Cordova MD, 10 mL at 10/23/20 0600    sodium chloride (NS) flush 5-40 mL, 5-40 mL, IntraVENous, PRN, Ramesh Cordova MD, 10 mL at 10/22/20 0938    ondansetron (ZOFRAN ODT) tablet 4 mg, 4 mg, Oral, Q6H PRN **OR** ondansetron (ZOFRAN) injection 4 mg, 4 mg, IntraVENous, Q6H PRN, Ramesh Cordova MD    dexamethasone (DECADRON) 4 mg/mL injection 4 mg, 4 mg, IntraVENous, Q6H, Omar Lemon MD, 4 mg at 10/23/20 7232    benzonatate (TESSALON) capsule 200 mg, 200 mg, Oral, Q8H, Omar Lemon MD, 200 mg at 10/23/20 8331      Objective:     Vital Signs: Telemetry:    normal sinus rhythm Intake/Output:   Visit Vitals  /84 (BP 1 Location: Left arm, BP Patient Position: At rest)   Pulse 79   Temp 97.9 °F (36.6 °C)   Resp 20   Ht 5' 9\" (1.753 m)   Wt 81.8 kg (180 lb 5.4 oz)   SpO2 92%   BMI 26.63 kg/m²       Temp (24hrs), Av.3 °F (36.8 °C), Min:97.9 °F (36.6 °C), Max:98.5 °F (36.9 °C)        O2 Device: Hi flow nasal cannula O2 Flow Rate (L/min): 40 l/min         Body mass index is 26.63 kg/m². Wt Readings from Last 4 Encounters:   10/21/20 81.8 kg (180 lb 5.4 oz)          Intake/Output Summary (Last 24 hours) at 10/23/2020 1229  Last data filed at 10/22/2020 1800  Gross per 24 hour   Intake 640 ml   Output    Net 640 ml       Last shift:      No intake/output data recorded. Last 3 shifts: 10/21 1901 - 10/23 0700  In: 1260 [P.O.:1190]  Out: 350 [Urine:350]   Ventilator Settings:      Mode Rate TV Press PEEP FiO2 PIP Min.  Vent               100 %            Physical Exam:    General:  male; less distress chronic cough nonproductive  HEENT: NCAT, oral mucosa clear  Eyes: anicteric; conjunctiva clear extraocular movements intact   neck: no node neck veins visible  Chest: no deformity,   Cardiac: Regular rate and rhythm no murmurs trace ankle edema  Lungs: Clear anteriorly laterally still has rales posteriorly improved  Abd: Soft nontender normal bowel sounds no organomegaly  Ext: Mild edema; no joint swelling;  No clubbing  :clear urine  Neuro: Awake alert speech is clear moves all 4 extremities well  Psych- no agitation, oriented to person;   Skin: warm,, no cyanosis; very mildly diaphoretic  Pulses: Pulses intact  Capillary: Normal capillary refill      Labs:    Recent Labs     10/23/20  0920 10/22/20  0750 10/21/20  0515   WBC 16.9* 15.3* 15.7*   HGB 16.1 15.4 16.0    338 371   APTT 25.6 26.1 24.0     Recent Labs     10/22/20  0750 10/21/20  0515    139  138   K 3.9 4.0  3.9    105  105   CO2 26 27  27   * 170*  169*   BUN 26* 32*  32*   CREA 0.81 0.83  0.83   CA 8.3* 8.5  8.4*   PHOS  --  4.7   ALB 2.7* 3.0*  3.0*   ALT 55 68     10/23 on 100% nasal high flow oxygen saturation 98%    10/14 COVID-19 test negative  10/17 COVID-19 test indeterminate  Ferritin 1510, previous 2180  , previous 615, 754  Procalcitonin 0.14  CRP 0.87, previous 1.39, 3.0, 12.4  Lab Results   Component Value Date/Time    Culture result: No growth 2 days 10/20/2020 11:30 AM    Culture result: Heavy Staphylococcus aureus 10/20/2020 08:15 AM    Culture result: Light Klebsiella pneumoniae 10/20/2020 08:15 AM    Culture result: Heavy  Normal respiratory smita   10/20/2020 08:15 AM      Lab Results   Component Value Date/Time     10/15/2020 11:47 PM       Imaging:  I have personally reviewed the patients radiographs and have reviewed the reports:    CXR Results  (Last 48 hours)  10/20 chest x-ray with diffuse patchy infiltrates may be slightly less dense  10/17 chest x-ray unchanged diffuse bilateral infiltrate               10/15/20 1927  XR CHEST SNGL V Final result    Narrative:  1       Mild atelectasis in the bases with upper lungs clear. No effusion or   pneumothorax. Normal heart and no congestion  Disagree to me there is bilateral infiltrate           Results from Hospital Encounter encounter on 10/15/20   XR CHEST PORT    Narrative 1 view comparison yesterday    Little change in patchy interstitial infiltrates throughout much of each lung. No effusion or pneumothorax. Normal heart and stable mediastinal   XR CHEST PORT    Narrative Portable upright chest 8:16 AM compared to October 21, 2020. INDICATION: Covid 19. Heart size is stable. Bilateral predominantly peripheral airspace disease shows  some improvement but has not resolved. No gross effusion or pneumothorax. No  acute bony findings. Impression IMPRESSION: Improving bilateral airspace disease. XR CHEST PORT    Narrative Chest single view. Comparison single view chest October 20, 2020    Unchanged appearance for the lungs; patchy peripheral reticular markings  throughout the lungs. Cardiac and mediastinal structures unchanged. No  pneumothorax or sizable pleural effusion. Results from [place] encounter on 10/15/20   CTA CHEST W OR W WO CONT    Narrative CT dose reduction was achieved through use of a standardized protocol tailored  for this examination and automatic exposure control for dose modulation. Contrast study maximum intensity projections shows pronounced groundglass  opacification, of variable density, mainly in the dependent portions of each  lung, apex to base. Air bronchograms are preserved in the densest portions. Mild  geographic groundglass opacification of lower density in the nondependent  portions. Central airways are open    Pulmonary arteries are well-opacified and show no filling defect or distortion.   Aorta shows normal dimensions, without dissection. Tiny middle mediastinal lymph  nodes. Cardiac finding. No pleural pericardial effusion. No significant upper  abdominal or chest wall finding      Impression IMPRESSION: Widespread patchy bilateral pneumonitis       Clinical picture consistent with COVID-19 infection with pneumonitis with diffuse patchy pulmonary infiltrate elevation in ferritin LDH and CRP nonproductive cough and GI symptoms and yet initial COVID-19 test negative repeat testing indeterminate continue azithromycin Actemra  Decadron and Remdesivir and will repeat Lasix today continue to follow renal function    Time of care 30 minutes         Thank you for allowing us to participate in the care of this patient.   We will follow along with you    Francoise Layton MD

## 2020-10-23 NOTE — PROGRESS NOTES
Problem: Falls - Risk of  Goal: *Absence of Falls  Description: Document Sissynikolay Peterson Fall Risk and appropriate interventions in the flowsheet. Outcome: Progressing Towards Goal  Note: Fall Risk Interventions:  Mobility Interventions: Patient to call before getting OOB              Elimination Interventions: Call light in reach              Problem: Pressure Injury - Risk of  Goal: *Prevention of pressure injury  Description: Document Johnnie Scale and appropriate interventions in the flowsheet.   Outcome: Progressing Towards Goal  Note: Pressure Injury Interventions:  Sensory Interventions: Minimize linen layers    Moisture Interventions: Absorbent underpads    Activity Interventions: Pressure redistribution bed/mattress(bed type)    Mobility Interventions: HOB 30 degrees or less    Nutrition Interventions: Document food/fluid/supplement intake    Friction and Shear Interventions: HOB 30 degrees or less

## 2020-10-23 NOTE — PROGRESS NOTES
Progress Note  Date:10/23/2020       Room:[facility]  Patient Name:Ekaterina Blanchard     YOB: 1960     Age:60 y.o. Subjective    Subjective   Patient followed for presumptive Covid-19 pneumoniitis. Initial test was negative but second test is indeterminate. He was transferred out of the ICU but remains on high nasal O2. CXR has been unchanged. He is afebrile with leukocytosis attributed to steroids. Blood cultures negative so and sputum culture today reporting Gram negative rods. He is currently on IV Zosyn. He refused convalescent plasma. Patient still with significant cough. Objective         Vitals Last 24 Hours:  TEMPERATURE:  Temp  Av.2 °F (36.8 °C)  Min: 97.9 °F (36.6 °C)  Max: 98.5 °F (36.9 °C)  RESPIRATIONS RANGE: Resp  Av.6  Min: 18  Max: 20  PULSE OXIMETRY RANGE: SpO2  Av.1 %  Min: 92 %  Max: 95 %  PULSE RANGE: Pulse  Av.4  Min: 59  Max: 95  BLOOD PRESSURE RANGE: Systolic (90HWA), LGI:780 , Min:119 , WUF:446   ; Diastolic (25EOM), BFA:59, Min:77, Max:96    I/O (24Hr): Intake/Output Summary (Last 24 hours) at 10/23/2020 1019  Last data filed at 10/22/2020 1800  Gross per 24 hour   Intake 640 ml   Output 350 ml   Net 290 ml     Objective:  Vital signs: (most recent): Blood pressure 124/84, pulse (!) 59, temperature 97.9 °F (36.6 °C), resp. rate 20, height 5' 9\" (1.753 m), weight 180 lb 5.4 oz (81.8 kg), SpO2 92 %.       General: Moderately ill-appearing male, NAD  HEENT: eyes open, high flow nasal O2  Neck: supple  Lungs: clear bilaterally  Heart: RRR  : Granados catheter with moderate urine sediment    Labs/Imaging/Diagnostics    Labs:  CBC:  Recent Labs     10/22/20  0750 10/21/20  0515   WBC 15.3* 15.7*   RBC 4.97 5.07   HGB 15.4 16.0   HCT 44.0 44.5   MCV 88.5 87.8   RDW 12.1 13.0    371     CHEMISTRIES:  Recent Labs     10/22/20  0750 10/21/20  0515    139  138   K 3.9 4.0  3.9    105  105   CO2 26 27  27   BUN 26* 32*  32*   CA 8.3* 8.5  8.4*   PHOS  --  4.7   PT/INR:  No results for input(s): INR, INREXT, INREXT in the last 72 hours. No lab exists for component: PROTIME  APTT:  Recent Labs     10/22/20  0750 10/21/20  0515   APTT 26.1 24.0     LIVER PROFILE:  Recent Labs     10/22/20  0750 10/21/20  0515   AST 20 24   ALT 55 68     Lab Results   Component Value Date/Time    ALT (SGPT) 55 10/22/2020 07:50 AM    AST (SGOT) 20 10/22/2020 07:50 AM    Alk. phosphatase 106 10/22/2020 07:50 AM    Bilirubin, total 0.9 10/22/2020 07:50 AM     WBC 15,300   (452 (565  CRP <0.29 (0.43 (12.40    Sputum culture (10/20) Gram negative rods  Blood cultures (10/20) No growth at 2 days    Imaging Last 24 Hours:  Xr Chest Port    Result Date: 10/23/2020  1 view comparison yesterday Little change in patchy interstitial infiltrates throughout much of each lung. No effusion or pneumothorax. Normal heart and stable mediastinal    Assessment//Plan   Active Problems:    Acute respiratory failure due to COVID-19 ([facility] (10/15/2020)      Pneumonia (10/15/2020)      Acute respiratory failure ([facility] (10/15/2020)      Assessment & Plan  1. Probable Covid-19 pneumonitis, with indeterminate result, status post Remdesivir, Decadron, Azithromycin, Actemra, patient refused convalescent plasma. 2. Acute respiratory failure, on high flow nasal O2  3. Elevated CRP, LDH and ferritin, secondary to #1, decreasing. 4. Possible HCAP with purulent sputum, culture pending, Day #4 IV Zosyn.     Comment:  Sputum culture Gram stain showed GPC in clusters concerning for MRSA, however, today culture reporting Gram negative rods. CRP now normal, LDH increased.     1. Discontinue IV Vancomycin, done  2. Follow-up blood cultures and sputum culture  4. Continue IV Zosyn  5.  In am, repeat LDH, ferritin       Electronically signed by Stas Stafford MD on 10/23/2020 at 1:43 PM

## 2020-10-24 LAB
ANION GAP SERPL CALC-SCNC: 5 MMOL/L (ref 5–15)
APTT PPP: 23.2 SEC (ref 23–35.7)
BUN SERPL-MCNC: 25 MG/DL (ref 6–20)
BUN/CREAT SERPL: 33 (ref 12–20)
CA-I BLD-MCNC: 8.1 MG/DL (ref 8.5–10.1)
CHLORIDE SERPL-SCNC: 104 MMOL/L (ref 97–108)
CO2 SERPL-SCNC: 28 MMOL/L (ref 21–32)
CREAT SERPL-MCNC: 0.76 MG/DL (ref 0.7–1.3)
ERYTHROCYTE [DISTWIDTH] IN BLOOD BY AUTOMATED COUNT: 13.1 % (ref 11.5–14.5)
FERRITIN SERPL-MCNC: 1084 NG/ML (ref 26–388)
GLUCOSE SERPL-MCNC: 173 MG/DL (ref 65–100)
HCT VFR BLD AUTO: 42.8 % (ref 36.6–50.3)
HGB BLD-MCNC: 14.8 G/DL (ref 12.1–17)
LDH SERPL L TO P-CCNC: 405 U/L (ref 85–241)
MCH RBC QN AUTO: 30.6 PG (ref 26–34)
MCHC RBC AUTO-ENTMCNC: 34.6 G/DL (ref 30–36.5)
MCV RBC AUTO: 88.6 FL (ref 80–99)
PLATELET # BLD AUTO: 307 K/UL (ref 150–400)
PMV BLD AUTO: 10.4 FL (ref 8.9–12.9)
POTASSIUM SERPL-SCNC: 4.1 MMOL/L (ref 3.5–5.1)
RBC # BLD AUTO: 4.83 M/UL (ref 4.1–5.7)
SODIUM SERPL-SCNC: 137 MMOL/L (ref 136–145)
THERAPEUTIC RANGE,PTTT: NORMAL SEC (ref 68–109)
WBC # BLD AUTO: 16.2 K/UL (ref 4.1–11.1)

## 2020-10-24 PROCEDURE — 74011250636 HC RX REV CODE- 250/636: Performed by: HOSPITALIST

## 2020-10-24 PROCEDURE — 74011250637 HC RX REV CODE- 250/637: Performed by: INTERNAL MEDICINE

## 2020-10-24 PROCEDURE — 77010033711 HC HIGH FLOW OXYGEN

## 2020-10-24 PROCEDURE — 65270000029 HC RM PRIVATE

## 2020-10-24 PROCEDURE — 80048 BASIC METABOLIC PNL TOTAL CA: CPT

## 2020-10-24 PROCEDURE — 85730 THROMBOPLASTIN TIME PARTIAL: CPT

## 2020-10-24 PROCEDURE — 94640 AIRWAY INHALATION TREATMENT: CPT

## 2020-10-24 PROCEDURE — 74011000258 HC RX REV CODE- 258: Performed by: INTERNAL MEDICINE

## 2020-10-24 PROCEDURE — 74011000250 HC RX REV CODE- 250: Performed by: INTERNAL MEDICINE

## 2020-10-24 PROCEDURE — 77010033678 HC OXYGEN DAILY

## 2020-10-24 PROCEDURE — 85027 COMPLETE CBC AUTOMATED: CPT

## 2020-10-24 PROCEDURE — 94760 N-INVAS EAR/PLS OXIMETRY 1: CPT

## 2020-10-24 PROCEDURE — 99232 SBSQ HOSP IP/OBS MODERATE 35: CPT | Performed by: INTERNAL MEDICINE

## 2020-10-24 PROCEDURE — 36415 COLL VENOUS BLD VENIPUNCTURE: CPT

## 2020-10-24 PROCEDURE — 74011250636 HC RX REV CODE- 250/636: Performed by: INTERNAL MEDICINE

## 2020-10-24 PROCEDURE — 83615 LACTATE (LD) (LDH) ENZYME: CPT

## 2020-10-24 PROCEDURE — 82728 ASSAY OF FERRITIN: CPT

## 2020-10-24 RX ADMIN — IPRATROPIUM BROMIDE AND ALBUTEROL SULFATE 3 ML: .5; 3 SOLUTION RESPIRATORY (INHALATION) at 08:03

## 2020-10-24 RX ADMIN — PIPERACILLIN SODIUM AND TAZOBACTAM SODIUM 3.38 G: 3; .375 INJECTION, POWDER, LYOPHILIZED, FOR SOLUTION INTRAVENOUS at 02:03

## 2020-10-24 RX ADMIN — PIPERACILLIN SODIUM AND TAZOBACTAM SODIUM 3.38 G: 3; .375 INJECTION, POWDER, LYOPHILIZED, FOR SOLUTION INTRAVENOUS at 09:31

## 2020-10-24 RX ADMIN — CODEINE SULFATE 30 MG: 30 TABLET ORAL at 09:31

## 2020-10-24 RX ADMIN — DEXTROMETHORPHAN 30 MG: 30 SUSPENSION, EXTENDED RELEASE ORAL at 09:31

## 2020-10-24 RX ADMIN — BENZONATATE 200 MG: 100 CAPSULE ORAL at 22:41

## 2020-10-24 RX ADMIN — BENZONATATE 200 MG: 100 CAPSULE ORAL at 14:05

## 2020-10-24 RX ADMIN — PIPERACILLIN SODIUM AND TAZOBACTAM SODIUM 3.38 G: 3; .375 INJECTION, POWDER, LYOPHILIZED, FOR SOLUTION INTRAVENOUS at 17:16

## 2020-10-24 RX ADMIN — IPRATROPIUM BROMIDE AND ALBUTEROL SULFATE 3 ML: .5; 3 SOLUTION RESPIRATORY (INHALATION) at 01:37

## 2020-10-24 RX ADMIN — DEXAMETHASONE SODIUM PHOSPHATE 4 MG: 4 INJECTION, SOLUTION INTRA-ARTICULAR; INTRALESIONAL; INTRAMUSCULAR; INTRAVENOUS; SOFT TISSUE at 17:16

## 2020-10-24 RX ADMIN — DEXAMETHASONE SODIUM PHOSPHATE 4 MG: 4 INJECTION, SOLUTION INTRA-ARTICULAR; INTRALESIONAL; INTRAMUSCULAR; INTRAVENOUS; SOFT TISSUE at 06:09

## 2020-10-24 RX ADMIN — DEXAMETHASONE SODIUM PHOSPHATE 4 MG: 4 INJECTION, SOLUTION INTRA-ARTICULAR; INTRALESIONAL; INTRAMUSCULAR; INTRAVENOUS; SOFT TISSUE at 02:03

## 2020-10-24 RX ADMIN — SALINE NASAL SPRAY 2 SPRAY: 1.5 SOLUTION NASAL at 22:44

## 2020-10-24 RX ADMIN — SALINE NASAL SPRAY 2 SPRAY: 1.5 SOLUTION NASAL at 09:32

## 2020-10-24 RX ADMIN — Medication 10 ML: at 22:44

## 2020-10-24 RX ADMIN — IPRATROPIUM BROMIDE AND ALBUTEROL SULFATE 3 ML: .5; 3 SOLUTION RESPIRATORY (INHALATION) at 13:49

## 2020-10-24 RX ADMIN — SALINE NASAL SPRAY 2 SPRAY: 1.5 SOLUTION NASAL at 14:08

## 2020-10-24 RX ADMIN — DEXAMETHASONE SODIUM PHOSPHATE 4 MG: 4 INJECTION, SOLUTION INTRA-ARTICULAR; INTRALESIONAL; INTRAMUSCULAR; INTRAVENOUS; SOFT TISSUE at 14:05

## 2020-10-24 RX ADMIN — BENZONATATE 200 MG: 100 CAPSULE ORAL at 06:09

## 2020-10-24 RX ADMIN — ENOXAPARIN SODIUM 30 MG: 30 INJECTION SUBCUTANEOUS at 17:15

## 2020-10-24 RX ADMIN — Medication 10 ML: at 14:08

## 2020-10-24 RX ADMIN — IPRATROPIUM BROMIDE AND ALBUTEROL SULFATE 3 ML: .5; 3 SOLUTION RESPIRATORY (INHALATION) at 19:52

## 2020-10-24 RX ADMIN — SALINE NASAL SPRAY 2 SPRAY: 1.5 SOLUTION NASAL at 17:17

## 2020-10-24 RX ADMIN — CODEINE SULFATE 30 MG: 30 TABLET ORAL at 17:16

## 2020-10-24 RX ADMIN — DEXTROMETHORPHAN 30 MG: 30 SUSPENSION, EXTENDED RELEASE ORAL at 22:41

## 2020-10-24 RX ADMIN — CODEINE SULFATE 30 MG: 30 TABLET ORAL at 02:03

## 2020-10-24 NOTE — PROGRESS NOTES
Hospitalist Progress Note               Daily Progress Note: 10/24/2020      Subjective: The patient is seen for follow  up.   56 y.o. male with no significant medical problems presents to the emergency room complaining of shortness of breath. Patient had a urinary retention which was resolved with Granados catheter placement. Patient continues to be on high flow oxygen. Seen and evaluated at bedside, no acute events overnight, patient's anxiety improved, discussed with RN and patient at bedside    Medications reviewed  Current Facility-Administered Medications   Medication Dose Route Frequency    albuterol-ipratropium (DUO-NEB) 2.5 MG-0.5 MG/3 ML  3 mL Nebulization Q6H RT    piperacillin-tazobactam (ZOSYN) 3.375 g in 0.9% sodium chloride (MBP/ADV) 100 mL MBP  3.375 g IntraVENous Q8H    0.9% sodium chloride infusion 250 mL  250 mL IntraVENous PRN    sodium chloride (OCEAN) 0.65 % nasal squeeze bottle 2 Spray  2 [place] Both Nostrils Q4HWA    hydrOXYzine pamoate (VISTARIL) capsule 25 mg  25 mg Oral TID PRN    melatonin tablet 10 mg  10 mg Oral QHS PRN    codeine sulfate tablet 30 mg  30 mg Oral Q8H RT    dextromethorphan (DELSYM) 30 mg/5 mL syrup 30 mg  30 mg Oral Q12H    enoxaparin (LOVENOX) injection 30 mg  30 mg SubCUTAneous Q12H    acetaminophen (TYLENOL) tablet 650 mg  650 mg Oral Q6H PRN    Or    acetaminophen (TYLENOL) suppository 650 mg  650 mg Rectal Q6H PRN    sodium chloride (NS) flush 5-40 mL  5-40 mL IntraVENous Q8H    sodium chloride (NS) flush 5-40 mL  5-40 mL IntraVENous PRN    ondansetron (ZOFRAN ODT) tablet 4 mg  4 mg Oral Q6H PRN    Or    ondansetron (ZOFRAN) injection 4 mg  4 mg IntraVENous Q6H PRN    dexamethasone (DECADRON) 4 mg/mL injection 4 mg  4 mg IntraVENous Q6H    benzonatate (TESSALON) capsule 200 mg  200 mg Oral Q8H       Review of Systems:   A comprehensive review of systems was negative except for that written in the HPI.     Objective:   Physical Exam:     Visit Vitals  BP (!) 143/92   Pulse 94   Temp 98.1 °F (36.7 °C)   Resp 20   Ht 5' 9\" (1.753 m)   Wt 81.8 kg (180 lb 5.4 oz)   SpO2 96%   BMI 26.63 kg/m²    O2 Flow Rate (L/min): 50 l/min O2 Device: Hi flow nasal cannula    Temp (24hrs), Av.2 °F (36.8 °C), Min:98.1 °F (36.7 °C), Max:98.2 °F (36.8 °C)    No intake/output data recorded. 10/22 1901 - 10/24 0700  In: -   Out: 2300 [Urine:2300]    PHYSICAL EXAM:  General: Alert and awake. Skin: Extremities and face reveal no rashes. HEENT: Sclerae anicteric. Extra-occular muscles are intact. No oral ulcers. No ENT discharge. The neck is supple. Cardiovascular: Regular rate and rhythm. No murmurs, gallops, or rubs. PMI nondisplaced. Carotids without bruits. Respiratory: Comfortable breathing with no accessory muscle use. Clear breath sounds with no wheezes, rales, or rhonchi. GI: Abdomen nondistended, soft, and nontender. Normal active bowel sounds. No enlargement of the liver or spleen. No masses palpable. Rectal: Deferred   Musculoskeletal: No pitting edema of the lower legs. Extremities have good range of motion. No costovertebral tenderness. Neurological: Gross memory appears intact. Patient is alert and oriented. Power 5/5, Cranial nerves II-XII intact  Psychiatric: Mood appears appropriate with judgement intact.      Data Review:       Recent Days:  Recent Labs     10/23/20  0920 10/22/20  0750   WBC 16.9* 15.3*   HGB 16.1 15.4   HCT 46.4 44.0    338     Recent Labs     10/22/20  0750      K 3.9      CO2 26   *   BUN 26*   CREA 0.81   CA 8.3*   ALB 2.7*   TBILI 0.9   ALT 55     Recent Labs     10/22/20  0400   PH 7.449   PCO2 37   PO2 67*   HCO3 26   FIO2 100.0       24 Hour Results:  Recent Results (from the past 24 hour(s))   PTT    Collection Time: 10/23/20  9:20 AM   Result Value Ref Range    aPTT 25.6 23.0 - 35.7 sec    aPTT, therapeutic range   68 - 109 sec   C REACTIVE PROTEIN, QT    Collection Time: 10/23/20  9:20 AM Result Value Ref Range    C-Reactive protein <0.29 0.00 - 0.60 mg/dL   CBC WITH AUTOMATED DIFF    Collection Time: 10/23/20  9:20 AM   Result Value Ref Range    WBC 16.9 (H) 4.1 - 11.1 K/uL    RBC 5.25 4. 10 - 5.70 M/uL    HGB 16.1 12.1 - 17.0 g/dL    HCT 46.4 36.6 - 50.3 %    MCV 88.4 80.0 - 99.0 FL    MCH 30.7 26.0 - 34.0 PG    MCHC 34.7 30.0 - 36.5 g/dL    RDW 13.0 11.5 - 14.5 %    PLATELET 414 258 - 862 K/uL    MPV 10.2 8.9 - 12.9 FL    NRBC 0.0 0  WBC    ABSOLUTE NRBC 0.00 0.00 - 0.01 K/uL    NEUTROPHILS 85 (H) 32 - 75 %    LYMPHOCYTES 5 (L) 12 - 49 %    MONOCYTES 8 5 - 13 %    EOSINOPHILS 0 0 - 7 %    BASOPHILS 0 0 - 1 %    IMMATURE GRANULOCYTES 2 (H) 0.0 - 0.5 %    ABS. NEUTROPHILS 14.7 (H) 1.8 - 8.0 K/UL    ABS. LYMPHOCYTES 0.9 0.8 - 3.5 K/UL    ABS. MONOCYTES 1.3 (H) 0.0 - 1.0 K/UL    ABS. EOSINOPHILS 0.0 0.0 - 0.4 K/UL    ABS. BASOPHILS 0.0 0.0 - 0.1 K/UL    ABS. IMM. GRANS. 0.3 (H) 0.00 - 0.04 K/UL    DF AUTOMATED     LD    Collection Time: 10/23/20  9:20 AM   Result Value Ref Range     (H) 85 - 241 U/L   FERRITIN    Collection Time: 10/23/20  9:20 AM   Result Value Ref Range    Ferritin 1,187 (H) 26 - 388 ng/mL       XR CHEST PORT   Final Result      XR CHEST PORT   Final Result   IMPRESSION: Improving bilateral airspace disease.       XR CHEST PORT   Final Result      XR CHEST PORT   Final Result      XR CHEST PORT   Final Result      XR CHEST PORT   Final Result      CTA CHEST W OR W WO CONT   Final Result   IMPRESSION: Widespread patchy bilateral pneumonitis      XR CHEST SNGL V   Final Result      XR CHEST PORT    (Results Pending)          Assessment/     Problem List:  Hospital Problems  Date Reviewed: 10/15/2020          Codes Class Noted POA    Acute respiratory failure due to COVID-19 [facility]) ICD-10-CM: U07.1, J96.00  ICD-9-CM: 518.81, 079.89  10/15/2020 Yes        Pneumonia ICD-10-CM: J18.9  ICD-9-CM: 395  10/15/2020 Yes        Acute respiratory failure ([facility] [address] ICD-10-CM: J96.00  ICD-9-CM: 518.81  10/15/2020 Unknown                     Plan:    Acute hypoxic respiratory failure/pneumoniapatient presents with acute hypoxic respiratory failure 2/2 to pneumonia high flow nasal cannula at 40 L, bacterial versus viral in etiology, patient does not meet sepsis criteria at this time, of note patient's 3rd covid test was negative  Follow-up sputum culture for sensitivities,  Currently showing gram positive cocci  Follow-up repeat blood cultures  Continue Zosyn and azithromycin for antibiotic coverage  Continue to Vancomycin  Continue Decadron  Discontinue Remdemsivir  Continue lovenox  Continue to monitor patient's respiratory status  Pulmonology recommendations appreciated, will continue to follow  Infectious disease recommendations appreciated, will continue to follow    Dehydrationcurrently resolved    PpxLovenox  FENcurrently on clear liquids, bedside swallow eval, advance diet, replete potassium and magnesium  Full code, patient surrogate decision-maker is his wife, updated patients daughter  Dispositioncontinued inpatient treatment, pending clinical improvement    Care Plan discussed with: Patient/Family and Nurse        Ludwig Shaffer MD

## 2020-10-24 NOTE — PROGRESS NOTES
Hospitalist Progress Note               Daily Progress Note: 10/24/2020      Subjective: The patient is seen for follow  up.   56 y.o. male with no significant medical problems presents to the emergency room complaining of shortness of breath. Patient had a urinary retention which was resolved with Granados catheter placement. Patient continues to be on high flow oxygen.     Seen and evaluated at bedside, no acute events overnight, patient still complaining of shortness of breath, however currently downtitrating on HFNC, discussed with RN and patient at bedside    Medications reviewed  Current Facility-Administered Medications   Medication Dose Route Frequency    albuterol-ipratropium (DUO-NEB) 2.5 MG-0.5 MG/3 ML  3 mL Nebulization Q6H RT    piperacillin-tazobactam (ZOSYN) 3.375 g in 0.9% sodium chloride (MBP/ADV) 100 mL MBP  3.375 g IntraVENous Q8H    0.9% sodium chloride infusion 250 mL  250 mL IntraVENous PRN    sodium chloride (OCEAN) 0.65 % nasal squeeze bottle 2 Spray  2 [place] Both Nostrils Q4HWA    hydrOXYzine pamoate (VISTARIL) capsule 25 mg  25 mg Oral TID PRN    melatonin tablet 10 mg  10 mg Oral QHS PRN    codeine sulfate tablet 30 mg  30 mg Oral Q8H RT    dextromethorphan (DELSYM) 30 mg/5 mL syrup 30 mg  30 mg Oral Q12H    enoxaparin (LOVENOX) injection 30 mg  30 mg SubCUTAneous Q12H    acetaminophen (TYLENOL) tablet 650 mg  650 mg Oral Q6H PRN    Or    acetaminophen (TYLENOL) suppository 650 mg  650 mg Rectal Q6H PRN    sodium chloride (NS) flush 5-40 mL  5-40 mL IntraVENous Q8H    sodium chloride (NS) flush 5-40 mL  5-40 mL IntraVENous PRN    ondansetron (ZOFRAN ODT) tablet 4 mg  4 mg Oral Q6H PRN    Or    ondansetron (ZOFRAN) injection 4 mg  4 mg IntraVENous Q6H PRN    dexamethasone (DECADRON) 4 mg/mL injection 4 mg  4 mg IntraVENous Q6H    benzonatate (TESSALON) capsule 200 mg  200 mg Oral Q8H       Review of Systems:   A comprehensive review of systems was negative except for that written in the HPI. Objective:   Physical Exam:     Visit Vitals  BP (!) 143/92   Pulse 94   Temp 98.1 °F (36.7 °C)   Resp 20   Ht 5' 9\" (1.753 m)   Wt 81.8 kg (180 lb 5.4 oz)   SpO2 96%   BMI 26.63 kg/m²    O2 Flow Rate (L/min): 50 l/min O2 Device: Hi flow nasal cannula    Temp (24hrs), Av.2 °F (36.8 °C), Min:98.1 °F (36.7 °C), Max:98.2 °F (36.8 °C)    No intake/output data recorded. 10/22 1901 - 10/24 0700  In: -   Out: 2300 [Urine:2300]    PHYSICAL EXAM:  General: Alert and awake. Skin: Extremities and face reveal no rashes. HEENT: Sclerae anicteric. Extra-occular muscles are intact. No oral ulcers. No ENT discharge. The neck is supple. Cardiovascular: Regular rate and rhythm. No murmurs, gallops, or rubs. PMI nondisplaced. Carotids without bruits. Respiratory: Comfortable breathing with no accessory muscle use. Clear breath sounds with no wheezes, rales, or rhonchi. GI: Abdomen nondistended, soft, and nontender. Normal active bowel sounds. No enlargement of the liver or spleen. No masses palpable. Rectal: Deferred   Musculoskeletal: No pitting edema of the lower legs. Extremities have good range of motion. No costovertebral tenderness. Neurological: Gross memory appears intact. Patient is alert and oriented. Power 5/5, Cranial nerves II-XII intact  Psychiatric: Mood appears appropriate with judgement intact.      Data Review:       Recent Days:  Recent Labs     10/23/20  0920 10/22/20  0750   WBC 16.9* 15.3*   HGB 16.1 15.4   HCT 46.4 44.0    338     Recent Labs     10/22/20  0750      K 3.9      CO2 26   *   BUN 26*   CREA 0.81   CA 8.3*   ALB 2.7*   TBILI 0.9   ALT 55     Recent Labs     10/22/20  0400   PH 7.449   PCO2 37   PO2 67*   HCO3 26   FIO2 100.0       24 Hour Results:  Recent Results (from the past 24 hour(s))   PTT    Collection Time: 10/23/20  9:20 AM   Result Value Ref Range    aPTT 25.6 23.0 - 35.7 sec    aPTT, therapeutic range   68 - 109 sec   C REACTIVE PROTEIN, QT    Collection Time: 10/23/20  9:20 AM   Result Value Ref Range    C-Reactive protein <0.29 0.00 - 0.60 mg/dL   CBC WITH AUTOMATED DIFF    Collection Time: 10/23/20  9:20 AM   Result Value Ref Range    WBC 16.9 (H) 4.1 - 11.1 K/uL    RBC 5.25 4. 10 - 5.70 M/uL    HGB 16.1 12.1 - 17.0 g/dL    HCT 46.4 36.6 - 50.3 %    MCV 88.4 80.0 - 99.0 FL    MCH 30.7 26.0 - 34.0 PG    MCHC 34.7 30.0 - 36.5 g/dL    RDW 13.0 11.5 - 14.5 %    PLATELET 507 507 - 133 K/uL    MPV 10.2 8.9 - 12.9 FL    NRBC 0.0 0  WBC    ABSOLUTE NRBC 0.00 0.00 - 0.01 K/uL    NEUTROPHILS 85 (H) 32 - 75 %    LYMPHOCYTES 5 (L) 12 - 49 %    MONOCYTES 8 5 - 13 %    EOSINOPHILS 0 0 - 7 %    BASOPHILS 0 0 - 1 %    IMMATURE GRANULOCYTES 2 (H) 0.0 - 0.5 %    ABS. NEUTROPHILS 14.7 (H) 1.8 - 8.0 K/UL    ABS. LYMPHOCYTES 0.9 0.8 - 3.5 K/UL    ABS. MONOCYTES 1.3 (H) 0.0 - 1.0 K/UL    ABS. EOSINOPHILS 0.0 0.0 - 0.4 K/UL    ABS. BASOPHILS 0.0 0.0 - 0.1 K/UL    ABS. IMM. GRANS. 0.3 (H) 0.00 - 0.04 K/UL    DF AUTOMATED     LD    Collection Time: 10/23/20  9:20 AM   Result Value Ref Range     (H) 85 - 241 U/L   FERRITIN    Collection Time: 10/23/20  9:20 AM   Result Value Ref Range    Ferritin 1,187 (H) 26 - 388 ng/mL       XR CHEST PORT   Final Result      XR CHEST PORT   Final Result   IMPRESSION: Improving bilateral airspace disease.       XR CHEST PORT   Final Result      XR CHEST PORT   Final Result      XR CHEST PORT   Final Result      XR CHEST PORT   Final Result      CTA CHEST W OR W WO CONT   Final Result   IMPRESSION: Widespread patchy bilateral pneumonitis      XR CHEST SNGL V   Final Result      XR CHEST PORT    (Results Pending)          Assessment/     Problem List:  Hospital Problems  Date Reviewed: 10/15/2020          Codes Class Noted POA    Acute respiratory failure due to COVID-19 [facility]) ICD-10-CM: U07.1, J96.00  ICD-9-CM: 518.81, 079.89  10/15/2020 Yes        Pneumonia ICD-10-CM: J18.9  ICD-9-CM: 185  10/15/2020 Yes Acute respiratory failure [facility]) ICD-10-CM: J96.00  ICD-9-CM: 518.81  10/15/2020 Unknown                     Plan:    Acute hypoxic respiratory failure/pneumoniapatient presents with acute hypoxic respiratory failure 2/2 to pneumonia high flow nasal cannula at 40 L, bacterial versus viral in etiology, patient does not meet sepsis criteria at this time, of note patient's 3rd covid test was negative  Follow-up sputum culture for sensitivities,  Currently showing gram positive cocci  Follow-up repeat blood cultures  Continue Zosyn and azithromycin for antibiotic coverage  Pt completed vancomycin course  Continue Decadron  Completed actemra/remdemsivir  Continue lovenox  Continue to monitor patient's respiratory status  Pulmonology recommendations appreciated, will continue to follow  Infectious disease recommendations appreciated, will continue to follow    Dehydrationcurrently resolved    PpxLovenox  FENcurrently on clear liquids, bedside swallow eval, advance diet, replete potassium and magnesium  Full code, patient surrogate decision-maker is his wife, updated patients daughter as well as Primary Care Physician  Dispositioncontinued inpatient treatment, pending clinical improvement, PT/OT    Care Plan discussed with: Patient/Family and Nurse        Rizwana Williamson MD

## 2020-10-24 NOTE — PROGRESS NOTES
Physician Progress Note      PATIENT:               Angeles Ruiz  CSN #:                  [id number]  :                       1960  ADMIT DATE:       10/15/2020 6:33 PM  [address] [facility] DATE:  RESPONDING  PROVIDER #:        Caryle Glatter MD          QUERY TEXT:    Pt admitted with Pneumonia. Pt noted to have ***. If possible, please document in the progress notes and discharge summary if you are evaluating and/or treating any of the following: The medical record reflects the following:  Risk Factors: 56 M admitted with pneumonia and acute resp failure  Clinical Indicators: Per notes Pt with pneumonia, COVID test negative x 2 with 1 indeterminate test,  Sputum culture with 3+ gram positive cocci in clusters, heavy staphylococcus aureus, and light Klebsiella pneumoniae  Treatment: Zosyn IV  (Vancomycin and azithromycin DC'd)    Please call with questions. Thank you. [email or web address]  [phone]  Options provided:  -- Gram positive pneumonia  -- MSSA pneumonia  -- Bacterial pneumonia  -- Viral pneumonia  -- Other - I will add my own diagnosis  -- Disagree - Not applicable / Not valid  -- Disagree - Clinically unable to determine / Unknown  -- Refer to Clinical Documentation Reviewer    PROVIDER RESPONSE TEXT:    This patient has viral pneumonia.     Query created by: Nyla Alexander on 10/23/2020 3:26 PM      Electronically signed by:  Caryle Glatter MD 10/24/2020 7:46 AM

## 2020-10-24 NOTE — PROGRESS NOTES
Progress Note  Date:10/24/2020       Room:[facility]  Patient Name:Ekaterina Bradford     YOB: 1960     Age:60 y.o. Subjective    Subjective   Patient followed for presumptive Covid-19 pneumoniitis. Initial test was negative but second test is indeterminate. No CXR today. He is afebrile with leukocytosis attributed to steroids. Blood cultures negative and sputum culture has grown MSSA and Klebsiella. He is currently on IV Zosyn. He refused convalescent plasma. Patient reports subjective improvement and denies cough today. Objective         Vitals Last 24 Hours:  TEMPERATURE:  Temp  Av.2 °F (36.8 °C)  Min: 98.1 °F (36.7 °C)  Max: 98.2 °F (36.8 °C)  RESPIRATIONS RANGE: Resp  Av  Min: 20  Max: 20  PULSE OXIMETRY RANGE: SpO2  Av.2 %  Min: 92 %  Max: 96 %  PULSE RANGE: Pulse  Av.7  Min: 89  Max: 95  BLOOD PRESSURE RANGE: Systolic (08MRS), NYP:294 , Min:143 , STC:236   ; Diastolic (36NBU), CWC:77, Min:92, Max:93    I/O (24Hr): Intake/Output Summary (Last 24 hours) at 10/24/2020 1050  Last data filed at 10/24/2020 0655  Gross per 24 hour   Intake    Output 2300 ml   Net -2300 ml     Objective:  Vital signs: (most recent): Blood pressure (!) 143/92, pulse 94, temperature 98.1 °F (36.7 °C), resp. rate 20, height 5' 9\" (1.753 m), weight 180 lb 5.4 oz (81.8 kg), SpO2 96 %.       General: Moderately ill-appearing male, NAD  HEENT: eyes open, high flow nasal O2  Neck: supple  Lungs: clear bilaterally  Heart: RRR  : Granados catheter with moderate urine sediment    Labs/Imaging/Diagnostics    Labs:  CBC:  Recent Labs     10/24/20  0730 10/23/20  0920 10/22/20  0750   WBC 16.2* 16.9* 15.3*   RBC 4.83 5.25 4.97   HGB 14.8 16.1 15.4   HCT 42.8 46.4 44.0   MCV 88.6 88.4 88.5   RDW 13.1 13.0 12.1    320 338     CHEMISTRIES:  Recent Labs     10/24/20  0730 10/22/20  0750    139   K 4.1 3.9    105   CO2 28 26   BUN 25* 26*   CA 8.1* 8.3*   PT/INR:  No results for input(s): INR, INREXT, INREXT in the last 72 hours. No lab exists for component: PROTIME  APTT:  Recent Labs     10/24/20  0730 10/23/20  0920 10/22/20  0750   APTT 23.2 25.6 26.1     LIVER PROFILE:  Recent Labs     10/22/20  0750   AST 20   ALT 55     Lab Results   Component Value Date/Time    ALT (SGPT) 55 10/22/2020 07:50 AM    AST (SGOT) 20 10/22/2020 07:50 AM    Alk. phosphatase 106 10/22/2020 07:50 AM    Bilirubin, total 0.9 10/22/2020 07:50 AM     WBC 15,300   (689 (452 (565  Ferritin 1,187  CRP <0.29 (0.43 (12.40    Sputum culture (10/20) MSSA and Klebsiella pneumoniae  Blood cultures (10/20) No growth at 4 days    Imaging Last 24 Hours:  No results found. Assessment//Plan   Active Problems:    Acute respiratory failure due to COVID-19 [facility]) (10/15/2020)      Pneumonia (10/15/2020)      Acute respiratory failure ([facility] [address] (10/15/2020)      Assessment & Plan  1. Probable Covid-19 pneumonitis, with indeterminate result, status post Remdesivir, Decadron, Azithromycin, Actemra, improving. 2. Acute respiratory failure, on high flow nasal O2  3. Elevated CRP, LDH and ferritin, secondary to #1, decreasing. 4. Possible HCAP with purulent sputum, secondary to MSSA and Klebsiella pneumoniae, Day #5 IV Zosyn.     Comment:  Sputum culture grew MSSA and Klebsiella. MSSA should be covered by Zosyn. Leukocytosis likely due to steroids on board.     1. Continue IV Zosyn for MSSA and Klebsiella  2. Follow-up blood cultures  3.  In am, repeat LDH, ferritin       Electronically signed by Amber Kwok MD on 10/24/2020 at 1:43 PM

## 2020-10-24 NOTE — PROGRESS NOTES
Pulmonary Note      Name: Ekaterina Emmanuel MRN: [id number]   : 1960 Hospital: [facility]   Date: 10/24/2020  Admission date: 10/15/2020 Hospital Day: 10       Subjective/Interval History:   Patient seen in the ICU on high flow nasal oxygen at 70% with oxygen saturation 99. He gives a history of gradual worsening illness over the last week to 10 days with nonproductive cough fever generalized malaise nausea denies any vomiting although the ER note states that he had some vomiting. He seems awake and alert. Does not have any Covid contacts that he is aware of. States that his sense of taste and smell have been normal but terribly anorexic has not eaten anything in 3 or 4 days. 10/17 still feels bad Cough shortness of breath but no nausea today   10/18 still complains of severe cough nonproductive although in general feels a little better  10/20 remains 100% high flow nasal oxygen. Continues to complain of it irritating him. Requested nasal saline last evening but it has not been started will reorder it. We will let him try nonrebreather mask with nasal high flow for meals  10/24 on 50% nasal high flow oxygen saturation 97%. On 6 L nasal oxygen oxygen saturation 90% lying in bed at rest  Patient Active Problem List   Diagnosis Code    Acute respiratory failure due to COVID-19 ([facility] [address] U07.1, J96.00    Pneumonia J18.9    Acute respiratory failure ([facility] [address] J96.00       IMPRESSION:   1. Acute hypoxic respiratory failure requiring high flow nasal oxygen proved  2. History of asthma  3. Community-acquired versus aspiration with diffuse pulmonary infiltrates questionable atypical pneumonia possible COVID-19 repeat testing Negative  4. Nausea probable due to COVID-19 infection  5. Unremitting cough secondary to pneumonia or reflux  Body mass index is 26.63 kg/m². RECOMMENDATIONS/PLAN:   1.  Initial COVID-19 test is negative repeat testing indeterminant  but ferritin LDH CRP are all elevated chest x-ray is consistent with Covid pneumonitis decrease Decadron has completed 2 doses Actemra and 5 days of Remdesivir  2. Borderline oxygen saturation on 6 L nasal oxygen maintain high flow today try 6 L again in a.m.  3. Sputum culture with normal smita  4. Continue codeine Delsym and Tessalon for severe cough  5. Continue nebulizer treatment  6. Repeat COVID-19 test negative  7. Check Legionella antigen and titer continue azithromycin         Subjective/Initial History:   I have reviewed the flowsheet and previous days notes. Seen earlier today on rounds. I was asked by Vivian Oliveira MD to see An Adis Heaton a 56 y.o.  male  in consultation for a chief complaint of acute hypoxic respiratory failure cough and community-acquired pneumonia          Pt now in CCU. Patient PCP: UNKNOWN  PMH:  has no past medical history on file. PSH:   has no past surgical history on file. FHX: family history includes No Known Problems in his father and mother. SHX:  reports that he has never smoked. He has never used smokeless tobacco. He reports that he does not drink alcohol or use drugs. Systemic review:  General no chronic illness but over the last week he has had fever generalized malaise weight loss no appetite  Eyes no double vision or momentary blindness  ENT no sinus congestion drainage or facial pain  Skeletal has diffuse aches and pains no swollen tender joints  Endocrinologic no polyuria polydipsia  Neurologic no seizures or syncope  Gastrointestinal normally he has no problems but over this last week he has had nausea anorexia marked weight loss has not had any alteration of his sense of taste or smell  Genitourinary no discomfort or drainage on urination  Cardiovascular no history of heart disease chest pain has felt sweaty but no history of ankle edema.   Respiratory as mentioned above    No Known Allergies   MEDS:   Current Facility-Administered Medications   Medication    albuterol-ipratropium (DUO-NEB) 2.5 MG-0.5 MG/3 ML    piperacillin-tazobactam (ZOSYN) 3.375 g in 0.9% sodium chloride (MBP/ADV) 100 mL MBP    0.9% sodium chloride infusion 250 mL    sodium chloride (OCEAN) 0.65 % nasal squeeze bottle 2 Spray    hydrOXYzine pamoate (VISTARIL) capsule 25 mg    melatonin tablet 10 mg    codeine sulfate tablet 30 mg    dextromethorphan (DELSYM) 30 mg/5 mL syrup 30 mg    enoxaparin (LOVENOX) injection 30 mg    acetaminophen (TYLENOL) tablet 650 mg    Or    acetaminophen (TYLENOL) suppository 650 mg    sodium chloride (NS) flush 5-40 mL    sodium chloride (NS) flush 5-40 mL    ondansetron (ZOFRAN ODT) tablet 4 mg    Or    ondansetron (ZOFRAN) injection 4 mg    dexamethasone (DECADRON) 4 mg/mL injection 4 mg    benzonatate (TESSALON) capsule 200 mg        Current Facility-Administered Medications:     albuterol-ipratropium (DUO-NEB) 2.5 MG-0.5 MG/3 ML, 3 mL, Nebulization, Q6H RT, Kannan Conde MD, 3 mL at 10/24/20 0803    piperacillin-tazobactam (ZOSYN) 3.375 g in 0.9% sodium chloride (MBP/ADV) 100 mL MBP, 3.375 g, IntraVENous, Q8H, Simona Gonzales MD, Last Rate: 25 mL/hr at 10/24/20 0931, 3.375 g at 10/24/20 0931    0.9% sodium chloride infusion 250 mL, 250 mL, IntraVENous, PRN, Aurelia Fan MD    sodium chloride (OCEAN) 0.65 % nasal squeeze bottle 2 Spray, 2 Spray, Both Nostrils, Q4HWA, Lisa Knott MD, 2 [place] at 10/24/20 0932    hydrOXYzine pamoate (VISTARIL) capsule 25 mg, 25 mg, Oral, TID PRN, Kj Briscoe MD, 25 mg at 10/21/20 2231    melatonin tablet 10 mg, 10 mg, Oral, QHS PRN, Kj Briscoe MD, 10 mg at 10/21/20 2231    codeine sulfate tablet 30 mg, 30 mg, Oral, Q8H RT, Lisa Knott MD, 30 mg at 10/24/20 0931    dextromethorphan (DELSYM) 30 mg/5 mL syrup 30 mg, 30 mg, Oral, Q12H, Lisa Knott MD, 30 mg at 10/24/20 0931    enoxaparin (LOVENOX) injection 30 mg, 30 mg, SubCUTAneous, Q12H, Kobe Ceballos MD, Stopped at 10/23/20 1707    acetaminophen (TYLENOL) tablet 650 mg, 650 mg, Oral, Q6H PRN, 650 mg at 10/18/20 2205 **OR** acetaminophen (TYLENOL) suppository 650 mg, 650 mg, Rectal, Q6H PRN, Clayton Garcia MD    sodium chloride (NS) flush 5-40 mL, 5-40 mL, IntraVENous, Q8H, Clayton Garcia MD, Stopped at 10/23/20 2200    sodium chloride (NS) flush 5-40 mL, 5-40 mL, IntraVENous, PRN, Clayton Garcia MD, 10 mL at 10/22/20 0938    ondansetron (ZOFRAN ODT) tablet 4 mg, 4 mg, Oral, Q6H PRN **OR** ondansetron (ZOFRAN) injection 4 mg, 4 mg, IntraVENous, Q6H PRN, Clayton Garcia MD    dexamethasone (DECADRON) 4 mg/mL injection 4 mg, 4 mg, IntraVENous, Q6H, Nichole Kline MD, 4 mg at 10/24/20 0609    benzonatate (TESSALON) capsule 200 mg, 200 mg, Oral, Q8H, Nichole Kline MD, 200 mg at 10/24/20 0609      Objective:     Vital Signs: Telemetry:    normal sinus rhythm Intake/Output:   Visit Vitals  BP (!) 143/92   Pulse 94   Temp 98.1 °F (36.7 °C)   Resp 20   Ht 5' 9\" (1.753 m)   Wt 81.8 kg (180 lb 5.4 oz)   SpO2 96%   BMI 26.63 kg/m²       Temp (24hrs), Av.2 °F (36.8 °C), Min:98.1 °F (36.7 °C), Max:98.2 °F (36.8 °C)        O2 Device: Hi flow nasal cannula O2 Flow Rate (L/min): 50 l/min         Body mass index is 26.63 kg/m². Wt Readings from Last 4 Encounters:   10/21/20 81.8 kg (180 lb 5.4 oz)          Intake/Output Summary (Last 24 hours) at 10/24/2020 1135  Last data filed at 10/24/2020 0655  Gross per 24 hour   Intake    Output 2300 ml   Net -2300 ml       Last shift:      No intake/output data recorded. Last 3 shifts: 10/22 1901 - 10/24 0700  In: -   Out: 2300 [Urine:2300]   Ventilator Settings:      Mode Rate TV Press PEEP FiO2 PIP Min.  Vent               70 %            Physical Exam:    General:  male; less distress chronic cough nonproductive  HEENT: NCAT, oral mucosa clear  Eyes: anicteric; conjunctiva clear extraocular movements intact   neck: no node neck veins visible  Chest: no deformity,   Cardiac: Regular rate and rhythm no murmurs trace ankle edema  Lungs: Clear anteriorly laterally still has rales posteriorly improved  Abd: Soft nontender normal bowel sounds no organomegaly  Ext: Mild edema; no joint swelling; No clubbing  :clear urine  Neuro: Awake alert speech is clear moves all 4 extremities well  Psych- no agitation, oriented to person;   Skin: warm,, no cyanosis; very mildly diaphoretic  Pulses: Pulses intact  Capillary: Normal capillary refill      Labs:    Recent Labs     10/24/20  0730 10/23/20  0920 10/22/20  0750   WBC 16.2* 16.9* 15.3*   HGB 14.8 16.1 15.4    320 338   APTT 23.2 25.6 26.1     Recent Labs     10/24/20  0730 10/22/20  0750    139   K 4.1 3.9    105   CO2 28 26   * 160*   BUN 25* 26*   CREA 0.76 0.81   CA 8.1* 8.3*   ALB  --  2.7*   ALT  --  55     10/24 6 L nasal oxygen with oxygen saturation 90%    10/14 COVID-19 test negative  10/17 COVID-19 test indeterminate  Ferritin 1510, previous 2180  , previous 615, 754  Procalcitonin 0.14  CRP 0.87, previous 1.39, 3.0, 12.4  Lab Results   Component Value Date/Time    Culture result: No growth 4 days 10/20/2020 11:30 AM    Culture result: Heavy Staphylococcus aureus 10/20/2020 08:15 AM    Culture result: Light Klebsiella pneumoniae 10/20/2020 08:15 AM    Culture result: Heavy  Normal respiratory smita   10/20/2020 08:15 AM      Lab Results   Component Value Date/Time     10/15/2020 11:47 PM       Imaging:  I have personally reviewed the patients radiographs and have reviewed the reports:    CXR Results  (Last 48 hours)  10/20 chest x-ray with diffuse patchy infiltrates may be slightly less dense  10/17 chest x-ray unchanged diffuse bilateral infiltrate               10/15/20 1927  XR CHEST SNGL V Final result    Narrative:  1       Mild atelectasis in the bases with upper lungs clear. No effusion or   pneumothorax.  Normal heart and no congestion  Disagree to me there is bilateral infiltrate           Results from Hospital Encounter encounter on 10/15/20   XR CHEST PORT    Narrative 1 view comparison yesterday    Little change in patchy interstitial infiltrates throughout much of each lung. No effusion or pneumothorax. Normal heart and stable mediastinal   XR CHEST PORT    Narrative Portable upright chest 8:16 AM compared to October 21, 2020. INDICATION: Covid 19. Heart size is stable. Bilateral predominantly peripheral airspace disease shows  some improvement but has not resolved. No gross effusion or pneumothorax. No  acute bony findings. Impression IMPRESSION: Improving bilateral airspace disease. XR CHEST PORT    Narrative Chest single view. Comparison single view chest October 20, 2020    Unchanged appearance for the lungs; patchy peripheral reticular markings  throughout the lungs. Cardiac and mediastinal structures unchanged. No  pneumothorax or sizable pleural effusion. Results from [place] encounter on 10/15/20   CTA CHEST W OR W WO CONT    Narrative CT dose reduction was achieved through use of a standardized protocol tailored  for this examination and automatic exposure control for dose modulation. Contrast study maximum intensity projections shows pronounced groundglass  opacification, of variable density, mainly in the dependent portions of each  lung, apex to base. Air bronchograms are preserved in the densest portions. Mild  geographic groundglass opacification of lower density in the nondependent  portions. Central airways are open    Pulmonary arteries are well-opacified and show no filling defect or distortion. Aorta shows normal dimensions, without dissection. Tiny middle mediastinal lymph  nodes. Cardiac finding. No pleural pericardial effusion.  No significant upper  abdominal or chest wall finding      Impression IMPRESSION: Widespread patchy bilateral pneumonitis       Clinical picture consistent with COVID-19 infection with pneumonitis with diffuse patchy pulmonary infiltrate elevation in ferritin LDH and CRP nonproductive cough and GI symptoms and yet initial COVID-19 test negative repeat testing indeterminate continue azithromycin Actemra  Decadron and Remdesivir and will repeat Lasix today continue to follow renal function    Time of care 30 minutes         Thank you for allowing us to participate in the care of this patient.   We will follow along with you    Bertin Thompson MD

## 2020-10-24 NOTE — PROGRESS NOTES
Visited patient in response to patient's request for regular visit. Only patient  was present during the visit except when staff came in to provide care. Patient stated he was anxious to go home as soon as possible. He shared some misgivings about the manner of some of his treatments. Mr. Tobar also reflected on some spiritual realizations he has had while being in the hospital.  Patient seemed concerned about the long duration of his recovery from illness which he attempted before coming to the hospital.   I provided empathic presence and reflected his thoughts and emotions. I facilitated story-telling and attempted to normalize his experience. I affirmed his spiritual insights and advised of  availability. Chaplains will follow as able and/or needed.   MIKAYLA AdamsDiv.

## 2020-10-25 ENCOUNTER — APPOINTMENT (OUTPATIENT)
Dept: GENERAL RADIOLOGY | Age: 60
DRG: 177 | End: 2020-10-25
Attending: INTERNAL MEDICINE
Payer: COMMERCIAL

## 2020-10-25 LAB
ALBUMIN SERPL-MCNC: 2.9 G/DL (ref 3.5–5)
ALBUMIN/GLOB SERPL: 0.9 {RATIO} (ref 1.1–2.2)
ALP SERPL-CCNC: 97 U/L (ref 45–117)
ALT SERPL-CCNC: 42 U/L (ref 12–78)
ANION GAP SERPL CALC-SCNC: 9 MMOL/L (ref 5–15)
APTT PPP: 24.7 SEC (ref 23–35.7)
AST SERPL W P-5'-P-CCNC: 19 U/L (ref 15–37)
BILIRUB SERPL-MCNC: 1.2 MG/DL (ref 0.2–1)
BUN SERPL-MCNC: 26 MG/DL (ref 6–20)
BUN/CREAT SERPL: 33 (ref 12–20)
CA-I BLD-MCNC: 8.2 MG/DL (ref 8.5–10.1)
CHLORIDE SERPL-SCNC: 105 MMOL/L (ref 97–108)
CO2 SERPL-SCNC: 23 MMOL/L (ref 21–32)
CREAT SERPL-MCNC: 0.79 MG/DL (ref 0.7–1.3)
CRP SERPL-MCNC: <0.29 MG/DL (ref 0–0.6)
ERYTHROCYTE [DISTWIDTH] IN BLOOD BY AUTOMATED COUNT: 13.3 % (ref 11.5–14.5)
GLOBULIN SER CALC-MCNC: 3.1 G/DL (ref 2–4)
GLUCOSE SERPL-MCNC: 180 MG/DL (ref 65–100)
HCT VFR BLD AUTO: 43.3 % (ref 36.6–50.3)
HGB BLD-MCNC: 14.9 G/DL (ref 12.1–17)
LDH SERPL L TO P-CCNC: 403 U/L (ref 85–241)
MCH RBC QN AUTO: 30.6 PG (ref 26–34)
MCHC RBC AUTO-ENTMCNC: 34.4 G/DL (ref 30–36.5)
MCV RBC AUTO: 88.9 FL (ref 80–99)
PLATELET # BLD AUTO: 283 K/UL (ref 150–400)
PMV BLD AUTO: 10.4 FL (ref 8.9–12.9)
POTASSIUM SERPL-SCNC: 4.1 MMOL/L (ref 3.5–5.1)
PROT SERPL-MCNC: 6 G/DL (ref 6.4–8.2)
RBC # BLD AUTO: 4.87 M/UL (ref 4.1–5.7)
SODIUM SERPL-SCNC: 137 MMOL/L (ref 136–145)
THERAPEUTIC RANGE,PTTT: NORMAL SEC (ref 68–109)
WBC # BLD AUTO: 16.6 K/UL (ref 4.1–11.1)

## 2020-10-25 PROCEDURE — 99232 SBSQ HOSP IP/OBS MODERATE 35: CPT | Performed by: INTERNAL MEDICINE

## 2020-10-25 PROCEDURE — 85027 COMPLETE CBC AUTOMATED: CPT

## 2020-10-25 PROCEDURE — 86140 C-REACTIVE PROTEIN: CPT

## 2020-10-25 PROCEDURE — 74011250636 HC RX REV CODE- 250/636: Performed by: HOSPITALIST

## 2020-10-25 PROCEDURE — 85730 THROMBOPLASTIN TIME PARTIAL: CPT

## 2020-10-25 PROCEDURE — 74011250636 HC RX REV CODE- 250/636: Performed by: INTERNAL MEDICINE

## 2020-10-25 PROCEDURE — 83615 LACTATE (LD) (LDH) ENZYME: CPT

## 2020-10-25 PROCEDURE — 97162 PT EVAL MOD COMPLEX 30 MIN: CPT

## 2020-10-25 PROCEDURE — 71045 X-RAY EXAM CHEST 1 VIEW: CPT

## 2020-10-25 PROCEDURE — 74011250637 HC RX REV CODE- 250/637: Performed by: INTERNAL MEDICINE

## 2020-10-25 PROCEDURE — 80053 COMPREHEN METABOLIC PANEL: CPT

## 2020-10-25 PROCEDURE — 94640 AIRWAY INHALATION TREATMENT: CPT

## 2020-10-25 PROCEDURE — 74011000258 HC RX REV CODE- 258: Performed by: INTERNAL MEDICINE

## 2020-10-25 PROCEDURE — 74011000250 HC RX REV CODE- 250: Performed by: INTERNAL MEDICINE

## 2020-10-25 PROCEDURE — 36415 COLL VENOUS BLD VENIPUNCTURE: CPT

## 2020-10-25 PROCEDURE — 65270000029 HC RM PRIVATE

## 2020-10-25 RX ADMIN — Medication 10 ML: at 22:11

## 2020-10-25 RX ADMIN — PIPERACILLIN SODIUM AND TAZOBACTAM SODIUM 3.38 G: 3; .375 INJECTION, POWDER, LYOPHILIZED, FOR SOLUTION INTRAVENOUS at 01:13

## 2020-10-25 RX ADMIN — PIPERACILLIN SODIUM AND TAZOBACTAM SODIUM 3.38 G: 3; .375 INJECTION, POWDER, LYOPHILIZED, FOR SOLUTION INTRAVENOUS at 18:03

## 2020-10-25 RX ADMIN — DEXAMETHASONE SODIUM PHOSPHATE 4 MG: 4 INJECTION, SOLUTION INTRA-ARTICULAR; INTRALESIONAL; INTRAMUSCULAR; INTRAVENOUS; SOFT TISSUE at 01:13

## 2020-10-25 RX ADMIN — DEXAMETHASONE SODIUM PHOSPHATE 4 MG: 4 INJECTION, SOLUTION INTRA-ARTICULAR; INTRALESIONAL; INTRAMUSCULAR; INTRAVENOUS; SOFT TISSUE at 05:22

## 2020-10-25 RX ADMIN — BENZONATATE 200 MG: 100 CAPSULE ORAL at 22:10

## 2020-10-25 RX ADMIN — IPRATROPIUM BROMIDE AND ALBUTEROL SULFATE 3 ML: .5; 3 SOLUTION RESPIRATORY (INHALATION) at 06:23

## 2020-10-25 RX ADMIN — CODEINE SULFATE 30 MG: 30 TABLET ORAL at 01:13

## 2020-10-25 RX ADMIN — BENZONATATE 200 MG: 100 CAPSULE ORAL at 05:22

## 2020-10-25 RX ADMIN — SALINE NASAL SPRAY 2 SPRAY: 1.5 SOLUTION NASAL at 14:26

## 2020-10-25 RX ADMIN — CODEINE SULFATE 30 MG: 30 TABLET ORAL at 09:42

## 2020-10-25 RX ADMIN — ENOXAPARIN SODIUM 30 MG: 30 INJECTION SUBCUTANEOUS at 05:22

## 2020-10-25 RX ADMIN — IPRATROPIUM BROMIDE AND ALBUTEROL SULFATE 3 ML: .5; 3 SOLUTION RESPIRATORY (INHALATION) at 01:10

## 2020-10-25 RX ADMIN — Medication 10 ML: at 05:23

## 2020-10-25 RX ADMIN — DEXAMETHASONE SODIUM PHOSPHATE 4 MG: 4 INJECTION, SOLUTION INTRA-ARTICULAR; INTRALESIONAL; INTRAMUSCULAR; INTRAVENOUS; SOFT TISSUE at 14:25

## 2020-10-25 RX ADMIN — DEXTROMETHORPHAN 30 MG: 30 SUSPENSION, EXTENDED RELEASE ORAL at 09:42

## 2020-10-25 RX ADMIN — PIPERACILLIN SODIUM AND TAZOBACTAM SODIUM 3.38 G: 3; .375 INJECTION, POWDER, LYOPHILIZED, FOR SOLUTION INTRAVENOUS at 09:42

## 2020-10-25 RX ADMIN — SALINE NASAL SPRAY 2 SPRAY: 1.5 SOLUTION NASAL at 22:12

## 2020-10-25 RX ADMIN — IPRATROPIUM BROMIDE AND ALBUTEROL SULFATE 3 ML: .5; 3 SOLUTION RESPIRATORY (INHALATION) at 19:30

## 2020-10-25 RX ADMIN — ENOXAPARIN SODIUM 30 MG: 30 INJECTION SUBCUTANEOUS at 18:03

## 2020-10-25 RX ADMIN — SALINE NASAL SPRAY 2 SPRAY: 1.5 SOLUTION NASAL at 05:24

## 2020-10-25 RX ADMIN — DEXTROMETHORPHAN 30 MG: 30 SUSPENSION, EXTENDED RELEASE ORAL at 22:10

## 2020-10-25 RX ADMIN — SALINE NASAL SPRAY 2 SPRAY: 1.5 SOLUTION NASAL at 18:04

## 2020-10-25 RX ADMIN — IPRATROPIUM BROMIDE AND ALBUTEROL SULFATE 3 ML: .5; 3 SOLUTION RESPIRATORY (INHALATION) at 14:03

## 2020-10-25 RX ADMIN — CODEINE SULFATE 30 MG: 30 TABLET ORAL at 18:03

## 2020-10-25 RX ADMIN — DEXAMETHASONE SODIUM PHOSPHATE 4 MG: 4 INJECTION, SOLUTION INTRA-ARTICULAR; INTRALESIONAL; INTRAMUSCULAR; INTRAVENOUS; SOFT TISSUE at 18:03

## 2020-10-25 RX ADMIN — Medication 10 ML: at 14:25

## 2020-10-25 RX ADMIN — BENZONATATE 200 MG: 100 CAPSULE ORAL at 14:25

## 2020-10-25 NOTE — PROGRESS NOTES
Patient placed on 6 lpm via nc. o2 saturations stayed at 89%. The patient was subsequently placed back on HFNC  at 70% bringing the saturation up to 94%.

## 2020-10-25 NOTE — PROGRESS NOTES
Pulmonary Note      Name: Ekaterina Kim MRN: [id number]   : 1960 Hospital: [address] [place] [address]   Date: 10/25/2020  Admission date: 10/15/2020 Hospital Day: 11       Subjective/Interval History:   Patient seen in the ICU on high flow nasal oxygen at 70% with oxygen saturation 99. He gives a history of gradual worsening illness over the last week to 10 days with nonproductive cough fever generalized malaise nausea denies any vomiting although the ER note states that he had some vomiting. He seems awake and alert. Does not have any Covid contacts that he is aware of. States that his sense of taste and smell have been normal but terribly anorexic has not eaten anything in 3 or 4 days. 10/17 still feels bad Cough shortness of breath but no nausea today   10/18 still complains of severe cough nonproductive although in general feels a little better  10/20 remains 100% high flow nasal oxygen. Continues to complain of it irritating him. Requested nasal saline last evening but it has not been started will reorder it. We will let him try nonrebreather mask with nasal high flow for meals  10/24 on 50% nasal high flow oxygen saturation 97%. On 6 L nasal oxygen oxygen saturation 90% lying in bed at rest  Patient Active Problem List   Diagnosis Code    Acute respiratory failure due to COVID-19 ([facility] U07.1, J96.00    Pneumonia J18.9    Acute respiratory failure ([facility] J96.00       IMPRESSION:   1. Acute hypoxic respiratory failure requiring high flow nasal oxygen   2. History of asthma  3. Community-acquired versus aspiration with diffuse pulmonary infiltrates questionable atypical pneumonia possible COVID-19 repeat testing Negative  4. Nausea probable due to COVID-19 infection  5. Unremitting cough secondary to pneumonia or reflux  Body mass index is 26.63 kg/m². RECOMMENDATIONS/PLAN:   1.  Initial COVID-19 test is negative repeat testing indeterminant  but ferritin LDH CRP are all elevated chest x-ray is consistent with Covid pneumonitis decrease Decadron has completed 2 doses Actemra and 5 days of Remdesivir  2. Borderline oxygen saturation on 6 L nasal oxygen maintain high flow today try 6 L again in a.m.  3. Sputum culture with normal smita  4. Continue codeine Delsym and Tessalon for severe cough  5. Continue nebulizer treatment  6. Repeat COVID-19 test negative  7. Check Legionella antigen and titer continue azithromycin         Subjective/Initial History:   I have reviewed the flowsheet and previous days notes. Seen earlier today on rounds. I was asked by Saray Lucas MD to see An Faith Naranjo a 56 y.o.  male  in consultation for a chief complaint of acute hypoxic respiratory failure cough and community-acquired pneumonia          Pt now in CCU. Patient PCP: UNKNOWN  PMH:  has no past medical history on file. PSH:   has no past surgical history on file. FHX: family history includes No Known Problems in his father and mother. SHX:  reports that he has never smoked. He has never used smokeless tobacco. He reports that he does not drink alcohol or use drugs. Systemic review:  General no chronic illness but over the last week he has had fever generalized malaise weight loss no appetite  Eyes no double vision or momentary blindness  ENT no sinus congestion drainage or facial pain  Skeletal has diffuse aches and pains no swollen tender joints  Endocrinologic no polyuria polydipsia  Neurologic no seizures or syncope  Gastrointestinal normally he has no problems but over this last week he has had nausea anorexia marked weight loss has not had any alteration of his sense of taste or smell  Genitourinary no discomfort or drainage on urination  Cardiovascular no history of heart disease chest pain has felt sweaty but no history of ankle edema.   Respiratory as mentioned above    No Known Allergies   MEDS:   Current Facility-Administered Medications   Medication    albuterol-ipratropium (DUO-NEB) 2.5 MG-0.5 MG/3 ML    piperacillin-tazobactam (ZOSYN) 3.375 g in 0.9% sodium chloride (MBP/ADV) 100 mL MBP    0.9% sodium chloride infusion 250 mL    sodium chloride (OCEAN) 0.65 % nasal squeeze bottle 2 Spray    hydrOXYzine pamoate (VISTARIL) capsule 25 mg    melatonin tablet 10 mg    codeine sulfate tablet 30 mg    dextromethorphan (DELSYM) 30 mg/5 mL syrup 30 mg    enoxaparin (LOVENOX) injection 30 mg    acetaminophen (TYLENOL) tablet 650 mg    Or    acetaminophen (TYLENOL) suppository 650 mg    sodium chloride (NS) flush 5-40 mL    sodium chloride (NS) flush 5-40 mL    ondansetron (ZOFRAN ODT) tablet 4 mg    Or    ondansetron (ZOFRAN) injection 4 mg    dexamethasone (DECADRON) 4 mg/mL injection 4 mg    benzonatate (TESSALON) capsule 200 mg        Current Facility-Administered Medications:     albuterol-ipratropium (DUO-NEB) 2.5 MG-0.5 MG/3 ML, 3 mL, Nebulization, Q6H RT, Kannan Conde MD, 3 mL at 10/25/20 0623    piperacillin-tazobactam (ZOSYN) 3.375 g in 0.9% sodium chloride (MBP/ADV) 100 mL MBP, 3.375 g, IntraVENous, Q8H, Simona Gonzales MD, Last Rate: 25 mL/hr at 10/25/20 0942, 3.375 g at 10/25/20 0942    0.9% sodium chloride infusion 250 mL, 250 mL, IntraVENous, PRN, Umm Saldaña MD    sodium chloride (OCEAN) 0.65 % nasal squeeze bottle 2 Spray, 2 Spray, Both Nostrils, Q4HWA, Kwesi Najera MD, 2 [place] at 10/25/20 3640    hydrOXYzine pamoate (VISTARIL) capsule 25 mg, 25 mg, Oral, TID PRN, Brenda Haro MD, 25 mg at 10/21/20 2231    melatonin tablet 10 mg, 10 mg, Oral, QHS PRN, Brenda Haro MD, 10 mg at 10/21/20 2231    codeine sulfate tablet 30 mg, 30 mg, Oral, Q8H RT, Kwesi Najera MD, 30 mg at 10/25/20 0942    dextromethorphan (DELSYM) 30 mg/5 mL syrup 30 mg, 30 mg, Oral, Q12H, Kwesi Najera MD, 30 mg at 10/25/20 0942    enoxaparin (LOVENOX) injection 30 mg, 30 mg, SubCUTAneous, Q12H, Jerome Farr MD, 30 mg at 10/25/20 0522    acetaminophen (TYLENOL) tablet 650 mg, 650 mg, Oral, Q6H PRN, 650 mg at 10/18/20 2205 **OR** acetaminophen (TYLENOL) suppository 650 mg, 650 mg, Rectal, Q6H PRN, Aaliyah Wallis MD    sodium chloride (NS) flush 5-40 mL, 5-40 mL, IntraVENous, Q8H, Aaliyah Wallis MD, 10 mL at 10/25/20 0523    sodium chloride (NS) flush 5-40 mL, 5-40 mL, IntraVENous, PRN, Aaliyah Wallis MD, 10 mL at 10/22/20 0938    ondansetron (ZOFRAN ODT) tablet 4 mg, 4 mg, Oral, Q6H PRN **OR** ondansetron (ZOFRAN) injection 4 mg, 4 mg, IntraVENous, Q6H PRN, aAliyah Wallis MD    dexamethasone (DECADRON) 4 mg/mL injection 4 mg, 4 mg, IntraVENous, Q6H, Ryan Leyva MD, 4 mg at 10/25/20 0522    benzonatate (TESSALON) capsule 200 mg, 200 mg, Oral, Q8H, Ryan Leyva MD, 200 mg at 10/25/20 0522      Objective:     Vital Signs: Telemetry:    normal sinus rhythm Intake/Output:   Visit Vitals  /87   Pulse 79   Temp 97.6 °F (36.4 °C)   Resp 20   Ht 5' 9\" (1.753 m)   Wt 81.8 kg (180 lb 5.4 oz)   SpO2 95%   BMI 26.63 kg/m²       Temp (24hrs), Av.1 °F (36.7 °C), Min:97.6 °F (36.4 °C), Max:98.6 °F (37 °C)        O2 Device: Hi flow nasal cannula, Heated O2 Flow Rate (L/min): 50 l/min         Body mass index is 26.63 kg/m². Wt Readings from Last 4 Encounters:   10/21/20 81.8 kg (180 lb 5.4 oz)          Intake/Output Summary (Last 24 hours) at 10/25/2020 1047  Last data filed at 10/24/2020 1448  Gross per 24 hour   Intake    Output 700 ml   Net -700 ml       Last shift:      No intake/output data recorded. Last 3 shifts: 10/23 1901 - 10/25 0700  In: -   Out: 3000 [Urine:3000]   Ventilator Settings:      Mode Rate TV Press PEEP FiO2 PIP Min.  Vent               70 %            Physical Exam:    General:  male; less distress chronic cough nonproductive  HEENT: NCAT, oral mucosa clear  Eyes: anicteric; conjunctiva clear extraocular movements intact   neck: no node neck veins visible  Chest: no deformity,   Cardiac: Regular rate and rhythm no murmurs trace ankle edema  Lungs: Clear anteriorly laterally still has rales posteriorly improved  Abd: Soft nontender normal bowel sounds no organomegaly  Ext: Mild edema; no joint swelling; No clubbing  :clear urine  Neuro: Awake alert speech is clear moves all 4 extremities well  Psych- no agitation, oriented to person;   Skin: warm,, no cyanosis; very mildly diaphoretic  Pulses: Pulses intact  Capillary: Normal capillary refill      Labs:    Recent Labs     10/25/20  0800 10/24/20  0730 10/23/20  0920   WBC 16.6* 16.2* 16.9*   HGB 14.9 14.8 16.1    307 320   APTT 24.7 23.2 25.6     Recent Labs     10/25/20  0800 10/24/20  0730    137   K 4.1 4.1    104   CO2 23 28   * 173*   BUN 26* 25*   CREA 0.79 0.76   CA 8.2* 8.1*   ALB 2.9*  --    ALT 42  --      10/24 6 L nasal oxygen with oxygen saturation 90%    10/14 COVID-19 test negative  10/17 COVID-19 test indeterminate  Ferritin 1510, previous 2180  , previous 615, 754  Procalcitonin 0.14  CRP 0.87, previous 1.39, 3.0, 12.4  Lab Results   Component Value Date/Time    Culture result: No growth 5 days 10/20/2020 11:30 AM    Culture result: Heavy Staphylococcus aureus 10/20/2020 08:15 AM    Culture result: Light Klebsiella pneumoniae 10/20/2020 08:15 AM    Culture result: Heavy  Normal respiratory smita   10/20/2020 08:15 AM      Lab Results   Component Value Date/Time     10/15/2020 11:47 PM       Imaging:  I have personally reviewed the patients radiographs and have reviewed the reports:    CXR Results  (Last 48 hours)  10/20 chest x-ray with diffuse patchy infiltrates may be slightly less dense  10/17 chest x-ray unchanged diffuse bilateral infiltrate               10/15/20 1927  XR CHEST SNGL V Final result    Narrative:  1       Mild atelectasis in the bases with upper lungs clear. No effusion or   pneumothorax.  Normal heart and no congestion  Disagree to me there is bilateral infiltrate           Results from Hospital Encounter encounter on 10/15/20   XR CHEST PORT    Narrative 1 view comparison yesterday    Little change in patchy interstitial infiltrates throughout much of each lung. No effusion or pneumothorax. Normal heart and stable mediastinal   XR CHEST PORT    Narrative Portable upright chest 8:16 AM compared to October 21, 2020. INDICATION: Covid 19. Heart size is stable. Bilateral predominantly peripheral airspace disease shows  some improvement but has not resolved. No gross effusion or pneumothorax. No  acute bony findings. Impression IMPRESSION: Improving bilateral airspace disease. XR CHEST PORT    Narrative Chest single view. Comparison single view chest October 20, 2020    Unchanged appearance for the lungs; patchy peripheral reticular markings  throughout the lungs. Cardiac and mediastinal structures unchanged. No  pneumothorax or sizable pleural effusion. Results from [place] encounter on 10/15/20   CTA CHEST W OR W WO CONT    Narrative CT dose reduction was achieved through use of a standardized protocol tailored  for this examination and automatic exposure control for dose modulation. Contrast study maximum intensity projections shows pronounced groundglass  opacification, of variable density, mainly in the dependent portions of each  lung, apex to base. Air bronchograms are preserved in the densest portions. Mild  geographic groundglass opacification of lower density in the nondependent  portions. Central airways are open    Pulmonary arteries are well-opacified and show no filling defect or distortion. Aorta shows normal dimensions, without dissection. Tiny middle mediastinal lymph  nodes. Cardiac finding. No pleural pericardial effusion.  No significant upper  abdominal or chest wall finding      Impression IMPRESSION: Widespread patchy bilateral pneumonitis       Clinical picture consistent with COVID-19 infection with pneumonitis with diffuse patchy pulmonary infiltrate elevation in ferritin LDH and CRP nonproductive cough and GI symptoms and yet initial COVID-19 test negative repeat testing indeterminate continue azithromycin Actemra  Decadron and Remdesivir and will repeat Lasix today continue to follow renal function    Time of care 30 minutes         Thank you for allowing us to participate in the care of this patient.   We will follow along with you    Kamini Mayen MD

## 2020-10-25 NOTE — PROGRESS NOTES
Verbal orders received by Dr. Moreno Onofre to discontinue mishra catheter for voiding trial and to re-insert mishra catheter if unable to void or retaining urine after voiding. Explained this to patient, however, patient refuses to have mishra catheter removed at this time.

## 2020-10-25 NOTE — PROGRESS NOTES
Progress Note  Date:10/25/2020       Room:[facility]  Patient Name:Ekaterina Denny     YOB: 1960     Age:60 y.o. Subjective    Subjective   Patient followed for presumptive Covid-19 pneumoniitis. Initial test was negative but second test is indeterminate. No CXR today. He is afebrile with leukocytosis attributed to steroids. Blood cultures negative and sputum culture grew MSSA and Klebsiella. He is currently on IV Zosyn. It appears that patient desaturates easily and did not do well on nasal O2 and was placed back on high flow nasal O2. Patient affirms SOB and dizziness when he exerts himself. Objective         Vitals Last 24 Hours:  TEMPERATURE:  Temp  Av.1 °F (36.7 °C)  Min: 97.6 °F (36.4 °C)  Max: 98.6 °F (37 °C)  RESPIRATIONS RANGE: Resp  Av.5  Min: 18  Max: 20  PULSE OXIMETRY RANGE: SpO2  Av.7 %  Min: 93 %  Max: 96 %  PULSE RANGE: Pulse  Av.5  Min: 79  Max: 91  BLOOD PRESSURE RANGE: Systolic (83FZW), YFE:807 , Min:124 , IEO:247   ; Diastolic (19TLA), OZD:84, Min:84, Max:91    I/O (24Hr): Intake/Output Summary (Last 24 hours) at 10/25/2020 1037  Last data filed at 10/24/2020 1448  Gross per 24 hour   Intake    Output 700 ml   Net -700 ml     Objective:  Vital signs: (most recent): Blood pressure 135/87, pulse 79, temperature 97.6 °F (36.4 °C), resp. rate 20, height 5' 9\" (1.753 m), weight 180 lb 5.4 oz (81.8 kg), SpO2 95 %.       General: Moderately ill-appearing male, NAD  HEENT: eyes open, high flow nasal O2  Neck: supple  Lungs: clear bilaterally  Heart: RRR  : Granados catheter with moderate urine sediment    Labs/Imaging/Diagnostics    Labs:  CBC:  Recent Labs     10/25/20  0800 10/24/20  0730 10/23/20  0920   WBC 16.6* 16.2* 16.9*   RBC 4.87 4.83 5.25   HGB 14.9 14.8 16.1   HCT 43.3 42.8 46.4   MCV 88.9 88.6 88.4   RDW 13.3 13.1 13.0    307 320     CHEMISTRIES:  Recent Labs     10/25/20  0800 10/24/20  0730    137   K 4.1 4.1    104   CO2 23 28   BUN 26* 25*   CA 8.2* 8.1*   PT/INR:  No results for input(s): INR, INREXT, INREXT in the last 72 hours. No lab exists for component: PROTIME  APTT:  Recent Labs     10/25/20  0800 10/24/20  0730 10/23/20  0920   APTT 24.7 23.2 25.6     LIVER PROFILE:  Recent Labs     10/25/20  0800   AST 19   ALT 42     Lab Results   Component Value Date/Time    ALT (SGPT) 42 10/25/2020 08:00 AM    AST (SGOT) 19 10/25/2020 08:00 AM    Alk. phosphatase 97 10/25/2020 08:00 AM    Bilirubin, total 1.2 (H) 10/25/2020 08:00 AM     WBC 15,300   (405 (689 (452 (565  Ferritin 1,084 (1,187  CRP <0.29 (0.43 (12.40    Sputum culture (10/20) MSSA and Klebsiella pneumoniae  Blood cultures (10/20) No growth at 5 days    Imaging Last 24 Hours:  No results found. Assessment//Plan   Active Problems:    Acute respiratory failure due to COVID-19 [facility]) (10/15/2020)      Pneumonia (10/15/2020)      Ac[facility] (10/15/2020)      Assessment & Plan  1. Probable Covid-19 pneumonitis, with indeterminate result, status post Remdesivir, Decadron, Azithromycin, Actemra, unchanged. 2. Acute respiratory failure, on high flow nasal O2  3. Elevated CRP, LDH and ferritin, secondary to #1, decreasing. 4. Possible HCAP with purulent sputum, secondary to MSSA and Klebsiella pneumoniae, Day #6 IV Zosyn.     Comment:  Sputum culture grew MSSA and Klebsiella. MSSA should be covered by Zosyn. Leukocytosis likely due to steroids on board. LDH/ferritin/ CRP all decreasing though oxygenation remains tenuous.     1. Continue IV Zosyn for MSSA and Klebsiella  2. Follow-up blood cultures  3.  Continue to monitor LDH, ferritin       Electronically signed by Meryl Toledo MD on 10/25/2020 at 1:43 PM

## 2020-10-25 NOTE — PROGRESS NOTES
Hospitalist Progress Note               Daily Progress Note: 10/25/2020      Subjective: The patient is seen for follow  up.   56 y.o. male with no significant medical problems presents to the emergency room complaining of shortness of breath. Patient had a urinary retention which was resolved with Granados catheter placement. Patient continues to be on high flow oxygen.     Seen and evaluated at bedside, no acute events overnight, patient still complaining of shortness of breath, however currently downtitrating on HFNC, discussed with RN and patient at bedside    Medications reviewed  Current Facility-Administered Medications   Medication Dose Route Frequency    albuterol-ipratropium (DUO-NEB) 2.5 MG-0.5 MG/3 ML  3 mL Nebulization Q6H RT    piperacillin-tazobactam (ZOSYN) 3.375 g in 0.9% sodium chloride (MBP/ADV) 100 mL MBP  3.375 g IntraVENous Q8H    0.9% sodium chloride infusion 250 mL  250 mL IntraVENous PRN    sodium chloride (OCEAN) 0.65 % nasal squeeze bottle 2 Spray  2 [place] Both Nostrils Q4HWA    hydrOXYzine pamoate (VISTARIL) capsule 25 mg  25 mg Oral TID PRN    melatonin tablet 10 mg  10 mg Oral QHS PRN    codeine sulfate tablet 30 mg  30 mg Oral Q8H RT    dextromethorphan (DELSYM) 30 mg/5 mL syrup 30 mg  30 mg Oral Q12H    enoxaparin (LOVENOX) injection 30 mg  30 mg SubCUTAneous Q12H    acetaminophen (TYLENOL) tablet 650 mg  650 mg Oral Q6H PRN    Or    acetaminophen (TYLENOL) suppository 650 mg  650 mg Rectal Q6H PRN    sodium chloride (NS) flush 5-40 mL  5-40 mL IntraVENous Q8H    sodium chloride (NS) flush 5-40 mL  5-40 mL IntraVENous PRN    ondansetron (ZOFRAN ODT) tablet 4 mg  4 mg Oral Q6H PRN    Or    ondansetron (ZOFRAN) injection 4 mg  4 mg IntraVENous Q6H PRN    dexamethasone (DECADRON) 4 mg/mL injection 4 mg  4 mg IntraVENous Q6H    benzonatate (TESSALON) capsule 200 mg  200 mg Oral Q8H       Review of Systems:   A comprehensive review of systems was negative except for that written in the HPI. Objective:   Physical Exam:     Visit Vitals  /87   Pulse 79   Temp 97.6 °F (36.4 °C)   Resp 20   Ht 5' 9\" (1.753 m)   Wt 81.8 kg (180 lb 5.4 oz)   SpO2 95%   BMI 26.63 kg/m²    O2 Flow Rate (L/min): 50 l/min O2 Device: Hi flow nasal cannula, Heated    Temp (24hrs), Av.1 °F (36.7 °C), Min:97.6 °F (36.4 °C), Max:98.6 °F (37 °C)    No intake/output data recorded. 10/23 1901 - 10/25 0700  In: -   Out: 3000 [Urine:3000]    PHYSICAL EXAM:  General: Alert and awake. Skin: Extremities and face reveal no rashes. HEENT: Sclerae anicteric. Extra-occular muscles are intact. No oral ulcers. No ENT discharge. The neck is supple. Cardiovascular: Regular rate and rhythm. No murmurs, gallops, or rubs. PMI nondisplaced. Carotids without bruits. Respiratory: Comfortable breathing with no accessory muscle use. Clear breath sounds with no wheezes, rales, or rhonchi. GI: Abdomen nondistended, soft, and nontender. Normal active bowel sounds. No enlargement of the liver or spleen. No masses palpable. Rectal: Deferred   Musculoskeletal: No pitting edema of the lower legs. Extremities have good range of motion. No costovertebral tenderness. Neurological: Gross memory appears intact. Patient is alert and oriented. Power 5/5, Cranial nerves II-XII intact  Psychiatric: Mood appears appropriate with judgement intact. Data Review:       Recent Days:  Recent Labs     10/24/20  0730 10/23/20  0920   WBC 16.2* 16.9*   HGB 14.8 16.1   HCT 42.8 46.4    320     Recent Labs     10/24/20  0730      K 4.1      CO2 28   *   BUN 25*   CREA 0.76   CA 8.1*     No results for input(s): PH, PCO2, PO2, HCO3, FIO2 in the last 72 hours. 24 Hour Results:  No results found for this or any previous visit (from the past 24 hour(s)). XR CHEST PORT   Final Result      XR CHEST PORT   Final Result   IMPRESSION: Improving bilateral airspace disease.       XR CHEST PORT   Final Result XR CHEST PORT   Final Result      XR CHEST PORT   Final Result      XR CHEST PORT   Final Result      CTA CHEST W OR W WO CONT   Final Result   IMPRESSION: Widespread patchy bilateral pneumonitis      XR CHEST SNGL V   Final Result      XR CHEST PORT    (Results Pending)   XR CHEST PORT    (Results Pending)          Assessment/     Problem List:  Hospital Problems  Date Reviewed: 10/15/2020          Codes Class Noted POA    Acute respiratory failure due to COVID-19 [facility]) ICD-10-CM: U07.1, J96.00  ICD-9-CM: 518.81, 079.89  10/15/2020 Yes        Pneumonia ICD-10-CM: J18.9  ICD-9-CM: 856  10/15/2020 Yes        Acute respiratory failure [facility]) ICD-10-CM: J96.00  ICD-9-CM: 518.81  10/15/2020 Unknown                     Plan:    Acute hypoxic respiratory failure/pneumoniapatient presents with acute hypoxic respiratory failure 2/2 to pneumonia high flow nasal cannula at 40 L, bacterial versus viral in etiology, patient does not meet sepsis criteria at this time, of note patient's 3rd covid test was negative  Follow-up sputum culture for sensitivities,  Currently showing gram positive cocci  Follow-up repeat blood cultures  Continue Zosyn and azithromycin for antibiotic coverage  Pt completed vancomycin course  Continue Decadron  Completed actemra/remdemsivir  Continue lovenox  Continue to monitor patient's respiratory status  Pulmonology recommendations appreciated, will continue to follow  Infectious disease recommendations appreciated, will continue to follow    Dehydrationcurrently resolved    PpxLovenox  FENcurrently on clear liquids, bedside swallow eval, advance diet, replete potassium and magnesium  Full code, patient surrogate decision-maker is his wife, updated patients daughter as well as Primary Care Physician  Dispositioncontinued inpatient treatment, pending clinical improvement, PT/OT    Care Plan discussed with: Patient/Family and Nurse        Haile Mancera MD

## 2020-10-25 NOTE — PROGRESS NOTES
PHYSICAL THERAPY EVALUATION    Patient: Ekaterina Heaton (56 y.o. male)  Date: 10/25/2020  Primary Diagnosis: Pneumonia [J18.9]  Acute respiratory failure ([address] [J96.00]        Precautions:02 stats dropping at activity   Other (comment)    ASSESSMENT :  Based on the objective data described below, the patient presents with ongoing LE weakness ; easy fatiguability ; shortness of breath. Patient will benefit from skilled intervention to address the above impairments. Patients rehabilitation potential is considered to be good and expected to resolve as indicated; currently pt is on 6L NC high pressure 02 supplement and was able to only endure bedside activity   Factors which may influence rehabilitation potential include:   []         None noted  []         Mental ability/status  [x]         Medical condition  []         Home/family situation and support systems  []         Safety awareness  []         Pain tolerance/management  [x]         Other: proper breathing techniques      PLAN :  Recommendations and Planned Interventions:  []           Bed Mobility Training             []    Neuromuscular Re-Education  []           Transfer Training                   []    Orthotic/Prosthetic Training  [x]           Gait Training                          []    Modalities  [x]           Therapeutic Exercises          []    Edema Management/Control  []           Therapeutic Activities            []    Patient and Family Training/Education  []           Other (comment):    Frequency/Duration: Patient will be followed by physical therapy 3x a week  to address goals. Discharge Recommendations: at home - private residence with possible Home health care as needed   Further Equipment Recommendations for Discharge: none seen at this time     SUBJECTIVE:   Patient stated \"theres no problem with my legs and my strength I think ; its just that my oxygen drops when I get up \"    OBJECTIVE DATA SUMMARY:   History reviewed.  No pertinent past medical history. History reviewed. No pertinent surgical history. Barriers to Learning/Limitations: None  Compensate with: visual, verbal, tactile, kinesthetic cues/model  Prior Level of Function/Home Situation:  Home Situation  Home Environment: Private residence  One/Two Story Residence: Other (Comment)  Living Alone: No  Support Systems: Family member(s)  Patient Expects to be Discharged to[de-identified] Private residence  Current DME Used/Available at Home: None  Critical Behavior:  Neurologic State: Alert  Orientation Level: Oriented X4  Cognition: Appropriate decision making     Psychosocial  Patient Behaviors: Calm; Cooperative  Purposeful Interaction: Yes                 Strength:    Strength: Generally decreased, functional; grossly graded at 3-/5 on B hips knees and ankle ms grps                     Tone & Sensation:                  Sensation: Intact               Range Of Motion:  AROM: Within functional limits           PROM: Within functional limits           Functional Mobility:  Bed Mobility:  Rolling: Modified independent  Supine to Sit: Supervision  Sit to Supine: Supervision  Scooting: Supervision  Transfers:  Sit to Stand: Minimum assistance  Stand to Sit: Minimum assistance  Stand Pivot Transfers: Minimum assistance     Bed to Chair: Minimum assistance  Lateral Transfers: Minimum assistance           Balance:   Sitting: Intact  Standing: With support  Ambulation/Gait Training:      Pt declined to walk outside bed perimeter as pt 02 stats dropped to 89% when asked to do standing activity ; pt 02 stats returned to normal limits when pt returned to supine   Therapeutic Exercises:   [place] on B hip knee and ankle ms grps  done x 10 reps x 2 sets all planes ; transfers training; gait training  Pain:   No pain stated at this time          Precautions: Other (comment) 02 supplement at 6L NC and 02 stats precaution   Chart, physical therapy assessment, plan of care and goals were reviewed.     ASSESSMENT  Patient continues with skilled PT services and is progressing towards goals due to complexity of instructions and techniques needed to attain PLOF mobility status     Current Level of Function Impacting Discharge (mobility/balance): activity performance / endurance ; 02 stats level at rest and in activity     Other factors to consider for discharge: none seen at this time         PLAN :  Patient continues to benefit from skilled intervention to address the above impairments. Continue treatment per established plan of care. to address goals. Recommendation for discharge: (in order for the patient to meet his/her long term goals)  Physical therapy at least 2 days/week in the home     This discharge recommendation:  A follow-up discussion with the attending provider and/or case management is planned    IF patient discharges home will need the following DME: none       SUBJECTIVE:   Patient stated no new concerns; \"Im feeling better \"    OBJECTIVE DATA SUMMARY:   Critical Behavior:  Neurologic State: Alert  Orientation Level: Oriented X4  Cognition: Appropriate decision making     Functional Mobility Training:  Bed Mobility:  Rolling: Modified independent  Supine to Sit: Supervision  Sit to Supine: Supervision  Scooting: Supervision        Transfers:  Sit to Stand: Minimum assistance  Stand to Sit: Minimum assistance  Stand Pivot Transfers: Minimum assistance     Bed to Chair: Minimum assistance  Lateral Transfers: Minimum assistance                 Balance:  Sitting: Intact  Standing: With support  Ambulation/Gait Training:         Pt declined to ambulate outside bed perimeter at this time               Pt performed ambulation within bed perimeter but after 14 secs of mobilizing leg ; pt desatted to 89%                            Stairs:               Therapeutic Exercises:   Seth Butts on B LE hip knee and ankle ms grps   Pain Rating:  No pain stated at this time    Activity Tolerance:  POOR    Please refer to the flowsheet for vital signs taken during this treatment. After treatment patient left in no apparent distress:   Sitting in chair    COMMUNICATION/COLLABORATION:   The patients plan of care was discussed with: Physical therapist.     Laura Dior   Time Calculation: 23 mins              Activity Tolerance: poor ; pt graded RPE at hard when asked to do standing activity at bedside  Please refer to the flowsheet for vital signs taken during this treatment. Vital Signs  Temp: 97.6 °F (36.4 °C)     Pulse (Heart Rate): 79     BP: 135/87     Resp Rate: 20     O2 Sat (%): 95 %    After treatment:   []         Patient left in no apparent distress sitting up in chair  []         Patient left in no apparent distress in bed  [x]         Call bell left within reach  []         Nursing notified  []         Caregiver present  [x]         Bed alarm activated    1. Pt will be I with LE HEP in 7 days. 2. Pt will perform bed mobility with _MOD IND __ in 7 days. 3. Pt will perform transfers with _MOD IND__ in 7 days. 4. Pt will amb __150-200_ feet without AD safely with mod I in 7 days. 5. Pt will ascend/descend 4 steps with B handrails and CGA in 7 days to safely enter home. 6. Pt will verbalize and demonstrate compliance with _HEP and proper breathing techniques; fall risk __ precautions/restrictions to include ___ in 7 days. COMMUNICATION/EDUCATION:   [x]         Fall prevention education was provided and the patient/caregiver indicated understanding. []         Patient/family have participated as able in goal setting and plan of care. [x]         Patient/family agree to work toward stated goals and plan of care. []         Patient understands intent and goals of therapy, but is neutral about his/her participation. []         Patient is unable to participate in goal setting and plan of care.     Thank you for this referral.  Yareli Mora   Time Calculation: 23 mins

## 2020-10-25 NOTE — ROUTINE PROCESS
Bedside and Verbal shift change report given to Shon Perdomo (oncoming nurse) by Guillermo Contreras (offgoing nurse). Report included the following information SBAR and MAR.

## 2020-10-25 NOTE — PROGRESS NOTES
PT eval completed ; pt was not able to do anything outside bed perimeter ; 02 stats starting to drop upon mobility even with high pressure 02 at 90 % from 94% prior top mobility ; pt stated no feeling of fatigue when asked to do LE exercises at time of assesment  ; pt able to mobilize self with MOD IND with bed mobility and transfers ; ambulation was not accomplished as pt began coughing continuesly and only stopped as pt was asked to lie back in supine positioning ; to to continue therapy and establish OOB activity as in PLOF; pt left in room at 93% 02 stats and 102 IA  ; pt plans to return to home when medically stable ; pt seen with appropriate PPE and visited by Dr. Caryle Finical prior to activity

## 2020-10-26 ENCOUNTER — APPOINTMENT (OUTPATIENT)
Dept: NON INVASIVE DIAGNOSTICS | Age: 60
DRG: 177 | End: 2020-10-26
Attending: INTERNAL MEDICINE
Payer: COMMERCIAL

## 2020-10-26 ENCOUNTER — APPOINTMENT (OUTPATIENT)
Dept: GENERAL RADIOLOGY | Age: 60
DRG: 177 | End: 2020-10-26
Attending: INTERNAL MEDICINE
Payer: COMMERCIAL

## 2020-10-26 LAB
ANION GAP SERPL CALC-SCNC: 5 MMOL/L (ref 5–15)
APTT PPP: 23.8 SEC (ref 23–35.7)
BUN SERPL-MCNC: 25 MG/DL (ref 6–20)
BUN/CREAT SERPL: 35 (ref 12–20)
CA-I BLD-MCNC: 8.1 MG/DL (ref 8.5–10.1)
CHLORIDE SERPL-SCNC: 104 MMOL/L (ref 97–108)
CO2 SERPL-SCNC: 29 MMOL/L (ref 21–32)
CREAT SERPL-MCNC: 0.71 MG/DL (ref 0.7–1.3)
ERYTHROCYTE [DISTWIDTH] IN BLOOD BY AUTOMATED COUNT: 13.4 % (ref 11.5–14.5)
GLUCOSE SERPL-MCNC: 174 MG/DL (ref 65–100)
HCT VFR BLD AUTO: 43.5 % (ref 36.6–50.3)
HGB BLD-MCNC: 15.1 G/DL (ref 12.1–17)
MCH RBC QN AUTO: 31.2 PG (ref 26–34)
MCHC RBC AUTO-ENTMCNC: 34.7 G/DL (ref 30–36.5)
MCV RBC AUTO: 89.9 FL (ref 80–99)
PLATELET # BLD AUTO: 238 K/UL (ref 150–400)
PMV BLD AUTO: 10.4 FL (ref 8.9–12.9)
POTASSIUM SERPL-SCNC: 4.1 MMOL/L (ref 3.5–5.1)
RBC # BLD AUTO: 4.84 M/UL (ref 4.1–5.7)
SODIUM SERPL-SCNC: 138 MMOL/L (ref 136–145)
THERAPEUTIC RANGE,PTTT: NORMAL SEC (ref 68–109)
WBC # BLD AUTO: 16.9 K/UL (ref 4.1–11.1)

## 2020-10-26 PROCEDURE — 74011250636 HC RX REV CODE- 250/636: Performed by: INTERNAL MEDICINE

## 2020-10-26 PROCEDURE — 77010033711 HC HIGH FLOW OXYGEN

## 2020-10-26 PROCEDURE — 71045 X-RAY EXAM CHEST 1 VIEW: CPT

## 2020-10-26 PROCEDURE — 74011250636 HC RX REV CODE- 250/636: Performed by: HOSPITALIST

## 2020-10-26 PROCEDURE — 74011000250 HC RX REV CODE- 250: Performed by: INTERNAL MEDICINE

## 2020-10-26 PROCEDURE — 65270000029 HC RM PRIVATE

## 2020-10-26 PROCEDURE — 93970 EXTREMITY STUDY: CPT

## 2020-10-26 PROCEDURE — 97165 OT EVAL LOW COMPLEX 30 MIN: CPT

## 2020-10-26 PROCEDURE — 74011250637 HC RX REV CODE- 250/637: Performed by: INTERNAL MEDICINE

## 2020-10-26 PROCEDURE — 80048 BASIC METABOLIC PNL TOTAL CA: CPT

## 2020-10-26 PROCEDURE — 94640 AIRWAY INHALATION TREATMENT: CPT

## 2020-10-26 PROCEDURE — 97530 THERAPEUTIC ACTIVITIES: CPT

## 2020-10-26 PROCEDURE — 74011000258 HC RX REV CODE- 258: Performed by: INTERNAL MEDICINE

## 2020-10-26 PROCEDURE — 99232 SBSQ HOSP IP/OBS MODERATE 35: CPT | Performed by: INTERNAL MEDICINE

## 2020-10-26 PROCEDURE — 85730 THROMBOPLASTIN TIME PARTIAL: CPT

## 2020-10-26 PROCEDURE — 94762 N-INVAS EAR/PLS OXIMTRY CONT: CPT

## 2020-10-26 PROCEDURE — 36415 COLL VENOUS BLD VENIPUNCTURE: CPT

## 2020-10-26 PROCEDURE — 85027 COMPLETE CBC AUTOMATED: CPT

## 2020-10-26 RX ORDER — FUROSEMIDE 10 MG/ML
40 INJECTION INTRAMUSCULAR; INTRAVENOUS DAILY
Status: DISCONTINUED | OUTPATIENT
Start: 2020-10-26 | End: 2020-11-06

## 2020-10-26 RX ORDER — DEXAMETHASONE SODIUM PHOSPHATE 4 MG/ML
2 INJECTION, SOLUTION INTRA-ARTICULAR; INTRALESIONAL; INTRAMUSCULAR; INTRAVENOUS; SOFT TISSUE EVERY 12 HOURS
Status: DISCONTINUED | OUTPATIENT
Start: 2020-10-26 | End: 2020-11-02

## 2020-10-26 RX ORDER — FUROSEMIDE 10 MG/ML
40 INJECTION INTRAMUSCULAR; INTRAVENOUS ONCE
Status: DISCONTINUED | OUTPATIENT
Start: 2020-10-26 | End: 2020-10-26

## 2020-10-26 RX ORDER — DEXTROMETHORPHAN POLISTIREX 30 MG/5ML
30 SUSPENSION ORAL
Status: DISCONTINUED | OUTPATIENT
Start: 2020-10-26 | End: 2020-11-09 | Stop reason: HOSPADM

## 2020-10-26 RX ADMIN — SALINE NASAL SPRAY 2 SPRAY: 1.5 SOLUTION NASAL at 22:00

## 2020-10-26 RX ADMIN — PIPERACILLIN SODIUM AND TAZOBACTAM SODIUM 3.38 G: 3; .375 INJECTION, POWDER, LYOPHILIZED, FOR SOLUTION INTRAVENOUS at 01:19

## 2020-10-26 RX ADMIN — BENZONATATE 200 MG: 100 CAPSULE ORAL at 17:15

## 2020-10-26 RX ADMIN — CODEINE SULFATE 30 MG: 30 TABLET ORAL at 09:19

## 2020-10-26 RX ADMIN — SALINE NASAL SPRAY 2 SPRAY: 1.5 SOLUTION NASAL at 05:55

## 2020-10-26 RX ADMIN — SALINE NASAL SPRAY 2 SPRAY: 1.5 SOLUTION NASAL at 17:17

## 2020-10-26 RX ADMIN — DEXTROMETHORPHAN 30 MG: 30 SUSPENSION, EXTENDED RELEASE ORAL at 09:19

## 2020-10-26 RX ADMIN — SALINE NASAL SPRAY 2 SPRAY: 1.5 SOLUTION NASAL at 14:00

## 2020-10-26 RX ADMIN — IPRATROPIUM BROMIDE AND ALBUTEROL SULFATE 3 ML: .5; 3 SOLUTION RESPIRATORY (INHALATION) at 00:50

## 2020-10-26 RX ADMIN — PIPERACILLIN SODIUM AND TAZOBACTAM SODIUM 3.38 G: 3; .375 INJECTION, POWDER, LYOPHILIZED, FOR SOLUTION INTRAVENOUS at 17:15

## 2020-10-26 RX ADMIN — CODEINE SULFATE 30 MG: 30 TABLET ORAL at 01:19

## 2020-10-26 RX ADMIN — BENZONATATE 200 MG: 100 CAPSULE ORAL at 21:33

## 2020-10-26 RX ADMIN — Medication 10 ML: at 17:16

## 2020-10-26 RX ADMIN — BENZONATATE 200 MG: 100 CAPSULE ORAL at 05:53

## 2020-10-26 RX ADMIN — Medication 10 ML: at 22:00

## 2020-10-26 RX ADMIN — SALINE NASAL SPRAY 2 SPRAY: 1.5 SOLUTION NASAL at 09:19

## 2020-10-26 RX ADMIN — DEXAMETHASONE SODIUM PHOSPHATE 2 MG: 4 INJECTION, SOLUTION INTRAMUSCULAR; INTRAVENOUS at 21:33

## 2020-10-26 RX ADMIN — IPRATROPIUM BROMIDE AND ALBUTEROL SULFATE 3 ML: .5; 3 SOLUTION RESPIRATORY (INHALATION) at 08:52

## 2020-10-26 RX ADMIN — DEXAMETHASONE SODIUM PHOSPHATE 4 MG: 4 INJECTION, SOLUTION INTRA-ARTICULAR; INTRALESIONAL; INTRAMUSCULAR; INTRAVENOUS; SOFT TISSUE at 05:53

## 2020-10-26 RX ADMIN — IPRATROPIUM BROMIDE AND ALBUTEROL SULFATE 3 ML: .5; 3 SOLUTION RESPIRATORY (INHALATION) at 14:30

## 2020-10-26 RX ADMIN — FUROSEMIDE 40 MG: 10 INJECTION, SOLUTION INTRAMUSCULAR; INTRAVENOUS at 12:19

## 2020-10-26 RX ADMIN — IPRATROPIUM BROMIDE AND ALBUTEROL SULFATE 3 ML: .5; 3 SOLUTION RESPIRATORY (INHALATION) at 21:16

## 2020-10-26 RX ADMIN — PIPERACILLIN SODIUM AND TAZOBACTAM SODIUM 3.38 G: 3; .375 INJECTION, POWDER, LYOPHILIZED, FOR SOLUTION INTRAVENOUS at 09:19

## 2020-10-26 RX ADMIN — DEXAMETHASONE SODIUM PHOSPHATE 4 MG: 4 INJECTION, SOLUTION INTRA-ARTICULAR; INTRALESIONAL; INTRAMUSCULAR; INTRAVENOUS; SOFT TISSUE at 01:19

## 2020-10-26 RX ADMIN — Medication 10 ML: at 05:54

## 2020-10-26 RX ADMIN — ENOXAPARIN SODIUM 30 MG: 30 INJECTION SUBCUTANEOUS at 05:53

## 2020-10-26 RX ADMIN — ENOXAPARIN SODIUM 30 MG: 30 INJECTION SUBCUTANEOUS at 17:16

## 2020-10-26 RX ADMIN — BENZONATATE 200 MG: 100 CAPSULE ORAL at 12:18

## 2020-10-26 NOTE — ROUTINE PROCESS
Bedside and Verbal shift change report given to Lindsey Kraus (oncoming nurse) by Km Lucas (offgoing nurse). Report included the following information SBAR and MAR.

## 2020-10-26 NOTE — PROGRESS NOTES
Progress Note  Date:10/26/2020       Room:[facility]  Patient Name:Ekaterina Erwin     YOB: 1960     Age:60 y.o. Subjective    Subjective   Patient followed for presumptive Covid-19 pneumoniitis. Initial test was negative but second test is indeterminate. No CXR today. He is afebrile with leukocytosis attributed to steroids. Blood cultures negative and sputum culture grew MSSA and Klebsiella. He is currently on IV Zosyn. He remains on high flow nasal O2, but states that he \"feels better\". Objective         Vitals Last 24 Hours:  TEMPERATURE:  Temp  Av.2 °F (36.8 °C)  Min: 97.4 °F (36.3 °C)  Max: 98.7 °F (37.1 °C)  RESPIRATIONS RANGE: Resp  Av.7  Min: 20  Max: 22  PULSE OXIMETRY RANGE: SpO2  Av.3 %  Min: 92 %  Max: 97 %  PULSE RANGE: Pulse  Av.3  Min: 74  Max: 99  BLOOD PRESSURE RANGE: Systolic (46COL), TWH:133 , Min:138 , LDE:130   ; Diastolic (50OIT), SOLORZANO:03, Min:92, Max:100    I/O (24Hr): Intake/Output Summary (Last 24 hours) at 10/26/2020 0929  Last data filed at 10/26/2020 0630  Gross per 24 hour   Intake    Output 2100 ml   Net -2100 ml     Objective:  Vital signs: (most recent): Blood pressure (!) 138/98, pulse 74, temperature 98.4 °F (36.9 °C), resp. rate 20, height 5' 9\" (1.753 m), weight 180 lb 5.4 oz (81.8 kg), SpO2 95 %.       General: Moderately ill-appearing male, NAD  HEENT: eyes open, high flow nasal O2  Neck: supple  Lungs: clear bilaterally  Heart: RRR  : Granados catheter with moderate urine sediment    Labs/Imaging/Diagnostics    Labs:  CBC:  Recent Labs     10/26/20  0740 10/25/20  0800 10/24/20  0730   WBC 16.9* 16.6* 16.2*   RBC 4.84 4.87 4.83   HGB 15.1 14.9 14.8   HCT 43.5 43.3 42.8   MCV 89.9 88.9 88.6   RDW 13.4 13.3 13.1    283 307     CHEMISTRIES:  Recent Labs     10/26/20  0740 10/25/20  0800 10/24/20  0730    137 137   K 4.1 4.1 4.1    105 104   CO2 29 23 28   BUN 25* 26* 25*   CA 8.1* 8.2* 8.1* PT/INR:  No results for input(s): INR, INREXT, INREXT in the last 72 hours. No lab exists for component: PROTIME  APTT:  Recent Labs     10/26/20  0740 10/25/20  0800 10/24/20  0730   APTT 23.8 24.7 23.2     LIVER PROFILE:  Recent Labs     10/25/20  0800   AST 19   ALT 42     Lab Results   Component Value Date/Time    ALT (SGPT) 42 10/25/2020 08:00 AM    AST (SGOT) 19 10/25/2020 08:00 AM    Alk. phosphatase 97 10/25/2020 08:00 AM    Bilirubin, total 1.2 (H) 10/25/2020 08:00 AM     WBC 16,900   (405 (689 (452 (565  Ferritin 1,084 (1,187  CRP <0.29 (0.43 (12.40    Sputum culture (10/20) MSSA and Klebsiella pneumoniae  Blood cultures (10/20) No growth at 5 days    Imaging Last 24 Hours:  Xr Chest Port    Result Date: 10/25/2020  Comparison is with the prior exam dated 10/23/2020. Findings/impression: A portable upright view of the chest demonstrates persistent cardiomegaly and patchy interstitial and mild airspace disease which has improved slightly on the right. No pneumothorax or effusion is identified. The osseous structures are unchanged. Assessment//Plan   Active Problems:    Acute respiratory failure due to COVID-19 [facility]) (10/15/2020)      Pneumonia (10/15/2020)      Acute respiratory failure ([facility] [address] (10/15/2020)      Assessment & Plan  1. Probable Covid-19 pneumonitis, with indeterminate result, status post Remdesivir, Decadron, Azithromycin, Actemra, unchanged. 2. Acute respiratory failure, on high flow nasal O2  3. Elevated CRP, LDH and ferritin, secondary to #1, decreasing. 4. HCAP with purulent sputum, secondary to MSSA and Klebsiella pneumoniae, Day #7 IV Zosyn.     Comment:  Sputum culture grew MSSA and Klebsiella. MSSA should be covered by Zosyn. Leukocytosis likely due to steroids on board. LDH/ferritin decreasing, CRP normal.     1. Continue IV Zosyn for MSSA and Klebsiella  2. Follow-up blood cultures  3.  Continue to monitor LDH, ferritin       Electronically signed by Tung Anderson Tejas Jara MD on 10/26/2020 at 1:43 PM

## 2020-10-26 NOTE — PROGRESS NOTES
Pulmonary Note      Name: Ekaterina Finley MRN: [id number]   : 1960 Hospital: [facility]   Date: 10/26/2020  Admission date: 10/15/2020 Hospital Day: 12       Subjective/Interval History:   Patient seen in the ICU on high flow nasal oxygen at 70% with oxygen saturation 99. He gives a history of gradual worsening illness over the last week to 10 days with nonproductive cough fever generalized malaise nausea denies any vomiting although the ER note states that he had some vomiting. He seems awake and alert. Does not have any Covid contacts that he is aware of. States that his sense of taste and smell have been normal but terribly anorexic has not eaten anything in 3 or 4 days. 10/17 still feels bad Cough shortness of breath but no nausea today   10/18 still complains of severe cough nonproductive although in general feels a little better  10/20 remains 100% high flow nasal oxygen. Continues to complain of it irritating him. Requested nasal saline last evening but it has not been started will reorder it. We will let him try nonrebreather mask with nasal high flow for meals  10/26 high flow nasal oxygen back up to 70% unable to safely switch to low flow nasal oxygen at this point. He states he feels better sense of taste is normal with a good appetite  Patient Active Problem List   Diagnosis Code    Acute respiratory failure due to COVID-19 ([facility]) U07.1, J96.00    Pneumonia J18.9    Acute respiratory failure ([facility] [address] J96.00       IMPRESSION:   1. Acute hypoxic respiratory failure requiring high flow nasal oxygen will decrease to 55%  2. History of asthma  3. Community-acquired versus aspiration with diffuse pulmonary infiltrates questionable atypical pneumonia possible COVID-19 repeat testing Negative  4. Nausea probable due to COVID-19 infection  5. Unremitting cough secondary to pneumonia or reflux  Body mass index is 26.63 kg/m². RECOMMENDATIONS/PLAN:   1.  Initial COVID-19 test is negative repeat testing indeterminant  but ferritin LDH CRP are all elevated chest x-ray is consistent with Covid pneumonitis  has completed 2 doses Actemra and 5 days of Remdesivir  2. CRP less than 0.29 will decrease Decadron to every 12 dosing  3. Continued hypoxia will give IV Lasix and follow renal function. 4. Sputum culture with MSSA and Klebsiella both should be covered by Zosyn  5. Cough better continue Tessalon and as needed Delsym discontinue codeine  6. Continue nebulizer treatment  7. Repeat COVID-19 test negative  8. Legionella antigen and blood titer still not resulted question if it was actually sent         Subjective/Initial History:   I have reviewed the flowsheet and previous days notes. Seen earlier today on rounds. I was asked by Saray Lucas MD to see An Faith Naranjo a 56 y.o.  male  in consultation for a chief complaint of acute hypoxic respiratory failure cough and community-acquired pneumonia          Pt now in CCU. Patient PCP: UNKNOWN  PMH:  has no past medical history on file. PSH:   has no past surgical history on file. FHX: family history includes No Known Problems in his father and mother. SHX:  reports that he has never smoked. He has never used smokeless tobacco. He reports that he does not drink alcohol or use drugs.     Systemic review:  General no chronic illness but over the last week he has had fever generalized malaise weight loss no appetite  Eyes no double vision or momentary blindness  ENT no sinus congestion drainage or facial pain  Skeletal has diffuse aches and pains no swollen tender joints  Endocrinologic no polyuria polydipsia  Neurologic no seizures or syncope  Gastrointestinal normally he has no problems but over this last week he has had nausea anorexia marked weight loss has not had any alteration of his sense of taste or smell  Genitourinary no discomfort or drainage on urination  Cardiovascular no history of heart disease chest pain has felt sweaty but no history of ankle edema.   Respiratory as mentioned above    No Known Allergies   MEDS:   Current Facility-Administered Medications   Medication    furosemide (LASIX) injection 40 mg    albuterol-ipratropium (DUO-NEB) 2.5 MG-0.5 MG/3 ML    piperacillin-tazobactam (ZOSYN) 3.375 g in 0.9% sodium chloride (MBP/ADV) 100 mL MBP    0.9% sodium chloride infusion 250 mL    sodium chloride (OCEAN) 0.65 % nasal squeeze bottle 2 Spray    hydrOXYzine pamoate (VISTARIL) capsule 25 mg    melatonin tablet 10 mg    codeine sulfate tablet 30 mg    dextromethorphan (DELSYM) 30 mg/5 mL syrup 30 mg    enoxaparin (LOVENOX) injection 30 mg    acetaminophen (TYLENOL) tablet 650 mg    Or    acetaminophen (TYLENOL) suppository 650 mg    sodium chloride (NS) flush 5-40 mL    sodium chloride (NS) flush 5-40 mL    ondansetron (ZOFRAN ODT) tablet 4 mg    Or    ondansetron (ZOFRAN) injection 4 mg    dexamethasone (DECADRON) 4 mg/mL injection 4 mg    benzonatate (TESSALON) capsule 200 mg        Current Facility-Administered Medications:     furosemide (LASIX) injection 40 mg, 40 mg, IntraVENous, DAILY, Kesha Navarrete MD    albuterol-ipratropium (DUO-NEB) 2.5 MG-0.5 MG/3 ML, 3 mL, Nebulization, Q6H RT, Kannan Conde MD, 3 mL at 10/26/20 0852    piperacillin-tazobactam (ZOSYN) 3.375 g in 0.9% sodium chloride (MBP/ADV) 100 mL MBP, 3.375 g, IntraVENous, Q8H, Simona Gonzales MD, Last Rate: 25 mL/hr at 10/26/20 0919, 3.375 g at 10/26/20 0919    0.9% sodium chloride infusion 250 mL, 250 mL, IntraVENous, PRN, Ronna Church MD    sodium chloride (OCEAN) 0.65 % nasal squeeze bottle 2 Spray, 2 Spray, Both Nostrils, Q4HWA, Kesha Navarrete MD, 2 [place] at 10/26/20 0919    hydrOXYzine pamoate (VISTARIL) capsule 25 mg, 25 mg, Oral, TID PRN, Neida Mcdaniels MD, 25 mg at 10/21/20 2231    melatonin tablet 10 mg, 10 mg, Oral, QHS PRN, Neida Mcdaniels MD, 10 mg at 10/21/20 2231    codeine sulfate tablet 30 mg, 30 mg, Oral, Q8H RT, Krysta Kelley MD, 30 mg at 10/26/20 0919    dextromethorphan (DELSYM) 30 mg/5 mL syrup 30 mg, 30 mg, Oral, Q12H, Krysta Kelley MD, 30 mg at 10/26/20 0919    enoxaparin (LOVENOX) injection 30 mg, 30 mg, SubCUTAneous, Q12H, Sidney Hilliard MD, 30 mg at 10/26/20 0553    acetaminophen (TYLENOL) tablet 650 mg, 650 mg, Oral, Q6H PRN, 650 mg at 10/18/20 2205 **OR** acetaminophen (TYLENOL) suppository 650 mg, 650 mg, Rectal, Q6H PRN, Sidney Hilliard MD    sodium chloride (NS) flush 5-40 mL, 5-40 mL, IntraVENous, Q8H, Sidney Hilliard MD, 10 mL at 10/26/20 0554    sodium chloride (NS) flush 5-40 mL, 5-40 mL, IntraVENous, PRN, Sidney Hilliard MD, 10 mL at 10/22/20 0938    ondansetron (ZOFRAN ODT) tablet 4 mg, 4 mg, Oral, Q6H PRN **OR** ondansetron (ZOFRAN) injection 4 mg, 4 mg, IntraVENous, Q6H PRN, Sidney Hilliard MD    dexamethasone (DECADRON) 4 mg/mL injection 4 mg, 4 mg, IntraVENous, Q6H, Krysta Kelley MD, 4 mg at 10/26/20 0553    benzonatate (TESSALON) capsule 200 mg, 200 mg, Oral, Q8H, Krysta Kelley MD, 200 mg at 10/26/20 0553      Objective:     Vital Signs: Telemetry:    normal sinus rhythm Intake/Output:   Visit Vitals  BP (!) 138/98 (BP 1 Location: Right arm, BP Patient Position: At rest)   Pulse 74   Temp 98.4 °F (36.9 °C)   Resp 20   Ht 5' 9\" (1.753 m)   Wt 81.8 kg (180 lb 5.4 oz)   SpO2 95%   BMI 26.63 kg/m²       Temp (24hrs), Av.2 °F (36.8 °C), Min:97.4 °F (36.3 °C), Max:98.7 °F (37.1 °C)        O2 Device: Hi flow nasal cannula O2 Flow Rate (L/min): 50 l/min         Body mass index is 26.63 kg/m². Wt Readings from Last 4 Encounters:   10/21/20 81.8 kg (180 lb 5.4 oz)          Intake/Output Summary (Last 24 hours) at 10/26/2020 1131  Last data filed at 10/26/2020 0630  Gross per 24 hour   Intake    Output 2100 ml   Net -2100 ml       Last shift:      No intake/output data recorded.   Last 3 shifts: 10/24 1901 - 10/26 0700  In: -   Out: [address] [id number]   Ventilator Settings:      Mode Rate TV Press PEEP FiO2 PIP Min. Vent               70 %            Physical Exam:    General:  male; less distress chronic cough nonproductive  HEENT: NCAT, oral mucosa clear  Eyes: anicteric; conjunctiva clear extraocular movements intact   neck: no node neck veins visible  Chest: no deformity,   Cardiac: Regular rate and rhythm no murmurs trace ankle edema  Lungs: Clear anteriorly laterally still has rales posteriorly improved  Abd: Soft nontender normal bowel sounds no organomegaly  Ext: Mild edema; no joint swelling;  No clubbing  :clear urine  Neuro: Awake alert speech is clear moves all 4 extremities well  Psych- no agitation, oriented to person;   Skin: warm,, no cyanosis; very mildly diaphoretic  Pulses: Pulses intact  Capillary: Normal capillary refill      Labs:    Recent Labs     10/26/20  0740 10/25/20  0800 10/24/20  0730   WBC 16.9* 16.6* 16.2*   HGB 15.1 14.9 14.8    283 307   APTT 23.8 24.7 23.2     Recent Labs     10/26/20  0740 10/25/20  0800 10/24/20  0730    137 137   K 4.1 4.1 4.1    105 104   CO2 29 23 28   * 180* 173*   BUN 25* 26* 25*   CREA 0.71 0.79 0.76   CA 8.1* 8.2* 8.1*   ALB  --  2.9*  --    ALT  --  42  --      10/24 6 L nasal oxygen with oxygen saturation 90%    10/14 COVID-19 test negative  10/17 COVID-19 test indeterminate  Ferritin 1510, previous 2180  , previous 615, 754  Procalcitonin 0.14  CRP 0.87, previous 1.39, 3.0, 12.4  Lab Results   Component Value Date/Time    Culture result: No growth 5 days 10/20/2020 11:30 AM    Culture result: Heavy Staphylococcus aureus 10/20/2020 08:15 AM    Culture result: Light Klebsiella pneumoniae 10/20/2020 08:15 AM    Culture result: Heavy  Normal respiratory smita   10/20/2020 08:15 AM      Lab Results   Component Value Date/Time     10/15/2020 11:47 PM       Imaging:  I have personally reviewed the patients radiographs and have reviewed the reports:    CXR Results  (Last 48 hours)  10/20 chest x-ray with diffuse patchy infiltrates may be slightly less dense  10/17 chest x-ray unchanged diffuse bilateral infiltrate               10/15/20 1927  XR CHEST SNGL V Final result    Narrative:  1       Mild atelectasis in the bases with upper lungs clear. No effusion or   pneumothorax. Normal heart and no congestion  Disagree to me there is bilateral infiltrate           Results from Hospital Encounter encounter on 10/15/20   XR CHEST PORT    Narrative Chest, frontal view, 10/26/2020    History: Cough. Pneumonia. Comparison: Including chest 10/25/2020. Findings: The cardiac silhouette is stable. The lungs remain underexpanded. Airspace opacities in the lungs are not significantly changed as compared to the  study performed one day prior. Hydrostatic edema is not excluded. No pleural  effusion or pneumothorax is identified. The osseous structures are stable. Impression Impression: No significant interval change. XR CHEST PORT    Narrative Comparison is with the prior exam dated 10/23/2020. Impression Findings/impression: A portable upright view of the chest demonstrates  persistent cardiomegaly and patchy interstitial and mild airspace disease which  has improved slightly on the right. No pneumothorax or effusion is identified. The osseous structures are unchanged. XR CHEST PORT    Narrative 1 view comparison yesterday    Little change in patchy interstitial infiltrates throughout much of each lung. No effusion or pneumothorax. Normal heart and stable mediastinal     Results from Hospital Encounter encounter on 10/15/20   CTA CHEST W OR W WO CONT    Narrative CT dose reduction was achieved through use of a standardized protocol tailored  for this examination and automatic exposure control for dose modulation.     Contrast study maximum intensity projections shows pronounced groundglass  opacification, of variable density, mainly in the dependent portions of each  lung, apex to base. Air bronchograms are preserved in the densest portions. Mild  geographic groundglass opacification of lower density in the nondependent  portions. Central airways are open    Pulmonary arteries are well-opacified and show no filling defect or distortion. Aorta shows normal dimensions, without dissection. Tiny middle mediastinal lymph  nodes. Cardiac finding. No pleural pericardial effusion. No significant upper  abdominal or chest wall finding      Impression IMPRESSION: Widespread patchy bilateral pneumonitis       Clinical picture consistent with COVID-19 infection with pneumonitis with diffuse patchy pulmonary infiltrate elevation in ferritin LDH and CRP nonproductive cough and GI symptoms and yet initial COVID-19 test negative repeat testing indeterminate           Thank you for allowing us to participate in the care of this patient.   We will follow along with you    Raul Garber MD

## 2020-10-26 NOTE — PROGRESS NOTES
Hospitalist Progress Note               Daily Progress Note: 10/26/2020      Subjective: The patient is seen for follow  up.   56 y.o. male with no significant medical problems presents to the emergency room complaining of shortness of breath. Patient had a urinary retention which was resolved with Granados catheter placement. Patient continues to be on high flow oxygen.     Seen and evaluated at bedside, no acute events overnight, patient still complaining of shortness of breath, however currently downtitrating on HFNC, discussed with RN and patient at bedside    Medications reviewed  Current Facility-Administered Medications   Medication Dose Route Frequency    furosemide (LASIX) injection 40 mg  40 mg IntraVENous DAILY    dexamethasone (DECADRON) 4 mg/mL injection 2 mg  2 mg IntraVENous Q12H    dextromethorphan (DELSYM) 30 mg/5 mL syrup 30 mg  30 mg Oral Q12H PRN    albuterol-ipratropium (DUO-NEB) 2.5 MG-0.5 MG/3 ML  3 mL Nebulization Q6H RT    piperacillin-tazobactam (ZOSYN) 3.375 g in 0.9% sodium chloride (MBP/ADV) 100 mL MBP  3.375 g IntraVENous Q8H    0.9% sodium chloride infusion 250 mL  250 mL IntraVENous PRN    sodium chloride (OCEAN) 0.65 % nasal squeeze bottle 2 Spray  2 [place] Both Nostrils Q4HWA    hydrOXYzine pamoate (VISTARIL) capsule 25 mg  25 mg Oral TID PRN    melatonin tablet 10 mg  10 mg Oral QHS PRN    enoxaparin (LOVENOX) injection 30 mg  30 mg SubCUTAneous Q12H    acetaminophen (TYLENOL) tablet 650 mg  650 mg Oral Q6H PRN    Or    acetaminophen (TYLENOL) suppository 650 mg  650 mg Rectal Q6H PRN    sodium chloride (NS) flush 5-40 mL  5-40 mL IntraVENous Q8H    sodium chloride (NS) flush 5-40 mL  5-40 mL IntraVENous PRN    ondansetron (ZOFRAN ODT) tablet 4 mg  4 mg Oral Q6H PRN    Or    ondansetron (ZOFRAN) injection 4 mg  4 mg IntraVENous Q6H PRN    benzonatate (TESSALON) capsule 200 mg  200 mg Oral Q8H       Review of Systems:   A comprehensive review of systems was negative except for that written in the HPI. Objective:   Physical Exam:     Visit Vitals  /87   Pulse 71   Temp 98.3 °F (36.8 °C)   Resp 20   Ht 5' 9\" (1.753 m)   Wt 81.8 kg (180 lb 5.4 oz)   SpO2 92%   BMI 26.63 kg/m²    O2 Flow Rate (L/min): 50 l/min O2 Device: Hi flow nasal cannula    Temp (24hrs), Av.2 °F (36.8 °C), Min:97.4 °F (36.3 °C), Max:98.7 °F (37.1 °C)    No intake/output data recorded. 10/24 1901 - 10/26 0700  In: -   Out:  [Urine:3575]    PHYSICAL EXAM:  General: Alert and awake. Skin: Extremities and face reveal no rashes. HEENT: Sclerae anicteric. Extra-occular muscles are intact. No oral ulcers. No ENT discharge. The neck is supple. Cardiovascular: Regular rate and rhythm. No murmurs, gallops, or rubs. PMI nondisplaced. Carotids without bruits. Respiratory: Comfortable breathing with no accessory muscle use. Clear breath sounds with no wheezes, rales, or rhonchi. GI: Abdomen nondistended, soft, and nontender. Normal active bowel sounds. No enlargement of the liver or spleen. No masses palpable. Rectal: Deferred   Musculoskeletal: No pitting edema of the lower legs. Extremities have good range of motion. No costovertebral tenderness. Neurological: Gross memory appears intact. Patient is alert and oriented. Power 5/5, Cranial nerves II-XII intact  Psychiatric: Mood appears appropriate with judgement intact. Data Review:       Recent Days:  Recent Labs     10/26/20  0740 10/25/20  0800 10/24/20  0730   WBC 16.9* 16.6* 16.2*   HGB 15.1 14.9 14.8   HCT 43.5 43.3 42.8    283 307     Recent Labs     10/26/20  0740 10/25/20  0800 10/24/20  0730    137 137   K 4.1 4.1 4.1    105 104   CO2 29 23 28   * 180* 173*   BUN 25* 26* 25*   CREA 0.71 0.79 0.76   CA 8.1* 8.2* 8.1*   ALB  --  2.9*  --    TBILI  --  1.2*  --    ALT  --  42  --      No results for input(s): PH, PCO2, PO2, HCO3, FIO2 in the last 72 hours.     24 Hour Results:  Recent Results (from the past 24 hour(s))   PTT    Collection Time: 10/26/20  7:40 AM   Result Value Ref Range    aPTT 23.8 23.0 - 35.7 sec    aPTT, therapeutic range   68 - 109 sec   CBC W/O DIFF    Collection Time: 10/26/20  7:40 AM   Result Value Ref Range    WBC 16.9 (H) 4.1 - 11.1 K/uL    RBC 4.84 4.10 - 5.70 M/uL    HGB 15.1 12.1 - 17.0 g/dL    HCT 43.5 36.6 - 50.3 %    MCV 89.9 80.0 - 99.0 FL    MCH 31.2 26.0 - 34.0 PG    MCHC 34.7 30.0 - 36.5 g/dL    RDW 13.4 11.5 - 14.5 %    PLATELET 169 669 - 101 K/uL    MPV 10.4 8.9 - 81.9 FL   METABOLIC PANEL, BASIC    Collection Time: 10/26/20  7:40 AM   Result Value Ref Range    Sodium 138 136 - 145 mmol/L    Potassium 4.1 3.5 - 5.1 mmol/L    Chloride 104 97 - 108 mmol/L    CO2 29 21 - 32 mmol/L    Anion gap 5 5 - 15 mmol/L    Glucose 174 (H) 65 - 100 mg/dL    BUN 25 (H) 6 - 20 mg/dL    Creatinine 0.71 0.70 - 1.30 mg/dL    BUN/Creatinine ratio 35 (H) 12 - 20      GFR est AA >60 >60 ml/min/1.73m2    GFR est non-AA >60 >60 ml/min/1.73m2    Calcium 8.1 (L) 8.5 - 10.1 mg/dL       XR CHEST PORT   Final Result   Impression: No significant interval change. XR CHEST PORT   Final Result   Findings/impression: A portable upright view of the chest demonstrates   persistent cardiomegaly and patchy interstitial and mild airspace disease which   has improved slightly on the right. No pneumothorax or effusion is identified. The osseous structures are unchanged. XR CHEST PORT   Final Result      XR CHEST PORT   Final Result   IMPRESSION: Improving bilateral airspace disease.       XR CHEST PORT   Final Result      XR CHEST PORT   Final Result      XR CHEST PORT   Final Result      XR CHEST PORT   Final Result      CTA CHEST W OR W WO CONT   Final Result   IMPRESSION: Widespread patchy bilateral pneumonitis      XR CHEST SNGL V   Final Result      XR CHEST PORT    (Results Pending)          Assessment/     Problem List:  Hospital Problems  Date Reviewed: 10/15/2020          Codes Class Noted POA    Acute respiratory failure due to COVID-19 [facility]) ICD-10-CM: U07.1, J96.00  ICD-9-CM: 518.81, 079.89  10/15/2020 Yes        Pneumonia ICD-10-CM: J18.9  ICD-9-CM: 512  10/15/2020 Yes        Acute respiratory failure [facility]) ICD-10-CM: J96.00  ICD-9-CM: 518.81  10/15/2020 Unknown                     Plan:    Acute hypoxic respiratory failure/pneumoniapatient presents with acute hypoxic respiratory failure 2/2 to pneumonia high flow nasal cannula at 40 L, bacterial versus viral in etiology, patient does not meet sepsis criteria at this time, of note patient's 3rd covid test was negative  Follow-up sputum culture for sensitivities,  Currently showing gram positive cocci  Follow-up repeat blood cultures  Continue Zosyn for antibiotic coverage, completed Azithromycin course  Pt completed vancomycin course  Continue Decadron  Completed actemra/remdemsivir  Continue lovenox  Continue to monitor patient's respiratory status  Pulmonology recommendations appreciated, will continue to follow  Infectious disease recommendations appreciated, will continue to follow    Dehydrationcurrently resolved    PpxLovenox  FENcurrently on clear liquids, bedside swallow eval, advance diet, replete potassium and magnesium  Full code, patient surrogate decision-maker is his wife, updated patients daughter as well as Primary Care Physician  Dispositioncontinued inpatient treatment, pending clinical improvement, PT/OT    Care Plan discussed with: Patient/Family and Nurse        Devante Chaudhry MD

## 2020-10-26 NOTE — PROGRESS NOTES
OCCUPATIONAL THERAPY EVALUATION  Patient: Ekaterina Dickinson (56 y.o. male)  Date: 10/26/2020  Primary Diagnosis: Pneumonia [J18.9]  Acute respiratory failure ([facility] [address] [J96.00]        Precautions: Fall  Other (comment)    ASSESSMENT  Based on the objective data described below, the patient presents with overall deficits in generalized strength, functional activity tolerance and dynamic sitting balance impacting overall performance of ADLs and functional transfers/mobility. Per pt/notes, English is pt's second language however pt is able to speak/understand some English and able to communicate in simple phrases with this writer. Per pt, he lives at home with his spouse and was IND with ADLs and functional mobility at [facility]. Pt currently on HFNC 55%, noted to be at 90% while in semi-supine upon entering room. Pt able to perform bed mobility with supervision and don L sock to foot s/p setup, however with increased time and O2 sats dropped to 79-82% with activity. Pt returned to semi-supine with SBA and able to bring O2 back to 90% with PLB techniques. Pt in no apparent distress, able to complete face washing task at bed level s/p. Further mobility deferred at this time 2' to decreased in O2 sats with activity. Pt left resting comfortably with call bell and needs in reach and RN informed of O2 sats with activity and at rest upon departure of room. Pt would benefit from continued skilled OT services to address current impairments and facilitate overall return to PLOF. Current Level of Function Impacting Discharge (ADLs/self-care): supervision for bed mobility, setup/supervision to don socks and wash face at bed level. Decrease in O2 with activity. Other factors to consider for discharge: Family support, DME, decline in ADLs from PLOF     Patient will benefit from skilled therapy intervention to address the above noted impairments.        PLAN :  Recommendations and Planned Interventions: self care training, functional mobility training, therapeutic exercise, balance training, therapeutic activities, endurance activities, patient education, and home safety training    Frequency/Duration: Patient will be followed by occupational therapy 3 times a week to address goals. Recommendation for discharge: (in order for the patient to meet his/her long term goals)  TBD; HHOT vs. IRF pending progress/tolerance of functional activity    This discharge recommendation:  Has been made in collaboration with the attending provider and/or case management    IF patient discharges home will need the following DME: shower chair       SUBJECTIVE:   Patient stated I feel OK, but I am worried about my oxygen    OBJECTIVE DATA SUMMARY:   HISTORY:   History reviewed. No pertinent past medical history. History reviewed. No pertinent surgical history. Expanded or extensive additional review of patient history:     Home Situation  Home Environment: Private residence  # Steps to Enter: 0  One/Two Story Residence: Other (Comment)(Pt reports elevator available)  Living Alone: No  Support Systems: Spouse/Significant Other/Partner  Patient Expects to be Discharged to[de-identified] Private residence  Current DME Used/Available at Home: None    EXAMINATION OF PERFORMANCE DEFICITS:  Cognitive/Behavioral Status:  Neurologic State: Alert  Orientation Level: Oriented X4  Cognition: Follows commands; Appropriate decision making    Hearing: Auditory  Auditory Impairment: None    Vision/Perceptual:       WFL    Range of Motion:  B UE AROM grossly WFL for ADL purposes    Strength:  B UE strength generally decreased, functional     Coordination:     Fine Motor Skills-Upper: Left Intact; Right Intact    Gross Motor Skills-Upper: Left Intact; Right Intact    Tone & Sensation:  WFL    Balance:  Sitting: Intact    Functional Mobility and Transfers for ADLs:  Bed Mobility:  Rolling: Supervision  Supine to Sit: Supervision  Sit to Supine: Supervision  Scooting: Supervision    ADL Intervention and task modifications:  Grooming  Grooming Assistance: Set-up  Position Performed: Long sitting on bed  Washing Face: Set-up    Lower Body Dressing Assistance  Socks: Contact guard assistance(L sock, increased time; able to bring up on bedside)  Position Performed: Seated edge of bed    Therapeutic Exercise:  Pt educated on UE HEP to complete at bed to reduce cervical discomfort from lying in bed and to improve overall ROM and strength with good understanding verbalized and demonstrated. Functional Measure:    [address] [place] [address] AM-PACTM \"6 Clicks\"                                                       Daily Activity Inpatient Short Form  How much help from another person does the patient currently need. .. Total; A Lot A Little None   1. Putting on and taking off regular lower body clothing? []  1 []  2 [x]  3 []  4   2. Bathing (including washing, rinsing, drying)? []  1 []  2 [x]  3 []  4   3. Toileting, which includes using toilet, bedpan or urinal? [] 1 []  2 [x]  3 []  4   4. Putting on and taking off regular upper body clothing? []  1 []  2 [x]  3 []  4   5. Taking care of personal grooming such as brushing teeth? []  1 []  2 [x]  3 []  4   6. Eating meals? []  1 []  2 [x]  3 []  4   © 2007, Trustees of [address] [place] [address], under license to [employer]. All rights reserved     Score: 18/24     Interpretation of Tool:  Represents clinically-significant functional categories (i.e. Activities of daily living).   Percentage of Impairment CH    0%   CI    1-19% CJ    20-39% CK    40-59% CL    60-79% CM    80-99% CN     100%   [facility]  Score 6-24 24 23 20-22 15-19 10-14 7-9 6        Occupational Therapy Evaluation Charge Determination   History Examination Decision-Making   LOW Complexity : Brief history review  LOW Complexity : 1-3 performance deficits relating to physical, cognitive , or psychosocial skils that result in activity limitations and / or participation restrictions  MEDIUM Complexity : Patient may present with comorbidities that affect occupational performnce. Miniml to moderate modification of tasks or assistance (eg, physical or verbal ) with assesment(s) is necessary to enable patient to complete evaluation       Based on the above components, the patient evaluation is determined to be of the following complexity level: LOW   Pain Ratin/10    Activity Tolerance:   Poor, desaturates with exertion and requires oxygen, and requires frequent rest breaks  Please refer to the flowsheet for vital signs taken during this treatment. After treatment patient left in no apparent distress:    Supine in bed and Call bell within reach, RN updated     COMMUNICATION/EDUCATION:   The patients plan of care was discussed with: Registered nurse. Patient/family have participated as able in goal setting and plan of care. and Patient/family agree to work toward stated goals and plan of care. This patients plan of care is appropriate for delegation to [facility] [place].     Thank you for this referral.  Tameka Santana  Time Calculation: 22 mins   Problem: Self Care Deficits Care Plan (Adult)  Goal: *Acute Goals and Plan of Care (Insert Text)  Description: Pt will be IND sup <> sit in prep for EOB ADLs  Pt will be IND grooming sitting EOB  Pt will be IND LE dressing sitting EOB/long sit  Pt will be IND sit <>  prep for toileting LRAD  Pt will be IND toileting/toilet transfer/cloth mgmt LRAD  Pt will be IND following UE HEP in prep for self care tasks     Outcome: Not Met

## 2020-10-27 ENCOUNTER — APPOINTMENT (OUTPATIENT)
Dept: GENERAL RADIOLOGY | Age: 60
DRG: 177 | End: 2020-10-27
Attending: INTERNAL MEDICINE
Payer: COMMERCIAL

## 2020-10-27 LAB
ALBUMIN SERPL-MCNC: 3 G/DL (ref 3.5–5)
ANION GAP SERPL CALC-SCNC: 6 MMOL/L (ref 5–15)
APTT PPP: 24.4 SEC (ref 23–35.7)
BACTERIA SPEC CULT: NORMAL
BUN SERPL-MCNC: 31 MG/DL (ref 6–20)
BUN/CREAT SERPL: 39 (ref 12–20)
CA-I BLD-MCNC: 8.2 MG/DL (ref 8.5–10.1)
CHLORIDE SERPL-SCNC: 102 MMOL/L (ref 97–108)
CO2 SERPL-SCNC: 29 MMOL/L (ref 21–32)
CREAT SERPL-MCNC: 0.79 MG/DL (ref 0.7–1.3)
ERYTHROCYTE [DISTWIDTH] IN BLOOD BY AUTOMATED COUNT: 13.4 % (ref 11.5–14.5)
GLUCOSE SERPL-MCNC: 135 MG/DL (ref 65–100)
HCT VFR BLD AUTO: 45.8 % (ref 36.6–50.3)
HGB BLD-MCNC: 15.9 G/DL (ref 12.1–17)
MCH RBC QN AUTO: 31.1 PG (ref 26–34)
MCHC RBC AUTO-ENTMCNC: 34.7 G/DL (ref 30–36.5)
MCV RBC AUTO: 89.5 FL (ref 80–99)
PHOSPHATE SERPL-MCNC: 4.2 MG/DL (ref 2.6–4.7)
PLATELET # BLD AUTO: 213 K/UL (ref 150–400)
PMV BLD AUTO: 10.6 FL (ref 8.9–12.9)
POTASSIUM SERPL-SCNC: 3.8 MMOL/L (ref 3.5–5.1)
RBC # BLD AUTO: 5.12 M/UL (ref 4.1–5.7)
SODIUM SERPL-SCNC: 137 MMOL/L (ref 136–145)
SPECIAL REQUESTS,SREQ: NORMAL
THERAPEUTIC RANGE,PTTT: NORMAL SEC (ref 68–109)
WBC # BLD AUTO: 12.9 K/UL (ref 4.1–11.1)

## 2020-10-27 PROCEDURE — 77010033711 HC HIGH FLOW OXYGEN

## 2020-10-27 PROCEDURE — 74011250636 HC RX REV CODE- 250/636: Performed by: INTERNAL MEDICINE

## 2020-10-27 PROCEDURE — 74011250636 HC RX REV CODE- 250/636: Performed by: HOSPITALIST

## 2020-10-27 PROCEDURE — 94640 AIRWAY INHALATION TREATMENT: CPT

## 2020-10-27 PROCEDURE — 71045 X-RAY EXAM CHEST 1 VIEW: CPT

## 2020-10-27 PROCEDURE — 74011250637 HC RX REV CODE- 250/637: Performed by: INTERNAL MEDICINE

## 2020-10-27 PROCEDURE — 94762 N-INVAS EAR/PLS OXIMTRY CONT: CPT

## 2020-10-27 PROCEDURE — 85730 THROMBOPLASTIN TIME PARTIAL: CPT

## 2020-10-27 PROCEDURE — 74011000250 HC RX REV CODE- 250: Performed by: INTERNAL MEDICINE

## 2020-10-27 PROCEDURE — 74011000258 HC RX REV CODE- 258: Performed by: INTERNAL MEDICINE

## 2020-10-27 PROCEDURE — 65270000029 HC RM PRIVATE

## 2020-10-27 PROCEDURE — 85027 COMPLETE CBC AUTOMATED: CPT

## 2020-10-27 PROCEDURE — 80069 RENAL FUNCTION PANEL: CPT

## 2020-10-27 PROCEDURE — 99232 SBSQ HOSP IP/OBS MODERATE 35: CPT | Performed by: INTERNAL MEDICINE

## 2020-10-27 PROCEDURE — 36415 COLL VENOUS BLD VENIPUNCTURE: CPT

## 2020-10-27 RX ADMIN — PIPERACILLIN SODIUM AND TAZOBACTAM SODIUM 3.38 G: 3; .375 INJECTION, POWDER, LYOPHILIZED, FOR SOLUTION INTRAVENOUS at 10:22

## 2020-10-27 RX ADMIN — IPRATROPIUM BROMIDE AND ALBUTEROL SULFATE 3 ML: .5; 3 SOLUTION RESPIRATORY (INHALATION) at 13:58

## 2020-10-27 RX ADMIN — IPRATROPIUM BROMIDE AND ALBUTEROL SULFATE 3 ML: .5; 3 SOLUTION RESPIRATORY (INHALATION) at 02:07

## 2020-10-27 RX ADMIN — SALINE NASAL SPRAY 2 SPRAY: 1.5 SOLUTION NASAL at 20:53

## 2020-10-27 RX ADMIN — PIPERACILLIN SODIUM AND TAZOBACTAM SODIUM 3.38 G: 3; .375 INJECTION, POWDER, LYOPHILIZED, FOR SOLUTION INTRAVENOUS at 02:33

## 2020-10-27 RX ADMIN — SALINE NASAL SPRAY 2 SPRAY: 1.5 SOLUTION NASAL at 05:47

## 2020-10-27 RX ADMIN — Medication 10 ML: at 13:50

## 2020-10-27 RX ADMIN — BENZONATATE 200 MG: 100 CAPSULE ORAL at 20:52

## 2020-10-27 RX ADMIN — BENZONATATE 200 MG: 100 CAPSULE ORAL at 13:48

## 2020-10-27 RX ADMIN — DEXAMETHASONE SODIUM PHOSPHATE 2 MG: 4 INJECTION, SOLUTION INTRAMUSCULAR; INTRAVENOUS at 20:52

## 2020-10-27 RX ADMIN — SALINE NASAL SPRAY 2 SPRAY: 1.5 SOLUTION NASAL at 10:23

## 2020-10-27 RX ADMIN — Medication 10 ML: at 05:48

## 2020-10-27 RX ADMIN — Medication 10 ML: at 20:53

## 2020-10-27 RX ADMIN — FUROSEMIDE 40 MG: 10 INJECTION, SOLUTION INTRAMUSCULAR; INTRAVENOUS at 08:22

## 2020-10-27 RX ADMIN — SALINE NASAL SPRAY 2 SPRAY: 1.5 SOLUTION NASAL at 17:45

## 2020-10-27 RX ADMIN — ENOXAPARIN SODIUM 30 MG: 30 INJECTION SUBCUTANEOUS at 17:50

## 2020-10-27 RX ADMIN — SALINE NASAL SPRAY 2 SPRAY: 1.5 SOLUTION NASAL at 13:52

## 2020-10-27 RX ADMIN — IPRATROPIUM BROMIDE AND ALBUTEROL SULFATE 3 ML: .5; 3 SOLUTION RESPIRATORY (INHALATION) at 20:26

## 2020-10-27 RX ADMIN — DEXAMETHASONE SODIUM PHOSPHATE 2 MG: 4 INJECTION, SOLUTION INTRAMUSCULAR; INTRAVENOUS at 08:22

## 2020-10-27 RX ADMIN — IPRATROPIUM BROMIDE AND ALBUTEROL SULFATE 3 ML: .5; 3 SOLUTION RESPIRATORY (INHALATION) at 07:46

## 2020-10-27 RX ADMIN — BENZONATATE 200 MG: 100 CAPSULE ORAL at 05:30

## 2020-10-27 RX ADMIN — ENOXAPARIN SODIUM 30 MG: 30 INJECTION SUBCUTANEOUS at 05:31

## 2020-10-27 NOTE — PROGRESS NOTES
Rapid response called -Pt spo2  75% after ambulating to bathroom on 50% HFNC. Fi02 increased to 100% HFNC Spo2 93%.

## 2020-10-27 NOTE — PROGRESS NOTES
Progress Note  Date:10/27/2020       Room:[facility]  Patient Name:Ekaterina Story     YOB: 1960     Age:60 y.o. Subjective    Subjective   Patient followed for presumptive Covid-19 pneumoniitis. Initial test was negative but second test is indeterminate. No CXR today. He is afebrile with leukocytosis attributed to steroids. Blood cultures negative and sputum culture grew MSSA and Klebsiella. He is currently on IV Zosyn. He remains on high flow nasal O2. CXR today described as worsening bilateral airspace disease but appears overpenetrated to me. Objective         Vitals Last 24 Hours:  TEMPERATURE:  Temp  Av.2 °F (36.8 °C)  Min: 97.5 °F (36.4 °C)  Max: 98.5 °F (36.9 °C)  RESPIRATIONS RANGE: Resp  Av.6  Min: 20  Max: 22  PULSE OXIMETRY RANGE: SpO2  Av.8 %  Min: 92 %  Max: 94 %  PULSE RANGE: Pulse  Av.6  Min: 71  Max: 94  BLOOD PRESSURE RANGE: Systolic (79QGA), ELIJAH:516 , Min:117 , ICJ:082   ; Diastolic (47UMU), DX, Min:77, Max:92    I/O (24Hr): Intake/Output Summary (Last 24 hours) at 10/27/2020 0913  Last data filed at 10/27/2020 0552  Gross per 24 hour   Intake    Output 3450 ml   Net -3450 ml     Objective:  Vital signs: (most recent): Blood pressure 117/77, pulse 81, temperature 98.5 °F (36.9 °C), resp. rate 22, height 5' 9\" (1.753 m), weight 180 lb 5.4 oz (81.8 kg), SpO2 93 %.       General: Moderately ill-appearing male, NAD  HEENT: eyes open, high flow nasal O2  Neck: supple  Lungs: clear bilaterally  Heart: RRR  : Granados catheter with moderate urine sediment    Labs/Imaging/Diagnostics    Labs:  CBC:  Recent Labs     10/26/20  0740 10/25/20  0800   WBC 16.9* 16.6*   RBC 4.84 4.87   HGB 15.1 14.9   HCT 43.5 43.3   MCV 89.9 88.9   RDW 13.4 13.3    283     CHEMISTRIES:  Recent Labs     10/26/20  0740 10/25/20  0800    137   K 4.1 4.1    105   CO2 29 23   BUN 25* 26*   CA 8.1* 8.2*   PT/INR:  No results for input(s): INR, INREXT, INREXT in the last 72 hours. No lab exists for component: PROTIME  APTT:  Recent Labs     10/26/20  0740 10/25/20  0800   APTT 23.8 24.7     LIVER PROFILE:  Recent Labs     10/25/20  0800   AST 19   ALT 42     Lab Results   Component Value Date/Time    ALT (SGPT) 42 10/25/2020 08:00 AM    AST (SGOT) 19 10/25/2020 08:00 AM    Alk. phosphatase 97 10/25/2020 08:00 AM    Bilirubin, total 1.2 (H) 10/25/2020 08:00 AM     WBC 16,900   (405 (689 (452 (565  Ferritin 1,084 (1,187  CRP <0.29 (0.43 (12.40    Sputum culture (10/20) MSSA and Klebsiella pneumoniae  Blood cultures (10/20) No growth FINAL    Imaging Last 24 Hours:  Xr Chest Port    Result Date: 10/27/2020  Portable upright radiograph chest 7:34 AM compared to October 26, 2020. INDICATION: Cough, pneumonia. Patient has taken a shallow depth of inspiration. Heart size is stable. Bilateral airspace disease/edema shows worsening compared to prior study. No pneumothorax. Cannot exclude small effusions. No acute bony changes. IMPRESSION: Shallower depth of inspiration with worsening bilateral airspace disease/edema. Xr Chest Port    Result Date: 10/26/2020  Chest, frontal view, 10/26/2020 History: Cough. Pneumonia. Comparison: Including chest 10/25/2020. Findings: The cardiac silhouette is stable. The lungs remain underexpanded. Airspace opacities in the lungs are not significantly changed as compared to the study performed one day prior. Hydrostatic edema is not excluded. No pleural effusion or pneumothorax is identified. The osseous structures are stable. Impression: No significant interval change. Assessment//Plan   Active Problems:    Acute respiratory failure due to COVID-19 [facility]) (10/15/2020)      Pneumonia (10/15/2020)      Acute respiratory failure ([facility] [address] (10/15/2020)      Assessment & Plan  1. Probable Covid-19 pneumonitis, with indeterminate result, status post Remdesivir, Decadron, Azithromycin, Actemra, unchanged.    2. Acute respiratory failure, on high flow nasal O2  3. Elevated CRP, LDH and ferritin, secondary to #1, decreasing. 4. HCAP with purulent sputum, secondary to MSSA and Klebsiella pneumoniae, Day #8 IV Zosyn.     Comment:  Sputum culture grew MSSA and Klebsiella. MSSA should be covered by Zosyn. Leukocytosis likely due to steroids on board. LDH/ferritin decreasing, CRP normal.     1. Continue IV Zosyn for MSSA and Klebsiella  2.  Continue to monitor LDH, ferritin       Electronically signed by Jennifer Franks MD on 10/27/2020 at 1:43 PM

## 2020-10-27 NOTE — PROGRESS NOTES
Hospitalist Progress Note               Daily Progress Note: 10/27/2020      Subjective: The patient is seen for follow  up.     56 y.o. male with no significant medical problems presents to the emergency room complaining of shortness of breath. Patient had a urinary retention which was resolved with Granados catheter placement. Patient continues to be on high flow oxygen.     Seen and evaluated at bedside, no acute events overnight, patient still complaining of shortness of breath, however currently downtitrating on HFNC, discussed with RN and patient at bedside    Medications reviewed  Current Facility-Administered Medications   Medication Dose Route Frequency    furosemide (LASIX) injection 40 mg  40 mg IntraVENous DAILY    dexamethasone (DECADRON) 4 mg/mL injection 2 mg  2 mg IntraVENous Q12H    dextromethorphan (DELSYM) 30 mg/5 mL syrup 30 mg  30 mg Oral Q12H PRN    albuterol-ipratropium (DUO-NEB) 2.5 MG-0.5 MG/3 ML  3 mL Nebulization Q6H RT    piperacillin-tazobactam (ZOSYN) 3.375 g in 0.9% sodium chloride (MBP/ADV) 100 mL MBP  3.375 g IntraVENous Q8H    0.9% sodium chloride infusion 250 mL  250 mL IntraVENous PRN    sodium chloride (OCEAN) 0.65 % nasal squeeze bottle 2 Spray  2 [place] Both Nostrils Q4HWA    hydrOXYzine pamoate (VISTARIL) capsule 25 mg  25 mg Oral TID PRN    melatonin tablet 10 mg  10 mg Oral QHS PRN    enoxaparin (LOVENOX) injection 30 mg  30 mg SubCUTAneous Q12H    acetaminophen (TYLENOL) tablet 650 mg  650 mg Oral Q6H PRN    Or    acetaminophen (TYLENOL) suppository 650 mg  650 mg Rectal Q6H PRN    sodium chloride (NS) flush 5-40 mL  5-40 mL IntraVENous Q8H    sodium chloride (NS) flush 5-40 mL  5-40 mL IntraVENous PRN    ondansetron (ZOFRAN ODT) tablet 4 mg  4 mg Oral Q6H PRN    Or    ondansetron (ZOFRAN) injection 4 mg  4 mg IntraVENous Q6H PRN    benzonatate (TESSALON) capsule 200 mg  200 mg Oral Q8H       Review of Systems:   A comprehensive review of systems was negative except for that written in the HPI. Objective:   Physical Exam:     Visit Vitals  /77   Pulse 81   Temp 98.5 °F (36.9 °C)   Resp 22   Ht 5' 9\" (1.753 m)   Wt 81.8 kg (180 lb 5.4 oz)   SpO2 93%   BMI 26.63 kg/m²    O2 Flow Rate (L/min): 40 l/min O2 Device: Hi flow nasal cannula    Temp (24hrs), Av.2 °F (36.8 °C), Min:97.5 °F (36.4 °C), Max:98.5 °F (36.9 °C)    No intake/output data recorded. 10/25 1901 - 10/27 0700  In: -   Out: 3558 [Urine:4550]    PHYSICAL EXAM:  General: Alert and awake. Skin: Extremities and face reveal no rashes. HEENT: Sclerae anicteric. Extra-occular muscles are intact. No oral ulcers. No ENT discharge. The neck is supple. Cardiovascular: Regular rate and rhythm. No murmurs, gallops, or rubs. PMI nondisplaced. Carotids without bruits. Respiratory: Comfortable breathing with no accessory muscle use. Clear breath sounds with no wheezes, rales, or rhonchi. GI: Abdomen nondistended, soft, and nontender. Normal active bowel sounds. No enlargement of the liver or spleen. No masses palpable. Rectal: Deferred   Musculoskeletal: No pitting edema of the lower legs. Extremities have good range of motion. No costovertebral tenderness. Neurological: Gross memory appears intact. Patient is alert and oriented. Power 5/5, Cranial nerves II-XII intact  Psychiatric: Mood appears appropriate with judgement intact. Data Review:       Recent Days:  Recent Labs     10/27/20  0817 10/26/20  0740 10/25/20  0800   WBC 12.9* 16.9* 16.6*   HGB 15.9 15.1 14.9   HCT 45.8 43.5 43.3    238 283     Recent Labs     10/27/20  0817 10/26/20  0740 10/25/20  0800    138 137   K 3.8 4.1 4.1    104 105   CO2 29 29 23   * 174* 180*   BUN 31* 25* 26*   CREA 0.79 0.71 0.79   CA 8.2* 8.1* 8.2*   PHOS 4.2  --   --    ALB 3.0*  --  2.9*   TBILI  --   --  1.2*   ALT  --   --  42     No results for input(s): PH, PCO2, PO2, HCO3, FIO2 in the last 72 hours.     24 Hour Results:  Recent Results (from the past 24 hour(s))   PTT    Collection Time: 10/27/20  8:17 AM   Result Value Ref Range    aPTT 24.4 23.0 - 35.7 sec    aPTT, therapeutic range   68 - 109 sec   RENAL FUNCTION PANEL    Collection Time: 10/27/20  8:17 AM   Result Value Ref Range    Sodium 137 136 - 145 mmol/L    Potassium 3.8 3.5 - 5.1 mmol/L    Chloride 102 97 - 108 mmol/L    CO2 29 21 - 32 mmol/L    Anion gap 6 5 - 15 mmol/L    Glucose 135 (H) 65 - 100 mg/dL    BUN 31 (H) 6 - 20 mg/dL    Creatinine 0.79 0.70 - 1.30 mg/dL    BUN/Creatinine ratio 39 (H) 12 - 20      GFR est AA >60 >60 ml/min/1.73m2    GFR est non-AA >60 >60 ml/min/1.73m2    Calcium 8.2 (L) 8.5 - 10.1 mg/dL    Phosphorus 4.2 2.6 - 4.7 mg/dL    Albumin 3.0 (L) 3.5 - 5.0 g/dL   CBC W/O DIFF    Collection Time: 10/27/20  8:17 AM   Result Value Ref Range    WBC 12.9 (H) 4.1 - 11.1 K/uL    RBC 5.12 4.10 - 5.70 M/uL    HGB 15.9 12.1 - 17.0 g/dL    HCT 45.8 36.6 - 50.3 %    MCV 89.5 80.0 - 99.0 FL    MCH 31.1 26.0 - 34.0 PG    MCHC 34.7 30.0 - 36.5 g/dL    RDW 13.4 11.5 - 14.5 %    PLATELET 857 288 - 556 K/uL    MPV 10.6 8.9 - 12.9 FL       XR CHEST PORT   Final Result   IMPRESSION: Shallower depth of inspiration with worsening bilateral airspace   disease/edema. XR CHEST PORT   Final Result   Impression: No significant interval change. XR CHEST PORT   Final Result   Findings/impression: A portable upright view of the chest demonstrates   persistent cardiomegaly and patchy interstitial and mild airspace disease which   has improved slightly on the right. No pneumothorax or effusion is identified. The osseous structures are unchanged. XR CHEST PORT   Final Result      XR CHEST PORT   Final Result   IMPRESSION: Improving bilateral airspace disease.       XR CHEST PORT   Final Result      XR CHEST PORT   Final Result      XR CHEST PORT   Final Result      XR CHEST PORT   Final Result      CTA CHEST W OR W WO CONT   Final Result   IMPRESSION: Widespread patchy bilateral pneumonitis      XR CHEST SNGL V   Final Result             Assessment/     Problem List:  Hospital Problems  Date Reviewed: 10/15/2020          Codes Class Noted POA    Acute respiratory failure due to COVID-19 [facility]) ICD-10-CM: U07.1, J96.00  ICD-9-CM: 518.81, 079.89  10/15/2020 Yes        Pneumonia ICD-10-CM: J18.9  ICD-9-CM: 935  10/15/2020 Yes        Acute respiratory failure ([facility] [address] ICD-10-CM: J96.00  ICD-9-CM: 518.81  10/15/2020 Unknown                     Plan:    (1) Acute hypoxic respiratory failure : on high flow nasal canulla.keep saturation > 92. (2) pneumonia and pneumonitis: likely bacterial: sputum culture showing gram positive cocci. On zosyn.  Continue decadron, actemra and remdesivir        Dehydrationcurrently resolved    PpxLovenox  FENcurrently on clear liquids, bedside swallow eval, advance diet, replete potassium and magnesium  Full code, patient surrogate decision-maker is his wife, updated patients daughter as well as Primary Care Physician  Dispositioncontinued inpatient treatment, pending clinical improvement, PT/OT    Care Plan discussed with: Patient/Family and Nurse        Amairani Hsu MD

## 2020-10-27 NOTE — PROGRESS NOTES
Rapid response called: O2 75% on high flow NC after ambulating to restroom. Pt assisted back to bed, high flow and non rebreather applied.

## 2020-10-27 NOTE — PROGRESS NOTES
Pulmonary Note      Name: Ekaterina Escobedo MRN: [id number]   : 1960 Hospital: [address] [place] [address]   Date: 10/27/2020  Admission date: 10/15/2020 Hospital Day: 13       Subjective/Interval History:   Patient seen in the ICU on high flow nasal oxygen at 70% with oxygen saturation 99. He gives a history of gradual worsening illness over the last week to 10 days with nonproductive cough fever generalized malaise nausea denies any vomiting although the ER note states that he had some vomiting. He seems awake and alert. Does not have any Covid contacts that he is aware of. States that his sense of taste and smell have been normal but terribly anorexic has not eaten anything in 3 or 4 days. 10/17 still feels bad Cough shortness of breath but no nausea today   10/18 still complains of severe cough nonproductive although in general feels a little better  10/20 remains 100% high flow nasal oxygen. Continues to complain of it irritating him. Requested nasal saline last evening but it has not been started will reorder it. We will let him try nonrebreather mask with nasal high flow for meals  10/26 high flow nasal oxygen back up to 70% unable to safely switch to low flow nasal oxygen at this point. He states he feels better sense of taste is normal with a good appetite  Patient Active Problem List   Diagnosis Code    Acute respiratory failure due to COVID-19 ([facility]) U07.1, J96.00    Pneumonia J18.9    Acute respiratory failure ([facility] [address] J96.00       IMPRESSION:   1. Acute hypoxic respiratory failure requiring high flow nasal oxygen will decrease to 55%  2. History of asthma  3. Community-acquired versus aspiration with diffuse pulmonary infiltrates questionable atypical pneumonia possible COVID-19 repeat testing Negative  4. Nausea probable due to COVID-19 infection  5. Unremitting cough secondary to pneumonia or reflux  Body mass index is 26.63 kg/m². RECOMMENDATIONS/PLAN:   1.  Initial COVID-19 test is negative repeat testing indeterminant  but ferritin LDH CRP are all elevated chest x-ray is consistent with Covid pneumonitis  has completed 2 doses Actemra and 5 days of Remdesivir  2. CRP less than 0.29 will decrease Decadron to every 12 dosing  3. Continued hypoxia will give IV Lasix and follow renal function. 4. Sputum culture with MSSA and Klebsiella both should be covered by Zosyn  5. Cough better continue Tessalon and as needed Delsym discontinue codeine  6. Continue nebulizer treatment  7. Repeat COVID-19 test negative  8. Legionella antigen and blood titer still not resulted question if it was actually sent         Subjective/Initial History:   I have reviewed the flowsheet and previous days notes. Seen earlier today on rounds. I was asked by Susana Torres MD to see An Nusrat Burkett a 56 y.o.  male  in consultation for a chief complaint of acute hypoxic respiratory failure cough and community-acquired pneumonia          Pt now in CCU. Patient PCP: UNKNOWN  PMH:  has no past medical history on file. PSH:   has no past surgical history on file. FHX: family history includes No Known Problems in his father and mother. SHX:  reports that he has never smoked. He has never used smokeless tobacco. He reports that he does not drink alcohol or use drugs.     Systemic review:  General no chronic illness but over the last week he has had fever generalized malaise weight loss no appetite  Eyes no double vision or momentary blindness  ENT no sinus congestion drainage or facial pain  Skeletal has diffuse aches and pains no swollen tender joints  Endocrinologic no polyuria polydipsia  Neurologic no seizures or syncope  Gastrointestinal normally he has no problems but over this last week he has had nausea anorexia marked weight loss has not had any alteration of his sense of taste or smell  Genitourinary no discomfort or drainage on urination  Cardiovascular no history of heart disease chest pain has felt sweaty but no history of ankle edema.   Respiratory as mentioned above    No Known Allergies   MEDS:   Current Facility-Administered Medications   Medication    furosemide (LASIX) injection 40 mg    dexamethasone (DECADRON) 4 mg/mL injection 2 mg    dextromethorphan (DELSYM) 30 mg/5 mL syrup 30 mg    albuterol-ipratropium (DUO-NEB) 2.5 MG-0.5 MG/3 ML    piperacillin-tazobactam (ZOSYN) 3.375 g in 0.9% sodium chloride (MBP/ADV) 100 mL MBP    0.9% sodium chloride infusion 250 mL    sodium chloride (OCEAN) 0.65 % nasal squeeze bottle 2 Spray    hydrOXYzine pamoate (VISTARIL) capsule 25 mg    melatonin tablet 10 mg    enoxaparin (LOVENOX) injection 30 mg    acetaminophen (TYLENOL) tablet 650 mg    Or    acetaminophen (TYLENOL) suppository 650 mg    sodium chloride (NS) flush 5-40 mL    sodium chloride (NS) flush 5-40 mL    ondansetron (ZOFRAN ODT) tablet 4 mg    Or    ondansetron (ZOFRAN) injection 4 mg    benzonatate (TESSALON) capsule 200 mg        Current Facility-Administered Medications:     furosemide (LASIX) injection 40 mg, 40 mg, IntraVENous, DAILY, Kwesi Najera MD, 40 mg at 10/27/20 0919    dexamethasone (DECADRON) 4 mg/mL injection 2 mg, 2 mg, IntraVENous, Q12H, Kwesi Najera MD, 2 mg at 10/27/20 9214    dextromethorphan (DELSYM) 30 mg/5 mL syrup 30 mg, 30 mg, Oral, Q12H PRN, Kwesi Najera MD    albuterol-ipratropium (DUO-NEB) 2.5 MG-0.5 MG/3 ML, 3 mL, Nebulization, Q6H RT, Kannan Conde MD, 3 mL at 10/27/20 0746    piperacillin-tazobactam (ZOSYN) 3.375 g in 0.9% sodium chloride (MBP/ADV) 100 mL MBP, 3.375 g, IntraVENous, Q8H, Simona Gonzales MD, Last Rate: 25 mL/hr at 10/27/20 1022, 3.375 g at 10/27/20 1022    0.9% sodium chloride infusion 250 mL, 250 mL, IntraVENous, PRN, Umm Saldaña MD    sodium chloride (OCEAN) 0.65 % nasal squeeze bottle 2 Spray, 2 Spray, Both Nostrils, Q4HWA, Kwesi Najera MD, 2 [place] at 10/27/20 1023    hydrOXYzine pamoate (VISTARIL) capsule 25 mg, 25 mg, Oral, TID PRN, Sabina Loaiza MD, 25 mg at 10/21/20 2231    melatonin tablet 10 mg, 10 mg, Oral, QHS PRN, Sabina Loaiza MD, 10 mg at 10/21/20 2231    enoxaparin (LOVENOX) injection 30 mg, 30 mg, SubCUTAneous, Q12H, Castillo Gamino MD, 30 mg at 10/27/20 0531    acetaminophen (TYLENOL) tablet 650 mg, 650 mg, Oral, Q6H PRN, 650 mg at 10/18/20 2205 **OR** acetaminophen (TYLENOL) suppository 650 mg, 650 mg, Rectal, Q6H PRN, Castillo Gamino MD    sodium chloride (NS) flush 5-40 mL, 5-40 mL, IntraVENous, Q8H, Castillo Gamino MD, 10 mL at 10/27/20 0548    sodium chloride (NS) flush 5-40 mL, 5-40 mL, IntraVENous, PRN, Castillo Gamino MD, 10 mL at 10/22/20 0938    ondansetron (ZOFRAN ODT) tablet 4 mg, 4 mg, Oral, Q6H PRN **OR** ondansetron (ZOFRAN) injection 4 mg, 4 mg, IntraVENous, Q6H PRN, Castillo Gamino MD    benzonatate (TESSALON) capsule 200 mg, 200 mg, Oral, Q8H, Sabina Baez MD, 200 mg at 10/27/20 0530      Objective:     Vital Signs: Telemetry:    normal sinus rhythm Intake/Output:   Visit Vitals  /77   Pulse 81   Temp 98.5 °F (36.9 °C)   Resp 22   Ht 5' 9\" (1.753 m)   Wt 81.8 kg (180 lb 5.4 oz)   SpO2 93%   BMI 26.63 kg/m²       Temp (24hrs), Av.2 °F (36.8 °C), Min:97.5 °F (36.4 °C), Max:98.5 °F (36.9 °C)        O2 Device: Hi flow nasal cannula O2 Flow Rate (L/min): 40 l/min         Body mass index is 26.63 kg/m². Wt Readings from Last 4 Encounters:   10/21/20 81.8 kg (180 lb 5.4 oz)          Intake/Output Summary (Last 24 hours) at 10/27/2020 1129  Last data filed at 10/27/2020 0552  Gross per 24 hour   Intake    Output 3450 ml   Net -3450 ml       Last shift:      No intake/output data recorded. Last 3 shifts: 10/25 1901 - 10/27 0700  In: -   Out: 6353 [Urine:4550]   Ventilator Settings:      Mode Rate TV Press PEEP FiO2 PIP Min.  Vent               50 %            Physical Exam:    General:  male; less distress chronic cough nonproductive  HEENT: NCAT, oral mucosa clear  Eyes: anicteric; conjunctiva clear extraocular movements intact   neck: no node neck veins visible  Chest: no deformity,   Cardiac: Regular rate and rhythm no murmurs trace ankle edema  Lungs: Clear anteriorly laterally still has rales posteriorly improved  Abd: Soft nontender normal bowel sounds no organomegaly  Ext: Mild edema; no joint swelling;  No clubbing  :clear urine  Neuro: Awake alert speech is clear moves all 4 extremities well  Psych- no agitation, oriented to person;   Skin: warm,, no cyanosis; very mildly diaphoretic  Pulses: Pulses intact  Capillary: Normal capillary refill      Labs:    Recent Labs     10/27/20  0817 10/26/20  0740 10/25/20  0800   WBC 12.9* 16.9* 16.6*   HGB 15.9 15.1 14.9    238 283   APTT 24.4 23.8 24.7     Recent Labs     10/27/20  0817 10/26/20  0740 10/25/20  0800    138 137   K 3.8 4.1 4.1    104 105   CO2 29 29 23   * 174* 180*   BUN 31* 25* 26*   CREA 0.79 0.71 0.79   CA 8.2* 8.1* 8.2*   PHOS 4.2  --   --    ALB 3.0*  --  2.9*   ALT  --   --  42     10/24 6 L nasal oxygen with oxygen saturation 90%    10/14 COVID-19 test negative  10/17 COVID-19 test indeterminate  Ferritin 1510, previous 2180  , previous 615, 754  Procalcitonin 0.14  CRP 0.87, previous 1.39, 3.0, 12.4  Lab Results   Component Value Date/Time    Culture result: No growth 7 days 10/20/2020 11:30 AM    Culture result: Heavy Staphylococcus aureus 10/20/2020 08:15 AM    Culture result: Light Klebsiella pneumoniae 10/20/2020 08:15 AM    Culture result: Heavy  Normal respiratory smita   10/20/2020 08:15 AM      Lab Results   Component Value Date/Time     10/15/2020 11:47 PM       Imaging:  I have personally reviewed the patients radiographs and have reviewed the reports:    CXR Results  (Last 48 hours)  10/20 chest x-ray with diffuse patchy infiltrates may be slightly less dense  10/17 chest x-ray unchanged diffuse bilateral infiltrate               10/15/20 1927  XR CHEST SNGL V Final result    Narrative:  1       Mild atelectasis in the bases with upper lungs clear. No effusion or   pneumothorax. Normal heart and no congestion  Disagree to me there is bilateral infiltrate           Results from Hospital Encounter encounter on 10/15/20   XR CHEST PORT    Narrative Portable upright radiograph chest 7:34 AM compared to October 26, 2020. INDICATION: Cough, pneumonia. Patient has taken a shallow depth of inspiration. Heart size is stable. Bilateral airspace disease/edema shows worsening compared to prior study. No  pneumothorax. Cannot exclude small effusions. No acute bony changes. Impression IMPRESSION: Shallower depth of inspiration with worsening bilateral airspace  disease/edema. XR CHEST PORT    Narrative Chest, frontal view, 10/26/2020    History: Cough. Pneumonia. Comparison: Including chest 10/25/2020. Findings: The cardiac silhouette is stable. The lungs remain underexpanded. Airspace opacities in the lungs are not significantly changed as compared to the  study performed one day prior. Hydrostatic edema is not excluded. No pleural  effusion or pneumothorax is identified. The osseous structures are stable. Impression Impression: No significant interval change. XR CHEST PORT    Narrative Comparison is with the prior exam dated 10/23/2020. Impression Findings/impression: A portable upright view of the chest demonstrates  persistent cardiomegaly and patchy interstitial and mild airspace disease which  has improved slightly on the right. No pneumothorax or effusion is identified. The osseous structures are unchanged. Results from [place] encounter on 10/15/20   CTA CHEST W OR W WO CONT    Narrative CT dose reduction was achieved through use of a standardized protocol tailored  for this examination and automatic exposure control for dose modulation.     Contrast study maximum intensity projections shows pronounced groundglass  opacification, of variable density, mainly in the dependent portions of each  lung, apex to base. Air bronchograms are preserved in the densest portions. Mild  geographic groundglass opacification of lower density in the nondependent  portions. Central airways are open    Pulmonary arteries are well-opacified and show no filling defect or distortion. Aorta shows normal dimensions, without dissection. Tiny middle mediastinal lymph  nodes. Cardiac finding. No pleural pericardial effusion. No significant upper  abdominal or chest wall finding      Impression IMPRESSION: Widespread patchy bilateral pneumonitis       Clinical picture consistent with COVID-19 infection with pneumonitis with diffuse patchy pulmonary infiltrate elevation in ferritin LDH and CRP nonproductive cough and GI symptoms and yet initial COVID-19 test negative repeat testing indeterminate           Thank you for allowing us to participate in the care of this patient.   We will follow along with you    Elena Ellis MD

## 2020-10-27 NOTE — ROUTINE PROCESS
Report given to A. Kristy Blizzard, RN  Oncoming nurse from [facility], [place] off going nurse to include Sbar, Mar, kardex, recent changes/ results.

## 2020-10-27 NOTE — PROGRESS NOTES
Bedside and verbal shift change report given to Nat Paul RN (oncoming nurse) by Lio Page RN (offgoing nurse). Report included the following information SBAR, Kardex, Intake/Output, MAR, Recent Results, Med Rec Status and Cardiac Rhythm NSR.

## 2020-10-28 LAB
APTT PPP: 25 SEC (ref 23–35.7)
THERAPEUTIC RANGE,PTTT: NORMAL SEC (ref 68–109)

## 2020-10-28 PROCEDURE — 36415 COLL VENOUS BLD VENIPUNCTURE: CPT

## 2020-10-28 PROCEDURE — 94640 AIRWAY INHALATION TREATMENT: CPT

## 2020-10-28 PROCEDURE — 74011000258 HC RX REV CODE- 258: Performed by: INTERNAL MEDICINE

## 2020-10-28 PROCEDURE — 74011000250 HC RX REV CODE- 250: Performed by: INTERNAL MEDICINE

## 2020-10-28 PROCEDURE — 74011250636 HC RX REV CODE- 250/636: Performed by: INTERNAL MEDICINE

## 2020-10-28 PROCEDURE — 85730 THROMBOPLASTIN TIME PARTIAL: CPT

## 2020-10-28 PROCEDURE — 99232 SBSQ HOSP IP/OBS MODERATE 35: CPT | Performed by: INTERNAL MEDICINE

## 2020-10-28 PROCEDURE — 74011250637 HC RX REV CODE- 250/637: Performed by: INTERNAL MEDICINE

## 2020-10-28 PROCEDURE — 74011250636 HC RX REV CODE- 250/636: Performed by: HOSPITALIST

## 2020-10-28 PROCEDURE — 77010033678 HC OXYGEN DAILY

## 2020-10-28 PROCEDURE — 74011250637 HC RX REV CODE- 250/637: Performed by: FAMILY MEDICINE

## 2020-10-28 PROCEDURE — 65270000029 HC RM PRIVATE

## 2020-10-28 PROCEDURE — 94762 N-INVAS EAR/PLS OXIMTRY CONT: CPT

## 2020-10-28 RX ADMIN — SALINE NASAL SPRAY 2 SPRAY: 1.5 SOLUTION NASAL at 06:06

## 2020-10-28 RX ADMIN — Medication 10 ML: at 21:07

## 2020-10-28 RX ADMIN — PIPERACILLIN SODIUM AND TAZOBACTAM SODIUM 3.38 G: 3; .375 INJECTION, POWDER, LYOPHILIZED, FOR SOLUTION INTRAVENOUS at 02:20

## 2020-10-28 RX ADMIN — BENZONATATE 200 MG: 100 CAPSULE ORAL at 21:07

## 2020-10-28 RX ADMIN — SALINE NASAL SPRAY 2 SPRAY: 1.5 SOLUTION NASAL at 13:45

## 2020-10-28 RX ADMIN — IPRATROPIUM BROMIDE AND ALBUTEROL SULFATE 3 ML: .5; 3 SOLUTION RESPIRATORY (INHALATION) at 01:54

## 2020-10-28 RX ADMIN — Medication 10 ML: at 06:06

## 2020-10-28 RX ADMIN — SALINE NASAL SPRAY 2 SPRAY: 1.5 SOLUTION NASAL at 21:07

## 2020-10-28 RX ADMIN — HYDROXYZINE PAMOATE 25 MG: 25 CAPSULE ORAL at 17:43

## 2020-10-28 RX ADMIN — IPRATROPIUM BROMIDE AND ALBUTEROL SULFATE 3 ML: .5; 3 SOLUTION RESPIRATORY (INHALATION) at 20:19

## 2020-10-28 RX ADMIN — PIPERACILLIN SODIUM AND TAZOBACTAM SODIUM 3.38 G: 3; .375 INJECTION, POWDER, LYOPHILIZED, FOR SOLUTION INTRAVENOUS at 09:53

## 2020-10-28 RX ADMIN — ENOXAPARIN SODIUM 30 MG: 30 INJECTION SUBCUTANEOUS at 06:06

## 2020-10-28 RX ADMIN — PIPERACILLIN SODIUM AND TAZOBACTAM SODIUM 3.38 G: 3; .375 INJECTION, POWDER, LYOPHILIZED, FOR SOLUTION INTRAVENOUS at 17:00

## 2020-10-28 RX ADMIN — Medication 10 ML: at 13:46

## 2020-10-28 RX ADMIN — DEXAMETHASONE SODIUM PHOSPHATE 2 MG: 4 INJECTION, SOLUTION INTRAMUSCULAR; INTRAVENOUS at 21:07

## 2020-10-28 RX ADMIN — IPRATROPIUM BROMIDE AND ALBUTEROL SULFATE 3 ML: .5; 3 SOLUTION RESPIRATORY (INHALATION) at 13:50

## 2020-10-28 RX ADMIN — IPRATROPIUM BROMIDE AND ALBUTEROL SULFATE 3 ML: .5; 3 SOLUTION RESPIRATORY (INHALATION) at 09:59

## 2020-10-28 RX ADMIN — FUROSEMIDE 40 MG: 10 INJECTION, SOLUTION INTRAMUSCULAR; INTRAVENOUS at 08:20

## 2020-10-28 RX ADMIN — BENZONATATE 200 MG: 100 CAPSULE ORAL at 06:06

## 2020-10-28 RX ADMIN — ENOXAPARIN SODIUM 30 MG: 30 INJECTION SUBCUTANEOUS at 17:00

## 2020-10-28 RX ADMIN — BENZONATATE 200 MG: 100 CAPSULE ORAL at 13:45

## 2020-10-28 RX ADMIN — DEXAMETHASONE SODIUM PHOSPHATE 2 MG: 4 INJECTION, SOLUTION INTRAMUSCULAR; INTRAVENOUS at 08:20

## 2020-10-28 RX ADMIN — SALINE NASAL SPRAY 2 SPRAY: 1.5 SOLUTION NASAL at 09:55

## 2020-10-28 NOTE — PROGRESS NOTES
Problem: Falls - Risk of  Goal: *Absence of Falls  Description: Document Debbie Meng Fall Risk and appropriate interventions in the flowsheet. Outcome: Progressing Towards Goal  Note: Fall Risk Interventions:  Mobility Interventions: Patient to call before getting OOB, PT Consult for mobility concerns, PT Consult for assist device competence         Medication Interventions: Bed/chair exit alarm, Evaluate medications/consider consulting pharmacy, Patient to call before getting OOB, Teach patient to arise slowly    Elimination Interventions: Call light in reach, Patient to call for help with toileting needs, Stay With Me (per policy)              Problem: Patient Education: Go to Patient Education Activity  Goal: Patient/Family Education  Outcome: Progressing Towards Goal     Problem: Pressure Injury - Risk of  Goal: *Prevention of pressure injury  Description: Document Johnnie Scale and appropriate interventions in the flowsheet.   Outcome: Progressing Towards Goal  Note: Pressure Injury Interventions:  Sensory Interventions: Assess changes in LOC, Assess need for specialty bed, Avoid rigorous massage over bony prominences, Keep linens dry and wrinkle-free, Maintain/enhance activity level, Minimize linen layers, Monitor skin under medical devices    Moisture Interventions: Internal/External urinary devices, Limit adult briefs, Maintain skin hydration (lotion/cream), Minimize layers, Moisture barrier    Activity Interventions: Increase time out of bed, Pressure redistribution bed/mattress(bed type), PT/OT evaluation    Mobility Interventions: HOB 30 degrees or less, Pressure redistribution bed/mattress (bed type), PT/OT evaluation    Nutrition Interventions: Document food/fluid/supplement intake    Friction and Shear Interventions: Lift sheet, Minimize layers                Problem: Patient Education: Go to Patient Education Activity  Goal: Patient/Family Education  Outcome: Progressing Towards Goal     Problem: Discharge Planning  Goal: *Discharge to safe environment  Outcome: Progressing Towards Goal  Goal: *Knowledge of medication management  Outcome: Progressing Towards Goal  Goal: *Knowledge of discharge instructions  Outcome: Progressing Towards Goal     Problem: Patient Education: Go to Patient Education Activity  Goal: Patient/Family Education  Outcome: Progressing Towards Goal     Problem: Patient Education: Go to Patient Education Activity  Goal: Patient/Family Education  Description:  To be educate on proper breathing exercises ; use of spirometer as needed  Outcome: Progressing Towards Goal     Problem: Patient Education: Go to Patient Education Activity  Goal: Patient/Family Education  Outcome: Progressing Towards Goal

## 2020-10-28 NOTE — PROGRESS NOTES
Pulmonary Note      Name: Ekaterina Blanchard MRN: [id number]   : 1960 Hospital: [facility]   Date: 10/28/2020  Admission date: 10/15/2020 Hospital Day: 14       Subjective/Interval History:   Patient seen in the ICU on high flow nasal oxygen at 70% with oxygen saturation 99. He gives a history of gradual worsening illness over the last week to 10 days with nonproductive cough fever generalized malaise nausea denies any vomiting although the ER note states that he had some vomiting. He seems awake and alert. Does not have any Covid contacts that he is aware of. States that his sense of taste and smell have been normal but terribly anorexic has not eaten anything in 3 or 4 days. 10/17 still feels bad Cough shortness of breath but no nausea today   10/18 still complains of severe cough nonproductive although in general feels a little better  10/20 remains 100% high flow nasal oxygen. Continues to complain of it irritating him. Requested nasal saline last evening but it has not been started will reorder it. We will let him try nonrebreather mask with nasal high flow for meals  10/26 high flow nasal oxygen back up to 70% unable to safely switch to low flow nasal oxygen at this point. He states he feels better sense of taste is normal with a good appetite  10/28 patient had a rapid response last night now he is on 100% high flow nasal cannula  Patient Active Problem List   Diagnosis Code    Acute respiratory failure due to COVID-19 ([facility] [address] U07.1, J96.00    Pneumonia J18.9    Acute respiratory failure ([address] J96.00       IMPRESSION:   1. Acute hypoxic respiratory failure requiring high flow nasal oxygen will decrease to 55%  2. History of asthma  3. Community-acquired versus aspiration with diffuse pulmonary infiltrates questionable atypical pneumonia possible COVID-19 repeat testing Negative  4. Nausea probable due to COVID-19 infection  5.  Unremitting cough secondary to pneumonia or reflux  Body mass index is 26.63 kg/m². RECOMMENDATIONS/PLAN:   1. Initial COVID-19 test is negative repeat testing indeterminant  but ferritin LDH CRP are all elevated chest x-ray is consistent with Covid pneumonitis  has completed 2 doses Actemra and 5 days of Remdesivir  2. X-ray showed bilateral infiltrate with some edema I will give an extra dose of Lasix  3. CRP less than 0.29 will decrease Decadron to every 12 dosing  4. Get arterial blood gases  5. Continued hypoxia will give IV Lasix and follow renal function. 6. Sputum culture with MSSA and Klebsiella both should be covered by Zosyn  7. Cough better continue Tessalon and as needed Delsym discontinue codeine  8. Continue nebulizer treatment  9. Repeat COVID-19 test negative  10. Legionella antigen and blood titer still not resulted question if it was actually sent         Subjective/Initial History:   I have reviewed the flowsheet and previous days notes. Seen earlier today on rounds. I was asked by Douglas Horner MD to see An Rose Areas a 56 y.o.  male  in consultation for a chief complaint of acute hypoxic respiratory failure cough and community-acquired pneumonia          Pt now in CCU. Patient PCP: UNKNOWN  PMH:  has no past medical history on file. PSH:   has no past surgical history on file. FHX: family history includes No Known Problems in his father and mother. SHX:  reports that he has never smoked. He has never used smokeless tobacco. He reports that he does not drink alcohol or use drugs.     Systemic review:  General no chronic illness but over the last week he has had fever generalized malaise weight loss no appetite  Eyes no double vision or momentary blindness  ENT no sinus congestion drainage or facial pain  Skeletal has diffuse aches and pains no swollen tender joints  Endocrinologic no polyuria polydipsia  Neurologic no seizures or syncope  Gastrointestinal normally he has no problems but over this last week he has had nausea anorexia marked weight loss has not had any alteration of his sense of taste or smell  Genitourinary no discomfort or drainage on urination  Cardiovascular no history of heart disease chest pain has felt sweaty but no history of ankle edema.   Respiratory as mentioned above    No Known Allergies   MEDS:   Current Facility-Administered Medications   Medication    furosemide (LASIX) injection 40 mg    dexamethasone (DECADRON) 4 mg/mL injection 2 mg    dextromethorphan (DELSYM) 30 mg/5 mL syrup 30 mg    albuterol-ipratropium (DUO-NEB) 2.5 MG-0.5 MG/3 ML    piperacillin-tazobactam (ZOSYN) 3.375 g in 0.9% sodium chloride (MBP/ADV) 100 mL MBP    0.9% sodium chloride infusion 250 mL    sodium chloride (OCEAN) 0.65 % nasal squeeze bottle 2 Spray    hydrOXYzine pamoate (VISTARIL) capsule 25 mg    melatonin tablet 10 mg    enoxaparin (LOVENOX) injection 30 mg    acetaminophen (TYLENOL) tablet 650 mg    Or    acetaminophen (TYLENOL) suppository 650 mg    sodium chloride (NS) flush 5-40 mL    sodium chloride (NS) flush 5-40 mL    ondansetron (ZOFRAN ODT) tablet 4 mg    Or    ondansetron (ZOFRAN) injection 4 mg    benzonatate (TESSALON) capsule 200 mg        Current Facility-Administered Medications:     furosemide (LASIX) injection 40 mg, 40 mg, IntraVENous, DAILY, Gwyn Aviles MD, 40 mg at 10/28/20 0820    dexamethasone (DECADRON) 4 mg/mL injection 2 mg, 2 mg, IntraVENous, Q12H, Gwyn Aviles MD, 2 mg at 10/28/20 0820    dextromethorphan (DELSYM) 30 mg/5 mL syrup 30 mg, 30 mg, Oral, Q12H PRN, Gwyn Aviles MD    albuterol-ipratropium (DUO-NEB) 2.5 MG-0.5 MG/3 ML, 3 mL, Nebulization, Q6H RT, Kannan Conde MD, 3 mL at 10/28/20 0959    piperacillin-tazobactam (ZOSYN) 3.375 g in 0.9% sodium chloride (MBP/ADV) 100 mL MBP, 3.375 g, IntraVENous, Q8H, Simona Gonzales MD, Last Rate: 25 mL/hr at 10/28/20 0953, 3.375 g at 10/28/20 0953    0.9% sodium chloride infusion 250 mL, 250 mL, IntraVENous, PRN, Enrigue Monday, MD    sodium chloride (OCEAN) 0.65 % nasal squeeze bottle 2 Spray, 2 Spray, Both Nostrils, Q4HWA, Carlito Pérez MD, 2 Spray at 10/28/20 7679    hydrOXYzine pamoate (VISTARIL) capsule 25 mg, 25 mg, Oral, TID PRN, Jinny Dumont MD, 25 mg at 10/21/20 2231    melatonin tablet 10 mg, 10 mg, Oral, QHS PRN, Jinny Dumont MD, 10 mg at 10/21/20 2231    enoxaparin (LOVENOX) injection 30 mg, 30 mg, SubCUTAneous, Q12H, Debbi Garcias MD, 30 mg at 10/28/20 0606    acetaminophen (TYLENOL) tablet 650 mg, 650 mg, Oral, Q6H PRN, 650 mg at 10/18/20 2205 **OR** acetaminophen (TYLENOL) suppository 650 mg, 650 mg, Rectal, Q6H PRN, Debbi Garcias MD    sodium chloride (NS) flush 5-40 mL, 5-40 mL, IntraVENous, Q8H, Debbi Garcias MD, 10 mL at 10/28/20 0606    sodium chloride (NS) flush 5-40 mL, 5-40 mL, IntraVENous, PRN, Debbi Garcias MD, 10 mL at 10/22/20 0938    ondansetron (ZOFRAN ODT) tablet 4 mg, 4 mg, Oral, Q6H PRN **OR** ondansetron (ZOFRAN) injection 4 mg, 4 mg, IntraVENous, Q6H PRN, Debbi Garcias MD    benzonatate (TESSALON) capsule 200 mg, 200 mg, Oral, Q8H, Carlito Pérez MD, 200 mg at 10/28/20 0606      Objective:     Vital Signs: Telemetry:    normal sinus rhythm Intake/Output:   Visit Vitals  /81   Pulse 69   Temp 98 °F (36.7 °C)   Resp 22   Ht 5' 9\" (1.753 m)   Wt 81.8 kg (180 lb 5.4 oz)   SpO2 93%   BMI 26.63 kg/m²       Temp (24hrs), Av °F (36.7 °C), Min:97.8 °F (36.6 °C), Max:98.3 °F (36.8 °C)        O2 Device: Hi flow nasal cannula O2 Flow Rate (L/min): 40 l/min         Body mass index is 26.63 kg/m². Wt Readings from Last 4 Encounters:   10/21/20 81.8 kg (180 lb 5.4 oz)          Intake/Output Summary (Last 24 hours) at 10/28/2020 1019  Last data filed at 10/28/2020 0610  Gross per 24 hour   Intake    Output 2150 ml   Net -2150 ml       Last shift:      No intake/output data recorded.   Last 3 shifts: 10/26 1901 - 10/28 0700  In: -   Out: 5600 [Urine:5600]   Ventilator Settings:      Mode Rate TV Press PEEP FiO2 PIP Min. Vent               90 %            Physical Exam:    General:  male; less distress chronic cough nonproductive  HEENT: NCAT, oral mucosa clear  Eyes: anicteric; conjunctiva clear extraocular movements intact   neck: no node neck veins visible  Chest: no deformity,   Cardiac: Regular rate and rhythm no murmurs trace ankle edema  Lungs: Clear anteriorly laterally still has rales posteriorly improved  Abd: Soft nontender normal bowel sounds no organomegaly  Ext: Mild edema; no joint swelling;  No clubbing  :clear urine  Neuro: Awake alert speech is clear moves all 4 extremities well  Psych- no agitation, oriented to person;   Skin: warm,, no cyanosis; very mildly diaphoretic  Pulses: Pulses intact  Capillary: Normal capillary refill      Labs:    Recent Labs     10/27/20  0817 10/26/20  0740   WBC 12.9* 16.9*   HGB 15.9 15.1    238   APTT 24.4 23.8     Recent Labs     10/27/20  0817 10/26/20  0740    138   K 3.8 4.1    104   CO2 29 29   * 174*   BUN 31* 25*   CREA 0.79 0.71   CA 8.2* 8.1*   PHOS 4.2  --    ALB 3.0*  --      10/24 6 L nasal oxygen with oxygen saturation 90%    10/14 COVID-19 test negative  10/17 COVID-19 test indeterminate  Ferritin 1510, previous 2180  , previous 615, 754  Procalcitonin 0.14  CRP 0.87, previous 1.39, 3.0, 12.4  Lab Results   Component Value Date/Time    Culture result: No growth 7 days 10/20/2020 11:30 AM    Culture result: Heavy Staphylococcus aureus 10/20/2020 08:15 AM    Culture result: Light Klebsiella pneumoniae 10/20/2020 08:15 AM    Culture result: Heavy  Normal respiratory smita   10/20/2020 08:15 AM      Lab Results   Component Value Date/Time     10/15/2020 11:47 PM       Imaging:  I have personally reviewed the patients radiographs and have reviewed the reports:    CXR Results  (Last 48 hours)  10/20 chest x-ray with diffuse patchy infiltrates may be slightly less dense  10/17 chest x-ray unchanged diffuse bilateral infiltrate               10/15/20 1927  XR CHEST SNGL V Final result    Narrative:  1       Mild atelectasis in the bases with upper lungs clear. No effusion or   pneumothorax. Normal heart and no congestion  Disagree to me there is bilateral infiltrate           Results from Hospital Encounter encounter on 10/15/20   XR CHEST PORT    Narrative Portable upright radiograph chest 7:34 AM compared to October 26, 2020. INDICATION: Cough, pneumonia. Patient has taken a shallow depth of inspiration. Heart size is stable. Bilateral airspace disease/edema shows worsening compared to prior study. No  pneumothorax. Cannot exclude small effusions. No acute bony changes. Impression IMPRESSION: Shallower depth of inspiration with worsening bilateral airspace  disease/edema. XR CHEST PORT    Narrative Chest, frontal view, 10/26/2020    History: Cough. Pneumonia. Comparison: Including chest 10/25/2020. Findings: The cardiac silhouette is stable. The lungs remain underexpanded. Airspace opacities in the lungs are not significantly changed as compared to the  study performed one day prior. Hydrostatic edema is not excluded. No pleural  effusion or pneumothorax is identified. The osseous structures are stable. Impression Impression: No significant interval change. XR CHEST PORT    Narrative Comparison is with the prior exam dated 10/23/2020. Impression Findings/impression: A portable upright view of the chest demonstrates  persistent cardiomegaly and patchy interstitial and mild airspace disease which  has improved slightly on the right. No pneumothorax or effusion is identified. The osseous structures are unchanged.      Results from [place] encounter on 10/15/20   CTA CHEST W OR W WO CONT    Narrative CT dose reduction was achieved through use of a standardized protocol tailored  for this examination and automatic exposure control for dose modulation. Contrast study maximum intensity projections shows pronounced groundglass  opacification, of variable density, mainly in the dependent portions of each  lung, apex to base. Air bronchograms are preserved in the densest portions. Mild  geographic groundglass opacification of lower density in the nondependent  portions. Central airways are open    Pulmonary arteries are well-opacified and show no filling defect or distortion. Aorta shows normal dimensions, without dissection. Tiny middle mediastinal lymph  nodes. Cardiac finding. No pleural pericardial effusion. No significant upper  abdominal or chest wall finding      Impression IMPRESSION: Widespread patchy bilateral pneumonitis       Clinical picture consistent with COVID-19 infection with pneumonitis with diffuse patchy pulmonary infiltrate elevation in ferritin LDH and CRP nonproductive cough and GI symptoms and yet initial COVID-19 test negative repeat testing indeterminate              Time spent 30 min       Thank you for allowing us to participate in the care of this patient.   We will follow along with you    Florecita Rivas MD

## 2020-10-28 NOTE — PROGRESS NOTES
Hospitalist Progress Note               Daily Progress Note: 10/28/2020      Subjective: The patient is seen for follow  up.     56 y.o. male with no significant medical problems presents to the emergency room complaining of shortness of breath. Patient had a urinary retention which was resolved with Granadso catheter placement. Patient continues to be on high flow oxygen. Medications reviewed  Current Facility-Administered Medications   Medication Dose Route Frequency    furosemide (LASIX) injection 40 mg  40 mg IntraVENous DAILY    dexamethasone (DECADRON) 4 mg/mL injection 2 mg  2 mg IntraVENous Q12H    dextromethorphan (DELSYM) 30 mg/5 mL syrup 30 mg  30 mg Oral Q12H PRN    albuterol-ipratropium (DUO-NEB) 2.5 MG-0.5 MG/3 ML  3 mL Nebulization Q6H RT    piperacillin-tazobactam (ZOSYN) 3.375 g in 0.9% sodium chloride (MBP/ADV) 100 mL MBP  3.375 g IntraVENous Q8H    0.9% sodium chloride infusion 250 mL  250 mL IntraVENous PRN    sodium chloride (OCEAN) 0.65 % nasal squeeze bottle 2 Spray  2 [place] Both Nostrils Q4HWA    hydrOXYzine pamoate (VISTARIL) capsule 25 mg  25 mg Oral TID PRN    melatonin tablet 10 mg  10 mg Oral QHS PRN    enoxaparin (LOVENOX) injection 30 mg  30 mg SubCUTAneous Q12H    acetaminophen (TYLENOL) tablet 650 mg  650 mg Oral Q6H PRN    Or    acetaminophen (TYLENOL) suppository 650 mg  650 mg Rectal Q6H PRN    sodium chloride (NS) flush 5-40 mL  5-40 mL IntraVENous Q8H    sodium chloride (NS) flush 5-40 mL  5-40 mL IntraVENous PRN    ondansetron (ZOFRAN ODT) tablet 4 mg  4 mg Oral Q6H PRN    Or    ondansetron (ZOFRAN) injection 4 mg  4 mg IntraVENous Q6H PRN    benzonatate (TESSALON) capsule 200 mg  200 mg Oral Q8H       Review of Systems:   A comprehensive review of systems was negative except for that written in the HPI.     Objective:   Physical Exam:     Visit Vitals  BP (!) 130/96 (BP 1 Location: Right arm, BP Patient Position: At rest)   Pulse 77   Temp 97.7 °F (36.5 °C)   Resp 22   Ht 5' 9\" (1.753 m)   Wt 81.8 kg (180 lb 5.4 oz)   SpO2 94%   BMI 26.63 kg/m²    O2 Flow Rate (L/min): 40 l/min O2 Device: Hi flow nasal cannula    Temp (24hrs), Av.9 °F (36.6 °C), Min:97.7 °F (36.5 °C), Max:98 °F (36.7 °C)    10/28 0701 - 10/28 1900  In: -   Out: 1500 [Urine:1500]   10/26 1901 - 10/28 0700  In: -   Out: 3997 [Urine:5600]    PHYSICAL EXAM:  General: Alert and awake. Skin: Extremities and face reveal no rashes. HEENT: Sclerae anicteric. Extra-occular muscles are intact. No oral ulcers. No ENT discharge. The neck is supple. Cardiovascular: Regular rate and rhythm. No murmurs, gallops, or rubs. PMI nondisplaced. Carotids without bruits. Respiratory: Comfortable breathing with no accessory muscle use. Clear breath sounds with no wheezes, rales, or rhonchi. GI: Abdomen nondistended, soft, and nontender. Normal active bowel sounds. No enlargement of the liver or spleen. No masses palpable. Rectal: Deferred   Musculoskeletal: No pitting edema of the lower legs. Extremities have good range of motion. No costovertebral tenderness. Neurological: Gross memory appears intact. Patient is alert and oriented. Power 5/5, Cranial nerves II-XII intact  Psychiatric: Mood appears appropriate with judgement intact. Data Review:       Recent Days:  Recent Labs     10/27/20  0817 10/26/20  0740   WBC 12.9* 16.9*   HGB 15.9 15.1   HCT 45.8 43.5    238     Recent Labs     10/27/20  0817 10/26/20  0740    138   K 3.8 4.1    104   CO2 29 29   * 174*   BUN 31* 25*   CREA 0.79 0.71   CA 8.2* 8.1*   PHOS 4.2  --    ALB 3.0*  --      No results for input(s): PH, PCO2, PO2, HCO3, FIO2 in the last 72 hours.     24 Hour Results:  Recent Results (from the past 24 hour(s))   PTT    Collection Time: 10/28/20  9:55 AM   Result Value Ref Range    aPTT 25.0 23.0 - 35.7 sec    aPTT, therapeutic range   68 - 109 sec       XR CHEST PORT   Final Result   IMPRESSION: Shallower depth of inspiration with worsening bilateral airspace   disease/edema. XR CHEST PORT   Final Result   Impression: No significant interval change. XR CHEST PORT   Final Result   Findings/impression: A portable upright view of the chest demonstrates   persistent cardiomegaly and patchy interstitial and mild airspace disease which   has improved slightly on the right. No pneumothorax or effusion is identified. The osseous structures are unchanged. XR CHEST PORT   Final Result      XR CHEST PORT   Final Result   IMPRESSION: Improving bilateral airspace disease. XR CHEST PORT   Final Result      XR CHEST PORT   Final Result      XR CHEST PORT   Final Result      XR CHEST PORT   Final Result      CTA CHEST W OR W WO CONT   Final Result   IMPRESSION: Widespread patchy bilateral pneumonitis      XR CHEST SNGL V   Final Result             Assessment/     Problem List:  Hospital Problems  Date Reviewed: 10/15/2020          Codes Class Noted POA    Acute respiratory failure due to COVID-19 [facility]) ICD-10-CM: U07.1, J96.00  ICD-9-CM: 518.81, 079.89  10/15/2020 Yes        Pneumonia ICD-10-CM: J18.9  ICD-9-CM: 593  10/15/2020 Yes        Acute respiratory failure ([facility] [address] ICD-10-CM: J96.00  ICD-9-CM: 518.81  10/15/2020 Unknown                     Plan:    (1) Acute hypoxic respiratory failure : on high flow nasal canulla.keep saturation > 92. (2) HCAP pneumonia and pneumonitis: MSSA and klebsiella on sputum.  On Zosyn    (3) Strong suspicion of COVID 19 pneumonitis: status post Remdesivir, Decadron, Azithromycin, Actemra,       Dehydrationcurrently resolved    PpxLovenox  FENcurrently on clear liquids, bedside swallow eval, advance diet, replete potassium and magnesium  Full code, patient surrogate decision-maker is his wife, updated patients daughter as well as Primary Care Physician  Dispositioncontinued inpatient treatment, pending clinical improvement, PT/OT    Care Plan discussed with: Patient/Family and Nurse        Bambi Mora MD

## 2020-10-28 NOTE — ROUTINE PROCESS
Bedside and Verbal shift change report given to [address] [place] [address] Melody (oncoming nurse) by Noreen Conrad (offgoing nurse). Report included the following information SBAR, Kardex, Intake/Output, MAR, Accordion and Recent Results.

## 2020-10-28 NOTE — PROGRESS NOTES
Patient had requested regular visitation during previous visit. Only the patient was present during the visit. Patient shared his concern about his protracted stay in the hospital as well as his wife's concern. Patient shared his personal ideas about what kind of treatments might hasten his recovery. Patient seemed less than satisfied about his treatments. I normalized his concern for himself and provided empathic listening and presence. Advised of  availability. Chaplains will follow as able and/or needed. Chaplain Willy Joseph M.Div.    can be reached by calling the  at [facility] [phone]

## 2020-10-28 NOTE — ROUTINE PROCESS
Report given to Calin Truong RN oncoming nurse from [facility]  offgoing nurse to include sbar, mar, kardex, and recent changes.

## 2020-10-28 NOTE — PROGRESS NOTES
PT treatment attempted at 1140 however, per RN patient is on bedrest. Will hold on patient today and continue to follow pt and will attempt treatment at a later time.  Thank you

## 2020-10-28 NOTE — PROGRESS NOTES
Progress Note  Date:10/28/2020       Room:[facility]  Patient Name:Ekaterina Maynard     YOB: 1960     Age:60 y.o. Subjective    Subjective   Patient followed for presumptive Covid-19 pneumoniitis. Initial test was negative but second test is indeterminate. No CXR today. He is afebrile with leukocytosis attributed to steroids. Blood cultures negative and sputum culture grew MSSA and Klebsiella. He is currently on IV Zosyn. He remains on high flow nasal O2. No CXR today. Spoke with respiratory therapist and patient apparently desaturated again today. Objective         Vitals Last 24 Hours:  TEMPERATURE:  Temp  Av °F (36.7 °C)  Min: 97.8 °F (36.6 °C)  Max: 98.3 °F (36.8 °C)  RESPIRATIONS RANGE: Resp  Av.8  Min: 20  Max: 22  PULSE OXIMETRY RANGE: SpO2  Av.9 %  Min: 93 %  Max: 99 %  PULSE RANGE: Pulse  Av.8  Min: 69  Max: 98  BLOOD PRESSURE RANGE: Systolic (73GFN), FKL:204 , Min:121 , OFS:481   ; Diastolic (93MBX), NKO:37, Min:81, Max:89    I/O (24Hr): Intake/Output Summary (Last 24 hours) at 10/28/2020 1217  Last data filed at 10/28/2020 0610  Gross per 24 hour   Intake    Output 2150 ml   Net -2150 ml     Objective:  Vital signs: (most recent): Blood pressure 126/81, pulse 69, temperature 98 °F (36.7 °C), resp. rate 22, height 5' 9\" (1.753 m), weight 180 lb 5.4 oz (81.8 kg), SpO2 93 %.       General: Moderately ill-appearing male, NAD  HEENT: eyes open, high flow nasal O2  Neck: supple  Lungs: clear bilaterally  Heart: RRR  : Granados catheter with moderate urine sediment    Labs/Imaging/Diagnostics    Labs:  CBC:  Recent Labs     10/27/20  0817 10/26/20  0740   WBC 12.9* 16.9*   RBC 5.12 4.84   HGB 15.9 15.1   HCT 45.8 43.5   MCV 89.5 89.9   RDW 13.4 13.4    238     CHEMISTRIES:  Recent Labs     10/27/20  0817 10/26/20  0740    138   K 3.8 4.1    104   CO2 29 29   BUN 31* 25*   CA 8.2* 8.1*   PHOS 4.2  --    PT/INR:  No results for input(s): INR, INREXT, INREXT in the last 72 hours. No lab exists for component: PROTIME  APTT:  Recent Labs     10/28/20  0955 10/27/20  0817 10/26/20  0740   APTT 25.0 24.4 23.8     LIVER PROFILE:  No results for input(s): AST, ALT in the last 72 hours. No lab exists for component: [place], ALKPHOS  Lab Results   Component Value Date/Time    ALT (SGPT) 42 10/25/2020 08:00 AM    AST (SGOT) 19 10/25/2020 08:00 AM    Alk. phosphatase 97 10/25/2020 08:00 AM    Bilirubin, total 1.2 (H) 10/25/2020 08:00 AM     WBC 12,900   (405 (689 (452 (565  Ferritin 1,084 (1,187  CRP <0.29 (0.43 (12.40    Sputum culture (10/20) MSSA and Klebsiella pneumoniae  Blood cultures (10/20) No growth FINAL    Imaging Last 24 Hours:  No results found. Assessment//Plan   Active Problems:    Acute respiratory failure due to COVID-19 [facility]) (10/15/2020)      Pneumonia (10/15/2020)      Acute respiratory failure ([facility] [address] (10/15/2020)      Assessment & Plan  1. Probable Covid-19 pneumonitis, with indeterminate result, status post Remdesivir, Decadron, Azithromycin, Actemra, unchanged. 2. Acute respiratory failure, on high flow nasal O2  3. Elevated CRP, LDH and ferritin, secondary to #1, decreasing. 4. HCAP with purulent sputum, secondary to MSSA and Klebsiella pneumoniae, Day #9 IV Zosyn.     Comment:  Sputum culture grew MSSA and Klebsiella. MSSA should be covered by Zosyn. Leukocytosis likely due to steroids on board. LDH/ferritin decreasing, CRP normal.  Respiratory status remains tenuous     1. Continue IV Zosyn for MSSA and Klebsiella  2.  In am, repeat LDH, ferritin       Electronically signed by Zully Thakur MD on 10/28/2020 at 1:43 PM

## 2020-10-29 LAB
ANION GAP SERPL CALC-SCNC: 10 MMOL/L (ref 5–15)
APTT PPP: 24 SEC (ref 23–35.7)
ARTERIAL PATENCY WRIST A: ABNORMAL
BASE EXCESS BLDA CALC-SCNC: 4.5 MMOL/L (ref 0–2)
BASOPHILS # BLD: 0 K/UL (ref 0–0.1)
BASOPHILS NFR BLD: 0 % (ref 0–1)
BDY SITE: ABNORMAL
BUN SERPL-MCNC: 31 MG/DL (ref 6–20)
BUN/CREAT SERPL: 33 (ref 12–20)
CA-I BLD-MCNC: 8.4 MG/DL (ref 8.5–10.1)
CHLORIDE SERPL-SCNC: 102 MMOL/L (ref 97–108)
CO2 SERPL-SCNC: 26 MMOL/L (ref 21–32)
CREAT SERPL-MCNC: 0.95 MG/DL (ref 0.7–1.3)
DIFFERENTIAL METHOD BLD: ABNORMAL
EOSINOPHIL # BLD: 0.1 K/UL (ref 0–0.4)
EOSINOPHIL NFR BLD: 1 % (ref 0–7)
EPAP/CPAP/PEEP, PAPEEP: 0
ERYTHROCYTE [DISTWIDTH] IN BLOOD BY AUTOMATED COUNT: 14.1 % (ref 11.5–14.5)
FIO2 ON VENT: 70 %
GAS FLOW.O2 O2 DELIVERY SYS: 40 L/MIN
GLUCOSE SERPL-MCNC: 224 MG/DL (ref 65–100)
HCO3 BLDA-SCNC: 28 MMOL/L (ref 22–26)
HCT VFR BLD AUTO: 46.4 % (ref 36.6–50.3)
HGB BLD-MCNC: 15.6 G/DL (ref 12.1–17)
IMM GRANULOCYTES # BLD AUTO: 0.1 K/UL (ref 0–0.04)
IMM GRANULOCYTES NFR BLD AUTO: 1 % (ref 0–0.5)
LDH SERPL L TO P-CCNC: 262 U/L (ref 85–241)
LYMPHOCYTES # BLD: 0.6 K/UL (ref 0.8–3.5)
LYMPHOCYTES NFR BLD: 6 % (ref 12–49)
MCH RBC QN AUTO: 31.1 PG (ref 26–34)
MCHC RBC AUTO-ENTMCNC: 33.6 G/DL (ref 30–36.5)
MCV RBC AUTO: 92.4 FL (ref 80–99)
MONOCYTES # BLD: 1.2 K/UL (ref 0–1)
MONOCYTES NFR BLD: 11 % (ref 5–13)
NEUTS SEG # BLD: 9.2 K/UL (ref 1.8–8)
NEUTS SEG NFR BLD: 81 % (ref 32–75)
PCO2 BLDA: 40 MMHG (ref 35–45)
PH BLDA: 7.47 [PH] (ref 7.35–7.45)
PLATELET # BLD AUTO: 165 K/UL (ref 150–400)
PMV BLD AUTO: 11 FL (ref 8.9–12.9)
PO2 BLDA: 71 MMHG (ref 75–100)
POTASSIUM SERPL-SCNC: 3.8 MMOL/L (ref 3.5–5.1)
RBC # BLD AUTO: 5.02 M/UL (ref 4.1–5.7)
SAO2 % BLD: 95 %
SAO2% DEVICE SAO2% SENSOR NAME: ABNORMAL
SODIUM SERPL-SCNC: 138 MMOL/L (ref 136–145)
THERAPEUTIC RANGE,PTTT: NORMAL SEC (ref 68–109)
WBC # BLD AUTO: 11.1 K/UL (ref 4.1–11.1)

## 2020-10-29 PROCEDURE — 80048 BASIC METABOLIC PNL TOTAL CA: CPT

## 2020-10-29 PROCEDURE — 74011250637 HC RX REV CODE- 250/637: Performed by: INTERNAL MEDICINE

## 2020-10-29 PROCEDURE — 94640 AIRWAY INHALATION TREATMENT: CPT

## 2020-10-29 PROCEDURE — 74011250636 HC RX REV CODE- 250/636: Performed by: INTERNAL MEDICINE

## 2020-10-29 PROCEDURE — 99232 SBSQ HOSP IP/OBS MODERATE 35: CPT | Performed by: INTERNAL MEDICINE

## 2020-10-29 PROCEDURE — 74011250636 HC RX REV CODE- 250/636: Performed by: HOSPITALIST

## 2020-10-29 PROCEDURE — 65270000029 HC RM PRIVATE

## 2020-10-29 PROCEDURE — 77010033678 HC OXYGEN DAILY

## 2020-10-29 PROCEDURE — 83615 LACTATE (LD) (LDH) ENZYME: CPT

## 2020-10-29 PROCEDURE — 85730 THROMBOPLASTIN TIME PARTIAL: CPT

## 2020-10-29 PROCEDURE — 82803 BLOOD GASES ANY COMBINATION: CPT

## 2020-10-29 PROCEDURE — 74011250637 HC RX REV CODE- 250/637: Performed by: HOSPITALIST

## 2020-10-29 PROCEDURE — 85025 COMPLETE CBC W/AUTO DIFF WBC: CPT

## 2020-10-29 PROCEDURE — 36415 COLL VENOUS BLD VENIPUNCTURE: CPT

## 2020-10-29 PROCEDURE — 94762 N-INVAS EAR/PLS OXIMTRY CONT: CPT

## 2020-10-29 PROCEDURE — 74011000250 HC RX REV CODE- 250: Performed by: INTERNAL MEDICINE

## 2020-10-29 PROCEDURE — 74011000258 HC RX REV CODE- 258: Performed by: INTERNAL MEDICINE

## 2020-10-29 PROCEDURE — 82728 ASSAY OF FERRITIN: CPT

## 2020-10-29 RX ORDER — ENOXAPARIN SODIUM 100 MG/ML
40 INJECTION SUBCUTANEOUS EVERY 12 HOURS
Status: DISCONTINUED | OUTPATIENT
Start: 2020-10-29 | End: 2020-11-09 | Stop reason: HOSPADM

## 2020-10-29 RX ORDER — GUAIFENESIN 600 MG/1
600 TABLET, EXTENDED RELEASE ORAL EVERY 12 HOURS
Status: DISCONTINUED | OUTPATIENT
Start: 2020-10-29 | End: 2020-11-02

## 2020-10-29 RX ADMIN — Medication 10 ML: at 22:20

## 2020-10-29 RX ADMIN — PIPERACILLIN SODIUM AND TAZOBACTAM SODIUM 3.38 G: 3; .375 INJECTION, POWDER, LYOPHILIZED, FOR SOLUTION INTRAVENOUS at 01:22

## 2020-10-29 RX ADMIN — SALINE NASAL SPRAY 2 SPRAY: 1.5 SOLUTION NASAL at 18:00

## 2020-10-29 RX ADMIN — SALINE NASAL SPRAY 2 SPRAY: 1.5 SOLUTION NASAL at 22:00

## 2020-10-29 RX ADMIN — PIPERACILLIN SODIUM AND TAZOBACTAM SODIUM 3.38 G: 3; .375 INJECTION, POWDER, LYOPHILIZED, FOR SOLUTION INTRAVENOUS at 09:25

## 2020-10-29 RX ADMIN — ENOXAPARIN SODIUM 30 MG: 30 INJECTION SUBCUTANEOUS at 05:57

## 2020-10-29 RX ADMIN — DEXAMETHASONE SODIUM PHOSPHATE 2 MG: 4 INJECTION, SOLUTION INTRAMUSCULAR; INTRAVENOUS at 22:18

## 2020-10-29 RX ADMIN — DEXAMETHASONE SODIUM PHOSPHATE 2 MG: 4 INJECTION, SOLUTION INTRAMUSCULAR; INTRAVENOUS at 08:19

## 2020-10-29 RX ADMIN — BENZONATATE 200 MG: 100 CAPSULE ORAL at 22:18

## 2020-10-29 RX ADMIN — ENOXAPARIN SODIUM 40 MG: 40 INJECTION SUBCUTANEOUS at 17:50

## 2020-10-29 RX ADMIN — SALINE NASAL SPRAY 2 SPRAY: 1.5 SOLUTION NASAL at 05:59

## 2020-10-29 RX ADMIN — BENZONATATE 200 MG: 100 CAPSULE ORAL at 13:21

## 2020-10-29 RX ADMIN — IPRATROPIUM BROMIDE AND ALBUTEROL SULFATE 3 ML: .5; 3 SOLUTION RESPIRATORY (INHALATION) at 14:26

## 2020-10-29 RX ADMIN — IPRATROPIUM BROMIDE AND ALBUTEROL SULFATE 3 ML: .5; 3 SOLUTION RESPIRATORY (INHALATION) at 21:08

## 2020-10-29 RX ADMIN — Medication 10 ML: at 13:27

## 2020-10-29 RX ADMIN — PIPERACILLIN SODIUM AND TAZOBACTAM SODIUM 3.38 G: 3; .375 INJECTION, POWDER, LYOPHILIZED, FOR SOLUTION INTRAVENOUS at 17:50

## 2020-10-29 RX ADMIN — SALINE NASAL SPRAY 2 SPRAY: 1.5 SOLUTION NASAL at 09:27

## 2020-10-29 RX ADMIN — IPRATROPIUM BROMIDE AND ALBUTEROL SULFATE 3 ML: .5; 3 SOLUTION RESPIRATORY (INHALATION) at 07:55

## 2020-10-29 RX ADMIN — BENZONATATE 200 MG: 100 CAPSULE ORAL at 05:57

## 2020-10-29 RX ADMIN — IPRATROPIUM BROMIDE AND ALBUTEROL SULFATE 3 ML: .5; 3 SOLUTION RESPIRATORY (INHALATION) at 01:16

## 2020-10-29 RX ADMIN — FUROSEMIDE 40 MG: 10 INJECTION, SOLUTION INTRAMUSCULAR; INTRAVENOUS at 08:19

## 2020-10-29 RX ADMIN — GUAIFENESIN 600 MG: 600 TABLET, EXTENDED RELEASE ORAL at 15:08

## 2020-10-29 RX ADMIN — GUAIFENESIN 600 MG: 600 TABLET, EXTENDED RELEASE ORAL at 22:20

## 2020-10-29 RX ADMIN — SALINE NASAL SPRAY 2 SPRAY: 1.5 SOLUTION NASAL at 13:22

## 2020-10-29 NOTE — PROGRESS NOTES
Hospitalist Progress Note               Daily Progress Note: 10/29/2020      Subjective: The patient is seen for follow  up.     56 y.o. male with no significant medical problems presents to the emergency room complaining of shortness of breath. Patient had a urinary retention which was resolved with Granados catheter placement. Patient continues to be on high flow oxygen 40lpm @ FiO2 of 70%    Medications reviewed  Current Facility-Administered Medications   Medication Dose Route Frequency    furosemide (LASIX) injection 40 mg  40 mg IntraVENous DAILY    dexamethasone (DECADRON) 4 mg/mL injection 2 mg  2 mg IntraVENous Q12H    dextromethorphan (DELSYM) 30 mg/5 mL syrup 30 mg  30 mg Oral Q12H PRN    albuterol-ipratropium (DUO-NEB) 2.5 MG-0.5 MG/3 ML  3 mL Nebulization Q6H RT    piperacillin-tazobactam (ZOSYN) 3.375 g in 0.9% sodium chloride (MBP/ADV) 100 mL MBP  3.375 g IntraVENous Q8H    0.9% sodium chloride infusion 250 mL  250 mL IntraVENous PRN    sodium chloride (OCEAN) 0.65 % nasal squeeze bottle 2 Spray  2 Devine Both Nostrils Q4HWA    hydrOXYzine pamoate (VISTARIL) capsule 25 mg  25 mg Oral TID PRN    melatonin tablet 10 mg  10 mg Oral QHS PRN    enoxaparin (LOVENOX) injection 30 mg  30 mg SubCUTAneous Q12H    acetaminophen (TYLENOL) tablet 650 mg  650 mg Oral Q6H PRN    Or    acetaminophen (TYLENOL) suppository 650 mg  650 mg Rectal Q6H PRN    sodium chloride (NS) flush 5-40 mL  5-40 mL IntraVENous Q8H    sodium chloride (NS) flush 5-40 mL  5-40 mL IntraVENous PRN    ondansetron (ZOFRAN ODT) tablet 4 mg  4 mg Oral Q6H PRN    Or    ondansetron (ZOFRAN) injection 4 mg  4 mg IntraVENous Q6H PRN    benzonatate (TESSALON) capsule 200 mg  200 mg Oral Q8H       Review of Systems:   A comprehensive review of systems was negative except for that written in the HPI.     Objective:   Physical Exam:     Visit Vitals  /89   Pulse 71   Temp 97.8 °F (36.6 °C)   Resp 22   Ht 5' 9\" (1.753 m)   Wt 81.8 kg (180 lb 5.4 oz)   SpO2 97%   BMI 26.63 kg/m²    O2 Flow Rate (L/min): 40 l/min O2 Device: Hi flow nasal cannula    Temp (24hrs), Av.9 °F (36.6 °C), Min:97.4 °F (36.3 °C), Max:98.3 °F (36.8 °C)    No intake/output data recorded. 10/27 1901 - 10/29 0700  In: 480 [P.O.:480]  Out: 3500 [Urine:3500]    PHYSICAL EXAM:  General: Alert and awake. Skin: Extremities and face reveal no rashes. HEENT: Sclerae anicteric. Extra-occular muscles are intact. No oral ulcers. No ENT discharge. The neck is supple. Cardiovascular: Regular rate and rhythm. No murmurs, gallops, or rubs. PMI nondisplaced. Carotids without bruits. Respiratory: Comfortable breathing with no accessory muscle use. Clear breath sounds with no wheezes, rales, or rhonchi. GI: Abdomen nondistended, soft, and nontender. Normal active bowel sounds. No enlargement of the liver or spleen. No masses palpable. Rectal: Deferred   Musculoskeletal: No pitting edema of the lower legs. Extremities have good range of motion. No costovertebral tenderness. Neurological: Gross memory appears intact. Patient is alert and oriented. Power 5/5, Cranial nerves II-XII intact  Psychiatric: Mood appears appropriate with judgement intact.      Data Review:       Recent Days:  Recent Labs     10/27/20  0817   WBC 12.9*   HGB 15.9   HCT 45.8        Recent Labs     10/27/20  0817      K 3.8      CO2 29   *   BUN 31*   CREA 0.79   CA 8.2*   PHOS 4.2   ALB 3.0*     Recent Labs     10/29/20  1205   PH 7.467*   PCO2 40   PO2 71*   HCO3 28*   FIO2 70.0       24 Hour Results:  Recent Results (from the past 24 hour(s))   PTT    Collection Time: 10/29/20  6:20 AM   Result Value Ref Range    aPTT 24.0 23.0 - 35.7 sec    aPTT, therapeutic range   68 - 109 sec   LD    Collection Time: 10/29/20  6:20 AM   Result Value Ref Range     (H) 85 - 241 U/L   BLOOD GAS, ARTERIAL    Collection Time: 10/29/20 12:05 PM   Result Value Ref Range    pH 7.467 (H) 7.35 - 7.45      PCO2 40 35 - 45 mmHg    PO2 71 (L) 75 - 100 mmHg    O2 SAT 95 (L) >95 %    BICARBONATE 28 (H) 22 - 26 mmol/L    BASE EXCESS 4.5 (H) 0 - 2 mmol/L    O2 METHOD High Flow O2      O2 FLOW RATE 40.0 L/min    FIO2 70.0 %    EPAP/CPAP/PEEP 0      SITE Right Radial      KAT'S TEST PASS      Critical value read back PENDING        XR CHEST PORT   Final Result   IMPRESSION: Shallower depth of inspiration with worsening bilateral airspace   disease/edema. XR CHEST PORT   Final Result   Impression: No significant interval change. XR CHEST PORT   Final Result   Findings/impression: A portable upright view of the chest demonstrates   persistent cardiomegaly and patchy interstitial and mild airspace disease which   has improved slightly on the right. No pneumothorax or effusion is identified. The osseous structures are unchanged. XR CHEST PORT   Final Result      XR CHEST PORT   Final Result   IMPRESSION: Improving bilateral airspace disease. XR CHEST PORT   Final Result      XR CHEST PORT   Final Result      XR CHEST PORT   Final Result      XR CHEST PORT   Final Result      CTA CHEST W OR W WO CONT   Final Result   IMPRESSION: Widespread patchy bilateral pneumonitis      XR CHEST SNGL V   Final Result             Assessment/     Problem List:  Hospital Problems  Date Reviewed: 10/15/2020          Codes Class Noted POA    Acute respiratory failure due to COVID-19 [facility]) ICD-10-CM: U07.1, J96.00  ICD-9-CM: 518.81, 079.89  10/15/2020 Yes        Pneumonia ICD-10-CM: J18.9  ICD-9-CM: 934  10/15/2020 Yes        Acute respiratory failure ([address] ICD-10-CM: J96.00  ICD-9-CM: 518.81  10/15/2020 Unknown                     Plan:    (1) Acute hypoxic respiratory failure : on high flow nasal canulla.keep saturation > 92. (2) HCAP pneumonia and pneumonitis: MSSA and klebsiella on sputum.  On Zosyn    (3) Strong suspicion of COVID 19 pneumonitis: status post Remdesivir, Decadron, Azithromycin, Actemra,       Dehydrationcurrently resolved    PpxLovenox  FENcurrently on clear liquids, bedside swallow eval, advance diet, replete potassium and magnesium  Full code, patient surrogate decision-maker is his wife, updated patients daughter as well as Primary Care Physician  Dispositioncontinued inpatient treatment, pending clinical improvement, PT/OT    Care Plan discussed with: Patient/Family and Nurse    Called Patients daughter Mayra Humphrey Ph- [phone]    Judy Samano MD

## 2020-10-29 NOTE — ROUTINE PROCESS
Bedside and Verbal shift change report given to [place] Rupesh Alexis (oncoming nurse) by Ruddy Escobar RN (offgoing nurse). Report included the following information SBAR, Kardex, Intake/Output, MAR, Accordion and Recent Results.

## 2020-10-29 NOTE — PROGRESS NOTES
Pulmonary Note      Name: Ekaterina Aelx MRN: [id number]   : 1960 Hospital: [address] [place] [address]   Date: 10/29/2020  Admission date: 10/15/2020 Hospital Day: 15       Subjective/Interval History:   Patient seen in the ICU on high flow nasal oxygen at 70% with oxygen saturation 99. He gives a history of gradual worsening illness over the last week to 10 days with nonproductive cough fever generalized malaise nausea denies any vomiting although the ER note states that he had some vomiting. He seems awake and alert. Does not have any Covid contacts that he is aware of. States that his sense of taste and smell have been normal but terribly anorexic has not eaten anything in 3 or 4 days. 10/17 still feels bad Cough shortness of breath but no nausea today   10/18 still complains of severe cough nonproductive although in general feels a little better  10/20 remains 100% high flow nasal oxygen. Continues to complain of it irritating him. Requested nasal saline last evening but it has not been started will reorder it. We will let him try nonrebreather mask with nasal high flow for meals  10/26 high flow nasal oxygen back up to 70% unable to safely switch to low flow nasal oxygen at this point. He states he feels better sense of taste is normal with a good appetite  10/28 patient had a rapid response last night now he is on 100% high flow nasal cannula  Patient Active Problem List   Diagnosis Code    Acute respiratory failure due to COVID-19 ([facility] [address] U07.1, J96.00    Pneumonia J18.9    Acute respiratory failure ([address] J96.00       IMPRESSION:   1. Acute hypoxic respiratory failure requiring high flow nasal   2. History of asthma  3. Community-acquired versus aspiration with diffuse pulmonary infiltrates questionable atypical pneumonia possible COVID-19 repeat testing Negative  4. Nausea probable due to COVID-19 infection  5.  Unremitting cough secondary to pneumonia or reflux  Body mass index is 26.63 kg/m².        RECOMMENDATIONS/PLAN:   1. Initial COVID-19 test is negative repeat testing indeterminant  but ferritin LDH CRP are all elevated chest x-ray is consistent with Covid pneumonitis  has completed 2 doses Actemra and 5 days of Remdesivir  2. He is still hypoxic we will continue with a high flow nasal cannula  3. X-ray showed bilateral infiltrate with some edema I will give an extra dose of Lasix  4. CRP less than 0.29 will decrease Decadron to every 12 dosing  5. Get arterial blood gases  6. Continued hypoxia will give IV Lasix and follow renal function. 7. Sputum culture with MSSA and Klebsiella both should be covered by Zosyn  8. Cough better continue Tessalon and as needed Delsym discontinue codeine  9. Continue nebulizer treatment  10. Repeat COVID-19 test negative  11. Legionella antigen and blood titer still not resulted question if it was actually sent         Subjective/Initial History:   I have reviewed the flowsheet and previous days notes. Seen earlier today on rounds. I was asked by Vivian Oliveira MD to see An Adis Heaton a 56 y.o.  male  in consultation for a chief complaint of acute hypoxic respiratory failure cough and community-acquired pneumonia          Pt now in CCU. Patient PCP: UNKNOWN  PMH:  has no past medical history on file. PSH:   has no past surgical history on file. FHX: family history includes No Known Problems in his father and mother. SHX:  reports that he has never smoked. He has never used smokeless tobacco. He reports that he does not drink alcohol or use drugs.     Systemic review:  General no chronic illness but over the last week he has had fever generalized malaise weight loss no appetite  Eyes no double vision or momentary blindness  ENT no sinus congestion drainage or facial pain  Skeletal has diffuse aches and pains no swollen tender joints  Endocrinologic no polyuria polydipsia  Neurologic no seizures or syncope  Gastrointestinal normally he has no problems but over this last week he has had nausea anorexia marked weight loss has not had any alteration of his sense of taste or smell  Genitourinary no discomfort or drainage on urination  Cardiovascular no history of heart disease chest pain has felt sweaty but no history of ankle edema.   Respiratory as mentioned above    No Known Allergies   MEDS:   Current Facility-Administered Medications   Medication    furosemide (LASIX) injection 40 mg    dexamethasone (DECADRON) 4 mg/mL injection 2 mg    dextromethorphan (DELSYM) 30 mg/5 mL syrup 30 mg    albuterol-ipratropium (DUO-NEB) 2.5 MG-0.5 MG/3 ML    piperacillin-tazobactam (ZOSYN) 3.375 g in 0.9% sodium chloride (MBP/ADV) 100 mL MBP    0.9% sodium chloride infusion 250 mL    sodium chloride (OCEAN) 0.65 % nasal squeeze bottle 2 Spray    hydrOXYzine pamoate (VISTARIL) capsule 25 mg    melatonin tablet 10 mg    enoxaparin (LOVENOX) injection 30 mg    acetaminophen (TYLENOL) tablet 650 mg    Or    acetaminophen (TYLENOL) suppository 650 mg    sodium chloride (NS) flush 5-40 mL    sodium chloride (NS) flush 5-40 mL    ondansetron (ZOFRAN ODT) tablet 4 mg    Or    ondansetron (ZOFRAN) injection 4 mg    benzonatate (TESSALON) capsule 200 mg        Current Facility-Administered Medications:     furosemide (LASIX) injection 40 mg, 40 mg, IntraVENous, DAILY, Nichole Kline MD, 40 mg at 10/29/20 0819    dexamethasone (DECADRON) 4 mg/mL injection 2 mg, 2 mg, IntraVENous, Q12H, Nichole Kline MD, 2 mg at 10/29/20 0819    dextromethorphan (DELSYM) 30 mg/5 mL syrup 30 mg, 30 mg, Oral, Q12H PRN, Nichole Kline MD    albuterol-ipratropium (DUO-NEB) 2.5 MG-0.5 MG/3 ML, 3 mL, Nebulization, Q6H RT, Kannan Conde MD, 3 mL at 10/29/20 0755    piperacillin-tazobactam (ZOSYN) 3.375 g in 0.9% sodium chloride (MBP/ADV) 100 mL MBP, 3.375 g, IntraVENous, Q8H, Simona Gonzales MD, Last Rate: 25 mL/hr at 10/29/20 0925, 3.375 g at 10/29/20 8696    0.9% sodium chloride infusion 250 mL, 250 mL, IntraVENous, PRN, Juan José Massey MD    sodium chloride (OCEAN) 0.65 % nasal squeeze bottle 2 Spray, 2 Spray, Both Nostrils, Q4HWA, Krysta Kelley MD, 2 Spray at 10/29/20 4025    hydrOXYzine pamoate (VISTARIL) capsule 25 mg, 25 mg, Oral, TID PRN, Luda Duran MD, 25 mg at 10/28/20 1743    melatonin tablet 10 mg, 10 mg, Oral, QHS PRN, Luda Duran MD, 10 mg at 10/21/20 2231    enoxaparin (LOVENOX) injection 30 mg, 30 mg, SubCUTAneous, Q12H, Sidney Hilliard MD, 30 mg at 10/29/20 0557    acetaminophen (TYLENOL) tablet 650 mg, 650 mg, Oral, Q6H PRN, 650 mg at 10/18/20 2205 **OR** acetaminophen (TYLENOL) suppository 650 mg, 650 mg, Rectal, Q6H PRN, Sidney Hilliard MD    sodium chloride (NS) flush 5-40 mL, 5-40 mL, IntraVENous, Q8H, Sidney Hilliard MD, 10 mL at 10/28/20 2107    sodium chloride (NS) flush 5-40 mL, 5-40 mL, IntraVENous, PRN, Sidney Hilliard MD, 10 mL at 10/22/20 0938    ondansetron (ZOFRAN ODT) tablet 4 mg, 4 mg, Oral, Q6H PRN **OR** ondansetron (ZOFRAN) injection 4 mg, 4 mg, IntraVENous, Q6H PRN, Sidney Hilliard MD    benzonatate (TESSALON) capsule 200 mg, 200 mg, Oral, Q8H, Krysta Kelley MD, 200 mg at 10/29/20 0557      Objective:     Vital Signs: Telemetry:    normal sinus rhythm Intake/Output:   Visit Vitals  /89   Pulse 71   Temp 97.8 °F (36.6 °C)   Resp 22   Ht 5' 9\" (1.753 m)   Wt 81.8 kg (180 lb 5.4 oz)   SpO2 97%   BMI 26.63 kg/m²       Temp (24hrs), Av.9 °F (36.6 °C), Min:97.4 °F (36.3 °C), Max:98.3 °F (36.8 °C)        O2 Device: Hi flow nasal cannula O2 Flow Rate (L/min): 40 l/min         Body mass index is 26.63 kg/m².     Wt Readings from Last 4 Encounters:   10/21/20 81.8 kg (180 lb 5.4 oz)          Intake/Output Summary (Last 24 hours) at 10/29/2020 1133  Last data filed at 10/29/2020 0604  Gross per 24 hour   Intake 480 ml   Output 2350 ml   Net -1870 ml       Last shift:      No intake/output data recorded. Last 3 shifts: 10/27 1901 - 10/29 0700  In: 18 [P.O.:480]  Out: 3500 [Urine:3500]   Ventilator Settings:      Mode Rate TV Press PEEP FiO2 PIP Min. Vent               70 %            Physical Exam:    General:  male; less distress chronic cough nonproductive  HEENT: NCAT, oral mucosa clear  Eyes: anicteric; conjunctiva clear extraocular movements intact   neck: no node neck veins visible  Chest: no deformity,   Cardiac: Regular rate and rhythm no murmurs trace ankle edema  Lungs: Clear anteriorly laterally still has rales posteriorly improved  Abd: Soft nontender normal bowel sounds no organomegaly  Ext: Mild edema; no joint swelling;  No clubbing  :clear urine  Neuro: Awake alert speech is clear moves all 4 extremities well  Psych- no agitation, oriented to person;   Skin: warm,, no cyanosis; very mildly diaphoretic  Pulses: Pulses intact  Capillary: Normal capillary refill      Labs:    Recent Labs     10/29/20  0620 10/28/20  0955 10/27/20  0817   WBC  --   --  12.9*   HGB  --   --  15.9   PLT  --   --  213   APTT 24.0 25.0 24.4     Recent Labs     10/27/20  0817      K 3.8      CO2 29   *   BUN 31*   CREA 0.79   CA 8.2*   PHOS 4.2   ALB 3.0*     10/24 6 L nasal oxygen with oxygen saturation 90%    10/14 COVID-19 test negative  10/17 COVID-19 test indeterminate  Ferritin 1510, previous 2180  , previous 615, 754  Procalcitonin 0.14  CRP 0.87, previous 1.39, 3.0, 12.4  Lab Results   Component Value Date/Time    Culture result: No growth 7 days 10/20/2020 11:30 AM    Culture result: Heavy Staphylococcus aureus 10/20/2020 08:15 AM    Culture result: Light Klebsiella pneumoniae 10/20/2020 08:15 AM    Culture result: Heavy  Normal respiratory smita   10/20/2020 08:15 AM      Lab Results   Component Value Date/Time     10/15/2020 11:47 PM       Imaging:  I have personally reviewed the patients radiographs and have reviewed the reports:    CXR Results (Last 48 hours)  10/20 chest x-ray with diffuse patchy infiltrates may be slightly less dense  10/17 chest x-ray unchanged diffuse bilateral infiltrate               10/15/20 1927  XR CHEST SNGL V Final result    Narrative:  1       Mild atelectasis in the bases with upper lungs clear. No effusion or   pneumothorax. Normal heart and no congestion  Disagree to me there is bilateral infiltrate           Results from Hospital Encounter encounter on 10/15/20   XR CHEST PORT    Narrative Portable upright radiograph chest 7:34 AM compared to October 26, 2020. INDICATION: Cough, pneumonia. Patient has taken a shallow depth of inspiration. Heart size is stable. Bilateral airspace disease/edema shows worsening compared to prior study. No  pneumothorax. Cannot exclude small effusions. No acute bony changes. Impression IMPRESSION: Shallower depth of inspiration with worsening bilateral airspace  disease/edema. XR CHEST PORT    Narrative Chest, frontal view, 10/26/2020    History: Cough. Pneumonia. Comparison: Including chest 10/25/2020. Findings: The cardiac silhouette is stable. The lungs remain underexpanded. Airspace opacities in the lungs are not significantly changed as compared to the  study performed one day prior. Hydrostatic edema is not excluded. No pleural  effusion or pneumothorax is identified. The osseous structures are stable. Impression Impression: No significant interval change. XR CHEST PORT    Narrative Comparison is with the prior exam dated 10/23/2020. Impression Findings/impression: A portable upright view of the chest demonstrates  persistent cardiomegaly and patchy interstitial and mild airspace disease which  has improved slightly on the right. No pneumothorax or effusion is identified. The osseous structures are unchanged.      Results from [place] encounter on 10/15/20   CTA CHEST W OR W WO CONT    Narrative CT dose reduction was achieved through use of a standardized protocol tailored  for this examination and automatic exposure control for dose modulation. Contrast study maximum intensity projections shows pronounced groundglass  opacification, of variable density, mainly in the dependent portions of each  lung, apex to base. Air bronchograms are preserved in the densest portions. Mild  geographic groundglass opacification of lower density in the nondependent  portions. Central airways are open    Pulmonary arteries are well-opacified and show no filling defect or distortion. Aorta shows normal dimensions, without dissection. Tiny middle mediastinal lymph  nodes. Cardiac finding. No pleural pericardial effusion. No significant upper  abdominal or chest wall finding      Impression IMPRESSION: Widespread patchy bilateral pneumonitis       Clinical picture consistent with COVID-19 infection with pneumonitis with diffuse patchy pulmonary infiltrate elevation in ferritin LDH and CRP nonproductive cough and GI symptoms and yet initial COVID-19 test negative repeat testing indeterminate              Time spent 30 min       Thank you for allowing us to participate in the care of this patient.   We will follow along with you    Sondra Paulson MD

## 2020-10-29 NOTE — PROGRESS NOTES
Progress Note  Date:10/29/2020       Room:[facility]  Patient Name:Ekaterina Bradford     YOB: 1960     Age:60 y.o. Subjective    Subjective   Patient followed for presumptive Covid-19 pneumoniitis with indeterminate results, now status post Remdesivir and Actemra, still on Decadron. No CXR today. He is afebrile with leukocytosis attributed to steroids. Blood cultures negative and sputum culture grew MSSA and Klebsiella. He is currently on IV Zosyn. He remains on high flow nasal O2. States he is now cough up yellowish sputum. Objective         Vitals Last 24 Hours:  TEMPERATURE:  Temp  Av.9 °F (36.6 °C)  Min: 97.4 °F (36.3 °C)  Max: 98.3 °F (36.8 °C)  RESPIRATIONS RANGE: Resp  Av.2  Min: 20  Max: 22  PULSE OXIMETRY RANGE: SpO2  Av.6 %  Min: 94 %  Max: 97 %  PULSE RANGE: Pulse  Av.4  Min: 68  Max: 101  BLOOD PRESSURE RANGE: Systolic (23FBI), ZEI:030 , Min:104 , JZD:158   ; Diastolic (69DGH), CQB:88, Min:76, Max:96    I/O (24Hr): Intake/Output Summary (Last 24 hours) at 10/29/2020 1249  Last data filed at 10/29/2020 0604  Gross per 24 hour   Intake 480 ml   Output 2350 ml   Net -1870 ml     Objective:  Vital signs: (most recent): Blood pressure 130/89, pulse 71, temperature 97.8 °F (36.6 °C), resp. rate 22, height 5' 9\" (1.753 m), weight 180 lb 5.4 oz (81.8 kg), SpO2 97 %. General: Moderately ill-appearing male, NAD  HEENT: eyes open, high flow nasal O2  Neck: supple  Lungs: clear bilaterally  Heart: RRR  : Granados catheter with moderate urine sediment    Labs/Imaging/Diagnostics    Labs:  CBC:  Recent Labs     10/27/20  0817   WBC 12.9*   RBC 5.12   HGB 15.9   HCT 45.8   MCV 89.5   RDW 13.4        CHEMISTRIES:  Recent Labs     10/27/20  0817      K 3.8      CO2 29   BUN 31*   CA 8.2*   PHOS 4.2   PT/INR:  No results for input(s): INR, INREXT, INREXT in the last 72 hours.     No lab exists for component: PROTIME  APTT:  Recent Labs     10/29/20  0620 10/28/20  0955 10/27/20  0817   APTT 24.0 25.0 24.4     LIVER PROFILE:  No results for input(s): AST, ALT in the last 72 hours. No lab exists for component: MIKEY DeviPHOS  Lab Results   Component Value Date/Time    ALT (SGPT) 42 10/25/2020 08:00 AM    AST (SGOT) 19 10/25/2020 08:00 AM    Alk. phosphatase 97 10/25/2020 08:00 AM    Bilirubin, total 1.2 (H) 10/25/2020 08:00 AM     WBC 12,900   (403 (405 (689 (452 (565  Ferritin 1,084 (1,187  CRP <0.29 (0.43 (12.40    Sputum culture (10/20) MSSA and Klebsiella pneumoniae  Blood cultures (10/20) No growth FINAL    Imaging Last 24 Hours:  No results found. Assessment//Plan   Active Problems:    Acute respiratory failure due to COVID-19 [facility]) (10/15/2020)      Pneumonia (10/15/2020)      Acute respiratory failure ([facility] [address] (10/15/2020)      Assessment & Plan  1. Probable Covid-19 pneumonitis, with indeterminate result, status post Remdesivir, Decadron, Azithromycin, Actemra, unchanged. 2. Acute respiratory failure, on high flow nasal O2  3. Elevated CRP, LDH and ferritin, secondary to #1, decreasing. 4. HCAP with purulent sputum, secondary to MSSA and Klebsiella pneumoniae, Day #10 IV Zosyn.     Comment:   LDH/ferritin decreasing, CRP normal.  Respiratory status remains tenuous, but it appears that he is slowly improving. Productive cough not concerning.     1. Continue IV Zosyn for MSSA and Klebsiella for 4 more days  2.  Continue to monitor LDH/ferritin       Electronically signed by Amber Kwok MD on 10/29/2020 at 1:43 PM

## 2020-10-29 NOTE — ROUTINE PROCESS
Report given to Manan RN oncoming nurse from [employer]. RN  offgoing nurse to include sbar, mar, kardex, and recent changes.

## 2020-10-29 NOTE — PROGRESS NOTES
Problem: Falls - Risk of  Goal: *Absence of Falls  Description: Document Go Francisco Fall Risk and appropriate interventions in the flowsheet. Outcome: Progressing Towards Goal  Note: Fall Risk Interventions:  Mobility Interventions: PT Consult for mobility concerns, PT Consult for assist device competence         Medication Interventions: Patient to call before getting OOB, Teach patient to arise slowly    Elimination Interventions: Call light in reach, Patient to call for help with toileting needs              Problem: Patient Education: Go to Patient Education Activity  Goal: Patient/Family Education  Outcome: Progressing Towards Goal     Problem: Pressure Injury - Risk of  Goal: *Prevention of pressure injury  Description: Document Johnnie Scale and appropriate interventions in the flowsheet.   Outcome: Progressing Towards Goal  Note: Pressure Injury Interventions:  Sensory Interventions: Assess changes in LOC, Assess need for specialty bed, Avoid rigorous massage over bony prominences, Float heels, Keep linens dry and wrinkle-free, Maintain/enhance activity level, Minimize linen layers, Monitor skin under medical devices    Moisture Interventions: Internal/External urinary devices    Activity Interventions: Increase time out of bed, Pressure redistribution bed/mattress(bed type), PT/OT evaluation    Mobility Interventions: Pressure redistribution bed/mattress (bed type), HOB 30 degrees or less, PT/OT evaluation    Nutrition Interventions: Document food/fluid/supplement intake    Friction and Shear Interventions: Lift sheet, Lift team/patient mobility team, Minimize layers                Problem: Patient Education: Go to Patient Education Activity  Goal: Patient/Family Education  Outcome: Progressing Towards Goal     Problem: Discharge Planning  Goal: *Discharge to safe environment  Outcome: Progressing Towards Goal  Goal: *Knowledge of medication management  Outcome: Progressing Towards Goal  Goal: *Knowledge of discharge instructions  Outcome: Progressing Towards Goal     Problem: Patient Education: Go to Patient Education Activity  Goal: Patient/Family Education  Outcome: Progressing Towards Goal     Problem: Patient Education: Go to Patient Education Activity  Goal: Patient/Family Education  Description:  To be educate on proper breathing exercises ; use of spirometer as needed  Outcome: Progressing Towards Goal     Problem: Patient Education: Go to Patient Education Activity  Goal: Patient/Family Education  Outcome: Progressing Towards Goal

## 2020-10-29 NOTE — ROUTINE PROCESS
Bedside and verbal shift change report given to Juanita Gaines (oncoming nurse) by Ismael Allen RN (offgoing nurse). Report included the following information SBAR, Kardex, Intake/Output, MAR, Recent Results, and Quality Measures. Patient afebrile and vitals stable. Continued treatment for pneumonia. IV antibiotics per MAR. Weaning oxygen as appropriate per RT. No significant events overnight.

## 2020-10-30 LAB — FERRITIN SERPL-MCNC: 1519 NG/ML (ref 26–388)

## 2020-10-30 PROCEDURE — 74011250637 HC RX REV CODE- 250/637: Performed by: HOSPITALIST

## 2020-10-30 PROCEDURE — 74011000258 HC RX REV CODE- 258: Performed by: INTERNAL MEDICINE

## 2020-10-30 PROCEDURE — 74011250636 HC RX REV CODE- 250/636: Performed by: INTERNAL MEDICINE

## 2020-10-30 PROCEDURE — 99232 SBSQ HOSP IP/OBS MODERATE 35: CPT | Performed by: INTERNAL MEDICINE

## 2020-10-30 PROCEDURE — 77010033711 HC HIGH FLOW OXYGEN

## 2020-10-30 PROCEDURE — 94640 AIRWAY INHALATION TREATMENT: CPT

## 2020-10-30 PROCEDURE — 74011000250 HC RX REV CODE- 250: Performed by: INTERNAL MEDICINE

## 2020-10-30 PROCEDURE — 74011250637 HC RX REV CODE- 250/637: Performed by: INTERNAL MEDICINE

## 2020-10-30 PROCEDURE — 74011250637 HC RX REV CODE- 250/637: Performed by: PHYSICIAN ASSISTANT

## 2020-10-30 PROCEDURE — 65270000029 HC RM PRIVATE

## 2020-10-30 PROCEDURE — 94762 N-INVAS EAR/PLS OXIMTRY CONT: CPT

## 2020-10-30 PROCEDURE — 97530 THERAPEUTIC ACTIVITIES: CPT

## 2020-10-30 PROCEDURE — 74011250636 HC RX REV CODE- 250/636: Performed by: HOSPITALIST

## 2020-10-30 RX ADMIN — DEXTROMETHORPHAN 30 MG: 30 SUSPENSION, EXTENDED RELEASE ORAL at 15:31

## 2020-10-30 RX ADMIN — PIPERACILLIN SODIUM AND TAZOBACTAM SODIUM 3.38 G: 3; .375 INJECTION, POWDER, LYOPHILIZED, FOR SOLUTION INTRAVENOUS at 01:23

## 2020-10-30 RX ADMIN — SALINE NASAL SPRAY 2 SPRAY: 1.5 SOLUTION NASAL at 15:31

## 2020-10-30 RX ADMIN — BENZONATATE 200 MG: 100 CAPSULE ORAL at 15:31

## 2020-10-30 RX ADMIN — GUAIFENESIN 600 MG: 600 TABLET, EXTENDED RELEASE ORAL at 21:51

## 2020-10-30 RX ADMIN — BENZONATATE 200 MG: 100 CAPSULE ORAL at 05:51

## 2020-10-30 RX ADMIN — IPRATROPIUM BROMIDE AND ALBUTEROL SULFATE 3 ML: .5; 3 SOLUTION RESPIRATORY (INHALATION) at 02:03

## 2020-10-30 RX ADMIN — DEXAMETHASONE SODIUM PHOSPHATE 2 MG: 4 INJECTION, SOLUTION INTRAMUSCULAR; INTRAVENOUS at 21:51

## 2020-10-30 RX ADMIN — FUROSEMIDE 40 MG: 10 INJECTION, SOLUTION INTRAMUSCULAR; INTRAVENOUS at 09:43

## 2020-10-30 RX ADMIN — GUAIFENESIN 600 MG: 600 TABLET, EXTENDED RELEASE ORAL at 09:43

## 2020-10-30 RX ADMIN — MENTHOL, METHYL SALICYLATE: 10; 15 CREAM TOPICAL at 01:24

## 2020-10-30 RX ADMIN — SALINE NASAL SPRAY 2 SPRAY: 1.5 SOLUTION NASAL at 21:55

## 2020-10-30 RX ADMIN — ENOXAPARIN SODIUM 40 MG: 40 INJECTION SUBCUTANEOUS at 18:06

## 2020-10-30 RX ADMIN — PIPERACILLIN SODIUM AND TAZOBACTAM SODIUM 3.38 G: 3; .375 INJECTION, POWDER, LYOPHILIZED, FOR SOLUTION INTRAVENOUS at 18:06

## 2020-10-30 RX ADMIN — SALINE NASAL SPRAY 2 SPRAY: 1.5 SOLUTION NASAL at 10:00

## 2020-10-30 RX ADMIN — PIPERACILLIN SODIUM AND TAZOBACTAM SODIUM 3.38 G: 3; .375 INJECTION, POWDER, LYOPHILIZED, FOR SOLUTION INTRAVENOUS at 09:43

## 2020-10-30 RX ADMIN — MENTHOL, METHYL SALICYLATE: 10; 15 CREAM TOPICAL at 21:51

## 2020-10-30 RX ADMIN — IPRATROPIUM BROMIDE AND ALBUTEROL SULFATE 3 ML: .5; 3 SOLUTION RESPIRATORY (INHALATION) at 08:25

## 2020-10-30 RX ADMIN — DEXAMETHASONE SODIUM PHOSPHATE 2 MG: 4 INJECTION, SOLUTION INTRAMUSCULAR; INTRAVENOUS at 09:43

## 2020-10-30 RX ADMIN — ENOXAPARIN SODIUM 40 MG: 40 INJECTION SUBCUTANEOUS at 05:51

## 2020-10-30 RX ADMIN — MENTHOL, METHYL SALICYLATE: 10; 15 CREAM TOPICAL at 09:51

## 2020-10-30 RX ADMIN — Medication 10 ML: at 05:51

## 2020-10-30 RX ADMIN — BENZONATATE 200 MG: 100 CAPSULE ORAL at 21:51

## 2020-10-30 RX ADMIN — IPRATROPIUM BROMIDE AND ALBUTEROL SULFATE 3 ML: .5; 3 SOLUTION RESPIRATORY (INHALATION) at 21:04

## 2020-10-30 RX ADMIN — IPRATROPIUM BROMIDE AND ALBUTEROL SULFATE 3 ML: .5; 3 SOLUTION RESPIRATORY (INHALATION) at 14:05

## 2020-10-30 RX ADMIN — SALINE NASAL SPRAY 2 SPRAY: 1.5 SOLUTION NASAL at 06:00

## 2020-10-30 NOTE — PROGRESS NOTES
Problem: Falls - Risk of  Goal: *Absence of Falls  Description: Document Darian Alonso Fall Risk and appropriate interventions in the flowsheet. Outcome: Progressing Towards Goal  Note: Fall Risk Interventions:  Mobility Interventions: Bed/chair exit alarm, Patient to call before getting OOB, PT Consult for mobility concerns, PT Consult for assist device competence         Medication Interventions: Patient to call before getting OOB    Elimination Interventions: Call light in reach              Problem: Patient Education: Go to Patient Education Activity  Goal: Patient/Family Education  Outcome: Progressing Towards Goal     Problem: Pressure Injury - Risk of  Goal: *Prevention of pressure injury  Description: Document Johnnie Scale and appropriate interventions in the flowsheet.   Outcome: Progressing Towards Goal  Note: Pressure Injury Interventions:  Sensory Interventions: Assess changes in LOC, Assess need for specialty bed, Avoid rigorous massage over bony prominences, Keep linens dry and wrinkle-free, Maintain/enhance activity level, Minimize linen layers, Monitor skin under medical devices    Moisture Interventions: Internal/External urinary devices, Maintain skin hydration (lotion/cream), Minimize layers    Activity Interventions: Increase time out of bed, Pressure redistribution bed/mattress(bed type), PT/OT evaluation    Mobility Interventions: HOB 30 degrees or less, Pressure redistribution bed/mattress (bed type), PT/OT evaluation    Nutrition Interventions: Document food/fluid/supplement intake    Friction and Shear Interventions: Lift sheet, Lift team/patient mobility team, Minimize layers                Problem: Patient Education: Go to Patient Education Activity  Goal: Patient/Family Education  Outcome: Progressing Towards Goal     Problem: Discharge Planning  Goal: *Discharge to safe environment  Outcome: Progressing Towards Goal  Goal: *Knowledge of medication management  Outcome: Progressing Towards Goal  Goal: *Knowledge of discharge instructions  Outcome: Progressing Towards Goal     Problem: Patient Education: Go to Patient Education Activity  Goal: Patient/Family Education  Outcome: Progressing Towards Goal     Problem: Patient Education: Go to Patient Education Activity  Goal: Patient/Family Education  Description:  To be educate on proper breathing exercises ; use of spirometer as needed  Outcome: Progressing Towards Goal     Problem: Patient Education: Go to Patient Education Activity  Goal: Patient/Family Education  Outcome: Progressing Towards Goal

## 2020-10-30 NOTE — PROGRESS NOTES
PHYSICAL THERAPY TREATMENT  Patient: Ekaterina Soares (56 y.o. male)  Date: 10/30/2020  Diagnosis: Pneumonia [J18.9]  [facility] [J96.00]   <principal problem not specified>       Precautions: Other (comment)  Chart, physical therapy assessment, plan of care and goals were reviewed. ASSESSMENT  Patient continues with skilled PT services and is progressing towards goals, however remains very limited secondary to decreased SpO2 saturation with activity. Patient performing bed mobility with supervision however unable to assess OOB mobility as oxygen drops quickly with sitting EOB. Pt declined standing or chair transfer at this time secondary to desaturation. Requested therapist to return this evening to try to stand if able, educated pt therapy staff shift is during days but we will return tomorrow for session and nursing can assist with standing later this evening if pt would like to try. Patient sat up EOB approx 10-12 minutes and performed LAQ and is knowledgeable about other LE therex. We will continue to progress with PT as pt is able and SpO2 is appropriate. Patient verbalized some concerns of dizziness and SOB during activity and very aware of appropriate O2 levels and when rest is needed. Rec HHPT based on assistance mobility but medically, pt may req more activity tolerance and endurance training prior to returning home at a rehab. Will continue to assess. Current Level of Function Impacting Discharge (mobility/balance): SpO2, activity tolerance. Other factors to consider for discharge: assistance at home         PLAN :  Patient continues to benefit from skilled intervention to address the above impairments. Continue treatment per established plan of care. to address goals.     Recommendation for discharge: (in order for the patient to meet his/her long term goals)  Physical therapy at least 2 days/week in the home     This discharge recommendation:  Has been made in collaboration with the attending provider and/or case management    IF patient discharges home will need the following DME: to be determined (TBD)       SUBJECTIVE:   Patient stated I am a human too, I have feelings -pt reporting difficulties and concerns with some staff member about the quality of care he has received. Spoke with patient afterwards about pt concerns. OBJECTIVE DATA SUMMARY:   Critical Behavior:  Neurologic State: Alert  Orientation Level: Oriented X4  Cognition: Appropriate decision making, Appropriate safety awareness     Functional Mobility Training:  Bed Mobility:  Rolling: Supervision  Supine to Sit: Supervision  Sit to Supine: Supervision  Scooting: Supervision    Transfers:  Not assessed due to poor SpO2. Desaturated to 84% on high flow EOB, fluctuating to upper 80s. Increased to 93% after 1-2 min returning to supine. Balance:  Sitting: Intact with no support approx 10-12 minutes       Therapeutic Exercises:   LAQ EOB 10x. Rest break needed due to SpO2 desaturation. Pain Ratin/10    Activity Tolerance:   Poor, requires frequent rest breaks, and observed SOB with activity  Please refer to the flowsheet for vital signs taken during this treatment. After treatment patient left in no apparent distress:   Supine in bed and Call bell within reach    COMMUNICATION/COLLABORATION:   The patients plan of care was discussed with: Registered nurse.      Praveen Caballero   Time Calculation: 25 mins         Problem: Mobility Impaired (Adult and Pediatric)  Goal: *Acute Goals and Plan of Care (Insert Text)  Outcome: Progressing Towards Goal

## 2020-10-30 NOTE — PROGRESS NOTES
Nutrition Assessment     Type and Reason for Visit: RD nutrition re-screen/LOS    Nutrition Recommendations/Plan:   Continue Regular diet  Continue Ensure Enlive daily    Document PO intakes in EMR     Nutrition Assessment: Body aches, emesis pta. Concern for COVID pna, however COVID negative x2. Rapid response (10/28) pt requiring 100% O2 supplementation. Previously in ICU, transferred to med floor (10/20). Regular diet ordered. Pt previously on Full Lq diet 5x days d/t pt anxiety over desatting with PO per SLP report. RD ordered supplementation of Ensure Enlive TID on Full Lq day 5 d/t concern for inadequate nutrition, however diet advanced to Regular same day per RN. Reduced Ensure Enlive to 1x day on last assessment. Appetite and intakes remain good, documented as 100% over the past few days. On previous assessment, PO intakes >80% meals. Noted pt food preferences for items like fruit trays. Hyperglycemia noted. Labs and meds reviewed. Malnutrition Assessment:  Malnutrition Status: No malnutrition       Nutrition Related Findings:  Unable to perform NFPE 2/2 COVID unit. Last BM 10/26, soft. No documented n/v or c/d. No dysphagia per SLP eval. No edema. Current Nutrition Therapies:  DIET REGULAR  DIET NUTRITIONAL SUPPLEMENTS Lunch;  Ensure Enlive    Anthropometric Measures:  · Height:  5' 9\" (175.3 cm)  · Current Body Wt:  81.8 kg (180 lb 5.4 oz)  · BMI: 26.6    Nutrition Diagnosis:   No nutrition diagnosis at this time     Nutrition Intervention:  Food and/or Nutrient Delivery: Continue current diet, Continue oral nutrition supplement  Nutrition Education and Counseling: No recommendations at this time  Coordination of Nutrition Care: Continue to monitor while inpatient    Nutrition Monitoring and Evaluation:   Behavioral-Environmental Outcomes:  N/A  Food/Nutrient Intake Outcomes: Food and nutrient intake  Physical Signs/Symptoms Outcomes:  N/A    Discharge Planning:    No discharge needs at this time Electronically signed by Isabella Corcoran on 10/30/2020 at 9:01 AM    Contact Number:

## 2020-10-30 NOTE — PROGRESS NOTES
Pulmonary Note      Name: Ekaterina Cesar MRN: [id number]   : 1960 Hospital: [address] [place] [address]   Date: 10/30/2020  Admission date: 10/15/2020 Hospital Day: 16       Subjective/Interval History:   Patient seen in the ICU on high flow nasal oxygen at 70% with oxygen saturation 99. He gives a history of gradual worsening illness over the last week to 10 days with nonproductive cough fever generalized malaise nausea denies any vomiting although the ER note states that he had some vomiting. He seems awake and alert. Does not have any Covid contacts that he is aware of. States that his sense of taste and smell have been normal but terribly anorexic has not eaten anything in 3 or 4 days. 10/17 still feels bad Cough shortness of breath but no nausea today   10/18 still complains of severe cough nonproductive although in general feels a little better  10/20 remains 100% high flow nasal oxygen. Continues to complain of it irritating him. Requested nasal saline last evening but it has not been started will reorder it. We will let him try nonrebreather mask with nasal high flow for meals  10/26 high flow nasal oxygen back up to 70% unable to safely switch to low flow nasal oxygen at this point. He states he feels better sense of taste is normal with a good appetite  10/28 patient had a rapid response last night now he is on 100% high flow nasal cannula  Patient Active Problem List   Diagnosis Code    Acute respiratory failure due to COVID-19 ([facility] [address] U07.1, J96.00    Pneumonia J18.9    Acute respiratory failure ([address] J96.00       IMPRESSION:   1. Acute hypoxic respiratory failure requiring high flow nasal   2. History of asthma  3. Community-acquired versus aspiration with diffuse pulmonary infiltrates questionable atypical pneumonia possible COVID-19 repeat testing Negative  4. Nausea probable due to COVID-19 infection  5.  Unremitting cough secondary to pneumonia or reflux  Body mass index is 26.63 kg/m².        RECOMMENDATIONS/PLAN:   1. Initial COVID-19 test is negative repeat testing indeterminant  but ferritin LDH CRP are all elevated chest x-ray is consistent with Covid pneumonitis  has completed 2 doses Actemra and 5 days of Remdesivir  2. He is still hypoxic we will continue with a high flow nasal cannula  3. X-ray showed bilateral infiltrate with some edema I will give an extra dose of Lasix  4. CRP less than 0.29 will decrease Decadron to every 12 dosing  5. Get arterial blood gases  6. Continued hypoxia will give IV Lasix and follow renal function. 7. Sputum culture with MSSA and Klebsiella both should be covered by Zosyn  8. Cough better continue Tessalon and as needed Delsym discontinue codeine  9. Continue nebulizer treatment  10. Repeat COVID-19 test negative  11. Legionella antigen and blood titer still not resulted question if it was actually sent         Subjective/Initial History:   I have reviewed the flowsheet and previous days notes. Seen earlier today on rounds. I was asked by Gigi Hawk MD to see An Adrián Blanchard a 56 y.o.  male  in consultation for a chief complaint of acute hypoxic respiratory failure cough and community-acquired pneumonia          Pt now in CCU. Patient PCP: UNKNOWN  PMH:  has no past medical history on file. PSH:   has no past surgical history on file. FHX: family history includes No Known Problems in his father and mother. SHX:  reports that he has never smoked. He has never used smokeless tobacco. He reports that he does not drink alcohol or use drugs.     Systemic review:  General no chronic illness but over the last week he has had fever generalized malaise weight loss no appetite  Eyes no double vision or momentary blindness  ENT no sinus congestion drainage or facial pain  Skeletal has diffuse aches and pains no swollen tender joints  Endocrinologic no polyuria polydipsia  Neurologic no seizures or syncope  Gastrointestinal normally he has no problems but over this last week he has had nausea anorexia marked weight loss has not had any alteration of his sense of taste or smell  Genitourinary no discomfort or drainage on urination  Cardiovascular no history of heart disease chest pain has felt sweaty but no history of ankle edema.   Respiratory as mentioned above    No Known Allergies   MEDS:   Current Facility-Administered Medications   Medication    guaiFENesin ER (MUCINEX) tablet 600 mg    enoxaparin (LOVENOX) injection 40 mg    methyl salicylate-menthol (BENGAY) 15-10 % cream    furosemide (LASIX) injection 40 mg    dexamethasone (DECADRON) 4 mg/mL injection 2 mg    dextromethorphan (DELSYM) 30 mg/5 mL syrup 30 mg    albuterol-ipratropium (DUO-NEB) 2.5 MG-0.5 MG/3 ML    piperacillin-tazobactam (ZOSYN) 3.375 g in 0.9% sodium chloride (MBP/ADV) 100 mL MBP    0.9% sodium chloride infusion 250 mL    sodium chloride (OCEAN) 0.65 % nasal squeeze bottle 2 Spray    hydrOXYzine pamoate (VISTARIL) capsule 25 mg    melatonin tablet 10 mg    acetaminophen (TYLENOL) tablet 650 mg    Or    acetaminophen (TYLENOL) suppository 650 mg    sodium chloride (NS) flush 5-40 mL    sodium chloride (NS) flush 5-40 mL    ondansetron (ZOFRAN ODT) tablet 4 mg    Or    ondansetron (ZOFRAN) injection 4 mg    benzonatate (TESSALON) capsule 200 mg        Current Facility-Administered Medications:     guaiFENesin ER (MUCINEX) tablet 600 mg, 600 mg, Oral, Q12H, Nasrin Perales MD, 600 mg at 10/30/20 0943    enoxaparin (LOVENOX) injection 40 mg, 40 mg, SubCUTAneous, Q12H, Nasrin Perales MD, 40 mg at 10/30/20 4624    methyl salicylate-menthol (BENGAY) 15-10 % cream, , Topical, TID PRN, Ness Valle PA-C    furosemide (LASIX) injection 40 mg, 40 mg, IntraVENous, DAILY, Anthony Bedolla MD, 40 mg at 10/30/20 0943    dexamethasone (DECADRON) 4 mg/mL injection 2 mg, 2 mg, IntraVENous, Q12H, Anthony Bedolla MD, 2 mg at 10/30/20 8319    dextromethorphan (DELSYM) 30 mg/5 mL syrup 30 mg, 30 mg, Oral, Q12H PRN, Lisa Knott MD    albuterol-ipratropium (DUO-NEB) 2.5 MG-0.5 MG/3 ML, 3 mL, Nebulization, Q6H RT, Kannan Conde MD, 3 mL at 10/30/20 0825    piperacillin-tazobactam (ZOSYN) 3.375 g in 0.9% sodium chloride (MBP/ADV) 100 mL MBP, 3.375 g, IntraVENous, Q8H, Simona Gonzales MD, Last Rate: 25 mL/hr at 10/30/20 0943, 3.375 g at 10/30/20 0943    0.9% sodium chloride infusion 250 mL, 250 mL, IntraVENous, PRN, Aurelia Fan MD    sodium chloride (OCEAN) 0.65 % nasal squeeze bottle 2 Spray, 2 Spray, Both Nostrils, Q4HWA, Lisa Knott MD, 2 [place] at 10/30/20 1000    hydrOXYzine pamoate (VISTARIL) capsule 25 mg, 25 mg, Oral, TID PRN, Kj Briscoe MD, 25 mg at 10/28/20 1743    melatonin tablet 10 mg, 10 mg, Oral, QHS PRN, Kj Briscoe MD, 10 mg at 10/21/20 2231    acetaminophen (TYLENOL) tablet 650 mg, 650 mg, Oral, Q6H PRN, 650 mg at 10/18/20 2205 **OR** acetaminophen (TYLENOL) suppository 650 mg, 650 mg, Rectal, Q6H PRN, Kobe Ceballos MD    sodium chloride (NS) flush 5-40 mL, 5-40 mL, IntraVENous, Q8H, Kobe Ceballos MD, 10 mL at 10/30/20 0551    sodium chloride (NS) flush 5-40 mL, 5-40 mL, IntraVENous, PRN, Kobe Ceballos MD, 10 mL at 10/22/20 0938    ondansetron (ZOFRAN ODT) tablet 4 mg, 4 mg, Oral, Q6H PRN **OR** ondansetron (ZOFRAN) injection 4 mg, 4 mg, IntraVENous, Q6H PRN, Kobe Ceballos MD    benzonatate (TESSALON) capsule 200 mg, 200 mg, Oral, Q8H, Lisa Knott MD, 200 mg at 10/30/20 0551      Objective:     Vital Signs: Telemetry:    normal sinus rhythm Intake/Output:   Visit Vitals  /88   Pulse 73   Temp 97.8 °F (36.6 °C)   Resp 20   Ht 5' 9\" (1.753 m)   Wt 81.8 kg (180 lb 5.4 oz)   SpO2 97%   BMI 26.63 kg/m²       Temp (24hrs), Av.1 °F (36.7 °C), Min:97.8 °F (36.6 °C), Max:98.3 °F (36.8 °C)        O2 Device: Hi flow nasal cannula O2 Flow Rate (L/min): 40 l/min         Body mass index is 26.63 kg/m². Wt Readings from Last 4 Encounters:   10/21/20 81.8 kg (180 lb 5.4 oz)          Intake/Output Summary (Last 24 hours) at 10/30/2020 1235  Last data filed at 10/29/2020 2330  Gross per 24 hour   Intake 480 ml   Output 900 ml   Net -420 ml       Last shift:      No intake/output data recorded. Last 3 shifts: 10/28 1901 - 10/30 0700  In: 12 [P.O.:960]  Out: 1750 [Urine:1750]   Ventilator Settings:      Mode Rate TV Press PEEP FiO2 PIP Min. Vent               70 %            Physical Exam:    General:  male; less distress chronic cough nonproductive  HEENT: NCAT, oral mucosa clear  Eyes: anicteric; conjunctiva clear extraocular movements intact   neck: no node neck veins visible  Chest: no deformity,   Cardiac: Regular rate and rhythm no murmurs trace ankle edema  Lungs: Clear anteriorly laterally still has rales posteriorly improved  Abd: Soft nontender normal bowel sounds no organomegaly  Ext: Mild edema; no joint swelling;  No clubbing  :clear urine  Neuro: Awake alert speech is clear moves all 4 extremities well  Psych- no agitation, oriented to person;   Skin: warm,, no cyanosis; very mildly diaphoretic  Pulses: Pulses intact  Capillary: Normal capillary refill      Labs:    Recent Labs     10/29/20  0620 10/28/20  0955   WBC 11.1  --    HGB 15.6  --      --    APTT 24.0 25.0     Recent Labs     10/29/20  1948      K 3.8      CO2 26   *   BUN 31*   CREA 0.95   CA 8.4*     10/24 6 L nasal oxygen with oxygen saturation 90%    10/14 COVID-19 test negative  10/17 COVID-19 test indeterminate  Ferritin 1510, previous 2180  , previous 615, 754  Procalcitonin 0.14  CRP 0.87, previous 1.39, 3.0, 12.4  Lab Results   Component Value Date/Time    Culture result: No growth 7 days 10/20/2020 11:30 AM    Culture result: Heavy Staphylococcus aureus 10/20/2020 08:15 AM    Culture result: Light Klebsiella pneumoniae 10/20/2020 08:15 AM    Culture result: Heavy  Normal respiratory smita   10/20/2020 08:15 AM      Lab Results   Component Value Date/Time     10/15/2020 11:47 PM       Imaging:  I have personally reviewed the patients radiographs and have reviewed the reports:    CXR Results  (Last 48 hours)  10/20 chest x-ray with diffuse patchy infiltrates may be slightly less dense  10/17 chest x-ray unchanged diffuse bilateral infiltrate               10/15/20 1927  XR CHEST SNGL V Final result    Narrative:  1       Mild atelectasis in the bases with upper lungs clear. No effusion or   pneumothorax. Normal heart and no congestion  Disagree to me there is bilateral infiltrate           Results from Hospital Encounter encounter on 10/15/20   XR CHEST PORT    Narrative Portable upright radiograph chest 7:34 AM compared to October 26, 2020. INDICATION: Cough, pneumonia. Patient has taken a shallow depth of inspiration. Heart size is stable. Bilateral airspace disease/edema shows worsening compared to prior study. No  pneumothorax. Cannot exclude small effusions. No acute bony changes. Impression IMPRESSION: Shallower depth of inspiration with worsening bilateral airspace  disease/edema. XR CHEST PORT    Narrative Chest, frontal view, 10/26/2020    History: Cough. Pneumonia. Comparison: Including chest 10/25/2020. Findings: The cardiac silhouette is stable. The lungs remain underexpanded. Airspace opacities in the lungs are not significantly changed as compared to the  study performed one day prior. Hydrostatic edema is not excluded. No pleural  effusion or pneumothorax is identified. The osseous structures are stable. Impression Impression: No significant interval change. XR CHEST PORT    Narrative Comparison is with the prior exam dated 10/23/2020.       Impression Findings/impression: A portable upright view of the chest demonstrates  persistent cardiomegaly and patchy interstitial and mild airspace disease which  has improved slightly on the right. No pneumothorax or effusion is identified. The osseous structures are unchanged. Results from [facility] encounter on 10/15/20   CTA CHEST W OR W WO CONT    Narrative CT dose reduction was achieved through use of a standardized protocol tailored  for this examination and automatic exposure control for dose modulation. Contrast study maximum intensity projections shows pronounced groundglass  opacification, of variable density, mainly in the dependent portions of each  lung, apex to base. Air bronchograms are preserved in the densest portions. Mild  geographic groundglass opacification of lower density in the nondependent  portions. Central airways are open    Pulmonary arteries are well-opacified and show no filling defect or distortion. Aorta shows normal dimensions, without dissection. Tiny middle mediastinal lymph  nodes. Cardiac finding. No pleural pericardial effusion. No significant upper  abdominal or chest wall finding      Impression IMPRESSION: Widespread patchy bilateral pneumonitis       Clinical picture consistent with COVID-19 infection with pneumonitis with diffuse patchy pulmonary infiltrate elevation in ferritin LDH and CRP nonproductive cough and GI symptoms and yet initial COVID-19 test negative repeat testing indeterminate              Time spent 30 min       Thank you for allowing us to participate in the care of this patient.   We will follow along with you  Time spent 30 min  Syeda Alex MD

## 2020-10-30 NOTE — PROGRESS NOTES
Hospitalist Progress Note               Daily Progress Note: 10/30/2020      Subjective: The patient is seen for follow  up.     56 y.o. male with no significant medical problems presents to the emergency room complaining of shortness of breath. Patient had a urinary retention which was resolved with Granados catheter placement.   Patient continues to be on high flow oxygen 40lpm @ FiO2 of 60%    Medications reviewed  Current Facility-Administered Medications   Medication Dose Route Frequency    guaiFENesin ER (MUCINEX) tablet 600 mg  600 mg Oral Q12H    enoxaparin (LOVENOX) injection 40 mg  40 mg SubCUTAneous E11N    methyl salicylate-menthol (BENGAY) 15-10 % cream   Topical TID PRN    furosemide (LASIX) injection 40 mg  40 mg IntraVENous DAILY    dexamethasone (DECADRON) 4 mg/mL injection 2 mg  2 mg IntraVENous Q12H    dextromethorphan (DELSYM) 30 mg/5 mL syrup 30 mg  30 mg Oral Q12H PRN    albuterol-ipratropium (DUO-NEB) 2.5 MG-0.5 MG/3 ML  3 mL Nebulization Q6H RT    piperacillin-tazobactam (ZOSYN) 3.375 g in 0.9% sodium chloride (MBP/ADV) 100 mL MBP  3.375 g IntraVENous Q8H    0.9% sodium chloride infusion 250 mL  250 mL IntraVENous PRN    sodium chloride (OCEAN) 0.65 % nasal squeeze bottle 2 Spray  2 [place] Both Nostrils Q4HWA    hydrOXYzine pamoate (VISTARIL) capsule 25 mg  25 mg Oral TID PRN    melatonin tablet 10 mg  10 mg Oral QHS PRN    acetaminophen (TYLENOL) tablet 650 mg  650 mg Oral Q6H PRN    Or    acetaminophen (TYLENOL) suppository 650 mg  650 mg Rectal Q6H PRN    sodium chloride (NS) flush 5-40 mL  5-40 mL IntraVENous Q8H    sodium chloride (NS) flush 5-40 mL  5-40 mL IntraVENous PRN    ondansetron (ZOFRAN ODT) tablet 4 mg  4 mg Oral Q6H PRN    Or    ondansetron (ZOFRAN) injection 4 mg  4 mg IntraVENous Q6H PRN    benzonatate (TESSALON) capsule 200 mg  200 mg Oral Q8H       Review of Systems:   A comprehensive review of systems was negative except for that written in the HPI.    Objective:   Physical Exam:     Visit Vitals  /88   Pulse 73   Temp 97.8 °F (36.6 °C)   Resp 20   Ht 5' 9\" (1.753 m)   Wt 81.8 kg (180 lb 5.4 oz)   SpO2 96%   BMI 26.63 kg/m²    O2 Flow Rate (L/min): 40 l/min O2 Device: Hi flow nasal cannula    Temp (24hrs), Av.1 °F (36.7 °C), Min:97.8 °F (36.6 °C), Max:98.3 °F (36.8 °C)    No intake/output data recorded. 10/28 1901 - 10/30 0700  In: 960 [P.O.:960]  Out: 1750 [Urine:1750]    PHYSICAL EXAM:  General: Alert and awake. Skin: Extremities and face reveal no rashes. HEENT: Sclerae anicteric. Extra-occular muscles are intact. No oral ulcers. No ENT discharge. The neck is supple. Cardiovascular: Regular rate and rhythm. No murmurs, gallops, or rubs. PMI nondisplaced. Carotids without bruits. Respiratory: Comfortable breathing with no accessory muscle use. Clear breath sounds with no wheezes, rales, or rhonchi. GI: Abdomen nondistended, soft, and nontender. Normal active bowel sounds. No enlargement of the liver or spleen. No masses palpable. Rectal: Deferred   Musculoskeletal: No pitting edema of the lower legs. Extremities have good range of motion. No costovertebral tenderness. Neurological: Gross memory appears intact. Patient is alert and oriented. Power 5/5, Cranial nerves II-XII intact  Psychiatric: Mood appears appropriate with judgement intact.      Data Review:       Recent Days:  Recent Labs     10/29/20  0620   WBC 11.1   HGB 15.6   HCT 46.4        Recent Labs     10/29/20  1948      K 3.8      CO2 26   *   BUN 31*   CREA 0.95   CA 8.4*     Recent Labs     10/29/20  1205   PH 7.467*   PCO2 40   PO2 71*   HCO3 28*   FIO2 70.0       24 Hour Results:  Recent Results (from the past 24 hour(s))   METABOLIC PANEL, BASIC    Collection Time: 10/29/20  7:48 PM   Result Value Ref Range    Sodium 138 136 - 145 mmol/L    Potassium 3.8 3.5 - 5.1 mmol/L    Chloride 102 97 - 108 mmol/L    CO2 26 21 - 32 mmol/L    Anion gap 10 5 - 15 mmol/L    Glucose 224 (H) 65 - 100 mg/dL    BUN 31 (H) 6 - 20 mg/dL    Creatinine 0.95 0.70 - 1.30 mg/dL    BUN/Creatinine ratio 33 (H) 12 - 20      GFR est AA >60 >60 ml/min/1.73m2    GFR est non-AA >60 >60 ml/min/1.73m2    Calcium 8.4 (L) 8.5 - 10.1 mg/dL       XR CHEST PORT   Final Result   IMPRESSION: Shallower depth of inspiration with worsening bilateral airspace   disease/edema. XR CHEST PORT   Final Result   Impression: No significant interval change. XR CHEST PORT   Final Result   Findings/impression: A portable upright view of the chest demonstrates   persistent cardiomegaly and patchy interstitial and mild airspace disease which   has improved slightly on the right. No pneumothorax or effusion is identified. The osseous structures are unchanged. XR CHEST PORT   Final Result      XR CHEST PORT   Final Result   IMPRESSION: Improving bilateral airspace disease. XR CHEST PORT   Final Result      XR CHEST PORT   Final Result      XR CHEST PORT   Final Result      XR CHEST PORT   Final Result      CTA CHEST W OR W WO CONT   Final Result   IMPRESSION: Widespread patchy bilateral pneumonitis      XR CHEST SNGL V   Final Result             Assessment/     Problem List:  Hospital Problems  Date Reviewed: 10/15/2020          Codes Class Noted POA    Acute respiratory failure due to COVID-19 [facility]) ICD-10-CM: U07.1, J96.00  ICD-9-CM: 518.81, 079.89  10/15/2020 Yes        Pneumonia ICD-10-CM: J18.9  ICD-9-CM: 449  10/15/2020 Yes        Acute respiratory failure ([facility] [address] ICD-10-CM: J96.00  ICD-9-CM: 518.81  10/15/2020 Unknown                     Plan:    (1) Acute hypoxic respiratory failure : on high flow nasal canula. keep saturation > 92. (2) HCAP pneumonia and pneumonitis: MSSA and klebsiella on sputum.  On Zosyn    (3) Strong suspicion of COVID 19 pneumonitis: status post Remdesivir, Decadron, Azithromycin, Actemra,       Dehydrationcurrently resolved    PpxLovenox  FENcurrently on clear liquids, bedside swallow eval, advance diet, replete potassium and magnesium  Full code, patient surrogate decision-maker is his wife, updated patients daughter as well as Primary Care Physician  Dispositioncontinued inpatient treatment, pending clinical improvement, PT/OT    Care Plan discussed with: Patient/Family and Nurse    Called Patients daughter Malik Hayden Ph- [phone]    Alondra Daley MD

## 2020-10-30 NOTE — PROGRESS NOTES
Progress Note  Date:10/30/2020       Room:[facility]  Patient Name:Ekaterina Escobedo     YOB: 1960     Age:60 y.o. Subjective    Subjective   Patient followed for presumptive Covid-19 pneumoniitis with indeterminate results, now status post Remdesivir and Actemra, still on Decadron. No CXR today. He is afebrile with leukocytosis attributed to steroids. Blood cultures negative and sputum culture grew MSSA and Klebsiella. He is currently on IV Zosyn. He remains on high flow nasal O2. Daughter at bedside. Objective         Vitals Last 24 Hours:  TEMPERATURE:  Temp  Av.1 °F (36.7 °C)  Min: 97.8 °F (36.6 °C)  Max: 98.3 °F (36.8 °C)  RESPIRATIONS RANGE: Resp  Av.5  Min: 20  Max: 24  PULSE OXIMETRY RANGE: SpO2  Av %  Min: 93 %  Max: 97 %  PULSE RANGE: Pulse  Av  Min: 73  Max: 115  BLOOD PRESSURE RANGE: Systolic (30VSW), IAX:082 , Min:125 , XFN:323   ; Diastolic (48DSP), HBW:89, Min:86, Max:93    I/O (24Hr): Intake/Output Summary (Last 24 hours) at 10/30/2020 1052  Last data filed at 10/29/2020 2330  Gross per 24 hour   Intake 480 ml   Output 900 ml   Net -420 ml     Objective:  Vital signs: (most recent): Blood pressure 132/88, pulse 73, temperature 97.8 °F (36.6 °C), resp. rate 20, height 5' 9\" (1.753 m), weight 180 lb 5.4 oz (81.8 kg), SpO2 97 %. General: Moderately ill-appearing male, NAD  HEENT: eyes open, high flow nasal O2  Neck: supple  Lungs: clear bilaterally  Heart: RRR  : Granados catheter with moderate urine sediment    Labs/Imaging/Diagnostics    Labs:  CBC:  Recent Labs     10/29/20  0620   WBC 11.1   RBC 5.02   HGB 15.6   HCT 46.4   MCV 92.4   RDW 14.1        CHEMISTRIES:  Recent Labs     10/29/20  1948      K 3.8      CO2 26   BUN 31*   CA 8.4*   PT/INR:  No results for input(s): INR, INREXT, INREXT in the last 72 hours.     No lab exists for component: PROTIME  APTT:  Recent Labs     10/29/20  0620 10/28/20  09 APTT 24.0 25.0     LIVER PROFILE:  No results for input(s): AST, ALT in the last 72 hours. No lab exists for component: Marita Kassilvia ALKPHOS  Lab Results   Component Value Date/Time    ALT (SGPT) 42 10/25/2020 08:00 AM    AST (SGOT) 19 10/25/2020 08:00 AM    Alk. phosphatase 97 10/25/2020 08:00 AM    Bilirubin, total 1.2 (H) 10/25/2020 08:00 AM     WBC 12,900   (403 (405 (689 (452 (565  Ferritin 1,519 (1,084 (1,187  CRP <0.29 (0.43 (12.40    Sputum culture (10/20) MSSA and Klebsiella pneumoniae  Blood cultures (10/20) No growth FINAL    Imaging Last 24 Hours:  No results found. Assessment//Plan   Active Problems:    Acute respiratory failure due to COVID-19 [facility]) (10/15/2020)      Pneumonia (10/15/2020)      Acute respiratory failure ([facility] [address] (10/15/2020)      Assessment & Plan  1. Probable Covid-19 pneumonitis, with indeterminate result, status post Remdesivir, Decadron, Azithromycin, Actemra, unchanged. 2. Acute respiratory failure, on high flow nasal O2  3. Elevated CRP, LDH and ferritin, secondary to #1, decreasing. 4. HCAP with purulent sputum, secondary to MSSA and Klebsiella pneumoniae, Day #11 IV Zosyn.     Comment:   LDH decreasing but ferritin increasing. Respiratory status remains tenuous, but it appears that he is slowly improving. 1. Continue IV Zosyn for MSSA and Klebsiella for 3 more days  2.  On Monday repeat LDH/ferritin       Electronically signed by Surinder Bell MD on 10/30/2020 at 1:43 PM

## 2020-10-31 PROCEDURE — 74011250636 HC RX REV CODE- 250/636: Performed by: HOSPITALIST

## 2020-10-31 PROCEDURE — 97530 THERAPEUTIC ACTIVITIES: CPT

## 2020-10-31 PROCEDURE — 74011000250 HC RX REV CODE- 250: Performed by: INTERNAL MEDICINE

## 2020-10-31 PROCEDURE — 94762 N-INVAS EAR/PLS OXIMTRY CONT: CPT

## 2020-10-31 PROCEDURE — 74011250637 HC RX REV CODE- 250/637: Performed by: HOSPITALIST

## 2020-10-31 PROCEDURE — 74011250637 HC RX REV CODE- 250/637: Performed by: INTERNAL MEDICINE

## 2020-10-31 PROCEDURE — 74011250636 HC RX REV CODE- 250/636: Performed by: INTERNAL MEDICINE

## 2020-10-31 PROCEDURE — 65270000029 HC RM PRIVATE

## 2020-10-31 PROCEDURE — 77010033711 HC HIGH FLOW OXYGEN

## 2020-10-31 PROCEDURE — 74011000258 HC RX REV CODE- 258: Performed by: INTERNAL MEDICINE

## 2020-10-31 PROCEDURE — 94640 AIRWAY INHALATION TREATMENT: CPT

## 2020-10-31 RX ORDER — FAMOTIDINE 20 MG/1
20 TABLET, FILM COATED ORAL 2 TIMES DAILY
Status: DISCONTINUED | OUTPATIENT
Start: 2020-10-31 | End: 2020-11-09 | Stop reason: HOSPADM

## 2020-10-31 RX ADMIN — ENOXAPARIN SODIUM 30 MG: 40 INJECTION SUBCUTANEOUS at 05:22

## 2020-10-31 RX ADMIN — SALINE NASAL SPRAY 2 SPRAY: 1.5 SOLUTION NASAL at 08:46

## 2020-10-31 RX ADMIN — FAMOTIDINE 20 MG: 20 TABLET ORAL at 21:57

## 2020-10-31 RX ADMIN — GUAIFENESIN 600 MG: 600 TABLET, EXTENDED RELEASE ORAL at 08:42

## 2020-10-31 RX ADMIN — ENOXAPARIN SODIUM 40 MG: 40 INJECTION SUBCUTANEOUS at 17:23

## 2020-10-31 RX ADMIN — SALINE NASAL SPRAY 2 SPRAY: 1.5 SOLUTION NASAL at 22:00

## 2020-10-31 RX ADMIN — MENTHOL, METHYL SALICYLATE: 10; 15 CREAM TOPICAL at 08:55

## 2020-10-31 RX ADMIN — DEXAMETHASONE SODIUM PHOSPHATE 2 MG: 4 INJECTION, SOLUTION INTRAMUSCULAR; INTRAVENOUS at 21:57

## 2020-10-31 RX ADMIN — PIPERACILLIN SODIUM AND TAZOBACTAM SODIUM 3.38 G: 3; .375 INJECTION, POWDER, LYOPHILIZED, FOR SOLUTION INTRAVENOUS at 08:46

## 2020-10-31 RX ADMIN — PIPERACILLIN SODIUM AND TAZOBACTAM SODIUM 3.38 G: 3; .375 INJECTION, POWDER, LYOPHILIZED, FOR SOLUTION INTRAVENOUS at 01:08

## 2020-10-31 RX ADMIN — FUROSEMIDE 40 MG: 10 INJECTION, SOLUTION INTRAMUSCULAR; INTRAVENOUS at 08:42

## 2020-10-31 RX ADMIN — IPRATROPIUM BROMIDE AND ALBUTEROL SULFATE 3 ML: .5; 3 SOLUTION RESPIRATORY (INHALATION) at 08:45

## 2020-10-31 RX ADMIN — BENZONATATE 200 MG: 100 CAPSULE ORAL at 16:13

## 2020-10-31 RX ADMIN — IPRATROPIUM BROMIDE AND ALBUTEROL SULFATE 3 ML: .5; 3 SOLUTION RESPIRATORY (INHALATION) at 13:13

## 2020-10-31 RX ADMIN — GUAIFENESIN 600 MG: 600 TABLET, EXTENDED RELEASE ORAL at 21:57

## 2020-10-31 RX ADMIN — Medication 10 ML: at 22:03

## 2020-10-31 RX ADMIN — BENZONATATE 200 MG: 100 CAPSULE ORAL at 21:57

## 2020-10-31 RX ADMIN — DEXAMETHASONE SODIUM PHOSPHATE 2 MG: 4 INJECTION, SOLUTION INTRAMUSCULAR; INTRAVENOUS at 08:42

## 2020-10-31 RX ADMIN — Medication 10 ML: at 16:30

## 2020-10-31 RX ADMIN — MENTHOL, METHYL SALICYLATE: 10; 15 CREAM TOPICAL at 22:04

## 2020-10-31 RX ADMIN — SALINE NASAL SPRAY 2 SPRAY: 1.5 SOLUTION NASAL at 17:27

## 2020-10-31 RX ADMIN — SALINE NASAL SPRAY 2 SPRAY: 1.5 SOLUTION NASAL at 16:14

## 2020-10-31 RX ADMIN — IPRATROPIUM BROMIDE AND ALBUTEROL SULFATE 3 ML: .5; 3 SOLUTION RESPIRATORY (INHALATION) at 20:47

## 2020-10-31 RX ADMIN — IPRATROPIUM BROMIDE AND ALBUTEROL SULFATE 3 ML: .5; 3 SOLUTION RESPIRATORY (INHALATION) at 01:01

## 2020-10-31 RX ADMIN — BENZONATATE 200 MG: 100 CAPSULE ORAL at 05:22

## 2020-10-31 RX ADMIN — PIPERACILLIN SODIUM AND TAZOBACTAM SODIUM 3.38 G: 3; .375 INJECTION, POWDER, LYOPHILIZED, FOR SOLUTION INTRAVENOUS at 17:23

## 2020-10-31 NOTE — PROGRESS NOTES
Hospitalist Progress Note               Daily Progress Note: 10/31/2020      Subjective: The patient is seen for follow  up.     56 y.o. male with no significant medical problems presents to the emergency room complaining of shortness of breath. Patient had a urinary retention which was resolved with Granados catheter placement. Patient continues to be on high flow oxygen 40lpm @ FiO2 of 60%  Patient was able to get up and sit in chair.     Medications reviewed  Current Facility-Administered Medications   Medication Dose Route Frequency    guaiFENesin ER (MUCINEX) tablet 600 mg  600 mg Oral Q12H    enoxaparin (LOVENOX) injection 40 mg  40 mg SubCUTAneous C52D    methyl salicylate-menthol (BENGAY) 15-10 % cream   Topical TID PRN    furosemide (LASIX) injection 40 mg  40 mg IntraVENous DAILY    dexamethasone (DECADRON) 4 mg/mL injection 2 mg  2 mg IntraVENous Q12H    dextromethorphan (DELSYM) 30 mg/5 mL syrup 30 mg  30 mg Oral Q12H PRN    albuterol-ipratropium (DUO-NEB) 2.5 MG-0.5 MG/3 ML  3 mL Nebulization Q6H RT    piperacillin-tazobactam (ZOSYN) 3.375 g in 0.9% sodium chloride (MBP/ADV) 100 mL MBP  3.375 g IntraVENous Q8H    0.9% sodium chloride infusion 250 mL  250 mL IntraVENous PRN    sodium chloride (OCEAN) 0.65 % nasal squeeze bottle 2 Spray  2 [place] Both Nostrils Q4HWA    hydrOXYzine pamoate (VISTARIL) capsule 25 mg  25 mg Oral TID PRN    melatonin tablet 10 mg  10 mg Oral QHS PRN    acetaminophen (TYLENOL) tablet 650 mg  650 mg Oral Q6H PRN    Or    acetaminophen (TYLENOL) suppository 650 mg  650 mg Rectal Q6H PRN    sodium chloride (NS) flush 5-40 mL  5-40 mL IntraVENous Q8H    sodium chloride (NS) flush 5-40 mL  5-40 mL IntraVENous PRN    ondansetron (ZOFRAN ODT) tablet 4 mg  4 mg Oral Q6H PRN    Or    ondansetron (ZOFRAN) injection 4 mg  4 mg IntraVENous Q6H PRN    benzonatate (TESSALON) capsule 200 mg  200 mg Oral Q8H       Review of Systems:   A comprehensive review of systems was negative except for that written in the HPI. Objective:   Physical Exam:     Visit Vitals  BP (!) 137/95 (BP 1 Location: Right arm, BP Patient Position: Sitting)   Pulse 98   Temp 98.1 °F (36.7 °C)   Resp 28   Ht 5' 9\" (1.753 m)   Wt 81.8 kg (180 lb 5.4 oz)   SpO2 93%   BMI 26.63 kg/m²    O2 Flow Rate (L/min): 40 l/min O2 Device: Hi flow nasal cannula    Temp (24hrs), Av.1 °F (36.7 °C), Min:97.7 °F (36.5 °C), Max:98.9 °F (37.2 °C)    10/31 0701 - 10/31 1900  In: -   Out:  [[facility]:1528]   10/29 1901 - 10/31 0700  In: 780 [P.O.:780]  Out: 2950 [Urine:2950]    PHYSICAL EXAM:  General: Alert and awake. Skin: Extremities and face reveal no rashes. HEENT: Sclerae anicteric. Extra-occular muscles are intact. No oral ulcers. No ENT discharge. The neck is supple. Cardiovascular: Regular rate and rhythm. No murmurs, gallops, or rubs. PMI nondisplaced. Carotids without bruits. Respiratory: Comfortable breathing with no accessory muscle use. Clear breath sounds with no wheezes, rales, or rhonchi. GI: Abdomen nondistended, soft, and nontender. Normal active bowel sounds. No enlargement of the liver or spleen. No masses palpable. Rectal: Deferred   Musculoskeletal: No pitting edema of the lower legs. Extremities have good range of motion. No costovertebral tenderness. Neurological: Gross memory appears intact. Patient is alert and oriented. Power 5/5, Cranial nerves II-XII intact  Psychiatric: Mood appears appropriate with judgement intact. Data Review:       Recent Days:  Recent Labs     10/29/20  06   WBC 11.1   HGB 15.6   HCT 46.4        Recent Labs     10/29/20  1948      K 3.8      CO2 26   *   BUN 31*   CREA 0.95   CA 8.4*     Recent Labs     10/29/20  1205   PH 7.467*   PCO2 40   PO2 71*   HCO3 28*   FIO2 70.0       24 Hour Results:  No results found for this or any previous visit (from the past 24 hour(s)).     XR CHEST PORT   Final Result   IMPRESSION: Shallower depth of inspiration with worsening bilateral airspace   disease/edema. XR CHEST PORT   Final Result   Impression: No significant interval change. XR CHEST PORT   Final Result   Findings/impression: A portable upright view of the chest demonstrates   persistent cardiomegaly and patchy interstitial and mild airspace disease which   has improved slightly on the right. No pneumothorax or effusion is identified. The osseous structures are unchanged. XR CHEST PORT   Final Result      XR CHEST PORT   Final Result   IMPRESSION: Improving bilateral airspace disease. XR CHEST PORT   Final Result      XR CHEST PORT   Final Result      XR CHEST PORT   Final Result      XR CHEST PORT   Final Result      CTA CHEST W OR W WO CONT   Final Result   IMPRESSION: Widespread patchy bilateral pneumonitis      XR CHEST SNGL V   Final Result             Assessment/     Problem List:  Hospital Problems  Date Reviewed: 10/15/2020          Codes Class Noted POA    Acute respiratory failure due to COVID-19 [facility]) ICD-10-CM: U07.1, J96.00  ICD-9-CM: 518.81, 079.89  10/15/2020 Yes        Pneumonia ICD-10-CM: J18.9  ICD-9-CM: 732  10/15/2020 Yes        Acute respiratory failure ([facility] [address] ICD-10-CM: J96.00  ICD-9-CM: 518.81  10/15/2020 Unknown                     Plan:    (1) Acute hypoxic respiratory failure : on high flow nasal canula. keep saturation > 92. (2) HCAP pneumonia and pneumonitis: MSSA and klebsiella on sputum.  On Zosyn    (3) Strong suspicion of COVID 19 pneumonitis: status post Remdesivir, Decadron, Azithromycin, Actemra,       Dehydrationcurrently resolved    PpxLovenox  FENcurrently on clear liquids, bedside swallow eval, advance diet, replete potassium and magnesium  Full code, patient surrogate decision-maker is his wife, updated patients daughter as well as Primary Care Physician  Dispositioncontinued inpatient treatment, pending clinical improvement, PT/OT    Care Plan discussed with: Patient/Family and Nurse    Called Patients daughter Dante Dorantes Ph- [phone]    Kathie Calderon MD

## 2020-10-31 NOTE — PROGRESS NOTES
Problem: Falls - Risk of  Goal: *Absence of Falls  Description: Document Richard Merlin Fall Risk and appropriate interventions in the flowsheet.   Outcome: Progressing Towards Goal  Note: Fall Risk Interventions:  Mobility Interventions: Bed/chair exit alarm         Medication Interventions: Patient to call before getting OOB    Elimination Interventions: Call light in reach

## 2020-10-31 NOTE — PROGRESS NOTES
Pulmonary Note      Name: Ekaterina Grewal MRN: [id number]   : 1960 Hospital: [address] [place] [address]   Date: 10/31/2020  Admission date: 10/15/2020 Hospital Day:        Subjective/Interval History:   Patient seen in the ICU on high flow nasal oxygen at 70% with oxygen saturation 99. He gives a history of gradual worsening illness over the last week to 10 days with nonproductive cough fever generalized malaise nausea denies any vomiting although the ER note states that he had some vomiting. He seems awake and alert. Does not have any Covid contacts that he is aware of. States that his sense of taste and smell have been normal but terribly anorexic has not eaten anything in 3 or 4 days. 10/17 still feels bad Cough shortness of breath but no nausea today   10/18 still complains of severe cough nonproductive although in general feels a little better  10/20 remains 100% high flow nasal oxygen. Continues to complain of it irritating him. Requested nasal saline last evening but it has not been started will reorder it. We will let him try nonrebreather mask with nasal high flow for meals  10/26 high flow nasal oxygen back up to 70% unable to safely switch to low flow nasal oxygen at this point. He states he feels better sense of taste is normal with a good appetite  10/28 patient had a rapid response last night now he is on 100% high flow nasal cannula  Patient Active Problem List   Diagnosis Code    Acute respiratory failure due to COVID-19 ([facility] [address] U07.1, J96.00    Pneumonia J18.9    Acute respiratory failure ([address] J96.00       IMPRESSION:   1. Acute hypoxic respiratory failure requiring high flow nasal   2. History of asthma  3. Community-acquired versus aspiration with diffuse pulmonary infiltrates questionable atypical pneumonia possible COVID-19 repeat testing Negative  4. Nausea probable due to COVID-19 infection  5.  Unremitting cough secondary to pneumonia or reflux  Body mass index is 26.63 kg/m².        RECOMMENDATIONS/PLAN:   1. Covid pneumonitis  has completed 2 doses Actemra and 5 days of Remdesivir  2. He is still hypoxic we will continue with a high flow nasal cannula  3. X-ray showed bilateral infiltrate with some edema I will give an extra dose of Lasix  4. CRP less than 0.29 will decrease Decadron to every 12 dosing  5. Get arterial blood gases  6. Continued hypoxia will give IV Lasix and follow renal function. 7. Sputum culture with MSSA and Klebsiella both should be covered by Zosyn  8. Continue nebulizer treatment  9. Repeat COVID-19 test negative         Subjective/Initial History:   I have reviewed the flowsheet and previous days notes. Seen earlier today on rounds. I was asked by Muna Pierre MD to see An Geraldine Gilliland a 56 y.o.  male  in consultation for a chief complaint of acute hypoxic respiratory failure cough and community-acquired pneumonia          Pt now in CCU. Patient PCP: UNKNOWN  PMH:  has no past medical history on file. PSH:   has no past surgical history on file. FHX: family history includes No Known Problems in his father and mother. SHX:  reports that he has never smoked. He has never used smokeless tobacco. He reports that he does not drink alcohol or use drugs.     Systemic review:  General no chronic illness but over the last week he has had fever generalized malaise weight loss no appetite  Eyes no double vision or momentary blindness  ENT no sinus congestion drainage or facial pain  Skeletal has diffuse aches and pains no swollen tender joints  Endocrinologic no polyuria polydipsia  Neurologic no seizures or syncope  Gastrointestinal normally he has no problems but over this last week he has had nausea anorexia marked weight loss has not had any alteration of his sense of taste or smell  Genitourinary no discomfort or drainage on urination  Cardiovascular no history of heart disease chest pain has felt sweaty but no history of ankle edema.   Respiratory as mentioned above    No Known Allergies   MEDS:   Current Facility-Administered Medications   Medication    guaiFENesin ER (MUCINEX) tablet 600 mg    enoxaparin (LOVENOX) injection 40 mg    methyl salicylate-menthol (BENGAY) 15-10 % cream    furosemide (LASIX) injection 40 mg    dexamethasone (DECADRON) 4 mg/mL injection 2 mg    dextromethorphan (DELSYM) 30 mg/5 mL syrup 30 mg    albuterol-ipratropium (DUO-NEB) 2.5 MG-0.5 MG/3 ML    piperacillin-tazobactam (ZOSYN) 3.375 g in 0.9% sodium chloride (MBP/ADV) 100 mL MBP    0.9% sodium chloride infusion 250 mL    sodium chloride (OCEAN) 0.65 % nasal squeeze bottle 2 Spray    hydrOXYzine pamoate (VISTARIL) capsule 25 mg    melatonin tablet 10 mg    acetaminophen (TYLENOL) tablet 650 mg    Or    acetaminophen (TYLENOL) suppository 650 mg    sodium chloride (NS) flush 5-40 mL    sodium chloride (NS) flush 5-40 mL    ondansetron (ZOFRAN ODT) tablet 4 mg    Or    ondansetron (ZOFRAN) injection 4 mg    benzonatate (TESSALON) capsule 200 mg        Current Facility-Administered Medications:     guaiFENesin ER (MUCINEX) tablet 600 mg, 600 mg, Oral, Q12H, Raj Frank MD, 600 mg at 10/31/20 0842    enoxaparin (LOVENOX) injection 40 mg, 40 mg, SubCUTAneous, Q12H, Raj Frank MD, 30 mg at 10/31/20 6128    methyl salicylate-menthol (BENGAY) 15-10 % cream, , Topical, TID PRN, Ness Valle PA-C    furosemide (LASIX) injection 40 mg, 40 mg, IntraVENous, DAILY, Ryan Leyva MD, 40 mg at 10/31/20 0842    dexamethasone (DECADRON) 4 mg/mL injection 2 mg, 2 mg, IntraVENous, Q12H, Ryan Leyva MD, 2 mg at 10/31/20 0842    dextromethorphan (DELSYM) 30 mg/5 mL syrup 30 mg, 30 mg, Oral, Q12H PRN, Ryan Leyva MD, 30 mg at 10/30/20 1531    albuterol-ipratropium (DUO-NEB) 2.5 MG-0.5 MG/3 ML, 3 mL, Nebulization, Q6H RT, Kannan Conde MD, 3 mL at 10/31/20 0845    piperacillin-tazobactam (ZOSYN) 3.375 g in 0.9% sodium chloride (MBP/ADV) 100 mL MBP, 3.375 g, IntraVENous, Q8H, Simona Gonzales MD, Last Rate: 25 mL/hr at 10/31/20 0846, 3.375 g at 10/31/20 0846    0.9% sodium chloride infusion 250 mL, 250 mL, IntraVENous, PRN, Aurelia Fan MD    sodium chloride (OCEAN) 0.65 % nasal squeeze bottle 2 Spray, 2 Spray, Both Nostrils, Q4HWA, Lisa Knott MD, 2 [place] at 10/31/20 4868    hydrOXYzine pamoate (VISTARIL) capsule 25 mg, 25 mg, Oral, TID PRN, Kj Briscoe MD, 25 mg at 10/28/20 1743    melatonin tablet 10 mg, 10 mg, Oral, QHS PRN, Kj Briscoe MD, 10 mg at 10/21/20 2231    acetaminophen (TYLENOL) tablet 650 mg, 650 mg, Oral, Q6H PRN, 650 mg at 10/18/20 2205 **OR** acetaminophen (TYLENOL) suppository 650 mg, 650 mg, Rectal, Q6H PRN, Kobe Ceballos MD    sodium chloride (NS) flush 5-40 mL, 5-40 mL, IntraVENous, Q8H, Kobe Ceballos MD, 10 mL at 10/30/20 0551    sodium chloride (NS) flush 5-40 mL, 5-40 mL, IntraVENous, PRN, Kobe Ceballos MD, 10 mL at 10/22/20 0938    ondansetron (ZOFRAN ODT) tablet 4 mg, 4 mg, Oral, Q6H PRN **OR** ondansetron (ZOFRAN) injection 4 mg, 4 mg, IntraVENous, Q6H PRN, Kobe Ceballos MD    benzonatate (TESSALON) capsule 200 mg, 200 mg, Oral, Q8H, Lisa Knott MD, 200 mg at 10/31/20 0522      Objective:     Vital Signs: Telemetry:    normal sinus rhythm Intake/Output:   Visit Vitals  BP (!) 143/93 (BP 1 Location: Right arm, BP Patient Position: At rest)   Pulse 90   Temp 97.8 °F (36.6 °C)   Resp 20   Ht 5' 9\" (1.753 m)   Wt 81.8 kg (180 lb 5.4 oz)   SpO2 93%   BMI 26.63 kg/m²       Temp (24hrs), Av.2 °F (36.8 °C), Min:97.7 °F (36.5 °C), Max:98.9 °F (37.2 °C)        O2 Device: Hi flow nasal cannula O2 Flow Rate (L/min): 40 l/min         Body mass index is 26.63 kg/m².     Wt Readings from Last 4 Encounters:   10/21/20 81.8 kg (180 lb 5.4 oz)          Intake/Output Summary (Last 24 hours) at 10/31/2020 1056  Last data filed at 10/31/2020 0525  Gross per 24 hour   Intake 780 ml   Output 2050 ml   Net -1270 ml       Last shift:      No intake/output data recorded. Last 3 shifts: 10/29 1901 - 10/31 0700  In: 80 [P.O.:780]  Out: 2950 [Urine:2950]   Ventilator Settings:      Mode Rate TV Press PEEP FiO2 PIP Min. Vent               60 %            Physical Exam:    General:  male; less distress chronic cough nonproductive  HEENT: NCAT, oral mucosa clear  Eyes: anicteric; conjunctiva clear extraocular movements intact   neck: no node neck veins visible  Chest: no deformity,   Cardiac: Regular rate and rhythm no murmurs trace ankle edema  Lungs: Clear anteriorly laterally still has rales posteriorly improved  Abd: Soft nontender normal bowel sounds no organomegaly  Ext: Mild edema; no joint swelling;  No clubbing  :clear urine  Neuro: Awake alert speech is clear moves all 4 extremities well  Psych- no agitation, oriented to person;   Skin: warm,, no cyanosis; very mildly diaphoretic  Pulses: Pulses intact  Capillary: Normal capillary refill      Labs:    Recent Labs     10/29/20  0620   WBC 11.1   HGB 15.6      APTT 24.0     Recent Labs     10/29/20  1948      K 3.8      CO2 26   *   BUN 31*   CREA 0.95   CA 8.4*     10/24 6 L nasal oxygen with oxygen saturation 90%    10/14 COVID-19 test negative  10/17 COVID-19 test indeterminate  Ferritin 1510, previous 2180  , previous 615, 754  Procalcitonin 0.14  CRP 0.87, previous 1.39, 3.0, 12.4  Lab Results   Component Value Date/Time    Culture result: No growth 7 days 10/20/2020 11:30 AM    Culture result: Heavy Staphylococcus aureus 10/20/2020 08:15 AM    Culture result: Light Klebsiella pneumoniae 10/20/2020 08:15 AM    Culture result: Heavy  Normal respiratory smita   10/20/2020 08:15 AM      Lab Results   Component Value Date/Time     10/15/2020 11:47 PM       Imaging:  I have personally reviewed the patients radiographs and have reviewed the reports:    CXR Results  (Last 48 hours)  10/20 chest x-ray with diffuse patchy infiltrates may be slightly less dense  10/17 chest x-ray unchanged diffuse bilateral infiltrate               10/15/20 1927  XR CHEST SNGL V Final result    Narrative:  1       Mild atelectasis in the bases with upper lungs clear. No effusion or   pneumothorax. Normal heart and no congestion  Disagree to me there is bilateral infiltrate           Results from Hospital Encounter encounter on 10/15/20   XR CHEST PORT    Narrative Portable upright radiograph chest 7:34 AM compared to October 26, 2020. INDICATION: Cough, pneumonia. Patient has taken a shallow depth of inspiration. Heart size is stable. Bilateral airspace disease/edema shows worsening compared to prior study. No  pneumothorax. Cannot exclude small effusions. No acute bony changes. Impression IMPRESSION: Shallower depth of inspiration with worsening bilateral airspace  disease/edema. XR CHEST PORT    Narrative Chest, frontal view, 10/26/2020    History: Cough. Pneumonia. Comparison: Including chest 10/25/2020. Findings: The cardiac silhouette is stable. The lungs remain underexpanded. Airspace opacities in the lungs are not significantly changed as compared to the  study performed one day prior. Hydrostatic edema is not excluded. No pleural  effusion or pneumothorax is identified. The osseous structures are stable. Impression Impression: No significant interval change. XR CHEST PORT    Narrative Comparison is with the prior exam dated 10/23/2020. Impression Findings/impression: A portable upright view of the chest demonstrates  persistent cardiomegaly and patchy interstitial and mild airspace disease which  has improved slightly on the right. No pneumothorax or effusion is identified. The osseous structures are unchanged.      Results from [place] encounter on 10/15/20   CTA CHEST W OR W WO CONT    Narrative CT dose reduction was achieved through use of a standardized protocol tailored  for this examination and automatic exposure control for dose modulation. Contrast study maximum intensity projections shows pronounced groundglass  opacification, of variable density, mainly in the dependent portions of each  lung, apex to base. Air bronchograms are preserved in the densest portions. Mild  geographic groundglass opacification of lower density in the nondependent  portions. Central airways are open    Pulmonary arteries are well-opacified and show no filling defect or distortion. Aorta shows normal dimensions, without dissection. Tiny middle mediastinal lymph  nodes. Cardiac finding. No pleural pericardial effusion. No significant upper  abdominal or chest wall finding      Impression IMPRESSION: Widespread patchy bilateral pneumonitis       Clinical picture consistent with COVID-19 infection with pneumonitis with diffuse patchy pulmonary infiltrate elevation in ferritin LDH and CRP nonproductive cough and GI symptoms and yet initial COVID-19 test negative repeat testing indeterminate              Time spent 30 min       Thank you for allowing us to participate in the care of this patient.   We will follow along with you  Time spent 30 min  Yani Jones MD

## 2020-10-31 NOTE — PROGRESS NOTES
Problem: Mobility Impaired (Adult and Pediatric)  Goal: *Acute Goals and Plan of Care (Insert Text)  Outcome: Progressing Towards Goal     Problem: Patient Education: Go to Patient Education Activity  Goal: Patient/Family Education  Description: To be educate on proper breathing exercises ; use of spirometer as needed  Outcome: Progressing Towards Goal     PHYSICAL THERAPY TREATMENT  Patient: Ekaterina Mcdonald (56 y.o. male)  Date: 10/31/2020  Diagnosis: Pneumonia [J18.9]  Acute respiratory failure ([address] [J96.00]   <principal problem not specified>       Precautions: Other (comment) O2 Sat, respiratory status  Chart, physical therapy assessment, plan of care and goals were reviewed. ASSESSMENT  Patient continues with skilled PT services and is progressing towards goals. Pt presents with decr strength, gait, balance, activity tolerance, safety awareness, and functional mobility. Pt agreeable to PT treatment. Pt received semisupine on 60% HiFlow O2, O2 Sat 84-93% throughout session. Pt completed bed mobility with SBA, transfers with SBA, and amb approx 5 ft with RW and CGA. Pt completed supine and seated LE therex for ROM, strengthening, and functional mobility. Pt has fair activity tolerance, highly motivated to participate with therapy. Pt left sitting in upright chair by bed with call button in reach, RN notified. Pt would benefit from continued skilled PT services to address above deficits and progress towards goals. PT d/c recc IRF vs HHPT when medically appropriate pending respiratory status. .     Other factors to consider for discharge: respiratory function, O2 Sat, activity pacing         PLAN :  Patient continues to benefit from skilled intervention to address the above impairments. Continue treatment per established plan of care. to address goals.     Recommendation for discharge: (in order for the patient to meet his/her long term goals)  PT d/c recc IRF vs HHPT when medically appropriate pending respiratory status    This discharge recommendation:  Has been made in collaboration with the attending provider and/or case management    IF patient discharges home will need the following DME: to be determined (TBD)       SUBJECTIVE:   Patient stated I feel good, the doctor lowered my O2 so I want to be careful with standing. I don't want to get dizzy.     OBJECTIVE DATA SUMMARY:   Critical Behavior:  Neurologic State: Alert, Appropriate for age  Orientation Level: Oriented X4  Cognition: Appropriate decision making, Appropriate safety awareness     Functional Mobility Training:  Bed Mobility:  Rolling: Supervision  Supine to Sit: Supervision  Sit to Supine: Supervision      Transfers:  Sit to Stand: Contact guard assistance  Stand to Sit: Contact guard assistance      Bed to Chair: Contact guard assistance         Pt completed bed mobility with SBA and transfers with SBA. Pt req min cues for sequencing, hand placement, line management, and safety during dynamic activities to prevent decline. Pt tolerated standing at bedside and completed marches intermittently, for approx 5 min. Pt denies dizziness with change in position, educated on activity pacing and breathing technique. Balance:  Sitting: Intact; With support  Standing: Intact; With support  Ambulation/Gait Training:  Distance (ft): 5 Feet (ft)  Assistive Device: Gait belt;Walker, rolling  Ambulation - Level of Assistance: Contact guard assistance   Gait Description (WDL): Within defined limits  Gait Abnormalities: Decreased step clearance;Shuffling gait   Speed/Danielle: Slow        Pt amb approx 5 ft with RW and CGA, req assist with line management and safety during turns to prevent fall. Pt has fair activity tolerance, reported min fatigue and SOB with activity. Pt is highly motivated to participate with therapy. Pt on 60% HiFlow O2, O2 Sat 84-93% throughout session. Stairs:               Therapeutic Exercises:   Pt completed supine and seated LE therex for ROM, strengthening, and functional mobility. EXERCISE   Sets   Reps   Active Active Assist   Passive Self ROM   Comments   Heel/Toe raises  20 [x] [] [] []    Quad Sets/Glut Sets   [] [] [] []    Hamstring Sets   [] [] [] []    Short Arc Quads   [] [] [] []    Heel Slides  10 [x] [] [] []    Straight Leg Raises  10 [x] [] [] []    Hip abd/add  10 [x] [] [] []    Long Arc Quads  10 [x] [] [] []    Sitting/Standing Marching  20 [x] [] [] []       [] [] [] []       Pain Ratin/10    Activity Tolerance:   Fair, desaturates with exertion and requires oxygen, requires frequent rest breaks, and observed SOB with activity  Please refer to the flowsheet for vital signs taken during this treatment. After treatment patient left in no apparent distress:   Sitting in chair and Call bell within reach, nurse notified    COMMUNICATION/COLLABORATION:   The patients plan of care was discussed with: Registered nurse.      Adia Aparicio PT   Time Calculation: 28 mins

## 2020-10-31 NOTE — PROGRESS NOTES
Spiritual Care Assessment/Progress Note  [facility]      NAME: Ekaterina Medrano      MRN: [id number]  AGE: 56 y.o. SEX: male  Catholic Affiliation: Unknown   Language: English     10/31/2020     Total Time (in minutes): 37     Spiritual Assessment begun in [address] through conversation with:         [x]Patient        [x] Family    [] Friend(s)        Reason for Consult:  Follow-up, routine     Spiritual beliefs: (Please include comment if needed)     [] Identifies with a martin tradition:         [] Supported by a martin community:            [] Claims no spiritual orientation:           [] Seeking spiritual identity:                [] Adheres to an individual form of spirituality:           [x] Not able to assess:                           Identified resources for coping:      [] Prayer                               [] Music                  [] Guided Imagery     [x] Family/friends                 [] Pet visits     [] Devotional reading                         [] Unknown     [] Other:                                              Interventions offered during this visit: (See comments for more details)    Patient Interventions: Affirmation of emotions/emotional suffering, Catharsis/review of pertinent events in supportive environment, Life review/legacy, Normalization of emotional/spiritual concerns, Prayer (assurance of), Bridging     Family/Friend(s): Bridging     Plan of Care:     [] Support spiritual and/or cultural needs    [] Support AMD and/or advance care planning process      [] Support grieving process   [] Coordinate Rites and/or Rituals    [] Coordination with community clergy   [] No spiritual needs identified at this time   [] Detailed Plan of Care below (See Comments)  [] Make referral to Music Therapy  [] Make referral to Pet Therapy     [] Make referral to Addiction services  [] Make referral to [facility]  [] Make referral to Spiritual Care Partner  [] No future visits requested        [x] Follow up visits as needed     Comments: This was a follow up visit for the patient in 4th oncology who had requested regular visitation. Visit was mostly with patient and his daughter Walker Luis came into the room towards the end of the visit. He engaged in much life review of his early years in the country and his many experiences. I facilitated story-telling and normalized his and Lila's desire for him to get better soon. Patient shared about his medical condition expressed his desire to recover soon and be discharged. I affirmed her support of her father in his time of need and attempted to bridge relationship between them. I provided reflective and empathic listening. I advised of  availability. Chaplains will follow as able and/or needed. MIKAYLA SamanoDiv.    can be reached by calling the  at [facility]  [phone]

## 2020-11-01 PROCEDURE — 74011250636 HC RX REV CODE- 250/636: Performed by: HOSPITALIST

## 2020-11-01 PROCEDURE — 77010033711 HC HIGH FLOW OXYGEN

## 2020-11-01 PROCEDURE — 94640 AIRWAY INHALATION TREATMENT: CPT

## 2020-11-01 PROCEDURE — 74011250637 HC RX REV CODE- 250/637: Performed by: INTERNAL MEDICINE

## 2020-11-01 PROCEDURE — 97530 THERAPEUTIC ACTIVITIES: CPT

## 2020-11-01 PROCEDURE — 74011250636 HC RX REV CODE- 250/636: Performed by: INTERNAL MEDICINE

## 2020-11-01 PROCEDURE — 94762 N-INVAS EAR/PLS OXIMTRY CONT: CPT

## 2020-11-01 PROCEDURE — 74011000258 HC RX REV CODE- 258: Performed by: INTERNAL MEDICINE

## 2020-11-01 PROCEDURE — 74011000250 HC RX REV CODE- 250: Performed by: INTERNAL MEDICINE

## 2020-11-01 PROCEDURE — 74011250637 HC RX REV CODE- 250/637: Performed by: HOSPITALIST

## 2020-11-01 PROCEDURE — 65270000029 HC RM PRIVATE

## 2020-11-01 PROCEDURE — 74011250637 HC RX REV CODE- 250/637: Performed by: FAMILY MEDICINE

## 2020-11-01 RX ADMIN — SALINE NASAL SPRAY 2 SPRAY: 1.5 SOLUTION NASAL at 09:53

## 2020-11-01 RX ADMIN — PIPERACILLIN SODIUM AND TAZOBACTAM SODIUM 3.38 G: 3; .375 INJECTION, POWDER, LYOPHILIZED, FOR SOLUTION INTRAVENOUS at 09:50

## 2020-11-01 RX ADMIN — FAMOTIDINE 20 MG: 20 TABLET ORAL at 21:15

## 2020-11-01 RX ADMIN — ENOXAPARIN SODIUM 40 MG: 40 INJECTION SUBCUTANEOUS at 06:36

## 2020-11-01 RX ADMIN — ENOXAPARIN SODIUM 40 MG: 40 INJECTION SUBCUTANEOUS at 17:40

## 2020-11-01 RX ADMIN — IPRATROPIUM BROMIDE AND ALBUTEROL SULFATE 3 ML: .5; 3 SOLUTION RESPIRATORY (INHALATION) at 15:01

## 2020-11-01 RX ADMIN — PIPERACILLIN SODIUM AND TAZOBACTAM SODIUM 3.38 G: 3; .375 INJECTION, POWDER, LYOPHILIZED, FOR SOLUTION INTRAVENOUS at 17:40

## 2020-11-01 RX ADMIN — IPRATROPIUM BROMIDE AND ALBUTEROL SULFATE 3 ML: .5; 3 SOLUTION RESPIRATORY (INHALATION) at 01:32

## 2020-11-01 RX ADMIN — Medication 10 ML: at 06:37

## 2020-11-01 RX ADMIN — MELATONIN TAB 5 MG 10 MG: 5 TAB at 21:14

## 2020-11-01 RX ADMIN — DEXTROMETHORPHAN 30 MG: 30 SUSPENSION, EXTENDED RELEASE ORAL at 21:14

## 2020-11-01 RX ADMIN — GUAIFENESIN 600 MG: 600 TABLET, EXTENDED RELEASE ORAL at 09:50

## 2020-11-01 RX ADMIN — IPRATROPIUM BROMIDE AND ALBUTEROL SULFATE 3 ML: .5; 3 SOLUTION RESPIRATORY (INHALATION) at 09:08

## 2020-11-01 RX ADMIN — MENTHOL, METHYL SALICYLATE: 10; 15 CREAM TOPICAL at 06:41

## 2020-11-01 RX ADMIN — DEXAMETHASONE SODIUM PHOSPHATE 2 MG: 4 INJECTION, SOLUTION INTRAMUSCULAR; INTRAVENOUS at 21:14

## 2020-11-01 RX ADMIN — Medication 10 ML: at 21:16

## 2020-11-01 RX ADMIN — BENZONATATE 200 MG: 100 CAPSULE ORAL at 06:39

## 2020-11-01 RX ADMIN — BENZONATATE 200 MG: 100 CAPSULE ORAL at 21:15

## 2020-11-01 RX ADMIN — SALINE NASAL SPRAY 2 SPRAY: 1.5 SOLUTION NASAL at 17:42

## 2020-11-01 RX ADMIN — GUAIFENESIN 600 MG: 600 TABLET, EXTENDED RELEASE ORAL at 21:15

## 2020-11-01 RX ADMIN — DEXAMETHASONE SODIUM PHOSPHATE 2 MG: 4 INJECTION, SOLUTION INTRAMUSCULAR; INTRAVENOUS at 09:49

## 2020-11-01 RX ADMIN — Medication 10 ML: at 14:26

## 2020-11-01 RX ADMIN — SODIUM CHLORIDE 250 ML: 9 INJECTION, SOLUTION INTRAVENOUS at 01:56

## 2020-11-01 RX ADMIN — SALINE NASAL SPRAY 2 SPRAY: 1.5 SOLUTION NASAL at 06:00

## 2020-11-01 RX ADMIN — BENZONATATE 200 MG: 100 CAPSULE ORAL at 14:24

## 2020-11-01 RX ADMIN — FAMOTIDINE 20 MG: 20 TABLET ORAL at 09:50

## 2020-11-01 RX ADMIN — HYDROXYZINE PAMOATE 25 MG: 25 CAPSULE ORAL at 21:14

## 2020-11-01 RX ADMIN — IPRATROPIUM BROMIDE AND ALBUTEROL SULFATE 3 ML: .5; 3 SOLUTION RESPIRATORY (INHALATION) at 20:43

## 2020-11-01 RX ADMIN — FUROSEMIDE 40 MG: 10 INJECTION, SOLUTION INTRAMUSCULAR; INTRAVENOUS at 09:49

## 2020-11-01 RX ADMIN — SALINE NASAL SPRAY 2 SPRAY: 1.5 SOLUTION NASAL at 14:25

## 2020-11-01 RX ADMIN — PIPERACILLIN SODIUM AND TAZOBACTAM SODIUM 3.38 G: 3; .375 INJECTION, POWDER, LYOPHILIZED, FOR SOLUTION INTRAVENOUS at 01:56

## 2020-11-01 NOTE — PROGRESS NOTES
Pt chart reviewed and PT treatment attempted at [phone]. Pt stated his daughter was coming this afternoon and would like to see how the Pt is moving. Pt called daughter Jyotsna Nicholas while PT was in the room, she stated she would not be able to come in today because she needed to be tested for Covid-19 before coming to visit. PT updated daughter on Pt mobility status and requested to be updated each session, telephone number is on the Pt board. Pt asked to be seen after lunch, PT will attempt to see Pt this afternoon.  Will continue to follow Thank you

## 2020-11-01 NOTE — PROGRESS NOTES
Hospitalist Progress Note               Daily Progress Note: 11/1/2020      Subjective: The patient is seen for follow  up.     56 y.o. male with no significant medical problems presents to the emergency room complaining of shortness of breath. Patient had a urinary retention which was resolved with Granados catheter placement. Patient continues to be on high flow oxygen 35lpm @ FiO2 of 50%  Patient was able to get up and sit in chair.     Medications reviewed  Current Facility-Administered Medications   Medication Dose Route Frequency    famotidine (PEPCID) tablet 20 mg  20 mg Oral BID    guaiFENesin ER (MUCINEX) tablet 600 mg  600 mg Oral Q12H    enoxaparin (LOVENOX) injection 40 mg  40 mg SubCUTAneous [id number]    methyl salicylate-menthol (BENGAY) 15-10 % cream   Topical TID PRN    furosemide (LASIX) injection 40 mg  40 mg IntraVENous DAILY    dexamethasone (DECADRON) 4 mg/mL injection 2 mg  2 mg IntraVENous Q12H    dextromethorphan (DELSYM) 30 mg/5 mL syrup 30 mg  30 mg Oral Q12H PRN    albuterol-ipratropium (DUO-NEB) 2.5 MG-0.5 MG/3 ML  3 mL Nebulization Q6H RT    piperacillin-tazobactam (ZOSYN) 3.375 g in 0.9% sodium chloride (MBP/ADV) 100 mL MBP  3.375 g IntraVENous Q8H    0.9% sodium chloride infusion 250 mL  250 mL IntraVENous PRN    sodium chloride (OCEAN) 0.65 % nasal squeeze bottle 2 Spray  2 Recluse Both Nostrils Q4HWA    hydrOXYzine pamoate (VISTARIL) capsule 25 mg  25 mg Oral TID PRN    melatonin tablet 10 mg  10 mg Oral QHS PRN    acetaminophen (TYLENOL) tablet 650 mg  650 mg Oral Q6H PRN    Or    acetaminophen (TYLENOL) suppository 650 mg  650 mg Rectal Q6H PRN    sodium chloride (NS) flush 5-40 mL  5-40 mL IntraVENous Q8H    sodium chloride (NS) flush 5-40 mL  5-40 mL IntraVENous PRN    ondansetron (ZOFRAN ODT) tablet 4 mg  4 mg Oral Q6H PRN    Or    ondansetron (ZOFRAN) injection 4 mg  4 mg IntraVENous Q6H PRN    benzonatate (TESSALON) capsule 200 mg  200 mg Oral Q8H       Review of Systems:   A comprehensive review of systems was negative except for that written in the HPI. Objective:   Physical Exam:     Visit Vitals  /81   Pulse 92   Temp 97.8 °F (36.6 °C)   Resp 20   Ht 5' 9\" (1.753 m)   Wt 81.8 kg (180 lb 5.4 oz)   SpO2 98%   BMI 26.63 kg/m²    O2 Flow Rate (L/min): 40 l/min O2 Device: Hi flow nasal cannula    Temp (24hrs), Av.9 °F (36.6 °C), Min:97.6 °F (36.4 °C), Max:98.1 °F (36.7 °C)    No intake/output data recorded. 10/30 1901 -  0700  In: -   Out: 3600 [Urine:3600]    PHYSICAL EXAM:  General: Alert and awake. Skin: Extremities and face reveal no rashes. HEENT: Sclerae anicteric. Extra-occular muscles are intact. No oral ulcers. No ENT discharge. The neck is supple. Cardiovascular: Regular rate and rhythm. No murmurs, gallops, or rubs. PMI nondisplaced. Carotids without bruits. Respiratory: Comfortable breathing with no accessory muscle use. Clear breath sounds with no wheezes, rales, or rhonchi. GI: Abdomen nondistended, soft, and nontender. Normal active bowel sounds. No enlargement of the liver or spleen. No masses palpable. Rectal: Deferred   Musculoskeletal: No pitting edema of the lower legs. Extremities have good range of motion. No costovertebral tenderness. Neurological: Gross memory appears intact. Patient is alert and oriented. Power 5/5, Cranial nerves II-XII intact  Psychiatric: Mood appears appropriate with judgement intact. Data Review:       Recent Days:  No results for input(s): WBC, HGB, HCT, PLT, HGBEXT, HCTEXT, PLTEXT, HGBEXT, HCTEXT, PLTEXT in the last 72 hours. Recent Labs     10/29/20  1948      K 3.8      CO2 26   *   BUN 31*   CREA 0.95   CA 8.4*     No results for input(s): PH, PCO2, PO2, HCO3, FIO2 in the last 72 hours. 24 Hour Results:  No results found for this or any previous visit (from the past 24 hour(s)).     XR CHEST PORT   Final Result   IMPRESSION: Shallower depth of inspiration with worsening bilateral airspace   disease/edema. XR CHEST PORT   Final Result   Impression: No significant interval change. XR CHEST PORT   Final Result   Findings/impression: A portable upright view of the chest demonstrates   persistent cardiomegaly and patchy interstitial and mild airspace disease which   has improved slightly on the right. No pneumothorax or effusion is identified. The osseous structures are unchanged. XR CHEST PORT   Final Result      XR CHEST PORT   Final Result   IMPRESSION: Improving bilateral airspace disease. XR CHEST PORT   Final Result      XR CHEST PORT   Final Result      XR CHEST PORT   Final Result      XR CHEST PORT   Final Result      CTA CHEST W OR W WO CONT   Final Result   IMPRESSION: Widespread patchy bilateral pneumonitis      XR CHEST SNGL V   Final Result      XR CHEST PORT    (Results Pending)          Assessment/     Problem List:  Hospital Problems  Date Reviewed: 10/15/2020          Codes Class Noted POA    Acute respiratory failure due to COVID-19 [facility]) ICD-10-CM: U07.1, J96.00  ICD-9-CM: 518.81, 079.89  10/15/2020 Yes        Pneumonia ICD-10-CM: J18.9  ICD-9-CM: 946  10/15/2020 Yes        Acute respiratory failure ([address] ICD-10-CM: J96.00  ICD-9-CM: 518.81  10/15/2020 Unknown                     Plan:    (1) Acute hypoxic respiratory failure : on high flow nasal canula. keep saturation > 92. (2) HCAP pneumonia and pneumonitis: MSSA and klebsiella on sputum.  On Zosyn    (3) Strong suspicion of COVID 19 pneumonitis: status post Remdesivir, Decadron, Azithromycin, Actemra,       Dehydrationcurrently resolved    PpxLovenox  FENcurrently on clear liquids, bedside swallow eval, advance diet, replete potassium and magnesium  Full code, patient surrogate decision-maker is his wife, updated patients daughter as well as Primary Care Physician  Dispositioncontinued inpatient treatment, pending clinical improvement, PT/OT    Care Plan discussed with: Patient/Family and Nurse    Called Patients daughter Nidhi Diaz - [phone]    Jayne Fox MD

## 2020-11-01 NOTE — ROUTINE PROCESS
Bedside shift change report given to Ev Luna RN (oncoming nurse) by Lambert Gomez RN (offgoing nurse). Report included the following information SBAR.

## 2020-11-01 NOTE — PROGRESS NOTES
Pulmonary Note      Name: Ekaterina Gutierrez MRN: [id number]   : 1960 Hospital: [address] [place] [address]   Date: 2020  Admission date: 10/15/2020 Hospital Day:        Subjective/Interval History:   Patient seen in the ICU on high flow nasal oxygen at 70% with oxygen saturation 99. He gives a history of gradual worsening illness over the last week to 10 days with nonproductive cough fever generalized malaise nausea denies any vomiting although the ER note states that he had some vomiting. He seems awake and alert. Does not have any Covid contacts that he is aware of. States that his sense of taste and smell have been normal but terribly anorexic has not eaten anything in 3 or 4 days. 10/17 still feels bad Cough shortness of breath but no nausea today   10/18 still complains of severe cough nonproductive although in general feels a little better  10/20 remains 100% high flow nasal oxygen. Continues to complain of it irritating him. Requested nasal saline last evening but it has not been started will reorder it. We will let him try nonrebreather mask with nasal high flow for meals  10/26 high flow nasal oxygen back up to 70% unable to safely switch to low flow nasal oxygen at this point. He states he feels better sense of taste is normal with a good appetite  10/28 patient had a rapid response last night now he is on 100% high flow nasal cannula   discussed with the daughter regarding his condition he remains on high flow nasal cannula we will continue to wean on 65% FiO2  Patient Active Problem List   Diagnosis Code    [facility] failure due to [facility]19 ([facility] U07.1, J96.00    Pneumonia J18.9    Acute respiratory failure ([facility] J96.00       IMPRESSION:   1. Acute hypoxic respiratory failure requiring high flow nasal   2. History of asthma  3.  Community-acquired versus aspiration with diffuse pulmonary infiltrates questionable atypical pneumonia possible COVID-19 repeat testing Negative  4. Nausea probable due to COVID-19 infection  5. Unremitting cough secondary to pneumonia or reflux  Body mass index is 26.63 kg/m². RECOMMENDATIONS/PLAN:   1. Covid pneumonitis  has completed 2 doses Actemra and 5 days of Remdesivir  2. He is still hypoxic we will continue with a high flow nasal cannula  3. X-ray showed bilateral infiltrate which improved  4. CRP less than 0.29 will decrease Decadron to every 12 dosing  5. Get arterial blood gases  6. Continued hypoxia   7. Sputum culture with MSSA and Klebsiella both should be covered by Zosyn  8. Continue nebulizer treatment  9. Repeat COVID-19 test negative         Subjective/Initial History:   I have reviewed the flowsheet and previous days notes. Seen earlier today on rounds. I was asked by Canelo Palacio MD to see An Osullivan Mall a 56 y.o.  male  in consultation for a chief complaint of acute hypoxic respiratory failure cough and community-acquired pneumonia          Pt now in CCU. Patient PCP: UNKNOWN  PMH:  has no past medical history on file. PSH:   has no past surgical history on file. FHX: family history includes No Known Problems in his father and mother. SHX:  reports that he has never smoked. He has never used smokeless tobacco. He reports that he does not drink alcohol or use drugs.     Systemic review:  General no chronic illness but over the last week he has had fever generalized malaise weight loss no appetite  Eyes no double vision or momentary blindness  ENT no sinus congestion drainage or facial pain  Skeletal has diffuse aches and pains no swollen tender joints  Endocrinologic no polyuria polydipsia  Neurologic no seizures or syncope  Gastrointestinal normally he has no problems but over this last week he has had nausea anorexia marked weight loss has not had any alteration of his sense of taste or smell  Genitourinary no discomfort or drainage on urination  Cardiovascular no history of heart disease chest pain has felt sweaty but no history of ankle edema.   Respiratory as mentioned above    No Known Allergies   MEDS:   Current Facility-Administered Medications   Medication    famotidine (PEPCID) tablet 20 mg    guaiFENesin ER (MUCINEX) tablet 600 mg    enoxaparin (LOVENOX) injection 40 mg    methyl salicylate-menthol (BENGAY) 15-10 % cream    furosemide (LASIX) injection 40 mg    dexamethasone (DECADRON) 4 mg/mL injection 2 mg    dextromethorphan (DELSYM) 30 mg/5 mL syrup 30 mg    albuterol-ipratropium (DUO-NEB) 2.5 MG-0.5 MG/3 ML    piperacillin-tazobactam (ZOSYN) 3.375 g in 0.9% sodium chloride (MBP/ADV) 100 mL MBP    0.9% sodium chloride infusion 250 mL    sodium chloride (OCEAN) 0.65 % nasal squeeze bottle 2 Spray    hydrOXYzine pamoate (VISTARIL) capsule 25 mg    melatonin tablet 10 mg    acetaminophen (TYLENOL) tablet 650 mg    Or    acetaminophen (TYLENOL) suppository 650 mg    sodium chloride (NS) flush 5-40 mL    sodium chloride (NS) flush 5-40 mL    ondansetron (ZOFRAN ODT) tablet 4 mg    Or    ondansetron (ZOFRAN) injection 4 mg    benzonatate (TESSALON) capsule 200 mg        Current Facility-Administered Medications:     famotidine (PEPCID) tablet 20 mg, 20 mg, Oral, BID, Darci Dubin, MD, 20 mg at 11/01/20 0950    guaiFENesin ER (MUCINEX) tablet 600 mg, 600 mg, Oral, Q12H, Darci Dubin, MD, 600 mg at 11/01/20 0950    enoxaparin (LOVENOX) injection 40 mg, 40 mg, SubCUTAneous, Q12H, Darci Dubin, MD, 40 mg at 11/01/20 2829    methyl salicylate-menthol (BENGAY) 15-10 % cream, , Topical, TID PRN, Ness Valle PA-C    furosemide (LASIX) injection 40 mg, 40 mg, IntraVENous, DAILY, Omar Lemon MD, 40 mg at 11/01/20 0949    dexamethasone (DECADRON) 4 mg/mL injection 2 mg, 2 mg, IntraVENous, Q12H, Omar Lemon MD, 2 mg at 11/01/20 0939    dextromethorphan (DELSYM) 30 mg/5 mL syrup 30 mg, 30 mg, Oral, Q12H PRN, Omar Lemon MD, 30 mg at 10/30/20 1533   albuterol-ipratropium (DUO-NEB) 2.5 MG-0.5 MG/3 ML, 3 mL, Nebulization, Q6H RT, Kannan Conde MD, 3 mL at 20 0908    piperacillin-tazobactam (ZOSYN) 3.375 g in 0.9% sodium chloride (MBP/ADV) 100 mL MBP, 3.375 g, IntraVENous, Q8H, Kardar, Ahmed Dulcy Cowden, MD, Last Rate: 25 mL/hr at 20 0950, 3.375 g at 20 0950    0.9% sodium chloride infusion 250 mL, 250 mL, IntraVENous, PRN, Jonah Guajardo MD, Last Rate: 15 mL/hr at 20 0156, 250 mL at 20 0156    sodium chloride (OCEAN) 0.65 % nasal squeeze bottle 2 Spray, 2 Spray, Both Nostrils, Q4HWA, Michoacano Alexander MD, 2 [place] at 20 6453    hydrOXYzine pamoate (VISTARIL) capsule 25 mg, 25 mg, Oral, TID PRN, Megan Hansen MD, 25 mg at 10/28/20 1743    melatonin tablet 10 mg, 10 mg, Oral, QHS PRN, Megan Hansen MD, 10 mg at 10/21/20 2231    acetaminophen (TYLENOL) tablet 650 mg, 650 mg, Oral, Q6H PRN, 650 mg at 10/18/20 2205 **OR** acetaminophen (TYLENOL) suppository 650 mg, 650 mg, Rectal, Q6H PRN, Suly Littlejohn MD    sodium chloride (NS) flush 5-40 mL, 5-40 mL, IntraVENous, Q8H, Suly Littlejohn MD, 10 mL at 20 0637    sodium chloride (NS) flush 5-40 mL, 5-40 mL, IntraVENous, PRN, Suly Littlejohn MD, 10 mL at 10/22/20 0938    ondansetron (ZOFRAN ODT) tablet 4 mg, 4 mg, Oral, Q6H PRN **OR** ondansetron (ZOFRAN) injection 4 mg, 4 mg, IntraVENous, Q6H PRN, Suly Littlejohn MD    benzonatate (TESSALON) capsule 200 mg, 200 mg, Oral, Q8H, Michoacano Alexander MD, 200 mg at 20 8793      Objective:     Vital Signs: Telemetry:    normal sinus rhythm Intake/Output:   Visit Vitals  /81   Pulse 92   Temp 97.8 °F (36.6 °C)   Resp 20   Ht 5' 9\" (1.753 m)   Wt 81.8 kg (180 lb 5.4 oz)   SpO2 98%   BMI 26.63 kg/m²       Temp (24hrs), Av.9 °F (36.6 °C), Min:97.6 °F (36.4 °C), Max:98.1 °F (36.7 °C)        O2 Device: Hi flow nasal cannula O2 Flow Rate (L/min): 40 l/min         Body mass index is 26.63 kg/m². Wt Readings from Last 4 Encounters:   10/21/20 81.8 kg (180 lb 5.4 oz)          Intake/Output Summary (Last 24 hours) at 11/1/2020 1052  Last data filed at 11/1/2020 0646  Gross per 24 hour   Intake    Output 2700 ml   Net -2700 ml       Last shift:      No intake/output data recorded. Last 3 shifts: 10/30 1901 - 11/01 0700  In: -   Out: 3600 [Urine:3600]   Ventilator Settings:      Mode Rate TV Press PEEP FiO2 PIP Min. Vent               60 %            Physical Exam:    General:  male; less distress chronic cough nonproductive  HEENT: NCAT, oral mucosa clear  Eyes: anicteric; conjunctiva clear extraocular movements intact   neck: no node neck veins visible  Chest: no deformity,   Cardiac: Regular rate and rhythm no murmurs trace ankle edema  Lungs: Clear anteriorly laterally still has rales posteriorly improved  Abd: Soft nontender normal bowel sounds no organomegaly  Ext: Mild edema; no joint swelling; No clubbing  :clear urine  Neuro: Awake alert speech is clear moves all 4 extremities well  Psych- no agitation, oriented to person;   Skin: warm,, no cyanosis; very mildly diaphoretic  Pulses: Pulses intact  Capillary: Normal capillary refill      Labs:    No results for input(s): WBC, HGB, PLT, INR, APTT, HGBEXT, PLTEXT, INREXT, HGBEXT, PLTEXT, INREXT in the last 72 hours.     No lab exists for component: FIB, DDMER  Recent Labs     10/29/20  1948      K 3.8      CO2 26   *   BUN 31*   CREA 0.95   CA 8.4*     10/24 6 L nasal oxygen with oxygen saturation 90%    10/14 COVID-19 test negative  10/17 COVID-19 test indeterminate  Ferritin 1510, previous 2180  , previous 615, 754  Procalcitonin 0.14  CRP 0.87, previous 1.39, 3.0, 12.4  Lab Results   Component Value Date/Time    Culture result: No growth 7 days 10/20/2020 11:30 AM    Culture result: Heavy Staphylococcus aureus 10/20/2020 08:15 AM    Culture result: Light Klebsiella pneumoniae 10/20/2020 08:15 AM    Culture result: Heavy  Normal respiratory smita   10/20/2020 08:15 AM      Lab Results   Component Value Date/Time     10/15/2020 11:47 PM       Imaging:  I have personally reviewed the patients radiographs and have reviewed the reports:    CXR Results  (Last 48 hours)  10/20 chest x-ray with diffuse patchy infiltrates may be slightly less dense  10/17 chest x-ray unchanged diffuse bilateral infiltrate               10/15/20 1927  XR CHEST SNGL V Final result    Narrative:  1       Mild atelectasis in the bases with upper lungs clear. No effusion or   pneumothorax. Normal heart and no congestion  Disagree to me there is bilateral infiltrate           Results from Hospital Encounter encounter on 10/15/20   XR CHEST PORT    Narrative Portable upright radiograph chest 7:34 AM compared to October 26, 2020. INDICATION: Cough, pneumonia. Patient has taken a shallow depth of inspiration. Heart size is stable. Bilateral airspace disease/edema shows worsening compared to prior study. No  pneumothorax. Cannot exclude small effusions. No acute bony changes. Impression IMPRESSION: Shallower depth of inspiration with worsening bilateral airspace  disease/edema. XR CHEST PORT    Narrative Chest, frontal view, 10/26/2020    History: Cough. Pneumonia. Comparison: Including chest 10/25/2020. Findings: The cardiac silhouette is stable. The lungs remain underexpanded. Airspace opacities in the lungs are not significantly changed as compared to the  study performed one day prior. Hydrostatic edema is not excluded. No pleural  effusion or pneumothorax is identified. The osseous structures are stable. Impression Impression: No significant interval change. XR CHEST PORT    Narrative Comparison is with the prior exam dated 10/23/2020.       Impression Findings/impression: A portable upright view of the chest demonstrates  persistent cardiomegaly and patchy interstitial and mild airspace disease which  has improved slightly on the right. No pneumothorax or effusion is identified. The osseous structures are unchanged. Results from [facility] encounter on 10/15/20   CTA CHEST W OR W WO CONT    Narrative CT dose reduction was achieved through use of a standardized protocol tailored  for this examination and automatic exposure control for dose modulation. Contrast study maximum intensity projections shows pronounced groundglass  opacification, of variable density, mainly in the dependent portions of each  lung, apex to base. Air bronchograms are preserved in the densest portions. Mild  geographic groundglass opacification of lower density in the nondependent  portions. Central airways are open    Pulmonary arteries are well-opacified and show no filling defect or distortion. Aorta shows normal dimensions, without dissection. Tiny middle mediastinal lymph  nodes. Cardiac finding. No pleural pericardial effusion. No significant upper  abdominal or chest wall finding      Impression IMPRESSION: Widespread patchy bilateral pneumonitis       Clinical picture consistent with COVID-19 infection with pneumonitis with diffuse patchy pulmonary infiltrate elevation in ferritin LDH and CRP nonproductive cough and GI symptoms and yet initial COVID-19 test negative repeat testing indeterminate              Time spent 30 min       Thank you for allowing us to participate in the care of this patient.   We will follow along with you  Time spent 30 min  Lilian Banda MD

## 2020-11-01 NOTE — ROUTINE PROCESS
Bedside shift change report given to Michelle MCCULLOUGH (oncoming nurse) by Nathaly Novoa (offgoing nurse). Report included the following information SBAR, Kardex, Intake/Output, MAR and Recent Results.

## 2020-11-01 NOTE — PROGRESS NOTES
Problem: Mobility Impaired (Adult and Pediatric)  Goal: *Acute Goals and Plan of Care (Insert Text)  Description: Bed mobility with ind within 7 days   Transfers with Ind within 7 days   Amb approx 15-30 ft with LRAD and SBA with normal O2 Sats within 7 days   Increase dynamic standing tolerance to 4 min with normal O2 Sats within 7 days   Ind with HEP for LE within 7 days       Outcome: Progressing Towards Goal     Problem: Patient Education: Go to Patient Education Activity  Goal: Patient/Family Education  Description: To be educate on proper breathing exercises ; use of spirometer as needed  Outcome: Progressing Towards Goal     PHYSICAL THERAPY REEVALUATION  Patient: Ekaterina Emmanuel (56 y.o. male)  Date: 11/1/2020  Primary Diagnosis: Pneumonia [J18.9]  Acute respiratory failure ([facility] [J96.00]        Precautions: respiratory status, O2 Sat  Other (comment)      ASSESSMENT  Based on the objective data described below, the patient presents with Pt presents with decr ROM, strength, bed mobility, transfers, gait, balance, activity tolerance, safety awareness, and functional mobility. Pt agreeable to PT re-assessment, Pt was evaluated by PT on 10/25/20 and has participated in 2 treatment sessions since then. Pt is progressing well towards all goals. Pt received semi supine on 50% HiFlow and O2 Sats 81-98% throughout session. Pt presents with incr anxiety and reports stressful environment with fast paced medical sessions. PT spoke to Pt daughter on the phone for 10 minutes updating her on Pt mobility status and requested for therapy to update with progress, number is on Pt board. Pt completed bed mobility with SBA, transfers with CGA/Min A, and unable to amb this session due to incr fatigue and SOB. Pt has fair activity tolerance and highly motivated to work with therapy. Pt would benefit from continued skilled PT services to address above deficits and progress towards goals.  PT d/c recc IRF vs SNF when medically appropriate pending respiratory status. Other factors to consider for discharge: O2 sat, fatigue, activity tolerance, acute medical state     Patient will benefit from skilled therapy intervention to address the above noted impairments. PLAN :  Recommendations and Planned Interventions: bed mobility training, transfer training, gait training, therapeutic exercises, neuromuscular re-education, patient and family training/education, and therapeutic activities      Frequency/Duration: Patient will be followed by physical therapy:  5 times a week to address goals. Recommendation for discharge: (in order for the patient to meet his/her long term goals)  PT d/c recc IRF vs SNF when medically appropriate pending respiratory status    This discharge recommendation:  Has been made in collaboration with the attending provider and/or case management    Equipment recommendations for successful discharge (if) home: walker: rolling         SUBJECTIVE:   Patient stated I can't breathe with everyone coming and rushing around, it makes my head dizzy. Please let me go slow.     OBJECTIVE DATA SUMMARY:   HISTORY:    History reviewed. No pertinent past medical history. History reviewed. No pertinent surgical history. Hospital course since last seen and reason for reevaluation: Pt was evaluated by PT on 10/25/20 and has participated in 2 treatment sessions since then. Pt is progressing well towards all goals.     Personal factors and/or comorbidities impacting plan of care: complicated medical history    Home Situation  Home Environment: Private residence  # Steps to Enter: 0  One/Two Story Residence: Other (Comment)(Pt reports elevator available)  Living Alone: No  Support Systems: Spouse/Significant Other/Partner  Patient Expects to be Discharged to[de-identified] Private residence  Current DME Used/Available at Home: None    EXAMINATION/PRESENTATION/DECISION MAKING:   Critical Behavior:  Neurologic State: Alert, Appropriate for age  Orientation Level: Oriented X4  Cognition: Appropriate safety awareness, Appropriate for age attention/concentration, Appropriate decision making, Follows commands     Hearing: Auditory  Auditory Impairment: None    Range Of Motion:   WFL      Strength:     WFL, grossly decr     Functional Mobility:  Bed Mobility:  Rolling: Supervision  Supine to Sit: Supervision  Sit to Supine: Supervision  Scooting: Supervision  Transfers:  Sit to Stand: Contact guard assistance;Minimum assistance  Stand to Sit: Contact guard assistance;Minimum assistance  Stand Pivot Transfers: Contact guard assistance            Pt completed bed mobility with SBA, transfers with CGA/Min A, and unable to amb this session due to incr fatigue and SOB. Pt req cues for hand placement on stable surface, activity pacing, breathing technique, assist with line management, and safety during dynamic activities. Pt completed 4 STS, tolerating approx 2 min in static standing before requesting to sit. Pt transferred to [facility] with CGA and completed self pericare following BM. Pt reported incr SOB and cough this session. Pt c/o mod fatigue at end of treatment, recovering in supine. Pt has fair activity tolerance and highly motivated to work with therapy. Pt on 50% HiFlow and O2 Sats 81-98% throughout session. Balance:   Sitting: Intact; With support  Standing: Intact; With support  Ambulation/Gait Training:      Does not occur   Stairs:              Pain Ratin/10    Activity Tolerance:   Fair, desaturates with exertion and requires oxygen, requires frequent rest breaks, and observed SOB with activity  Please refer to the flowsheet for vital signs taken during this treatment.     After treatment patient left in no apparent distress:   Supine in bed, Call bell within reach, and Side rails x 3    COMMUNICATION/EDUCATION:   The patients plan of care was discussed with: Registered nurse and Respiratory therapist.     Fall prevention education was provided and the patient/caregiver indicated understanding. and Patient/family have participated as able in goal setting and plan of care.     Thank you for this referral.  Teo Henry, PT   Time Calculation: 40 mins

## 2020-11-01 NOTE — ROUTINE PROCESS
Bedside shift change report given to Michelle MCCULLOUGH (oncoming nurse) by Fausto Headley (offgoing nurse). Report included the following information SBAR, Kardex, Intake/Output, MAR and Recent Results.

## 2020-11-02 ENCOUNTER — APPOINTMENT (OUTPATIENT)
Dept: GENERAL RADIOLOGY | Age: 60
DRG: 177 | End: 2020-11-02
Attending: INTERNAL MEDICINE
Payer: COMMERCIAL

## 2020-11-02 LAB
ARTERIAL PATENCY WRIST A: POSITIVE
BASE EXCESS BLDA CALC-SCNC: 4.5 MMOL/L (ref 0–2)
BDY SITE: ABNORMAL
FIO2 ON VENT: 50 %
GAS FLOW.O2 O2 DELIVERY SYS: 35 L/MIN
GLUCOSE BLD STRIP.AUTO-MCNC: 162 MG/DL (ref 65–100)
HCO3 BLDA-SCNC: 28 MMOL/L (ref 22–26)
PCO2 BLDA: 42 MMHG (ref 35–45)
PERFORMED BY, TECHID: ABNORMAL
PH BLDA: 7.45 [PH] (ref 7.35–7.45)
PO2 BLDA: 81 MMHG (ref 75–100)
SAO2 % BLD: 97 %
SAO2% DEVICE SAO2% SENSOR NAME: ABNORMAL
SPECIMEN SITE: ABNORMAL

## 2020-11-02 PROCEDURE — 97530 THERAPEUTIC ACTIVITIES: CPT

## 2020-11-02 PROCEDURE — 74011250636 HC RX REV CODE- 250/636: Performed by: HOSPITALIST

## 2020-11-02 PROCEDURE — 82803 BLOOD GASES ANY COMBINATION: CPT

## 2020-11-02 PROCEDURE — 74011250636 HC RX REV CODE- 250/636: Performed by: INTERNAL MEDICINE

## 2020-11-02 PROCEDURE — 82962 GLUCOSE BLOOD TEST: CPT

## 2020-11-02 PROCEDURE — 94640 AIRWAY INHALATION TREATMENT: CPT

## 2020-11-02 PROCEDURE — 77010033711 HC HIGH FLOW OXYGEN

## 2020-11-02 PROCEDURE — 74011250637 HC RX REV CODE- 250/637: Performed by: INTERNAL MEDICINE

## 2020-11-02 PROCEDURE — 74011000250 HC RX REV CODE- 250: Performed by: INTERNAL MEDICINE

## 2020-11-02 PROCEDURE — 74011000258 HC RX REV CODE- 258: Performed by: INTERNAL MEDICINE

## 2020-11-02 PROCEDURE — 71045 X-RAY EXAM CHEST 1 VIEW: CPT

## 2020-11-02 PROCEDURE — 74011250637 HC RX REV CODE- 250/637: Performed by: HOSPITALIST

## 2020-11-02 PROCEDURE — 99232 SBSQ HOSP IP/OBS MODERATE 35: CPT | Performed by: INTERNAL MEDICINE

## 2020-11-02 PROCEDURE — 65270000029 HC RM PRIVATE

## 2020-11-02 PROCEDURE — 94762 N-INVAS EAR/PLS OXIMTRY CONT: CPT

## 2020-11-02 RX ORDER — GUAIFENESIN 600 MG/1
1200 TABLET, EXTENDED RELEASE ORAL EVERY 12 HOURS
Status: DISCONTINUED | OUTPATIENT
Start: 2020-11-02 | End: 2020-11-05

## 2020-11-02 RX ORDER — DEXAMETHASONE 4 MG/1
2 TABLET ORAL EVERY 12 HOURS
Status: DISCONTINUED | OUTPATIENT
Start: 2020-11-02 | End: 2020-11-05

## 2020-11-02 RX ADMIN — SALINE NASAL SPRAY 2 SPRAY: 1.5 SOLUTION NASAL at 14:13

## 2020-11-02 RX ADMIN — DEXAMETHASONE SODIUM PHOSPHATE 2 MG: 4 INJECTION, SOLUTION INTRAMUSCULAR; INTRAVENOUS at 08:56

## 2020-11-02 RX ADMIN — MENTHOL, METHYL SALICYLATE: 10; 15 CREAM TOPICAL at 21:35

## 2020-11-02 RX ADMIN — Medication 10 ML: at 14:19

## 2020-11-02 RX ADMIN — SALINE NASAL SPRAY 2 SPRAY: 1.5 SOLUTION NASAL at 21:32

## 2020-11-02 RX ADMIN — ENOXAPARIN SODIUM 40 MG: 40 INJECTION SUBCUTANEOUS at 05:30

## 2020-11-02 RX ADMIN — ENOXAPARIN SODIUM 40 MG: 40 INJECTION SUBCUTANEOUS at 17:42

## 2020-11-02 RX ADMIN — IPRATROPIUM BROMIDE AND ALBUTEROL SULFATE 3 ML: .5; 3 SOLUTION RESPIRATORY (INHALATION) at 22:04

## 2020-11-02 RX ADMIN — FAMOTIDINE 20 MG: 20 TABLET ORAL at 08:56

## 2020-11-02 RX ADMIN — MENTHOL, METHYL SALICYLATE: 10; 15 CREAM TOPICAL at 01:59

## 2020-11-02 RX ADMIN — GUAIFENESIN 600 MG: 600 TABLET, EXTENDED RELEASE ORAL at 08:56

## 2020-11-02 RX ADMIN — BENZONATATE 200 MG: 100 CAPSULE ORAL at 21:31

## 2020-11-02 RX ADMIN — FAMOTIDINE 20 MG: 20 TABLET ORAL at 21:31

## 2020-11-02 RX ADMIN — PIPERACILLIN SODIUM AND TAZOBACTAM SODIUM 3.38 G: 3; .375 INJECTION, POWDER, LYOPHILIZED, FOR SOLUTION INTRAVENOUS at 01:50

## 2020-11-02 RX ADMIN — BENZONATATE 200 MG: 100 CAPSULE ORAL at 05:30

## 2020-11-02 RX ADMIN — GUAIFENESIN 1200 MG: 600 TABLET, EXTENDED RELEASE ORAL at 21:31

## 2020-11-02 RX ADMIN — SALINE NASAL SPRAY 2 SPRAY: 1.5 SOLUTION NASAL at 10:00

## 2020-11-02 RX ADMIN — PIPERACILLIN SODIUM AND TAZOBACTAM SODIUM 3.38 G: 3; .375 INJECTION, POWDER, LYOPHILIZED, FOR SOLUTION INTRAVENOUS at 10:00

## 2020-11-02 RX ADMIN — IPRATROPIUM BROMIDE AND ALBUTEROL SULFATE 3 ML: .5; 3 SOLUTION RESPIRATORY (INHALATION) at 07:58

## 2020-11-02 RX ADMIN — Medication 10 ML: at 05:30

## 2020-11-02 RX ADMIN — FUROSEMIDE 40 MG: 10 INJECTION, SOLUTION INTRAMUSCULAR; INTRAVENOUS at 08:55

## 2020-11-02 RX ADMIN — IPRATROPIUM BROMIDE AND ALBUTEROL SULFATE 3 ML: .5; 3 SOLUTION RESPIRATORY (INHALATION) at 01:23

## 2020-11-02 RX ADMIN — BENZONATATE 200 MG: 100 CAPSULE ORAL at 14:12

## 2020-11-02 RX ADMIN — DEXAMETHASONE 2 MG: 4 TABLET ORAL at 14:12

## 2020-11-02 RX ADMIN — Medication 10 ML: at 21:34

## 2020-11-02 RX ADMIN — IPRATROPIUM BROMIDE AND ALBUTEROL SULFATE 3 ML: .5; 3 SOLUTION RESPIRATORY (INHALATION) at 13:01

## 2020-11-02 NOTE — ROUTINE PROCESS
Bedside shift change report given to Felipe Villalpando RN (oncoming nurse) by Madalyn Aleman RN (offgoing nurse). Report included the following information SBAR.

## 2020-11-02 NOTE — PROGRESS NOTES
Progress Note  Date:2020       Room:[facility]  Patient Name:Ekaterina Siddiqui     YOB: 1960     Age:60 y.o. Subjective    Subjective   Patient followed for presumptive Covid-19 pneumoniitis with indeterminate results, now status post Remdesivir and Actemra, still on Decadron. CXR today unchanged. He is afebrile with leukocytosis attributed to steroids. Blood cultures negative and sputum culture grew MSSA and Klebsiella. He is currently on IV Zosyn. He remains on high flow nasal O2. Subjectively improved. Objective         Vitals Last 24 Hours:  TEMPERATURE:  Temp  Av °F (36.7 °C)  Min: 97.8 °F (36.6 °C)  Max: 98.3 °F (36.8 °C)  RESPIRATIONS RANGE: Resp  Av.4  Min: 10  Max: 22  PULSE OXIMETRY RANGE: SpO2  Av.6 %  Min: 93 %  Max: 98 %  PULSE RANGE: Pulse  Av.8  Min: 65  Max: 101  BLOOD PRESSURE RANGE: Systolic (08LIW), ORL:632 , Min:112 , KBR:647   ; Diastolic (20XBA), SCL:68, Min:75, Max:102    I/O (24Hr): Intake/Output Summary (Last 24 hours) at 2020 1815  Last data filed at 2020 1749  Gross per 24 hour   Intake 1140 ml   Output 93233 ml   Net -31538 ml     Objective:  Vital signs: (most recent): Blood pressure (!) 152/102, pulse 85, temperature 98.3 °F (36.8 °C), resp. rate 20, height 5' 9\" (1.753 m), weight 180 lb 5.4 oz (81.8 kg), SpO2 97 %. General: Moderately ill-appearing male, NAD  HEENT: eyes open, high flow nasal O2  Neck: supple  Lungs: clear bilaterally  Heart: RRR  : Granados catheter with moderate urine sediment    Labs/Imaging/Diagnostics    Labs:  CBC:  No results for input(s): WBC, RBC, HGB, HCT, MCV, RDW, PLT, HGBEXT, HCTEXT, PLTEXT, HGBEXT, HCTEXT, PLTEXT in the last 72 hours. CHEMISTRIES:  No results for input(s): NA, K, CL, CO2, BUN, CA, PHOS, MG in the last 72 hours. No lab exists for component: CREATININE, GLUCOSEPT/INR:  No results for input(s): INR, INREXT, INREXT in the last 72 hours.     No lab exists for component: PROTIME  APTT:  No results for input(s): APTT in the last 72 hours. LIVER PROFILE:  No results for input(s): AST, ALT in the last 72 hours. No lab exists for component: Maya Axe, ALKPHOS  Lab Results   Component Value Date/Time    ALT (SGPT) 42 10/25/2020 08:00 AM    AST (SGOT) 19 10/25/2020 08:00 AM    Alk. phosphatase 97 10/25/2020 08:00 AM    Bilirubin, total 1.2 (H) 10/25/2020 08:00 AM     WBC 11,100   (403 (405 (689 (452 (565  Ferritin 1,519 (1,084 (1,187  CRP <0.29 (0.43 (12.40    Sputum culture (10/20) MSSA and Klebsiella pneumoniae  Blood cultures (10/20) No growth FINAL    Imaging Last 24 Hours:  Xr Chest Port    Result Date: 11/2/2020  Chest, frontal view, 11/2/2020 History: Covid-19. Comparison: Including chest 10/27/2020. Findings: The cardiac silhouette is stable. The lungs remain underexpanded. Diffuse opacities in the lungs are not significantly changed. Hydrostatic edema is possible. No pleural effusion is definitively noted on this single view. No pneumothorax is identified. The osseous structures are stable. Impression: No significant interval change. Assessment//Plan   Active Problems:    Acute respiratory failure due to COVID-19 [facility]) (10/15/2020)      Pneumonia (10/15/2020)      Acute respiratory failure ([facility] (10/15/2020)      Assessment & Plan  1. Probable Covid-19 pneumonitis, with indeterminate result, status post Remdesivir, Decadron, Azithromycin, Actemra, unchanged. 2. Acute respiratory failure, on high flow nasal O2  3. Elevated CRP, LDH and ferritin, secondary to #1, decreasing. 4. HCAP with purulent sputum, secondary to MSSA and Klebsiella pneumoniae, Day #14 IV Zosyn.     1. Discontinue Zosyn after today  2.  Will continue to follow now       Electronically signed by Liyah Hilliard MD on 11/2/2020 at 1:43 PM

## 2020-11-02 NOTE — PROGRESS NOTES
Hospitalist Progress Note               Daily Progress Note: 11/2/2020      Subjective: The patient is seen for follow  up.     56 y.o. male with no significant medical problems presents to the emergency room complaining of shortness of breath. Patient had a urinary retention which was resolved with Granados catheter placement. Patient continues to be on high flow oxygen 35lpm @ FiO2 of 50%. Patient desaturates when on try to do PT. So increase FiO2 to 100% when doing PT. Patient was able to get up and sit in chair.     Medications reviewed  Current Facility-Administered Medications   Medication Dose Route Frequency    guaiFENesin ER (MUCINEX) tablet 1,200 mg  1,200 mg Oral Q12H    dexAMETHasone (DECADRON) tablet 2 mg  2 mg Oral Q12H    famotidine (PEPCID) tablet 20 mg  20 mg Oral BID    enoxaparin (LOVENOX) injection 40 mg  40 mg SubCUTAneous [id number]    methyl salicylate-menthol (BENGAY) 15-10 % cream   Topical TID PRN    furosemide (LASIX) injection 40 mg  40 mg IntraVENous DAILY    dextromethorphan (DELSYM) 30 mg/5 mL syrup 30 mg  30 mg Oral Q12H PRN    albuterol-ipratropium (DUO-NEB) 2.5 MG-0.5 MG/3 ML  3 mL Nebulization Q6H RT    0.9% sodium chloride infusion 250 mL  250 mL IntraVENous PRN    sodium chloride (OCEAN) 0.65 % nasal squeeze bottle 2 Spray  2 [place] Both Nostrils Q4HWA    hydrOXYzine pamoate (VISTARIL) capsule 25 mg  25 mg Oral TID PRN    melatonin tablet 10 mg  10 mg Oral QHS PRN    acetaminophen (TYLENOL) tablet 650 mg  650 mg Oral Q6H PRN    Or    acetaminophen (TYLENOL) suppository 650 mg  650 mg Rectal Q6H PRN    sodium chloride (NS) flush 5-40 mL  5-40 mL IntraVENous Q8H    sodium chloride (NS) flush 5-40 mL  5-40 mL IntraVENous PRN    ondansetron (ZOFRAN ODT) tablet 4 mg  4 mg Oral Q6H PRN    Or    ondansetron (ZOFRAN) injection 4 mg  4 mg IntraVENous Q6H PRN    benzonatate (TESSALON) capsule 200 mg  200 mg Oral Q8H       Review of Systems:   A comprehensive review of systems was negative except for that written in the HPI. Objective:   Physical Exam:     Visit Vitals  /75   Pulse 86   Temp 97.9 °F (36.6 °C)   Resp 20   Ht 5' 9\" (1.753 m)   Wt 81.8 kg (180 lb 5.4 oz)   SpO2 97%   BMI 26.63 kg/m²    O2 Flow Rate (L/min): 35 l/min O2 Device: Hi flow nasal cannula    Temp (24hrs), Av.9 °F (36.6 °C), Min:97.8 °F (36.6 °C), Max:98 °F (36.7 °C)    701 - 1900  In: 660 [P.O.:660]  Out: 08745 [AKGZO:12514]   10/31 1901 -  07  In: -   Out: 3000 [Urine:3000]    PHYSICAL EXAM:  General: Alert and awake. Skin: Extremities and face reveal no rashes. HEENT: Sclerae anicteric. Extra-occular muscles are intact. No oral ulcers. No ENT discharge. The neck is supple. Cardiovascular: Regular rate and rhythm. No murmurs, gallops, or rubs. PMI nondisplaced. Carotids without bruits. Respiratory: Comfortable breathing with no accessory muscle use. Clear breath sounds with no wheezes, rales, or rhonchi. GI: Abdomen nondistended, soft, and nontender. Normal active bowel sounds. No enlargement of the liver or spleen. No masses palpable. Rectal: Deferred   Musculoskeletal: No pitting edema of the lower legs. Extremities have good range of motion. No costovertebral tenderness. Neurological: Gross memory appears intact. Patient is alert and oriented. Power 5/5, Cranial nerves II-XII intact  Psychiatric: Mood appears appropriate with judgement intact. Data Review:       Recent Days:  No results for input(s): WBC, HGB, HCT, PLT, HGBEXT, HCTEXT, PLTEXT, HGBEXT, HCTEXT, PLTEXT in the last 72 hours. No results for input(s): NA, K, CL, CO2, GLU, BUN, CREA, CA, MG, PHOS, ALB, TBIL, TBILI, ALT, INR, INREXT, INREXT in the last 72 hours.     No lab exists for component: SGOT  Recent Labs     20  0400   PH 7.45   PCO2 42   PO2 81   HCO3 28*   FIO2 50       24 Hour Results:  Recent Results (from the past 24 hour(s))   BLOOD GAS, ARTERIAL    Collection Time: 20 4:00 AM   Result Value Ref Range    pH 7.45 7.35 - 7.45      PCO2 42 35 - 45 mmHg    PO2 81 75 - 100 mmHg    O2 SAT 97 >95 %    BICARBONATE 28 (H) 22 - 26 mmol/L    BASE EXCESS 4.5 (H) 0 - 2 mmol/L    O2 METHOD High Flow Nasal Cannula      O2 FLOW RATE 35 L/min    FIO2 50 %    Sample source Arterial      SITE Right Radial      KAT'S TEST Positive         XR CHEST PORT   Final Result   Impression: No significant interval change. XR CHEST PORT   Final Result   IMPRESSION: Shallower depth of inspiration with worsening bilateral airspace   disease/edema. XR CHEST PORT   Final Result   Impression: No significant interval change. XR CHEST PORT   Final Result   Findings/impression: A portable upright view of the chest demonstrates   persistent cardiomegaly and patchy interstitial and mild airspace disease which   has improved slightly on the right. No pneumothorax or effusion is identified. The osseous structures are unchanged. XR CHEST PORT   Final Result      XR CHEST PORT   Final Result   IMPRESSION: Improving bilateral airspace disease. XR CHEST PORT   Final Result      XR CHEST PORT   Final Result      XR CHEST PORT   Final Result      XR CHEST PORT   Final Result      CTA CHEST W OR W WO CONT   Final Result   IMPRESSION: Widespread patchy bilateral pneumonitis      XR CHEST SNGL V   Final Result             Assessment/     Problem List:  Hospital Problems  Date Reviewed: 10/15/2020          Codes Class Noted POA    Acute respiratory failure due to COVID-19 [facility]) ICD-10-CM: U07.1, J96.00  ICD-9-CM: 518.81, 079.89  10/15/2020 Yes        Pneumonia ICD-10-CM: J18.9  ICD-9-CM: 523  10/15/2020 Yes        Acute respiratory failure ([facility] ICD-10-CM: J96.00  ICD-9-CM: 518.81  10/15/2020 Unknown                     Plan:    (1) Acute hypoxic respiratory failure : on high flow nasal canula. keep saturation > 92. (2) HCAP pneumonia and pneumonitis: MSSA and klebsiella on sputum.  Completed 14 days of  Zosyn    (3) Strong suspicion of COVID 19 pneumonitis: status post Remdesivir, Decadron, Azithromycin, Actemra,       Dehydrationcurrently resolved    PpxLovenox  Full code, patient surrogate decision-maker is his wife, updated patients daughter as well as Primary Care Physician  Dispositioncontinued inpatient treatment, pending clinical improvement, PT/OT    Care Plan discussed with: Patient/Family and Nurse    Called Patients daughter Charlie Mahajan Ph- [phone]    Josseline Mendieta MD

## 2020-11-02 NOTE — PROGRESS NOTES
Pulmonary Note      Name: Ekaterina Kruse MRN: [id number]   : 1960 Hospital: [address] [place] [address]   Date: 2020  Admission date: 10/15/2020 Hospital Day:        Subjective/Interval History:   Patient seen in the ICU on high flow nasal oxygen at 70% with oxygen saturation 99. He gives a history of gradual worsening illness over the last week to 10 days with nonproductive cough fever generalized malaise nausea denies any vomiting although the ER note states that he had some vomiting. He seems awake and alert. Does not have any Covid contacts that he is aware of. States that his sense of taste and smell have been normal but terribly anorexic has not eaten anything in 3 or 4 days. 10/17 still feels bad Cough shortness of breath but no nausea today   10/18 still complains of severe cough nonproductive although in general feels a little better  10/20 remains 100% high flow nasal oxygen. Continues to complain of it irritating him. Requested nasal saline last evening but it has not been started will reorder it. We will let him try nonrebreather mask with nasal high flow for meals  10/26 high flow nasal oxygen back up to 70% unable to safely switch to low flow nasal oxygen at this point. He states he feels better sense of taste is normal with a good appetite  10/28 patient had a rapid response last night now he is on 100% high flow nasal cannula   remains on nasal high flow down to 50% today but with getting out of bed to work with physical therapy he immediately desaturates into the 70s very slow to recover  Patient Active Problem List   Diagnosis Code    [facility] U07.1, J96.00    Pneumonia J18.9    Acute respiratory failure ([facility] J96.00       IMPRESSION:   1. Acute hypoxic respiratory failure continues to require high flow nasal   2. History of asthma  3.  Community-acquired versus aspiration with diffuse pulmonary infiltrates questionable atypical pneumonia possible COVID-19 repeat testing Negative  4. Nausea probable due to COVID-19 infection  5. Unremitting cough secondary to pneumonia or reflux  Body mass index is 26.63 kg/m². RECOMMENDATIONS/PLAN:   1. Covid pneumonitis  has completed 2 doses Actemra and 5 days of Remdesivir will decrease Decadron to 2 mg oral twice daily repeat CRP in a.m. 2. He is still hypoxic we will continue with a high flow nasal cannula and titrate with his activity  3. X-ray showed bilateral infiltrate improved on 11/2 x-ray will continue Lasix  4. CRP less than 0.29 continue to decrease Decadron  5. Sputum culture with MSSA and Klebsiella both should be covered by Zosyn on day 14 will discontinue at this point  6. Continue nebulizer treatment  7. Repeat COVID-19 test negative         Subjective/Initial History:   I have reviewed the flowsheet and previous days notes. Seen earlier today on rounds. I was asked by Gigi Hawk MD to see An Adrián Blanchard a 56 y.o.  male  in consultation for a chief complaint of acute hypoxic respiratory failure cough and community-acquired pneumonia          Pt now in CCU. Patient PCP: UNKNOWN  PMH:  has no past medical history on file. PSH:   has no past surgical history on file. FHX: family history includes No Known Problems in his father and mother. SHX:  reports that he has never smoked. He has never used smokeless tobacco. He reports that he does not drink alcohol or use drugs.     Systemic review:  General no chronic illness but over the last week he has had fever generalized malaise weight loss no appetite  Eyes no double vision or momentary blindness  ENT no sinus congestion drainage or facial pain  Skeletal has diffuse aches and pains no swollen tender joints  Endocrinologic no polyuria polydipsia  Neurologic no seizures or syncope  Gastrointestinal normally he has no problems but over this last week he has had nausea anorexia marked weight loss has not had any alteration of his sense of taste or smell  Genitourinary no discomfort or drainage on urination  Cardiovascular no history of heart disease chest pain has felt sweaty but no history of ankle edema.   Respiratory as mentioned above    No Known Allergies   MEDS:   Current Facility-Administered Medications   Medication    famotidine (PEPCID) tablet 20 mg    guaiFENesin ER (MUCINEX) tablet 600 mg    enoxaparin (LOVENOX) injection 40 mg    methyl salicylate-menthol (BENGAY) 15-10 % cream    furosemide (LASIX) injection 40 mg    dexamethasone (DECADRON) 4 mg/mL injection 2 mg    dextromethorphan (DELSYM) 30 mg/5 mL syrup 30 mg    albuterol-ipratropium (DUO-NEB) 2.5 MG-0.5 MG/3 ML    piperacillin-tazobactam (ZOSYN) 3.375 g in 0.9% sodium chloride (MBP/ADV) 100 mL MBP    0.9% sodium chloride infusion 250 mL    sodium chloride (OCEAN) 0.65 % nasal squeeze bottle 2 Spray    hydrOXYzine pamoate (VISTARIL) capsule 25 mg    melatonin tablet 10 mg    acetaminophen (TYLENOL) tablet 650 mg    Or    acetaminophen (TYLENOL) suppository 650 mg    sodium chloride (NS) flush 5-40 mL    sodium chloride (NS) flush 5-40 mL    ondansetron (ZOFRAN ODT) tablet 4 mg    Or    ondansetron (ZOFRAN) injection 4 mg    benzonatate (TESSALON) capsule 200 mg        Current Facility-Administered Medications:     famotidine (PEPCID) tablet 20 mg, 20 mg, Oral, BID, Stacia Olivo MD, 20 mg at 11/02/20 0856    guaiFENesin ER (MUCINEX) tablet 600 mg, 600 mg, Oral, Q12H, Stacia Olivo MD, 600 mg at 11/02/20 0856    enoxaparin (LOVENOX) injection 40 mg, 40 mg, SubCUTAneous, Q12H, Stacia Olivo MD, 40 mg at 11/02/20 1977    methyl salicylate-menthol (BENGAY) 15-10 % cream, , Topical, TID PRN, Ness Valle PA-C    furosemide (LASIX) injection 40 mg, 40 mg, IntraVENous, DAILY, Phyllis Harrington MD, 40 mg at 11/02/20 0855    dexamethasone (DECADRON) 4 mg/mL injection 2 mg, 2 mg, IntraVENous, Q12H, Phyllis Harrington MD, 2 mg at 20 0856    dextromethorphan (DELSYM) 30 mg/5 mL syrup 30 mg, 30 mg, Oral, Q12H PRN, Melissa Garvey MD, 30 mg at 20    albuterol-ipratropium (DUO-NEB) 2.5 MG-0.5 MG/3 ML, 3 mL, Nebulization, Q6H RT, Kannan Conde MD, 3 mL at 20 0758    piperacillin-tazobactam (ZOSYN) 3.375 g in 0.9% sodium chloride (MBP/ADV) 100 mL MBP, 3.375 g, IntraVENous, Q8H, Sandy Gonzales MD, Last Rate: 25 mL/hr at 20 1000, 3.375 g at 20 1000    0.9% sodium chloride infusion 250 mL, 250 mL, IntraVENous, PRN, Leonardo Richter MD, Last Rate: 15 mL/hr at 20 0156, 250 mL at 20 0156    sodium chloride (OCEAN) 0.65 % nasal squeeze bottle 2 Spray, 2 Spray, Both Nostrils, Q4HWA, Melissa Garvey MD, 2 Spray at 20 1000    hydrOXYzine pamoate (VISTARIL) capsule 25 mg, 25 mg, Oral, TID PRN, Renata Velazquez MD, 25 mg at 20    melatonin tablet 10 mg, 10 mg, Oral, QHS PRN, Renata Velazquez MD, 10 mg at 20    acetaminophen (TYLENOL) tablet 650 mg, 650 mg, Oral, Q6H PRN, 650 mg at 10/18/20 2205 **OR** acetaminophen (TYLENOL) suppository 650 mg, 650 mg, Rectal, Q6H PRN, Thiago Wilkerson MD    sodium chloride (NS) flush 5-40 mL, 5-40 mL, IntraVENous, Q8H, Thiago Wilkerson MD, 10 mL at 20 0530    sodium chloride (NS) flush 5-40 mL, 5-40 mL, IntraVENous, PRN, Thiago Wilkerson MD, 10 mL at 10/22/20 0938    ondansetron (ZOFRAN ODT) tablet 4 mg, 4 mg, Oral, Q6H PRN **OR** ondansetron (ZOFRAN) injection 4 mg, 4 mg, IntraVENous, Q6H PRN, Thiago Wilkerson MD    benzonatate (TESSALON) capsule 200 mg, 200 mg, Oral, Q8H, Melissa Garvey MD, 200 mg at 20 0530      Objective:     Vital Signs: Telemetry:    normal sinus rhythm Intake/Output:   Visit Vitals  BP (!) 147/87   Pulse 65   Temp 97.8 °F (36.6 °C)   Resp 20   Ht 5' 9\" (1.753 m)   Wt 81.8 kg (180 lb 5.4 oz)   SpO2 98%   BMI 26.63 kg/m²       Temp (24hrs), Av.9 °F (36.6 °C), Min:97.8 °F (36.6 °C), Max:98 °F (36.7 °C)        O2 Device: Hi flow nasal cannula O2 Flow Rate (L/min): 35 l/min         Body mass index is 26.63 kg/m². Wt Readings from Last 4 Encounters:   10/21/20 81.8 kg (180 lb 5.4 oz)          Intake/Output Summary (Last 24 hours) at 11/2/2020 1132  Last data filed at 11/2/2020 1036  Gross per 24 hour   Intake 420 ml   Output 71408 ml   Net -73271 ml       Last shift:      11/02 0701 - 11/02 1900  In: 420 [P.O.:420]  Out: 07461 [Urine:80766]  Last 3 shifts: 10/31 1901 - 11/02 0700  In: -   Out: 7269 [ASUZV:8016]   Ventilator Settings:      Mode Rate TV Press PEEP FiO2 PIP Min. Vent               50 %            Physical Exam:    General:  male; less distress chronic cough nonproductive  HEENT: NCAT, oral mucosa clear  Eyes: anicteric; conjunctiva clear extraocular movements intact   neck: no node neck veins not visible  Chest: no deformity,   Cardiac: Regular rate and rhythm no murmurs trace ankle edema  Lungs: Clear anteriorly laterally still has rales posteriorly improved  Abd: Soft nontender normal bowel sounds no organomegaly  Ext: Mild edema; no joint swelling;  No clubbing  :clear urine  Neuro: Awake alert speech is clear moves all 4 extremities well  Psych- no agitation, oriented to person;   Skin: warm,, no cyanosis; very mildly diaphoretic  Pulses: Pulses intact  Capillary: Normal capillary refill      Labs:  11/250% nasal high flow with PO2 81 PCO2 42 pH 7.45  11/2 with standing and walking from bed to chair with physical therapy oxygen saturation drops to 72%  10/14 COVID-19 test negative  10/17 COVID-19 test indeterminate  Ferritin 1510, previous 2180  , previous 615, 754  Procalcitonin 0.14  CRP 0.87, previous 1.39, 3.0, 12.4  Lab Results   Component Value Date/Time    Culture result: No growth 7 days 10/20/2020 11:30 AM    Culture result: Heavy Staphylococcus aureus 10/20/2020 08:15 AM    Culture result: Light Klebsiella pneumoniae 10/20/2020 08:15 AM    Culture result: Heavy  Normal respiratory smita   10/20/2020 08:15 AM      Lab Results   Component Value Date/Time     10/15/2020 11:47 PM       Imaging:  I have personally reviewed the patients radiographs and have reviewed the reports:    CXR Results  (Last 48 hours)  11/2 chest x-ray continued diffuse accentuated interstitial markings  10/20 chest x-ray with diffuse patchy infiltrates may be slightly less dense  10/17 chest x-ray unchanged diffuse bilateral infiltrate               10/15/20 1927  XR CHEST SNGL V Final result    Narrative:  1       Mild atelectasis in the bases with upper lungs clear. No effusion or   pneumothorax. Normal heart and no congestion  Disagree to me there is bilateral infiltrate           Results from Hospital Encounter encounter on 10/15/20   XR CHEST PORT    Narrative Chest, frontal view, 11/2/2020    History: Covid-19. Comparison: Including chest 10/27/2020. Findings: The cardiac silhouette is stable. The lungs remain underexpanded. Diffuse opacities in the lungs are not significantly changed. Hydrostatic edema  is possible. No pleural effusion is definitively noted on this single view. No  pneumothorax is identified. The osseous structures are stable. Impression Impression: No significant interval change. XR CHEST PORT    Narrative Portable upright radiograph chest 7:34 AM compared to October 26, 2020. INDICATION: Cough, pneumonia. Patient has taken a shallow depth of inspiration. Heart size is stable. Bilateral airspace disease/edema shows worsening compared to prior study. No  pneumothorax. Cannot exclude small effusions. No acute bony changes. Impression IMPRESSION: Shallower depth of inspiration with worsening bilateral airspace  disease/edema. XR CHEST PORT    Narrative Chest, frontal view, 10/26/2020    History: Cough. Pneumonia. Comparison: Including chest 10/25/2020. Findings: The cardiac silhouette is stable.   The lungs remain underexpanded. Airspace opacities in the lungs are not significantly changed as compared to the  study performed one day prior. Hydrostatic edema is not excluded. No pleural  effusion or pneumothorax is identified. The osseous structures are stable. Impression Impression: No significant interval change. Results from [facility] encounter on 10/15/20   CTA CHEST W OR W WO CONT    Narrative CT dose reduction was achieved through use of a standardized protocol tailored  for this examination and automatic exposure control for dose modulation. Contrast study maximum intensity projections shows pronounced groundglass  opacification, of variable density, mainly in the dependent portions of each  lung, apex to base. Air bronchograms are preserved in the densest portions. Mild  geographic groundglass opacification of lower density in the nondependent  portions. Central airways are open    Pulmonary arteries are well-opacified and show no filling defect or distortion. Aorta shows normal dimensions, without dissection. Tiny middle mediastinal lymph  nodes. Cardiac finding. No pleural pericardial effusion. No significant upper  abdominal or chest wall finding      Impression IMPRESSION: Widespread patchy bilateral pneumonitis       Clinical picture consistent with COVID-19 infection with pneumonitis with diffuse patchy pulmonary infiltrate elevation in ferritin LDH and CRP nonproductive cough and GI symptoms and yet initial COVID-19 test negative repeat testing indeterminate              Time spent 30 min       Thank you for allowing us to participate in the care of this patient.   We will follow along with you    Jennifer Lopez MD

## 2020-11-02 NOTE — PROGRESS NOTES
OCCUPATIONAL THERAPY RE-EVALUATION  Patient: Ekaterina Bradford (56 y.o. male)  Date: 11/2/2020  Diagnosis: Pneumonia [J18.9]  Acute respiratory failure ([address] [J96.00]   <principal problem not specified>       Precautions: Other (comment), COVID 19  Chart, occupational therapy assessment, plan of care, and goals were reviewed. ASSESSMENT  Based on the objective data described below, patient currently presents with oxygen desaturation with activity, poor activity tolerance, decreased standing balance/tolerance, generalized deconditioning, increased need for A with self care and functional mobility/transfers. Patient initially seen for OT evaluation 10/26/2020 and has not been seen for skilled OT sessions since that time. Since time of initial evaluation pt has had a rapid response called 10/28/2020 and high flow O2 increased to 100% at that time and seen today for reevaluation due to LOS. Patient performed bed mobility, sup <> sit with SBA, pt setup assist to don slip on shoes at EOB, patient CGA sit <> stand and bed to chair transfers. Once sitting in chair patient participated in TE, see below for details, and setup assist for grooming to wash face and brush teeth. Patient then requesting to return to bed 2/2 concerns over oxygen saturation. Patient oxygen at 96% upon therapy entry and dropped to 93% during stand pivot chair transfer, while sitting in chair patient performing UE and LE TE and dropped to 85% during exercise, MD entered room at that point and raised oxygen from 50% to 91% high flow and pt improved to 88% with MD still present in room upon therapy exit. Pt continues to benefit from continued skilled OT services, continue to progress toward goals. Recommend discharge to IRF for cardiopulmonary rehab, when medically appropriate.     Current Level of Function Impacting Discharge (ADLs): oxygen desaturation with activity, SBA to CGA for mobility    Other factors to consider for discharge: time since onset, respiratory status at discharge         PLAN :  Recommendations and Planned Interventions: self care training, functional mobility training, therapeutic exercise, balance training, therapeutic activities, endurance activities, patient education, home safety training and family training/education    Frequency/Duration: Patient will be followed by occupational therapy 3 times a week to address goals. Recommend with staff: up to chair for meals as tolerate, patient complete daily grooming routine    Recommend next OT session: toilet transfer as appropriate    Recommendation for discharge: (in order for the patient to meet his/her long term goals)  IRF    This discharge recommendation:  Has been made in collaboration with the attending provider and/or case management    Equipment recommendations for successful discharge (if) home: bedside commode, shower chair and walker: rolling       SUBJECTIVE:   Patient stated I want to get back to bed.     OBJECTIVE DATA SUMMARY:     Cognitive/Behavioral Status:  Neurologic State: Alert  Orientation Level: Oriented X4  Cognition: Appropriate decision making; Appropriate safety awareness    Hearing: Auditory  Auditory Impairment: None    Vision/Perceptual:    WNL    Range of Motion:  WNL     Strength:  Grossly observed to be 4-/5     Tone & Sensation:  Tone: normal    Functional Mobility and Transfers for ADLs:  Bed Mobility:  Rolling: Stand-by assistance  Supine to Sit: Stand-by assistance  Sit to Supine: Stand-by assistance  Scooting: Stand-by assistance    Transfers:  Sit to Stand: Contact guard assistance     Bed to Chair: Contact guard assistance    Balance:  Sitting: Intact; Without support  Standing: Intact; With support    ADL Assessment:    Oral Facial Hygiene/Grooming: Setup    Lower Body Dressing: Setup    ADL Intervention and task modifications:    Grooming  Grooming Assistance: Set-up  Position Performed: Seated in chair  Washing Face: Set-up  Brushing Teeth: Set-up    Lower Body Dressing Assistance  Dressing Assistance: Set-up  Slip on Shoes Without Back: Set-up  Position Performed: Seated edge of bed    Therapeutic Exercises:   Exercise Sets Reps AROM AAROM PROM Self PROM Comments   Wrist flex/ext 1 10 [x] [] [] []        Functional Measure:    [address] [place] [address] AM-PACTM \"6 Clicks\"                                                       Daily Activity Inpatient Short Form  How much help from another person does the patient currently need. .. Total; A Lot A Little None   1. Putting on and taking off regular lower body clothing? []  1 []  2 [x]  3 []  4   2. Bathing (including washing, rinsing, drying)? []  1 []  2 [x]  3 []  4   3. Toileting, which includes using toilet, bedpan or urinal? [] 1 []  2 [x]  3 []  4   4. Putting on and taking off regular upper body clothing? []  1 []  2 [x]  3 []  4   5. Taking care of personal grooming such as brushing teeth? []  1 []  2 [x]  3 []  4   6. Eating meals? []  1 []  2 [x]  3 []  4   © 2007, Trustees of [address] [place] [address], under license to [employer]. All rights reserved     Score: 18/24     Interpretation of Tool:  Represents clinically-significant functional categories (i.e. Activities of daily living). Percentage of Impairment CH    0%   CI    1-19% CJ    20-39% CK    40-59% CL    60-79% CM    80-99% CN     100%   AMPA  Score 6-24 24 23 20-22 15-19 10-14 7-9 6     Pain:  0/10    Activity Tolerance:   Fair, desaturates with exertion and requires oxygen and requires frequent rest breaks    After treatment patient left in no apparent distress:   Supine in bed, Call bell within reach and MD present in room    COMMUNICATION/COLLABORATION:   The patients plan of care was discussed with: Physical therapy assistant, Registered nurse and Physician.      OT/PT sessions occurred together for increased patient and clinician safety as pt with poor activity and would not tolerate x2    Problem: Self Care Deficits Care Plan (Adult)  Goal: *Acute Goals and Plan of Care (Insert Text)  Description: Pt will be IND sup <> sit in prep for EOB ADLs  Pt will be IND grooming sitting EOB  Pt will be IND LE dressing sitting EOB/long sit  Pt will be IND sit <>  prep for toileting LRAD  Pt will be IND toileting/toilet transfer/cloth mgmt LRAD  Pt will be IND following UE HEP in prep for self care tasks     Outcome: Progressing Towards Goal       Zo Antis, OTR/L  Time Calculation: 29 mins

## 2020-11-02 NOTE — PROGRESS NOTES
PHYSICAL THERAPY TREATMENT  Patient: Ekaterina Jo (2 y.o. male)  Date: 11/2/2020  Diagnosis: Pneumonia [J18.9]  Acute respiratory failure ([facility] [address] [J96.00]   <principal problem not specified>       Precautions: Other (comment)  Chart, physical therapy assessment, plan of care and goals were reviewed. ASSESSMENT  Patient continues with skilled PT services and is progressing towards goals. Pt semi supine in bed upon arrival PT/OT arrival and agreeable to session. Pt performed bed mobility with SBA. STS and bed<>chair transfers required CGA. While seated in chair pt performed therex for LE strengthening and endurance, see details below. Patient then requesting to return to bed 2/2 concerns over oxygen saturation. Upon entry patients O2 at 96% and dropped to 93% post bed>chair transfer, while seated in chair performing LE TE pt O2 dropped to 85% during exercise. MD entered room at that time and increased oxygen flow rate from 50% to 91% high flow and pt improved to 88% saturation and MD still present in room upon therapy exit. Other factors to consider for discharge: oxygen desaturation during activity, decreased activity tolerance         PLAN :  Patient continues to benefit from skilled intervention to address the above impairments. Continue treatment per established plan of care. to address goals. Recommendation for discharge: (in order for the patient to meet his/her long term goals)  IRF for pulmonary rehab    This discharge recommendation:  Has been made in collaboration with the attending provider and/or case management    IF patient discharges home will need the following DME: to be determined (TBD)       SUBJECTIVE:   Patient stated I want to get back in bed, my oxygen.     OBJECTIVE DATA SUMMARY:   Critical Behavior:  Neurologic State: Alert  Orientation Level: Oriented X4  Cognition: Appropriate decision making, Appropriate safety awareness     Functional Mobility Training:  Bed Mobility:  Rolling: Stand-by assistance  Supine to Sit: Stand-by assistance  Sit to Supine: Stand-by assistance  Scooting: Stand-by assistance        Transfers:  Sit to Stand: Contact guard assistance  Stand to Sit: Contact guard assistance        Bed to Chair: Contact guard assistance                    Balance:  Sitting: Intact; Without support  Standing: Intact; With support    Therapeutic Exercises:   1 x 10 AP  1 x 10 LAQ     Pain Rating:  No pain    Activity Tolerance:   Poor, desaturates with exertion and requires oxygen, requires rest breaks and observed SOB with activity  Please refer to the flowsheet for vital signs taken during this treatment.     After treatment patient left in no apparent distress:   Supine in bed and Call bell within reach and MD present    COMMUNICATION/COLLABORATION:   The patients plan of care was discussed with: Occupational therapist.       Problem: Mobility Impaired (Adult and Pediatric)  Goal: *Acute Goals and Plan of Care (Insert Text)  Description: Bed mobility with ind within 7 days   Transfers with Ind within 7 days   Amb approx 15-30 ft with LRAD and SBA with normal O2 Sats within 7 days   Increase dynamic standing tolerance to 4 min with normal O2 Sats within 7 days   Ind with HEP for LE within 7 days       11/2/2020 1600 by Getachew Fail  Outcome: Progressing Towards Goal  11/2/2020 1442 by Getachew Fail  Outcome: Progressing Towards Goal       Stephanie Dueñas PTA   Time Calculation: 29 mins

## 2020-11-03 PROCEDURE — 99232 SBSQ HOSP IP/OBS MODERATE 35: CPT | Performed by: INTERNAL MEDICINE

## 2020-11-03 PROCEDURE — 74011250637 HC RX REV CODE- 250/637: Performed by: INTERNAL MEDICINE

## 2020-11-03 PROCEDURE — 94762 N-INVAS EAR/PLS OXIMTRY CONT: CPT

## 2020-11-03 PROCEDURE — 74011250636 HC RX REV CODE- 250/636: Performed by: INTERNAL MEDICINE

## 2020-11-03 PROCEDURE — 65270000029 HC RM PRIVATE

## 2020-11-03 PROCEDURE — 94760 N-INVAS EAR/PLS OXIMETRY 1: CPT

## 2020-11-03 PROCEDURE — 77010033711 HC HIGH FLOW OXYGEN

## 2020-11-03 PROCEDURE — 74011250637 HC RX REV CODE- 250/637: Performed by: HOSPITALIST

## 2020-11-03 PROCEDURE — 74011000250 HC RX REV CODE- 250: Performed by: INTERNAL MEDICINE

## 2020-11-03 PROCEDURE — 74011250636 HC RX REV CODE- 250/636: Performed by: HOSPITALIST

## 2020-11-03 PROCEDURE — 94640 AIRWAY INHALATION TREATMENT: CPT

## 2020-11-03 RX ORDER — BENZONATATE 100 MG/1
200 CAPSULE ORAL
Status: DISCONTINUED | OUTPATIENT
Start: 2020-11-03 | End: 2020-11-09 | Stop reason: HOSPADM

## 2020-11-03 RX ADMIN — SALINE NASAL SPRAY 2 SPRAY: 1.5 SOLUTION NASAL at 21:08

## 2020-11-03 RX ADMIN — Medication 10 ML: at 21:12

## 2020-11-03 RX ADMIN — SALINE NASAL SPRAY 2 SPRAY: 1.5 SOLUTION NASAL at 14:00

## 2020-11-03 RX ADMIN — IPRATROPIUM BROMIDE AND ALBUTEROL SULFATE 3 ML: .5; 3 SOLUTION RESPIRATORY (INHALATION) at 21:39

## 2020-11-03 RX ADMIN — Medication 10 ML: at 05:17

## 2020-11-03 RX ADMIN — MENTHOL, METHYL SALICYLATE: 10; 15 CREAM TOPICAL at 21:09

## 2020-11-03 RX ADMIN — FAMOTIDINE 20 MG: 20 TABLET ORAL at 21:06

## 2020-11-03 RX ADMIN — ENOXAPARIN SODIUM 40 MG: 40 INJECTION SUBCUTANEOUS at 17:35

## 2020-11-03 RX ADMIN — BENZONATATE 200 MG: 100 CAPSULE ORAL at 05:09

## 2020-11-03 RX ADMIN — IPRATROPIUM BROMIDE AND ALBUTEROL SULFATE 3 ML: .5; 3 SOLUTION RESPIRATORY (INHALATION) at 02:25

## 2020-11-03 RX ADMIN — SALINE NASAL SPRAY 2 SPRAY: 1.5 SOLUTION NASAL at 18:00

## 2020-11-03 RX ADMIN — ENOXAPARIN SODIUM 40 MG: 40 INJECTION SUBCUTANEOUS at 05:09

## 2020-11-03 RX ADMIN — SALINE NASAL SPRAY 2 SPRAY: 1.5 SOLUTION NASAL at 05:09

## 2020-11-03 RX ADMIN — FAMOTIDINE 20 MG: 20 TABLET ORAL at 09:45

## 2020-11-03 RX ADMIN — FUROSEMIDE 40 MG: 10 INJECTION, SOLUTION INTRAMUSCULAR; INTRAVENOUS at 09:45

## 2020-11-03 RX ADMIN — DEXAMETHASONE 2 MG: 4 TABLET ORAL at 02:10

## 2020-11-03 RX ADMIN — SALINE NASAL SPRAY 2 SPRAY: 1.5 SOLUTION NASAL at 10:00

## 2020-11-03 RX ADMIN — DEXAMETHASONE 2 MG: 4 TABLET ORAL at 09:45

## 2020-11-03 RX ADMIN — IPRATROPIUM BROMIDE AND ALBUTEROL SULFATE 3 ML: .5; 3 SOLUTION RESPIRATORY (INHALATION) at 14:18

## 2020-11-03 RX ADMIN — GUAIFENESIN 1200 MG: 600 TABLET, EXTENDED RELEASE ORAL at 09:45

## 2020-11-03 RX ADMIN — IPRATROPIUM BROMIDE AND ALBUTEROL SULFATE 3 ML: .5; 3 SOLUTION RESPIRATORY (INHALATION) at 07:47

## 2020-11-03 RX ADMIN — GUAIFENESIN 1200 MG: 600 TABLET, EXTENDED RELEASE ORAL at 21:06

## 2020-11-03 RX ADMIN — DEXAMETHASONE 2 MG: 4 TABLET ORAL at 21:06

## 2020-11-03 NOTE — PROGRESS NOTES
Progress Note  Date:11/3/2020       Room:[facility]  Patient Name:Ekaterina Leigh     YOB: 1960     Age:60 y.o. Subjective    Subjective   Patient followed for presumptive Covid-19 pneumoniitis with indeterminate results, now status post Remdesivir and Actemra, still on Decadron. CXR today unchanged. He is afebrile with leukocytosis attributed to steroids. Blood cultures negative and sputum culture grew MSSA and Klebsiella. He is currently on IV Zosyn. He remains on high flow nasal O2 down to 35%. Subjectively improved with no cough or sputum production. Objective         Vitals Last 24 Hours:  TEMPERATURE:  Temp  Av.9 °F (36.6 °C)  Min: 97.6 °F (36.4 °C)  Max: 98.2 °F (36.8 °C)  RESPIRATIONS RANGE: Resp  Av  Min: 20  Max: 24  PULSE OXIMETRY RANGE: SpO2  Av.8 %  Min: 96 %  Max: 98 %  PULSE RANGE: Pulse  Av  Min: 78  Max: 81  BLOOD PRESSURE RANGE: Systolic (95WIS), NC , Min:120 , OIU:176   ; Diastolic (87XMR), CBT:51, Min:78, Max:91    I/O (24Hr): Intake/Output Summary (Last 24 hours) at 11/3/2020 1545  Last data filed at 11/3/2020 0427  Gross per 24 hour   Intake 480 ml   Output 650 ml   Net -170 ml     Objective:  Vital signs: (most recent): Blood pressure (!) 133/91, pulse 78, temperature 98.2 °F (36.8 °C), resp. rate 20, height 5' 9\" (1.753 m), weight 180 lb 5.4 oz (81.8 kg), SpO2 97 %. General: Moderately ill-appearing male, NAD  HEENT: eyes open, high flow nasal O2  Neck: supple  Lungs: clear bilaterally  Heart: RRR  : Granados catheter with moderate urine sediment    Labs/Imaging/Diagnostics    Labs:  CBC:  No results for input(s): WBC, RBC, HGB, HCT, MCV, RDW, PLT, HGBEXT, HCTEXT, PLTEXT, HGBEXT, HCTEXT, PLTEXT in the last 72 hours. CHEMISTRIES:  No results for input(s): NA, K, CL, CO2, BUN, CA, PHOS, MG in the last 72 hours.     No lab exists for component: CREATININE, GLUCOSEPT/INR:  No results for input(s): INR, INREXT, INREXT in the last 72 hours. No lab exists for component: PROTIME  APTT:  No results for input(s): APTT in the last 72 hours. LIVER PROFILE:  No results for input(s): AST, ALT in the last 72 hours. No lab exists for component: MIKEY CabralPHOS  Lab Results   Component Value Date/Time    ALT (SGPT) 42 10/25/2020 08:00 AM    AST (SGOT) 19 10/25/2020 08:00 AM    Alk. phosphatase 97 10/25/2020 08:00 AM    Bilirubin, total 1.2 (H) 10/25/2020 08:00 AM     WBC 11,100   (403 (405 (689 (452 (565  Ferritin 1,519 (1,084 (1,187  CRP <0.29 (0.43 (12.40    Sputum culture (10/20) MSSA and Klebsiella pneumoniae  Blood cultures (10/20) No growth FINAL    Imaging Last 24 Hours:  No results found. Assessment//Plan   Active Problems:    Acute respiratory failure due to COVID-19 [facility]) (10/15/2020)      Pneumonia (10/15/2020)      Acute respiratory failure ([facility] [address] (10/15/2020)      Assessment & Plan  1. Probable Covid-19 pneumonitis, with indeterminate result, status post Remdesivir, Decadron, Azithromycin, Actemra, unchanged. 2. Acute respiratory failure, on high flow nasal O2  3. Elevated CRP, LDH and ferritin, secondary to #1, decreasing. 4. HCAP with purulent sputum, secondary to MSSA and Klebsiella pneumoniae, status post 14 days of IV Zosyn.     1. Discontinue Zosyn, done  2.  Will continue to follow      Electronically signed by Kathrine Zuniga MD on 11/3/2020 at 1:43 PM

## 2020-11-03 NOTE — ROUTINE PROCESS
Bedside and Verbal shift change report given to Jourdan Arevalo (oncoming nurse) by Radha Zamudio (offgoing nurse). Report included the following information SBAR and MAR.

## 2020-11-03 NOTE — PROGRESS NOTES
PT is rec. IRF. Cm met with pt at the bedside. Cm discussed and educated IRF. Pt asked writer to call his daughter Wilton Eddy to discuss rehab. Pt states once writer speaks with his daughter, his daughter will talk to his wife and they will have a family discussion about rehab. CM attempted to contact pt's daughter Wilton Eddy [phone]. Cm left a voice message.

## 2020-11-03 NOTE — PROGRESS NOTES
Hospitalist Progress Note               Daily Progress Note: 11/3/2020      Subjective: The patient is seen for follow  up.     56 y.o. male with no significant medical problems presents to the emergency room complaining of shortness of breath.   Patient had a urinary retention which was resolved with overnight events noted, patient currently has no active complaints, patient is frustrated for having been in the hospital for certain amount of time, discussed with RN events at bedside    Medications reviewed  Current Facility-Administered Medications   Medication Dose Route Frequency    guaiFENesin ER (MUCINEX) tablet 1,200 mg  1,200 mg Oral Q12H    dexAMETHasone (DECADRON) tablet 2 mg  2 mg Oral Q12H    famotidine (PEPCID) tablet 20 mg  20 mg Oral BID    enoxaparin (LOVENOX) injection 40 mg  40 mg SubCUTAneous L78C    methyl salicylate-menthol (BENGAY) 15-10 % cream   Topical TID PRN    furosemide (LASIX) injection 40 mg  40 mg IntraVENous DAILY    dextromethorphan (DELSYM) 30 mg/5 mL syrup 30 mg  30 mg Oral Q12H PRN    albuterol-ipratropium (DUO-NEB) 2.5 MG-0.5 MG/3 ML  3 mL Nebulization Q6H RT    0.9% sodium chloride infusion 250 mL  250 mL IntraVENous PRN    sodium chloride (OCEAN) 0.65 % nasal squeeze bottle 2 Spray  2 [place] Both Nostrils Q4HWA    hydrOXYzine pamoate (VISTARIL) capsule 25 mg  25 mg Oral TID PRN    melatonin tablet 10 mg  10 mg Oral QHS PRN    acetaminophen (TYLENOL) tablet 650 mg  650 mg Oral Q6H PRN    Or    acetaminophen (TYLENOL) suppository 650 mg  650 mg Rectal Q6H PRN    sodium chloride (NS) flush 5-40 mL  5-40 mL IntraVENous Q8H    sodium chloride (NS) flush 5-40 mL  5-40 mL IntraVENous PRN    ondansetron (ZOFRAN ODT) tablet 4 mg  4 mg Oral Q6H PRN    Or    ondansetron (ZOFRAN) injection 4 mg  4 mg IntraVENous Q6H PRN    benzonatate (TESSALON) capsule 200 mg  200 mg Oral Q8H       Review of Systems:   A comprehensive review of systems was negative except for that written in the HPI. Objective:   Physical Exam:     Visit Vitals  BP (!) 143/90 (BP 1 Location: Right arm, BP Patient Position: At rest)   Pulse 78   Temp 97.6 °F (36.4 °C)   Resp 22   Ht 5' 9\" (1.753 m)   Wt 81.8 kg (180 lb 5.4 oz)   SpO2 97%   BMI 26.63 kg/m²    O2 Flow Rate (L/min): 35 l/min O2 Device: Hi flow nasal cannula    Temp (24hrs), Av °F (36.7 °C), Min:97.6 °F (36.4 °C), Max:98.3 °F (36.8 °C)    No intake/output data recorded.  1901 -  0700  In: 1140 [P.O.:1140]  Out: 20873 [Urine:[id number]]    PHYSICAL EXAM:  General: Alert and awake. Skin: Extremities and face reveal no rashes. HEENT: Sclerae anicteric. Extra-occular muscles are intact. No oral ulcers. No ENT discharge. The neck is supple. Cardiovascular: Regular rate and rhythm. No murmurs, gallops, or rubs. PMI nondisplaced. Carotids without bruits. Respiratory: Comfortable breathing with no accessory muscle use. Clear breath sounds with no wheezes, rales, or rhonchi. GI: Abdomen nondistended, soft, and nontender. Normal active bowel sounds. No enlargement of the liver or spleen. No masses palpable. Rectal: Deferred   Musculoskeletal: No pitting edema of the lower legs. Extremities have good range of motion. No costovertebral tenderness. Neurological: Gross memory appears intact. Patient is alert and oriented. Power 5/5, Cranial nerves II-XII intact  Psychiatric: Mood appears appropriate with judgement intact. Data Review:       Recent Days:  No results for input(s): WBC, HGB, HCT, PLT, HGBEXT, HCTEXT, PLTEXT, HGBEXT, HCTEXT, PLTEXT in the last 72 hours. No results for input(s): NA, K, CL, CO2, GLU, BUN, CREA, CA, MG, PHOS, ALB, TBIL, TBILI, ALT, INR, INREXT, INREXT in the last 72 hours.     No lab exists for component: SGOT  Recent Labs     20  0400   PH 7.45   PCO2 42   PO2 81   HCO3 28*   FIO2 50       24 Hour Results:  Recent Results (from the past 24 hour(s))   GLUCOSE, POC    Collection Time: 20 8:27 PM   Result Value Ref Range    Glucose (POC) 162 (H) 65 - 100 mg/dL    Performed by Rom Cooper        XR CHEST PORT   Final Result   Impression: No significant interval change. XR CHEST PORT   Final Result   IMPRESSION: Shallower depth of inspiration with worsening bilateral airspace   disease/edema. XR CHEST PORT   Final Result   Impression: No significant interval change. XR CHEST PORT   Final Result   Findings/impression: A portable upright view of the chest demonstrates   persistent cardiomegaly and patchy interstitial and mild airspace disease which   has improved slightly on the right. No pneumothorax or effusion is identified. The osseous structures are unchanged. XR CHEST PORT   Final Result      XR CHEST PORT   Final Result   IMPRESSION: Improving bilateral airspace disease.       XR CHEST PORT   Final Result      XR CHEST PORT   Final Result      XR CHEST PORT   Final Result      XR CHEST PORT   Final Result      CTA CHEST W OR W WO CONT   Final Result   IMPRESSION: Widespread patchy bilateral pneumonitis      XR CHEST SNGL V   Final Result      XR CHEST PORT    (Results Pending)          Assessment/     Problem List:  Hospital Problems  Date Reviewed: 10/15/2020          Codes Class Noted POA    Acute respiratory failure due to COVID-19 [facility]) ICD-10-CM: U07.1, J96.00  ICD-9-CM: 518.81, 079.89  10/15/2020 Yes        Pneumonia ICD-10-CM: J18.9  ICD-9-CM: 246  10/15/2020 Yes        Acute respiratory failure ([facility] [address] ICD-10-CM: J96.00  ICD-9-CM: 518.81  10/15/2020 Unknown                     Plan:    (1) Acute hypoxic respiratory failurelikely multifactorial, secondary to COVID-19 pneumonia/pneumonitis, currently patient remains on high flow nasal cannula, likely secondary to scarring from pneumonitis/pneumonia  Continue methylprednisolone twice daily  Continue high flow nasal cannula, will attempt to wean  Continue Lasix 40 mg once daily, patient is diuresing well at this time  Pulmonology recommendations appreciated, will continue to follow    (2) HCAP pneumonia and pneumonitis:Status post 14 days of antibiotic coverage  Continue to monitor  Infectious disease recommendations appreciated, will continue to follow    (3) high likelihood of COVID-19 pneumonitis: status post Remdesivir, Decadron, Azithromycin, Actemra,   Currently repeat COVID-19 test negative  Continue to monitor    Dehydrationcurrently resolved    PpxLovenox  Full code, patient surrogate decision-maker is his wife, updated patient's family dispositioncontinued inpatient treatment, pending clinical improvement, PT/OT    Care Plan discussed with: Patient/Family and Nurse    Called Patients daughter Lio Leonard Ph- [phone]    Total non-critical care time 35 minutes    Enedina Sanchez MD

## 2020-11-03 NOTE — PROGRESS NOTES
Pulmonary Note      Name: Ekaterina Sanchez MRN: [id number]   : 1960 Hospital: [facility]   Date: 11/3/2020  Admission date: 10/15/2020 Hospital Day:        Subjective/Interval History:   Patient seen in the ICU on high flow nasal oxygen at 70% with oxygen saturation 99. He gives a history of gradual worsening illness over the last week to 10 days with nonproductive cough fever generalized malaise nausea denies any vomiting although the ER note states that he had some vomiting. He seems awake and alert. Does not have any Covid contacts that he is aware of. States that his sense of taste and smell have been normal but terribly anorexic has not eaten anything in 3 or 4 days. 10/17 still feels bad Cough shortness of breath but no nausea today   10/18 still complains of severe cough nonproductive although in general feels a little better  10/20 remains 100% high flow nasal oxygen. Continues to complain of it irritating him. Requested nasal saline last evening but it has not been started will reorder it. We will let him try nonrebreather mask with nasal high flow for meals  10/26 high flow nasal oxygen back up to 70% unable to safely switch to low flow nasal oxygen at this point. He states he feels better sense of taste is normal with a good appetite  10/28 patient had a rapid response last night now he is on 100% high flow nasal cannula   remains on nasal high flow down to 50% today but with getting out of bed to work with physical therapy he immediately desaturates into the 70s very slow to recover  11/3 nasal high flow at 45% today with oxygen saturation 96%. He has not worked with PT yet  Patient Active Problem List   Diagnosis Code    Acute respiratory failure due to COVID-19 ([facility] U07.1, J96.00    Pneumonia J18.9    Acute respiratory failure ([facility] J96.00       IMPRESSION:   1. Acute hypoxic respiratory failure continues to require high flow nasal   2.  History of asthma  3. Community-acquired versus aspiration with diffuse pulmonary infiltrates questionable atypical pneumonia possible COVID-19 repeat testing Negative  4. Nausea probable due to COVID-19 infection  5. Unremitting cough secondary to pneumonia or reflux resolved  Body mass index is 26.63 kg/m². RECOMMENDATIONS/PLAN:   1. Covid pneumonitis  has completed 2 doses Actemra and 5 days of Remdesivir  decreased Decadron to 2 mg oral twice daily repeat CRP pending  2. He is still hypoxic we will continue with a high flow nasal cannula and titrate with his activity  3. X-ray showed bilateral infiltrate improved on 11/2 x-ray will continue Lasix  4. CRP less than 0.29 continue to decrease Decadron  5. Sputum culture with MSSA and Klebsiella both should be covered by Zosyn discontinued after 14 days  6. Continue nebulizer treatment  7. Repeat COVID-19 test negative         Subjective/Initial History:   I have reviewed the flowsheet and previous days notes. Seen earlier today on rounds. I was asked by Huber Mendez MD to see An Crescencio Vazquezet a 56 y.o.  male  in consultation for a chief complaint of acute hypoxic respiratory failure cough and community-acquired pneumonia          Pt now in CCU. Patient PCP: UNKNOWN  PMH:  has no past medical history on file. PSH:   has no past surgical history on file. FHX: family history includes No Known Problems in his father and mother. SHX:  reports that he has never smoked. He has never used smokeless tobacco. He reports that he does not drink alcohol or use drugs.     Systemic review:  General no chronic illness but over the last week he has had fever generalized malaise weight loss no appetite  Eyes no double vision or momentary blindness  ENT no sinus congestion drainage or facial pain  Skeletal has diffuse aches and pains no swollen tender joints  Endocrinologic no polyuria polydipsia  Neurologic no seizures or syncope  Gastrointestinal normally he has no problems but over this last week he has had nausea anorexia marked weight loss has not had any alteration of his sense of taste or smell  Genitourinary no discomfort or drainage on urination  Cardiovascular no history of heart disease chest pain has felt sweaty but no history of ankle edema.   Respiratory as mentioned above    No Known Allergies   MEDS:   Current Facility-Administered Medications   Medication    guaiFENesin ER (MUCINEX) tablet 1,200 mg    dexAMETHasone (DECADRON) tablet 2 mg    famotidine (PEPCID) tablet 20 mg    enoxaparin (LOVENOX) injection 40 mg    methyl salicylate-menthol (BENGAY) 15-10 % cream    furosemide (LASIX) injection 40 mg    dextromethorphan (DELSYM) 30 mg/5 mL syrup 30 mg    albuterol-ipratropium (DUO-NEB) 2.5 MG-0.5 MG/3 ML    0.9% sodium chloride infusion 250 mL    sodium chloride (OCEAN) 0.65 % nasal squeeze bottle 2 Spray    hydrOXYzine pamoate (VISTARIL) capsule 25 mg    melatonin tablet 10 mg    acetaminophen (TYLENOL) tablet 650 mg    Or    acetaminophen (TYLENOL) suppository 650 mg    sodium chloride (NS) flush 5-40 mL    sodium chloride (NS) flush 5-40 mL    ondansetron (ZOFRAN ODT) tablet 4 mg    Or    ondansetron (ZOFRAN) injection 4 mg    benzonatate (TESSALON) capsule 200 mg        Current Facility-Administered Medications:     guaiFENesin ER (MUCINEX) tablet 1,200 mg, 1,200 mg, Oral, Q12H, Wanda Gaxiola MD, 1,200 mg at 11/03/20 0945    dexAMETHasone (DECADRON) tablet 2 mg, 2 mg, Oral, Q12H, Wanda Gaxiola MD, 2 mg at 11/03/20 0945    famotidine (PEPCID) tablet 20 mg, 20 mg, Oral, BID, Jon Escobar MD, 20 mg at 11/03/20 0945    enoxaparin (LOVENOX) injection 40 mg, 40 mg, SubCUTAneous, Q12H, Jon Escobar MD, 40 mg at 11/03/20 8695    methyl salicylate-menthol (BENGAY) 15-10 % cream, , Topical, TID PRN, Ness Valle PA-C    furosemide (LASIX) injection 40 mg, 40 mg, IntraVENous, DAILY, Wanda Gaxiola MD, 40 mg at 11/03/20 0945    dextromethorphan (DELSYM) 30 mg/5 mL syrup 30 mg, 30 mg, Oral, Q12H PRN, Abram Cavazos MD, 30 mg at 20    albuterol-ipratropium (DUO-NEB) 2.5 MG-0.5 MG/3 ML, 3 mL, Nebulization, Q6H RT, Kannan Conde MD, 3 mL at 20 0747    0.9% sodium chloride infusion 250 mL, 250 mL, IntraVENous, PRN, Don Gore MD, Last Rate: 15 mL/hr at 20 0156, 250 mL at 20 0156    sodium chloride (OCEAN) 0.65 % nasal squeeze bottle 2 Spray, 2 Spray, Both Nostrils, Q4HWA, Abram Cavazos MD, 2 [place] at 20 1000    hydrOXYzine pamoate (VISTARIL) capsule 25 mg, 25 mg, Oral, TID PRN, Florencio Avila MD, 25 mg at 20    melatonin tablet 10 mg, 10 mg, Oral, QHS PRN, Florencio Avila MD, 10 mg at 20    acetaminophen (TYLENOL) tablet 650 mg, 650 mg, Oral, Q6H PRN, 650 mg at 10/18/20 2205 **OR** acetaminophen (TYLENOL) suppository 650 mg, 650 mg, Rectal, Q6H PRN, Ger Rahman MD    sodium chloride (NS) flush 5-40 mL, 5-40 mL, IntraVENous, Q8H, Ger Rahman MD, 10 mL at 20 0517    sodium chloride (NS) flush 5-40 mL, 5-40 mL, IntraVENous, PRN, Ger Rahman MD, 10 mL at 10/22/20 0938    ondansetron (ZOFRAN ODT) tablet 4 mg, 4 mg, Oral, Q6H PRN **OR** ondansetron (ZOFRAN) injection 4 mg, 4 mg, IntraVENous, Q6H PRN, Ger Rahman MD    benzonatate (TESSALON) capsule 200 mg, 200 mg, Oral, Q8H, Abram Cavazos MD, 200 mg at 20 0502      Objective:     Vital Signs: Telemetry:    normal sinus rhythm Intake/Output:   Visit Vitals  BP (!) 133/91 (BP 1 Location: Right arm, BP Patient Position: At rest)   Pulse 78   Temp 98.2 °F (36.8 °C)   Resp 20   Ht 5' 9\" (1.753 m)   Wt 81.8 kg (180 lb 5.4 oz)   SpO2 96%   BMI 26.63 kg/m²       Temp (24hrs), Av °F (36.7 °C), Min:97.6 °F (36.4 °C), Max:98.3 °F (36.8 °C)        O2 Device: Hi flow nasal cannula O2 Flow Rate (L/min): 35 l/min         Body mass index is 26.63 kg/m².     Wt Readings from Last 4 Encounters:   10/21/20 81.8 kg (180 lb 5.4 oz)          Intake/Output Summary (Last 24 hours) at 11/3/2020 1209  Last data filed at 11/3/2020 0427  Gross per 24 hour   Intake 720 ml   Output 1150 ml   Net -430 ml       Last shift:      No intake/output data recorded. Last 3 shifts: 11/01 1901 - 11/03 0700  In: 1140 [P.O.:1140]  Out: 21198 [Urine:58829]     Physical Exam:    General:  male; less distress no cough while I am in the room  HEENT: NCAT, oral mucosa clear  Eyes: anicteric; conjunctiva clear extraocular movements intact   neck: no node neck veins not visible  Chest: no deformity,   Cardiac: Regular rate and rhythm no murmurs trace ankle edema  Lungs: Clear anteriorly laterally still has rales posteriorly improved  Abd: Soft nontender normal bowel sounds no organomegaly  Ext: Mild edema; no joint swelling;  No clubbing  :clear urine  Neuro: Awake alert speech is clear moves all 4 extremities well  Psych- no agitation, oriented to person;   Skin: warm,, no cyanosis; very mildly diaphoretic  Pulses: Pulses intact  Capillary: Normal capillary refill      Labs:  11/250% nasal high flow with PO2 81 PCO2 42 pH 7.45  11/2 with standing and walking from bed to chair with physical therapy oxygen saturation drops to 72%  10/14 COVID-19 test negative  10/17 COVID-19 test indeterminate  Ferritin 1510, previous 2180  , previous 615, 754  Procalcitonin 0.14  CRP 0.87, previous 1.39, 3.0, 12.4  Lab Results   Component Value Date/Time    Culture result: No growth 7 days 10/20/2020 11:30 AM    Culture result: Heavy Staphylococcus aureus 10/20/2020 08:15 AM    Culture result: Light Klebsiella pneumoniae 10/20/2020 08:15 AM    Culture result: Heavy  Normal respiratory smita   10/20/2020 08:15 AM      Lab Results   Component Value Date/Time     10/15/2020 11:47 PM       Imaging:  I have personally reviewed the patients radiographs and have reviewed the reports:    CXR Results  (Last 48 hours)  11/2 chest x-ray continued diffuse accentuated interstitial markings  10/20 chest x-ray with diffuse patchy infiltrates may be slightly less dense  10/17 chest x-ray unchanged diffuse bilateral infiltrate               10/15/20 1927  XR CHEST SNGL V Final result    Narrative:  1       Mild atelectasis in the bases with upper lungs clear. No effusion or   pneumothorax. Normal heart and no congestion  Disagree to me there is bilateral infiltrate           Results from Hospital Encounter encounter on 10/15/20   XR CHEST PORT    Narrative Chest, frontal view, 11/2/2020    History: Covid-19. Comparison: Including chest 10/27/2020. Findings: The cardiac silhouette is stable. The lungs remain underexpanded. Diffuse opacities in the lungs are not significantly changed. Hydrostatic edema  is possible. No pleural effusion is definitively noted on this single view. No  pneumothorax is identified. The osseous structures are stable. Impression Impression: No significant interval change. XR CHEST PORT    Narrative Portable upright radiograph chest 7:34 AM compared to October 26, 2020. INDICATION: Cough, pneumonia. Patient has taken a shallow depth of inspiration. Heart size is stable. Bilateral airspace disease/edema shows worsening compared to prior study. No  pneumothorax. Cannot exclude small effusions. No acute bony changes. Impression IMPRESSION: Shallower depth of inspiration with worsening bilateral airspace  disease/edema. XR CHEST PORT    Narrative Chest, frontal view, 10/26/2020    History: Cough. Pneumonia. Comparison: Including chest 10/25/2020. Findings: The cardiac silhouette is stable. The lungs remain underexpanded. Airspace opacities in the lungs are not significantly changed as compared to the  study performed one day prior. Hydrostatic edema is not excluded. No pleural  effusion or pneumothorax is identified. The osseous structures are stable. Impression Impression: No significant interval change. Results from [place] encounter on 10/15/20   CTA CHEST W OR W WO CONT    Narrative CT dose reduction was achieved through use of a standardized protocol tailored  for this examination and automatic exposure control for dose modulation. Contrast study maximum intensity projections shows pronounced groundglass  opacification, of variable density, mainly in the dependent portions of each  lung, apex to base. Air bronchograms are preserved in the densest portions. Mild  geographic groundglass opacification of lower density in the nondependent  portions. Central airways are open    Pulmonary arteries are well-opacified and show no filling defect or distortion. Aorta shows normal dimensions, without dissection. Tiny middle mediastinal lymph  nodes. Cardiac finding. No pleural pericardial effusion. No significant upper  abdominal or chest wall finding      Impression IMPRESSION: Widespread patchy bilateral pneumonitis       Clinical picture consistent with COVID-19 infection with pneumonitis with diffuse patchy pulmonary infiltrate elevation in ferritin LDH and CRP nonproductive cough and GI symptoms and yet initial COVID-19 test negative repeat testing indeterminate              Time spent 30 min       Thank you for allowing us to participate in the care of this patient.   We will follow along with you    Reyes Junker, MD

## 2020-11-04 ENCOUNTER — APPOINTMENT (OUTPATIENT)
Dept: GENERAL RADIOLOGY | Age: 60
DRG: 177 | End: 2020-11-04
Attending: INTERNAL MEDICINE
Payer: COMMERCIAL

## 2020-11-04 PROCEDURE — 71045 X-RAY EXAM CHEST 1 VIEW: CPT

## 2020-11-04 PROCEDURE — 65270000029 HC RM PRIVATE

## 2020-11-04 PROCEDURE — 94762 N-INVAS EAR/PLS OXIMTRY CONT: CPT

## 2020-11-04 PROCEDURE — 74011250637 HC RX REV CODE- 250/637: Performed by: INTERNAL MEDICINE

## 2020-11-04 PROCEDURE — 74011000250 HC RX REV CODE- 250: Performed by: INTERNAL MEDICINE

## 2020-11-04 PROCEDURE — 77010033678 HC OXYGEN DAILY

## 2020-11-04 PROCEDURE — 99231 SBSQ HOSP IP/OBS SF/LOW 25: CPT | Performed by: INTERNAL MEDICINE

## 2020-11-04 PROCEDURE — 74011250637 HC RX REV CODE- 250/637: Performed by: HOSPITALIST

## 2020-11-04 PROCEDURE — 94640 AIRWAY INHALATION TREATMENT: CPT

## 2020-11-04 PROCEDURE — 74011250636 HC RX REV CODE- 250/636: Performed by: INTERNAL MEDICINE

## 2020-11-04 PROCEDURE — 74011250636 HC RX REV CODE- 250/636: Performed by: HOSPITALIST

## 2020-11-04 RX ADMIN — GUAIFENESIN 1200 MG: 600 TABLET, EXTENDED RELEASE ORAL at 08:37

## 2020-11-04 RX ADMIN — SALINE NASAL SPRAY 2 SPRAY: 1.5 SOLUTION NASAL at 10:00

## 2020-11-04 RX ADMIN — ENOXAPARIN SODIUM 40 MG: 40 INJECTION SUBCUTANEOUS at 16:59

## 2020-11-04 RX ADMIN — FAMOTIDINE 20 MG: 20 TABLET ORAL at 22:38

## 2020-11-04 RX ADMIN — DEXAMETHASONE 2 MG: 4 TABLET ORAL at 08:37

## 2020-11-04 RX ADMIN — ENOXAPARIN SODIUM 40 MG: 40 INJECTION SUBCUTANEOUS at 05:10

## 2020-11-04 RX ADMIN — DEXAMETHASONE 2 MG: 4 TABLET ORAL at 22:38

## 2020-11-04 RX ADMIN — SALINE NASAL SPRAY 2 SPRAY: 1.5 SOLUTION NASAL at 14:00

## 2020-11-04 RX ADMIN — Medication 10 ML: at 05:20

## 2020-11-04 RX ADMIN — Medication 10 ML: at 22:39

## 2020-11-04 RX ADMIN — MENTHOL, METHYL SALICYLATE: 10; 15 CREAM TOPICAL at 05:19

## 2020-11-04 RX ADMIN — IPRATROPIUM BROMIDE AND ALBUTEROL SULFATE 3 ML: .5; 3 SOLUTION RESPIRATORY (INHALATION) at 08:16

## 2020-11-04 RX ADMIN — FUROSEMIDE 40 MG: 10 INJECTION, SOLUTION INTRAMUSCULAR; INTRAVENOUS at 08:37

## 2020-11-04 RX ADMIN — SALINE NASAL SPRAY 2 SPRAY: 1.5 SOLUTION NASAL at 05:12

## 2020-11-04 RX ADMIN — IPRATROPIUM BROMIDE AND ALBUTEROL SULFATE 3 ML: .5; 3 SOLUTION RESPIRATORY (INHALATION) at 19:55

## 2020-11-04 RX ADMIN — SALINE NASAL SPRAY 2 SPRAY: 1.5 SOLUTION NASAL at 22:39

## 2020-11-04 RX ADMIN — IPRATROPIUM BROMIDE AND ALBUTEROL SULFATE 3 ML: .5; 3 SOLUTION RESPIRATORY (INHALATION) at 13:24

## 2020-11-04 RX ADMIN — GUAIFENESIN 1200 MG: 600 TABLET, EXTENDED RELEASE ORAL at 22:38

## 2020-11-04 RX ADMIN — IPRATROPIUM BROMIDE AND ALBUTEROL SULFATE 3 ML: .5; 3 SOLUTION RESPIRATORY (INHALATION) at 03:42

## 2020-11-04 RX ADMIN — FAMOTIDINE 20 MG: 20 TABLET ORAL at 08:37

## 2020-11-04 NOTE — PROGRESS NOTES
Hospitalist Progress Note               Daily Progress Note: 11/4/2020      Subjective: The patient is seen for follow  up.     56 y.o. male with no significa patient seen and evaluated at bedside, no acute events overnight, of note patient successfully titrated down from high flow, discussed with RN events at bedside. Medications reviewed  Current Facility-Administered Medications   Medication Dose Route Frequency    benzonatate (TESSALON) capsule 200 mg  200 mg Oral TID PRN    guaiFENesin ER (MUCINEX) tablet 1,200 mg  1,200 mg Oral Q12H    dexAMETHasone (DECADRON) tablet 2 mg  2 mg Oral Q12H    famotidine (PEPCID) tablet 20 mg  20 mg Oral BID    enoxaparin (LOVENOX) injection 40 mg  40 mg SubCUTAneous B61B    methyl salicylate-menthol (BENGAY) 15-10 % cream   Topical TID PRN    furosemide (LASIX) injection 40 mg  40 mg IntraVENous DAILY    dextromethorphan (DELSYM) 30 mg/5 mL syrup 30 mg  30 mg Oral Q12H PRN    albuterol-ipratropium (DUO-NEB) 2.5 MG-0.5 MG/3 ML  3 mL Nebulization Q6H RT    0.9% sodium chloride infusion 250 mL  250 mL IntraVENous PRN    sodium chloride (OCEAN) 0.65 % nasal squeeze bottle 2 Spray  2 [place] Both Nostrils Q4HWA    hydrOXYzine pamoate (VISTARIL) capsule 25 mg  25 mg Oral TID PRN    melatonin tablet 10 mg  10 mg Oral QHS PRN    acetaminophen (TYLENOL) tablet 650 mg  650 mg Oral Q6H PRN    Or    acetaminophen (TYLENOL) suppository 650 mg  650 mg Rectal Q6H PRN    sodium chloride (NS) flush 5-40 mL  5-40 mL IntraVENous Q8H    sodium chloride (NS) flush 5-40 mL  5-40 mL IntraVENous PRN    ondansetron (ZOFRAN ODT) tablet 4 mg  4 mg Oral Q6H PRN    Or    ondansetron (ZOFRAN) injection 4 mg  4 mg IntraVENous Q6H PRN       Review of Systems:   A comprehensive review of systems was negative except for that written in the HPI.     Objective:   Physical Exam:     Visit Vitals  /74 (BP 1 Location: Right arm, BP Patient Position: Supine)   Pulse 67   Temp 98.4 °F (36.9 °C)   Resp 20   Ht 5' 9\" (1.753 m)   Wt 81.8 kg (180 lb 5.4 oz)   SpO2 96%   BMI 26.63 kg/m²    O2 Flow Rate (L/min): 6 l/min O2 Device: Nasal cannula    Temp (24hrs), Av.3 °F (36.8 °C), Min:98 °F (36.7 °C), Max:98.7 °F (37.1 °C)    No intake/output data recorded.  1901 -  0700  In: -   Out: 9414 [Urine:3250]    PHYSICAL EXAM:  General: Alert and awake. Skin: Extremities and face reveal no rashes. HEENT: Sclerae anicteric. Extra-occular muscles are intact. No oral ulcers. No ENT discharge. The neck is supple. Cardiovascular: Regular rate and rhythm. No murmurs, gallops, or rubs. PMI nondisplaced. Carotids without bruits. Respiratory: Comfortable breathing with no accessory muscle use. Clear breath sounds with no wheezes, rales, or rhonchi. GI: Abdomen nondistended, soft, and nontender. Normal active bowel sounds. No enlargement of the liver or spleen. No masses palpable. Rectal: Deferred   Musculoskeletal: No pitting edema of the lower legs. Extremities have good range of motion. No costovertebral tenderness. Neurological: Gross memory appears intact. Patient is alert and oriented. Power 5/5, Cranial nerves II-XII intact  Psychiatric: Mood appears appropriate with judgement intact. Data Review:       Recent Days:  No results for input(s): WBC, HGB, HCT, PLT, HGBEXT, HCTEXT, PLTEXT, HGBEXT, HCTEXT, PLTEXT in the last 72 hours. No results for input(s): NA, K, CL, CO2, GLU, BUN, CREA, CA, MG, PHOS, ALB, TBIL, TBILI, ALT, INR, INREXT, INREXT in the last 72 hours. No lab exists for component: SGOT  Recent Labs     20  0400   PH 7.45   PCO2 42   PO2 81   HCO3 28*   FIO2 50       24 Hour Results:  No results found for this or any previous visit (from the past 24 hour(s)). XR CHEST PORT   Final Result   Impression: No significant interval change. XR CHEST PORT   Final Result   IMPRESSION: Shallower depth of inspiration with worsening bilateral airspace   disease/edema. XR CHEST PORT   Final Result   Impression: No significant interval change. XR CHEST PORT   Final Result   Findings/impression: A portable upright view of the chest demonstrates   persistent cardiomegaly and patchy interstitial and mild airspace disease which   has improved slightly on the right. No pneumothorax or effusion is identified. The osseous structures are unchanged. XR CHEST PORT   Final Result      XR CHEST PORT   Final Result   IMPRESSION: Improving bilateral airspace disease. XR CHEST PORT   Final Result      XR CHEST PORT   Final Result      XR CHEST PORT   Final Result      XR CHEST PORT   Final Result      CTA CHEST W OR W WO CONT   Final Result   IMPRESSION: Widespread patchy bilateral pneumonitis      XR CHEST SNGL V   Final Result      XR CHEST PORT    (Results Pending)          Assessment/     Problem List:  Hospital Problems  Date Reviewed: 10/15/2020          Codes Class Noted POA    Acute respiratory failure due to COVID-19 [facility]) ICD-10-CM: U07.1, J96.00  ICD-9-CM: 518.81, 079.89  10/15/2020 Yes        Pneumonia ICD-10-CM: J18.9  ICD-9-CM: 890  10/15/2020 Yes        Acute respiratory failure ([address] ICD-10-CM: J96.00  ICD-9-CM: 518.81  10/15/2020 Unknown                     Plan:    (1) Acute hypoxic respiratory failurelikely multifactorial, secondary to COVID-19 pneumonia/pneumonitis, currently patient successfully titrated off of high flow, remains on nasal cannula 4 L, likely secondary to scarring from pneumonitis/pneumonia  Continue methylprednisolone twice daily  Continue nasal cannula at this time  Continue Lasix 40 mg once daily, patient is diuresing well at this time, patient is net -2.9 L in past 24 hours.   Pulmonology recommendations appreciated, will continue to follow    (2) HCAP pneumonia and pneumonitis:Status post 14 days of antibiotic coverage  Continue to monitor  Infectious disease recommendations appreciated, will continue to follow    (3) high likelihood of COVID-19 pneumonitis: status post Remdesivir, Decadron, Azithromycin, Actemra,   Currently repeat COVID-19 test negative  Continue to monitor    Dehydrationcurrently resolved    PpxLovenox  Full code, patient surrogate decision-maker is his wife, updated patient's family dispositioncontinued inpatient treatment, for inpatient rehab, likely in 25 to 50 hours     Care Plan discussed with: Patient/Family and Nurse    Called Patients daughter Jeimy Cortez Ph- [phone]    Total non-critical care time 35 minutes    Landy Hashimoto, MD

## 2020-11-04 NOTE — ROUTINE PROCESS
Bedside and Verbal shift change report given to Jourdan Arevalo (oncoming nurse) by Surya Perez (offgoing nurse). Report included the following information SBAR and MAR.

## 2020-11-04 NOTE — PROGRESS NOTES
Cm spoke with pt's daughter Ni Sheppard [phone]. PT is rec. IRF. CM discussed and educated IRF. Daughter asked about other options. Cm discussed and educated SNF and home health. Daughter understands her father will not receive that much therapy if he returns home. Daughter states she will speak with her mother and have a discussion with her. Cm explained to her the MD is anticipating discharge in 24-48 hrs. Daughter is agreeable with CM following up with her at 4pm for their decision. Cm called pt's daughter Ni Sheppard after 4pm to f/up on families decision. Daughter would like CM to send a referral to [address]. Referral made via [place] [id number].

## 2020-11-04 NOTE — PROGRESS NOTES
Pulmonary Note      Name: Ekaterina Burkett MRN: [id number]   : 1960 Hospital: [address] [place] [address]   Date: 2020  Admission date: 10/15/2020 Hospital Day:        Subjective/Interval History:   Patient seen in the ICU on high flow nasal oxygen at 70% with oxygen saturation 99. He gives a history of gradual worsening illness over the last week to 10 days with nonproductive cough fever generalized malaise nausea denies any vomiting although the ER note states that he had some vomiting. He seems awake and alert. Does not have any Covid contacts that he is aware of. States that his sense of taste and smell have been normal but terribly anorexic has not eaten anything in 3 or 4 days. 10/17 still feels bad Cough shortness of breath but no nausea today   10/18 still complains of severe cough nonproductive although in general feels a little better  10/20 remains 100% high flow nasal oxygen. Continues to complain of it irritating him. Requested nasal saline last evening but it has not been started will reorder it. We will let him try nonrebreather mask with nasal high flow for meals  10/26 high flow nasal oxygen back up to 70% unable to safely switch to low flow nasal oxygen at this point. He states he feels better sense of taste is normal with a good appetite  10/28 patient had a rapid response last night now he is on 100% high flow nasal cannula   remains on nasal high flow down to 50% today but with getting out of bed to work with physical therapy he immediately desaturates into the 70s very slow to recover   tolerating nasal oxygen at 6 L today with oxygen saturation 92 to 94%  Patient Active Problem List   Diagnosis Code    Acute respiratory failure due to COVID-19 ([facility] U07.1, J96.00    Pneumonia J18.9    Acute respiratory failure ([facility] J96.00       IMPRESSION:   1. Acute hypoxic respiratory failure we will attempt to maintain nasal oxygen 6 L today  2.  History of asthma  3. Community-acquired versus aspiration with diffuse pulmonary infiltrates questionable atypical pneumonia possible COVID-19 repeat testing Negative  4. Nausea probable due to COVID-19 infection  5. Unremitting cough secondary to pneumonia or reflux resolved  Body mass index is 26.63 kg/m². RECOMMENDATIONS/PLAN:   1. Covid pneumonitis  has completed 2 doses Actemra and 5 days of Remdesivir  decreased Decadron to 2 mg oral twice daily repeat CRP pending  2. He is still hypoxic tempting decreased to 6 L nasal oxygen  3. X-ray showed bilateral infiltrate improved on 11/2 x-ray will continue Lasix  4. Repeat CRP in a.m. if remains less than 0.29 will discontinue Decadron  5. Sputum culture with MSSA and Klebsiella both should be covered by Zosyn discontinued after 14 days  6. Continue nebulizer treatment  7. Repeat COVID-19 test negative         Subjective/Initial History:   I have reviewed the flowsheet and previous days notes. Seen earlier today on rounds. I was asked by Kyle Abernathy MD to see An Deidre Denny a 56 y.o.  male  in consultation for a chief complaint of acute hypoxic respiratory failure cough and community-acquired pneumonia          Pt now in CCU. Patient PCP: UNKNOWN  PMH:  has no past medical history on file. PSH:   has no past surgical history on file. FHX: family history includes No Known Problems in his father and mother. SHX:  reports that he has never smoked. He has never used smokeless tobacco. He reports that he does not drink alcohol or use drugs.     Systemic review:  General no chronic illness but over the last week he has had fever generalized malaise weight loss no appetite  Eyes no double vision or momentary blindness  ENT no sinus congestion drainage or facial pain  Skeletal has diffuse aches and pains no swollen tender joints  Endocrinologic no polyuria polydipsia  Neurologic no seizures or syncope  Gastrointestinal normally he has no problems but over this last week he has had nausea anorexia marked weight loss has not had any alteration of his sense of taste or smell  Genitourinary no discomfort or drainage on urination  Cardiovascular no history of heart disease chest pain has felt sweaty but no history of ankle edema.   Respiratory as mentioned above    No Known Allergies   MEDS:   Current Facility-Administered Medications   Medication    benzonatate (TESSALON) capsule 200 mg    guaiFENesin ER (MUCINEX) tablet 1,200 mg    dexAMETHasone (DECADRON) tablet 2 mg    famotidine (PEPCID) tablet 20 mg    enoxaparin (LOVENOX) injection 40 mg    methyl salicylate-menthol (BENGAY) 15-10 % cream    furosemide (LASIX) injection 40 mg    dextromethorphan (DELSYM) 30 mg/5 mL syrup 30 mg    albuterol-ipratropium (DUO-NEB) 2.5 MG-0.5 MG/3 ML    0.9% sodium chloride infusion 250 mL    sodium chloride (OCEAN) 0.65 % nasal squeeze bottle 2 Spray    hydrOXYzine pamoate (VISTARIL) capsule 25 mg    melatonin tablet 10 mg    acetaminophen (TYLENOL) tablet 650 mg    Or    acetaminophen (TYLENOL) suppository 650 mg    sodium chloride (NS) flush 5-40 mL    sodium chloride (NS) flush 5-40 mL    ondansetron (ZOFRAN ODT) tablet 4 mg    Or    ondansetron (ZOFRAN) injection 4 mg        Current Facility-Administered Medications:     benzonatate (TESSALON) capsule 200 mg, 200 mg, Oral, TID PRN, Eri Wahl MD    guaiFENesin ER [facility]) tablet 1,200 mg, 1,200 mg, Oral, Q12H, Eri Wahl MD, 1,200 mg at 11/04/20 0837    dexAMETHasone (DECADRON) tablet 2 mg, 2 mg, Oral, Q12H, Eri Wahl MD, 2 mg at 11/04/20 0837    famotidine (PEPCID) tablet 20 mg, 20 mg, Oral, BID, Adryan Aguillon MD, 20 mg at 11/04/20 0837    enoxaparin (LOVENOX) injection 40 mg, 40 mg, SubCUTAneous, Q12H, Adryan Aguillon MD, 40 mg at 11/04/20 8141    methyl salicylate-menthol (BENGAY) 15-10 % cream, , Topical, TID PRN, Ness Valle PA-C    furosemide (LASIX) injection 40 mg, 40 mg, IntraVENous, DAILY, Arnoldo Crump MD, 40 mg at 20 0837    dextromethorphan (DELSYM) 30 mg/5 mL syrup 30 mg, 30 mg, Oral, Q12H PRN, Arnoldo Crump MD, 30 mg at 20    albuterol-ipratropium (DUO-NEB) 2.5 MG-0.5 MG/3 ML, 3 mL, Nebulization, Q6H RT, Kannan Conde MD, 3 mL at 20 0816    0.9% sodium chloride infusion 250 mL, 250 mL, IntraVENous, PRN, Mary Payton MD, Last Rate: 15 mL/hr at 20 0156, 250 mL at 20 0156    sodium chloride (OCEAN) 0.65 % nasal squeeze bottle 2 Spray, 2 Spray, Both Nostrils, Q4HWA, Arnoldo Crump MD, 2 [place] at 20 0781    hydrOXYzine pamoate (VISTARIL) capsule 25 mg, 25 mg, Oral, TID PRN, Lou Cruz MD, 25 mg at 20    melatonin tablet 10 mg, 10 mg, Oral, QHS PRN, Lou Cruz MD, 10 mg at 20    acetaminophen (TYLENOL) tablet 650 mg, 650 mg, Oral, Q6H PRN, 650 mg at 10/18/20 2205 **OR** acetaminophen (TYLENOL) suppository 650 mg, 650 mg, Rectal, Q6H PRN, Reji Canales MD    sodium chloride (NS) flush 5-40 mL, 5-40 mL, IntraVENous, Q8H, Reji Canales MD, 10 mL at 20 0520    sodium chloride (NS) flush 5-40 mL, 5-40 mL, IntraVENous, PRN, Reji Canales MD, 10 mL at 10/22/20 0938    ondansetron (ZOFRAN ODT) tablet 4 mg, 4 mg, Oral, Q6H PRN **OR** ondansetron (ZOFRAN) injection 4 mg, 4 mg, IntraVENous, Q6H PRN, Reji Canales MD      Objective:     Vital Signs: Telemetry:    normal sinus rhythm Intake/Output:   Visit Vitals  /74 (BP 1 Location: Right arm, BP Patient Position: Supine)   Pulse 67   Temp 98.4 °F (36.9 °C)   Resp 20   Ht 5' 9\" (1.753 m)   Wt 81.8 kg (180 lb 5.4 oz)   SpO2 96%   BMI 26.63 kg/m²       Temp (24hrs), Av.4 °F (36.9 °C), Min:98 °F (36.7 °C), Max:98.7 °F (37.1 °C)        O2 Device: Nasal cannula O2 Flow Rate (L/min): 6 l/min         Body mass index is 26.63 kg/m².     Wt Readings from Last 4 Encounters:   10/21/20 81.8 kg (180 lb 5.4 oz)          Intake/Output Summary (Last 24 hours) at 11/4/2020 1132  Last data filed at 11/4/2020 0725  Gross per 24 hour   Intake    Output 2951 ml   Net -2951 ml       Last shift:      No intake/output data recorded. Last 3 shifts: 11/02 1901 - 11/04 0700  In: -   Out: 2920 [Urine:3250]     Physical Exam:    General:  male; less distress no cough while I am in the room  HEENT: NCAT, oral mucosa clear  Eyes: anicteric; conjunctiva clear extraocular movements intact   neck: no node neck veins not visible  Chest: no deformity,   Cardiac: Regular rate and rhythm no murmurs trace ankle edema  Lungs: Clear anteriorly laterally still has rales posteriorly improved  Abd: Soft nontender normal bowel sounds no organomegaly  Ext: Mild edema; no joint swelling;  No clubbing  :clear urine  Neuro: Awake alert speech is clear moves all 4 extremities well  Psych- no agitation, oriented to person;   Skin: warm,, no cyanosis; very mildly diaphoretic  Pulses: Pulses intact  Capillary: Normal capillary refill      Labs:  11/250% nasal high flow with PO2 81 PCO2 42 pH 7.45  11/2 with standing and walking from bed to chair with physical therapy oxygen saturation drops to 72%  10/14 COVID-19 test negative  10/17 COVID-19 test indeterminate  Ferritin 1510, previous 2180  , previous 615, 754  Procalcitonin 0.14  CRP 0.87, previous 1.39, 3.0, 12.4  Lab Results   Component Value Date/Time    Culture result: No growth 7 days 10/20/2020 11:30 AM    Culture result: Heavy Staphylococcus aureus 10/20/2020 08:15 AM    Culture result: Light Klebsiella pneumoniae 10/20/2020 08:15 AM    Culture result: Heavy  Normal respiratory smita   10/20/2020 08:15 AM      Lab Results   Component Value Date/Time     10/15/2020 11:47 PM       Imaging:  I have personally reviewed the patients radiographs and have reviewed the reports:    CXR Results  (Last 48 hours)  11/2 chest x-ray continued diffuse accentuated interstitial markings  10/20 chest x-ray with diffuse patchy infiltrates may be slightly less dense  10/17 chest x-ray unchanged diffuse bilateral infiltrate               10/15/20 1927  XR CHEST SNGL V Final result    Narrative:  1       Mild atelectasis in the bases with upper lungs clear. No effusion or   pneumothorax. Normal heart and no congestion  Disagree to me there is bilateral infiltrate           Results from Hospital Encounter encounter on 10/15/20   XR CHEST PORT    Narrative Chest, frontal view, 11/2/2020    History: Covid-19. Comparison: Including chest 10/27/2020. Findings: The cardiac silhouette is stable. The lungs remain underexpanded. Diffuse opacities in the lungs are not significantly changed. Hydrostatic edema  is possible. No pleural effusion is definitively noted on this single view. No  pneumothorax is identified. The osseous structures are stable. Impression Impression: No significant interval change. XR CHEST PORT    Narrative Portable upright radiograph chest 7:34 AM compared to October 26, 2020. INDICATION: Cough, pneumonia. Patient has taken a shallow depth of inspiration. Heart size is stable. Bilateral airspace disease/edema shows worsening compared to prior study. No  pneumothorax. Cannot exclude small effusions. No acute bony changes. Impression IMPRESSION: Shallower depth of inspiration with worsening bilateral airspace  disease/edema. XR CHEST PORT    Narrative Chest, frontal view, 10/26/2020    History: Cough. Pneumonia. Comparison: Including chest 10/25/2020. Findings: The cardiac silhouette is stable. The lungs remain underexpanded. Airspace opacities in the lungs are not significantly changed as compared to the  study performed one day prior. Hydrostatic edema is not excluded. No pleural  effusion or pneumothorax is identified. The osseous structures are stable. Impression Impression: No significant interval change.      Results from [facility] encounter on 10/15/20   CTA CHEST W OR W WO CONT    Narrative CT dose reduction was achieved through use of a standardized protocol tailored  for this examination and automatic exposure control for dose modulation. Contrast study maximum intensity projections shows pronounced groundglass  opacification, of variable density, mainly in the dependent portions of each  lung, apex to base. Air bronchograms are preserved in the densest portions. Mild  geographic groundglass opacification of lower density in the nondependent  portions. Central airways are open    Pulmonary arteries are well-opacified and show no filling defect or distortion. Aorta shows normal dimensions, without dissection. Tiny middle mediastinal lymph  nodes. Cardiac finding. No pleural pericardial effusion. No significant upper  abdominal or chest wall finding      Impression IMPRESSION: Widespread patchy bilateral pneumonitis       Clinical picture consistent with COVID-19 infection with pneumonitis with diffuse patchy pulmonary infiltrate elevation in ferritin LDH and CRP nonproductive cough and GI symptoms and yet initial COVID-19 test negative repeat testing indeterminate              Time spent 30 min       Thank you for allowing us to participate in the care of this patient.   We will follow along with you    Brittany Tom MD

## 2020-11-04 NOTE — PROGRESS NOTES
Progress Note  Date:2020       Room:[facility]  Patient Name:Ekaterina Gilliland     YOB: 1960     Age:60 y.o. Subjective    Subjective   Patient followed for presumptive Covid-19 pneumoniitis with indeterminate results, now status post Remdesivir and Actemra, still on Decadron. No CXR today. He is afebrile with leukocytosis attributed to steroids. He has completed course of Zosyn for HCAP. He is now on nasal O2 cannula. Subjectively improved with no cough or sputum production. Objective         Vitals Last 24 Hours:  TEMPERATURE:  Temp  Av.4 °F (36.9 °C)  Min: 98 °F (36.7 °C)  Max: 98.7 °F (37.1 °C)  RESPIRATIONS RANGE: Resp  Av.3  Min: 18  Max: 20  PULSE OXIMETRY RANGE: SpO2  Av %  Min: 93 %  Max: 97 %  PULSE RANGE: Pulse  Av  Min: 67  Max: 106  BLOOD PRESSURE RANGE: Systolic (65DCB), EYQ:252 , Min:119 , AWU:482   ; Diastolic (79KQI), RJI:89, Min:74, Max:92    I/O (24Hr): Intake/Output Summary (Last 24 hours) at 2020 1129  Last data filed at 2020 5047  Gross per 24 hour   Intake    Output 2951 ml   Net -2951 ml     Objective:  Vital signs: (most recent): Blood pressure 119/74, pulse 67, temperature 98.4 °F (36.9 °C), resp. rate 20, height 5' 9\" (1.753 m), weight 180 lb 5.4 oz (81.8 kg), SpO2 96 %. General: Mildy ill-appearing male, NAD  HEENT: eyes open, nasal O2  Neck: supple  Lungs: clear bilaterally  Heart: RRR  : Granados catheter with moderate urine sediment    Labs/Imaging/Diagnostics    Labs:  CBC:  No results for input(s): WBC, RBC, HGB, HCT, MCV, RDW, PLT, HGBEXT, HCTEXT, PLTEXT, HGBEXT, HCTEXT, PLTEXT in the last 72 hours. CHEMISTRIES:  No results for input(s): NA, K, CL, CO2, BUN, CA, PHOS, MG in the last 72 hours. No lab exists for component: CREATININE, GLUCOSEPT/INR:  No results for input(s): INR, INREXT, INREXT in the last 72 hours. No lab exists for component: PROTIME  APTT:  No results for input(s):  APTT in the last 72 hours. LIVER PROFILE:  No results for input(s): AST, ALT in the last 72 hours. No lab exists for component: MIKEY HowePHOS  Lab Results   Component Value Date/Time    ALT (SGPT) 42 10/25/2020 08:00 AM    AST (SGOT) 19 10/25/2020 08:00 AM    Alk. phosphatase 97 10/25/2020 08:00 AM    Bilirubin, total 1.2 (H) 10/25/2020 08:00 AM     WBC 11,100   (403 (405 (689 (452 (565  Ferritin 1,519 (1,084 (1,187  CRP <0.29 (0.43 (12.40    Sputum culture (10/20) MSSA and Klebsiella pneumoniae  Blood cultures (10/20) No growth FINAL    Imaging Last 24 Hours:  No results found. Assessment//Plan   Active Problems:    Acute respiratory failure due to COVID-19 [facility]) (10/15/2020)      Pneumonia (10/15/2020)      Acute respiratory failure ([facility] (10/15/2020)      Assessment & Plan  1. Probable Covid-19 pneumonitis, with indeterminate result, status post Remdesivir, Decadron, Azithromycin, Actemra, unchanged. 2. Acute respiratory failure, on high flow nasal O2  3. Elevated CRP, LDH and ferritin, secondary to #1, decreasing. 4. HCAP with purulent sputum, secondary to MSSA and Klebsiella pneumoniae, status post 14 days of IV Zosyn.     1. No further treatment recommendations at this time. 2. In am, repeat LDH, ferritin, done  3.  Will continue to follow      Electronically signed by Filemon Cowan MD on 11/4/2020 at 1:43 PM

## 2020-11-04 NOTE — PROGRESS NOTES
Spiritual Care Assessment/Progress Note  [facility]      NAME: Ekaterina Leigh      MRN: [id number]  AGE: 56 y.o. SEX: male  Orthodox Affiliation: Unknown   Language: English     11/4/2020     Total Time (in minutes): 48     Spiritual Assessment begun in [address] through conversation with:         [x]Patient        [] Family    [] Friend(s)        Reason for Consult:  Follow-up, routine     Spiritual beliefs: (Please include comment if needed)     [x] Identifies with a martin tradition:         [] Supported by a martin community:            [] Claims no spiritual orientation:           [] Seeking spiritual identity:                [] Adheres to an individual form of spirituality:           [] Not able to assess:                           Identified resources for coping:      [] Prayer                               [] Music                  [] Guided Imagery     [x] Family/friends                 [] Pet visits     [] Devotional reading                         [] Unknown     [] Other:                                          Interventions offered during this visit: (See comments for more details)    Patient Interventions: Affirmation of emotions/emotional suffering, Catharsis/review of pertinent events in supportive environment, Affirmation of martin, Coping skills reviewed/reinforced, Bridging, Life review/legacy, Normalization of emotional/spiritual concerns, Prayer (assurance of), Reframing, Orthodox beliefs/image of God discussed     Family/Friend(s): Bridging     Plan of Care:     [] Support spiritual and/or cultural needs    [] Support AMD and/or advance care planning process      [] Support grieving process   [] Coordinate Rites and/or Rituals    [] Coordination with community clergy   [] No spiritual needs identified at this time   [] Detailed Plan of Care below (See Comments)  [] Make referral to Music Therapy  [] Make referral to Pet Therapy     [] Make referral to Addiction services  [] Make referral to [facility]  [] Make referral to Spiritual Care Partner  [] No future visits requested        [x] Follow up visits as needed     Comments:  Visited the patient per his request for regular visits. Only the patient was present during the visit. Patient shared a great deal about his family, especially family dynamics with his daughters such as expectations and communications issues. Patient seemed to care a great deal about his relationships with his children and their future well being. Patient seemed curious about the follow up treatment which was suggested by staff. I provided empathic and reflective listening as well as reframing of his situation with his children due to their generational and cultural differences. I facilitated story telling and he seemed helped by the space provided to him to share. He seemed to think he will be transferred to another facility and until I will attempt to support him as able and/or needed. Chaplain Chloé Gibbons M.Div.    can be reached by calling the  at [facility]  [phone]

## 2020-11-05 PROCEDURE — 74011250636 HC RX REV CODE- 250/636: Performed by: INTERNAL MEDICINE

## 2020-11-05 PROCEDURE — 65270000029 HC RM PRIVATE

## 2020-11-05 PROCEDURE — 74011250637 HC RX REV CODE- 250/637: Performed by: HOSPITALIST

## 2020-11-05 PROCEDURE — 74011250636 HC RX REV CODE- 250/636: Performed by: HOSPITALIST

## 2020-11-05 PROCEDURE — 94762 N-INVAS EAR/PLS OXIMTRY CONT: CPT

## 2020-11-05 PROCEDURE — 74011250637 HC RX REV CODE- 250/637: Performed by: INTERNAL MEDICINE

## 2020-11-05 PROCEDURE — 99232 SBSQ HOSP IP/OBS MODERATE 35: CPT | Performed by: INTERNAL MEDICINE

## 2020-11-05 PROCEDURE — 77010033678 HC OXYGEN DAILY

## 2020-11-05 PROCEDURE — 94640 AIRWAY INHALATION TREATMENT: CPT

## 2020-11-05 PROCEDURE — 74011000250 HC RX REV CODE- 250: Performed by: INTERNAL MEDICINE

## 2020-11-05 RX ORDER — DEXAMETHASONE 4 MG/1
2 TABLET ORAL DAILY
Status: DISCONTINUED | OUTPATIENT
Start: 2020-11-06 | End: 2020-11-07

## 2020-11-05 RX ADMIN — SALINE NASAL SPRAY 2 SPRAY: 1.5 SOLUTION NASAL at 21:06

## 2020-11-05 RX ADMIN — GUAIFENESIN 1200 MG: 600 TABLET, EXTENDED RELEASE ORAL at 08:45

## 2020-11-05 RX ADMIN — Medication 10 ML: at 21:05

## 2020-11-05 RX ADMIN — IPRATROPIUM BROMIDE AND ALBUTEROL SULFATE 3 ML: .5; 3 SOLUTION RESPIRATORY (INHALATION) at 20:25

## 2020-11-05 RX ADMIN — MENTHOL, METHYL SALICYLATE: 10; 15 CREAM TOPICAL at 18:29

## 2020-11-05 RX ADMIN — IPRATROPIUM BROMIDE AND ALBUTEROL SULFATE 3 ML: .5; 3 SOLUTION RESPIRATORY (INHALATION) at 02:13

## 2020-11-05 RX ADMIN — IPRATROPIUM BROMIDE AND ALBUTEROL SULFATE 3 ML: .5; 3 SOLUTION RESPIRATORY (INHALATION) at 14:39

## 2020-11-05 RX ADMIN — Medication 10 ML: at 05:50

## 2020-11-05 RX ADMIN — FAMOTIDINE 20 MG: 20 TABLET ORAL at 08:45

## 2020-11-05 RX ADMIN — ENOXAPARIN SODIUM 40 MG: 40 INJECTION SUBCUTANEOUS at 18:23

## 2020-11-05 RX ADMIN — DEXAMETHASONE 2 MG: 4 TABLET ORAL at 08:45

## 2020-11-05 RX ADMIN — MENTHOL, METHYL SALICYLATE: 10; 15 CREAM TOPICAL at 10:42

## 2020-11-05 RX ADMIN — SALINE NASAL SPRAY 2 SPRAY: 1.5 SOLUTION NASAL at 13:11

## 2020-11-05 RX ADMIN — IPRATROPIUM BROMIDE AND ALBUTEROL SULFATE 3 ML: .5; 3 SOLUTION RESPIRATORY (INHALATION) at 11:14

## 2020-11-05 RX ADMIN — FUROSEMIDE 40 MG: 10 INJECTION, SOLUTION INTRAMUSCULAR; INTRAVENOUS at 08:46

## 2020-11-05 RX ADMIN — SALINE NASAL SPRAY 2 SPRAY: 1.5 SOLUTION NASAL at 18:24

## 2020-11-05 RX ADMIN — SALINE NASAL SPRAY 2 SPRAY: 1.5 SOLUTION NASAL at 10:38

## 2020-11-05 RX ADMIN — ENOXAPARIN SODIUM 40 MG: 40 INJECTION SUBCUTANEOUS at 05:46

## 2020-11-05 RX ADMIN — FAMOTIDINE 20 MG: 20 TABLET ORAL at 21:05

## 2020-11-05 RX ADMIN — Medication 10 ML: at 13:12

## 2020-11-05 RX ADMIN — SALINE NASAL SPRAY 2 SPRAY: 1.5 SOLUTION NASAL at 05:50

## 2020-11-05 NOTE — PROGRESS NOTES
Hospitalist Progress Note               Daily Progress Note: 11/5/2020      Subjective: The patient is seen for follow  up. Patient seen and evaluated at bedside, no acute events overnight, of note patient successfully titrated down from high flow yesterday, doing well on NC, discussed with RN events at bedside. Medications reviewed  Current Facility-Administered Medications   Medication Dose Route Frequency    benzonatate (TESSALON) capsule 200 mg  200 mg Oral TID PRN    guaiFENesin ER (MUCINEX) tablet 1,200 mg  1,200 mg Oral Q12H    dexAMETHasone (DECADRON) tablet 2 mg  2 mg Oral Q12H    famotidine (PEPCID) tablet 20 mg  20 mg Oral BID    enoxaparin (LOVENOX) injection 40 mg  40 mg SubCUTAneous J34G    methyl salicylate-menthol (BENGAY) 15-10 % cream   Topical TID PRN    furosemide (LASIX) injection 40 mg  40 mg IntraVENous DAILY    dextromethorphan (DELSYM) 30 mg/5 mL syrup 30 mg  30 mg Oral Q12H PRN    albuterol-ipratropium (DUO-NEB) 2.5 MG-0.5 MG/3 ML  3 mL Nebulization Q6H RT    0.9% sodium chloride infusion 250 mL  250 mL IntraVENous PRN    sodium chloride (OCEAN) 0.65 % nasal squeeze bottle 2 Spray  2 [place] Both Nostrils Q4HWA    hydrOXYzine pamoate (VISTARIL) capsule 25 mg  25 mg Oral TID PRN    melatonin tablet 10 mg  10 mg Oral QHS PRN    acetaminophen (TYLENOL) tablet 650 mg  650 mg Oral Q6H PRN    Or    acetaminophen (TYLENOL) suppository 650 mg  650 mg Rectal Q6H PRN    sodium chloride (NS) flush 5-40 mL  5-40 mL IntraVENous Q8H    sodium chloride (NS) flush 5-40 mL  5-40 mL IntraVENous PRN    ondansetron (ZOFRAN ODT) tablet 4 mg  4 mg Oral Q6H PRN    Or    ondansetron (ZOFRAN) injection 4 mg  4 mg IntraVENous Q6H PRN       Review of Systems:   A comprehensive review of systems was negative except for that written in the HPI.     Objective:   Physical Exam:     Visit Vitals  /81   Pulse 76   Temp 97.8 °F (36.6 °C)   Resp 20   Ht 5' 9\" (1.753 m)   Wt 81.8 kg (180 lb 5.4 oz)   SpO2 98%   BMI 26.63 kg/m²    O2 Flow Rate (L/min): 6 l/min O2 Device: Nasal cannula    Temp (24hrs), Av.3 °F (36.8 °C), Min:97.8 °F (36.6 °C), Max:99 °F (37.2 °C)    No intake/output data recorded.  1901 -  0700  In: 900 [P.O.:900]  Out: 5551 [Urine:5550]    PHYSICAL EXAM:  General: Alert and awake. Skin: Extremities and face reveal no rashes. HEENT: Sclerae anicteric. Extra-occular muscles are intact. No oral ulcers. No ENT discharge. The neck is supple. Cardiovascular: Regular rate and rhythm. No murmurs, gallops, or rubs. PMI nondisplaced. Carotids without bruits. Respiratory: Comfortable breathing with no accessory muscle use. Clear breath sounds with no wheezes, rales, or rhonchi. GI: Abdomen nondistended, soft, and nontender. Normal active bowel sounds. No enlargement of the liver or spleen. No masses palpable. Rectal: Deferred   Musculoskeletal: No pitting edema of the lower legs. Extremities have good range of motion. No costovertebral tenderness. Neurological: Gross memory appears intact. Patient is alert and oriented. Power 5/5, Cranial nerves II-XII intact  Psychiatric: Mood appears appropriate with judgement intact. Data Review:       Recent Days:  No results for input(s): WBC, HGB, HCT, PLT, HGBEXT, HCTEXT, PLTEXT, HGBEXT, HCTEXT, PLTEXT in the last 72 hours. No results for input(s): NA, K, CL, CO2, GLU, BUN, CREA, CA, MG, PHOS, ALB, TBIL, TBILI, ALT, INR, INREXT, INREXT in the last 72 hours. No lab exists for component: SGOT  No results for input(s): PH, PCO2, PO2, HCO3, FIO2 in the last 72 hours. 24 Hour Results:  No results found for this or any previous visit (from the past 24 hour(s)). XR CHEST PORT   Final Result      XR CHEST PORT   Final Result   Impression: No significant interval change. XR CHEST PORT   Final Result   IMPRESSION: Shallower depth of inspiration with worsening bilateral airspace   disease/edema.       XR CHEST PORT Final Result   Impression: No significant interval change. XR CHEST PORT   Final Result   Findings/impression: A portable upright view of the chest demonstrates   persistent cardiomegaly and patchy interstitial and mild airspace disease which   has improved slightly on the right. No pneumothorax or effusion is identified. The osseous structures are unchanged. XR CHEST PORT   Final Result      XR CHEST PORT   Final Result   IMPRESSION: Improving bilateral airspace disease. XR CHEST PORT   Final Result      XR CHEST PORT   Final Result      XR CHEST PORT   Final Result      XR CHEST PORT   Final Result      CTA CHEST W OR W WO CONT   Final Result   IMPRESSION: Widespread patchy bilateral pneumonitis      XR CHEST SNGL V   Final Result             Assessment/     Problem List:  Hospital Problems  Date Reviewed: 10/15/2020          Codes Class Noted POA    Acute respiratory failure due to COVID-19 [facility]) ICD-10-CM: U07.1, J96.00  ICD-9-CM: 518.81, 079.89  10/15/2020 Yes        Pneumonia ICD-10-CM: J18.9  ICD-9-CM: 513  10/15/2020 Yes        Acute respiratory failure ([address] ICD-10-CM: J96.00  ICD-9-CM: 518.81  10/15/2020 Unknown                     Plan:    (1) Acute hypoxic respiratory failurelikely multifactorial, secondary to COVID-19 pneumonia/pneumonitis, currently patient successfully titrated off of high flow yesterday, remains on nasal cannula 4 L, likely secondary to scarring from pneumonitis/pneumonia  Continue methylprednisolone twice daily  Continue nasal cannula at this time  Continue Lasix 40 mg once daily, patient is diuresing well at this time, patient is net -3.3 L in past 24 hours.   Pulmonology recommendations appreciated, will continue to follow    (2) HCAP pneumonia and pneumonitis:Status post 14 days of antibiotic coverage  Continue to monitor  Infectious disease recommendations appreciated, will continue to follow    (3) high likelihood of COVID-19 pneumonitis: status post Remdesivir, Decadron, Azithromycin, Actemra,   Currently repeat COVID-19 test negative  Continue to monitor    Dehydrationcurrently resolved    PpxLovenox  Full code, patient surrogate decision-maker is his wife, updated patient's family dispositionFor IRF placement (discussed with pt/daughter/CM/Specialists), likely in 25 to 48 hours     Care Plan discussed with: Patient/Family and Nurse    Called Patients daughter Dante Dorantes Ph- [phone]    Total non-critical care time 35 minutes    Maria C Meneses MD

## 2020-11-05 NOTE — PROGRESS NOTES
Problem: Falls - Risk of  Goal: *Absence of Falls  Description: Document Jessica Howard Fall Risk and appropriate interventions in the flowsheet.   Outcome: Progressing Towards Goal  Note: Fall Risk Interventions:  Mobility Interventions: Assess mobility with egress test, Bed/chair exit alarm, Communicate number of staff needed for ambulation/transfer, Mechanical lift, OT consult for ADLs, Patient to call before getting OOB, PT Consult for mobility concerns, PT Consult for assist device competence, Strengthening exercises (ROM-active/passive), Utilize walker, cane, or other assistive device, Utilize gait belt for transfers/ambulation         Medication Interventions: Assess postural VS orthostatic hypotension, Evaluate medications/consider consulting pharmacy, Patient to call before getting OOB, Teach patient to arise slowly, Utilize gait belt for transfers/ambulation    Elimination Interventions: Bed/chair exit alarm, Call light in reach, Elevated toilet seat, Patient to call for help with toileting needs, Stay With Me (per policy), Toilet paper/wipes in reach, Toileting schedule/hourly rounds, Urinal in reach

## 2020-11-05 NOTE — PROGRESS NOTES
Pulmonary Note      Name: Ekaterina Siddiqui MRN: [id number]   : 1960 Hospital: [facility]   Date: 2020  Admission date: 10/15/2020 Hospital Day:        Subjective/Interval History:   Patient seen in the ICU on high flow nasal oxygen at 70% with oxygen saturation 99. He gives a history of gradual worsening illness over the last week to 10 days with nonproductive cough fever generalized malaise nausea denies any vomiting although the ER note states that he had some vomiting. He seems awake and alert. Does not have any Covid contacts that he is aware of. States that his sense of taste and smell have been normal but terribly anorexic has not eaten anything in 3 or 4 days. 10/17 still feels bad Cough shortness of breath but no nausea today   10/18 still complains of severe cough nonproductive although in general feels a little better  10/20 remains 100% high flow nasal oxygen. Continues to complain of it irritating him. Requested nasal saline last evening but it has not been started will reorder it. We will let him try nonrebreather mask with nasal high flow for meals  10/26 high flow nasal oxygen back up to 70% unable to safely switch to low flow nasal oxygen at this point. He states he feels better sense of taste is normal with a good appetite  10/28 patient had a rapid response last night now he is on 100% high flow nasal cannula  /2 remains on nasal high flow down to 50% today but with getting out of bed to work with physical therapy he immediately desaturates into the 70s very slow to recover  / has been on 6 L nasal oxygen now for 2 days with no significant desaturation  Patient Active Problem List   Diagnosis Code    Acute [facility] due to [facility]-19 [facility] U07.1, J96.00    Pneumonia J18.9    Acute respiratory failure ([facility] J96.00       IMPRESSION:   1. Acute hypoxic respiratory failure  maintain nasal oxygen 6 L today  2.  History of asthma  3. Community-acquired versus aspiration with diffuse pulmonary infiltrates questionable atypical pneumonia possible COVID-19 repeat testing Negative  4. Nausea probable due to COVID-19 infection  5. Unremitting cough secondary to pneumonia or reflux resolved  Body mass index is 26.63 kg/m². RECOMMENDATIONS/PLAN:   1. Covid pneumonitis  has completed 2 doses Actemra and 5 days of Remdesivir  decreased Decadron to 2 mg oral twice daily repeat CRP pending will decrease to single daily dosing and follow CRP  2. He is still hypoxic but tolerating 6 L nasal oxygen  3. X-ray showed bilateral infiltrate improved on 11/2 x-ray will continue Lasix  4. Labs have been ordered daily for the last several days with no results being obtained today's labs still pending  5. Sputum culture with MSSA and Klebsiella both should be covered by Zosyn discontinued after 14 days  6. Continue nebulizer treatment  7. Repeat COVID-19 test negative         Subjective/Initial History:   I have reviewed the flowsheet and previous days notes. Seen earlier today on rounds. I was asked by Isamar Mendosa MD to see An Shahida Rash a 56 y.o.  male  in consultation for a chief complaint of acute hypoxic respiratory failure cough and community-acquired pneumonia          Pt now in CCU. Patient PCP: UNKNOWN  PMH:  has no past medical history on file. PSH:   has no past surgical history on file. FHX: family history includes No Known Problems in his father and mother. SHX:  reports that he has never smoked. He has never used smokeless tobacco. He reports that he does not drink alcohol or use drugs.     Systemic review:  General no chronic illness but over the last week he has had fever generalized malaise weight loss no appetite  Eyes no double vision or momentary blindness  ENT no sinus congestion drainage or facial pain  Skeletal has diffuse aches and pains no swollen tender joints  Endocrinologic no polyuria polydipsia  Neurologic no seizures or syncope  Gastrointestinal normally he has no problems but over this last week he has had nausea anorexia marked weight loss has not had any alteration of his sense of taste or smell  Genitourinary no discomfort or drainage on urination  Cardiovascular no history of heart disease chest pain has felt sweaty but no history of ankle edema.   Respiratory as mentioned above    No Known Allergies   MEDS:   Current Facility-Administered Medications   Medication    benzonatate (TESSALON) capsule 200 mg    guaiFENesin ER (MUCINEX) tablet 1,200 mg    dexAMETHasone (DECADRON) tablet 2 mg    famotidine (PEPCID) tablet 20 mg    enoxaparin (LOVENOX) injection 40 mg    methyl salicylate-menthol (BENGAY) 15-10 % cream    furosemide (LASIX) injection 40 mg    dextromethorphan (DELSYM) 30 mg/5 mL syrup 30 mg    albuterol-ipratropium (DUO-NEB) 2.5 MG-0.5 MG/3 ML    0.9% sodium chloride infusion 250 mL    sodium chloride (OCEAN) 0.65 % nasal squeeze bottle 2 Spray    hydrOXYzine pamoate (VISTARIL) capsule 25 mg    melatonin tablet 10 mg    acetaminophen (TYLENOL) tablet 650 mg    Or    acetaminophen (TYLENOL) suppository 650 mg    sodium chloride (NS) flush 5-40 mL    sodium chloride (NS) flush 5-40 mL    ondansetron (ZOFRAN ODT) tablet 4 mg    Or    ondansetron (ZOFRAN) injection 4 mg        Current Facility-Administered Medications:     benzonatate (TESSALON) capsule 200 mg, 200 mg, Oral, TID PRN, Nichole Kline MD    guaiFENesin ER [facility]) tablet 1,200 mg, 1,200 mg, Oral, Q12H, Nichole Kline MD, 1,200 mg at 11/05/20 0845    dexAMETHasone (DECADRON) tablet 2 mg, 2 mg, Oral, Q12H, Nichole Kline MD, 2 mg at 11/05/20 0845    famotidine (PEPCID) tablet 20 mg, 20 mg, Oral, BID, Dakotah Woodard MD, 20 mg at 11/05/20 0845    enoxaparin (LOVENOX) injection 40 mg, 40 mg, SubCUTAneous, Q12H, Dakotah Woodard MD, 40 mg at 11/05/20 5299    methyl salicylate-menthol (Erasmo Ordaz) 15-10 % cream, , Topical, TID PRN, Ness Valle PA-C    furosemide (LASIX) injection 40 mg, 40 mg, IntraVENous, DAILY, Nichole Kline MD, 40 mg at 20 0846    dextromethorphan (DELSYM) 30 mg/5 mL syrup 30 mg, 30 mg, Oral, Q12H PRN, Nichole Kline MD, 30 mg at 20    albuterol-ipratropium (DUO-NEB) 2.5 MG-0.5 MG/3 ML, 3 mL, Nebulization, Q6H RT, Kannan Conde MD, 3 mL at 20 1114    0.9% sodium chloride infusion 250 mL, 250 mL, IntraVENous, PRN, Amaya Sanabria MD, Last Rate: 15 mL/hr at 20 0156, 250 mL at 20 0156    sodium chloride (OCEAN) 0.65 % nasal squeeze bottle 2 Spray, 2 Spray, Both Nostrils, Q4HWA, Nichole Kline MD, 2 [place] at 20 1038    hydrOXYzine pamoate (VISTARIL) capsule 25 mg, 25 mg, Oral, TID PRN, Corey Sparks MD, 25 mg at 20    melatonin tablet 10 mg, 10 mg, Oral, QHS PRN, Corey Sparks MD, 10 mg at 20    acetaminophen (TYLENOL) tablet 650 mg, 650 mg, Oral, Q6H PRN, 650 mg at 10/18/20 2205 **OR** acetaminophen (TYLENOL) suppository 650 mg, 650 mg, Rectal, Q6H PRN, Clayton Garcia MD    sodium chloride (NS) flush 5-40 mL, 5-40 mL, IntraVENous, Q8H, Clayton Garcia MD, 10 mL at 20 0550    sodium chloride (NS) flush 5-40 mL, 5-40 mL, IntraVENous, PRN, Clayton Garcia MD, 10 mL at 10/22/20 0938    ondansetron (ZOFRAN ODT) tablet 4 mg, 4 mg, Oral, Q6H PRN **OR** ondansetron (ZOFRAN) injection 4 mg, 4 mg, IntraVENous, Q6H PRN, Clayton Garcia MD      Objective:     Vital Signs: Telemetry:    normal sinus rhythm Intake/Output:   Visit Vitals  BP (!) 128/91   Pulse (!) 101   Temp 98 °F (36.7 °C)   Resp 18   Ht 5' 9\" (1.753 m)   Wt 81.8 kg (180 lb 5.4 oz)   SpO2 93%   BMI 26.63 kg/m²       Temp (24hrs), Av.3 °F (36.8 °C), Min:97.8 °F (36.6 °C), Max:99 °F (37.2 °C)        O2 Device: Nasal cannula O2 Flow Rate (L/min): 6 l/min         Body mass index is 26.63 kg/m².     Wt Readings from Last 4 Encounters:   10/21/20 81.8 kg (180 lb 5.4 oz)          Intake/Output Summary (Last 24 hours) at 11/5/2020 1122  Last data filed at 11/5/2020 1056  Gross per 24 hour   Intake 300 ml   Output 5200 ml   Net -4900 ml       Last shift:      11/05 0701 - 11/05 1900  In: -   Out: 1000 [Urine:1000]  Last 3 shifts: 11/03 1901 - 11/05 0700  In: 900 [P.O.:900]  Out: 3490 [Urine:5550]     Physical Exam:    General:  male; less distress no cough while I am in the room  HEENT: NCAT, oral mucosa clear  Eyes: anicteric; conjunctiva clear extraocular movements intact   neck: no node neck veins not visible  Chest: no deformity,   Cardiac: Regular rate and rhythm no murmurs trace ankle edema  Lungs: Clear anteriorly laterally still has rales posteriorly improved  Abd: Soft nontender normal bowel sounds no organomegaly  Ext: Mild edema; no joint swelling;  No clubbing  :clear urine  Neuro: Awake alert speech is clear moves all 4 extremities well  Psych- no agitation, oriented to person;   Skin: warm,, no cyanosis; very mildly diaphoretic  Pulses: Pulses intact  Capillary: Normal capillary refill      Labs:  11/250% nasal high flow with PO2 81 PCO2 42 pH 7.45  11/2 with standing and walking from bed to chair with physical therapy oxygen saturation drops to 72%  10/14 COVID-19 test negative  10/17 COVID-19 test indeterminate  Ferritin 1510, previous 2180  , previous 615, 754  Procalcitonin 0.14  CRP 0.87, previous 1.39, 3.0, 12.4  Lab Results   Component Value Date/Time    Culture result: No growth 7 days 10/20/2020 11:30 AM    Culture result: Heavy Staphylococcus aureus 10/20/2020 08:15 AM    Culture result: Light Klebsiella pneumoniae 10/20/2020 08:15 AM    Culture result: Heavy  Normal respiratory smita   10/20/2020 08:15 AM      Lab Results   Component Value Date/Time     10/15/2020 11:47 PM       Imaging:  I have personally reviewed the patients radiographs and have reviewed the reports:    CXR Results (Last 48 hours)  11/2 chest x-ray continued diffuse accentuated interstitial markings  10/20 chest x-ray with diffuse patchy infiltrates may be slightly less dense  10/17 chest x-ray unchanged diffuse bilateral infiltrate               10/15/20 1927  XR CHEST SNGL V Final result    Narrative:  1       Mild atelectasis in the bases with upper lungs clear. No effusion or   pneumothorax. Normal heart and no congestion  Disagree to me there is bilateral infiltrate           Results from Hospital Encounter encounter on 10/15/20   XR CHEST PORT    Narrative Chest single view. Comparison single view chest November 2, 2020    Patchy coarse reticular markings through lungs appear similar compared to prior  imaging. Cardiac and mediastinal structures magnified on this AP view. No  pneumothorax or sizable pleural effusion. XR CHEST PORT    Narrative Chest, frontal view, 11/2/2020    History: Covid-19. Comparison: Including chest 10/27/2020. Findings: The cardiac silhouette is stable. The lungs remain underexpanded. Diffuse opacities in the lungs are not significantly changed. Hydrostatic edema  is possible. No pleural effusion is definitively noted on this single view. No  pneumothorax is identified. The osseous structures are stable. Impression Impression: No significant interval change. XR CHEST PORT    Narrative Portable upright radiograph chest 7:34 AM compared to October 26, 2020. INDICATION: Cough, pneumonia. Patient has taken a shallow depth of inspiration. Heart size is stable. Bilateral airspace disease/edema shows worsening compared to prior study. No  pneumothorax. Cannot exclude small effusions. No acute bony changes. Impression IMPRESSION: Shallower depth of inspiration with worsening bilateral airspace  disease/edema.      Results from [place] encounter on 10/15/20   CTA CHEST W OR W WO CONT    Narrative CT dose reduction was achieved through use of a standardized protocol tailored  for this examination and automatic exposure control for dose modulation. Contrast study maximum intensity projections shows pronounced groundglass  opacification, of variable density, mainly in the dependent portions of each  lung, apex to base. Air bronchograms are preserved in the densest portions. Mild  geographic groundglass opacification of lower density in the nondependent  portions. Central airways are open    Pulmonary arteries are well-opacified and show no filling defect or distortion. Aorta shows normal dimensions, without dissection. Tiny middle mediastinal lymph  nodes. Cardiac finding. No pleural pericardial effusion. No significant upper  abdominal or chest wall finding      Impression IMPRESSION: Widespread patchy bilateral pneumonitis       Clinical picture consistent with COVID-19 infection with pneumonitis with diffuse patchy pulmonary infiltrate elevation in ferritin LDH and CRP nonproductive cough and GI symptoms and yet initial COVID-19 test negative repeat testing indeterminate              Time spent 30 min       Thank you for allowing us to participate in the care of this patient.   We will follow along with you    Chyna Nielson MD

## 2020-11-05 NOTE — PROGRESS NOTES
Progress Note  Date:2020       [place]  Patient Name:Ekaterina Lopze     YOB: 1960     Age:60 y.o. Subjective    Subjective   Patient followed for presumptive Covid-19 pneumoniitis with indeterminate results, now status post Remdesivir and Actemra, still on Decadron. No CXR today. He is afebrile with leukocytosis attributed to steroids. He has completed course of Zosyn for HCAP. He is now on nasal O2 cannula. Currently using nebulizer. Objective         Vitals Last 24 Hours:  TEMPERATURE:  Temp  Av.3 °F (36.8 °C)  Min: 97.8 °F (36.6 °C)  Max: 99 °F (37.2 °C)  RESPIRATIONS RANGE: Resp  Av.3  Min: 16  Max: 20  PULSE OXIMETRY RANGE: SpO2  Av.2 %  Min: 93 %  Max: 98 %  PULSE RANGE: Pulse  Av.4  Min: 76  Max: 109  BLOOD PRESSURE RANGE: Systolic (20ZPT), ZVK:675 , Min:119 , LWV:978   ; Diastolic (54TNH), UHI:21, Min:77, Max:91    I/O (24Hr): Intake/Output Summary (Last 24 hours) at 2020 1110  Last data filed at 2020 1056  Gross per 24 hour   Intake 300 ml   Output 5200 ml   Net -4900 ml     Objective:  Vital signs: (most recent): Blood pressure (!) 128/91, pulse (!) 101, temperature 98 °F (36.7 °C), resp. rate 18, height 5' 9\" (1.753 m), weight 180 lb 5.4 oz (81.8 kg), SpO2 93 %. General: Mildy ill-appearing male, NAD  HEENT: eyes open, nasal O2  Neck: supple  Lungs: clear bilaterally  Heart: RRR  : Granados catheter with moderate urine sediment    Labs/Imaging/Diagnostics    Labs:  CBC:  No results for input(s): WBC, RBC, HGB, HCT, MCV, RDW, PLT, HGBEXT, HCTEXT, PLTEXT, HGBEXT, HCTEXT, PLTEXT in the last 72 hours. CHEMISTRIES:  No results for input(s): NA, K, CL, CO2, BUN, CA, PHOS, MG in the last 72 hours. No lab exists for component: CREATININE, GLUCOSEPT/INR:  No results for input(s): INR, INREXT, INREXT in the last 72 hours. No lab exists for component: PROTIME  APTT:  No results for input(s):  APTT in the last 72 hours.  LIVER PROFILE:  No results for input(s): AST, ALT in the last 72 hours. No lab exists for component: Rolanda Lyme, ALKPHOS  Lab Results   Component Value Date/Time    ALT (SGPT) 42 10/25/2020 08:00 AM    AST (SGOT) 19 10/25/2020 08:00 AM    Alk. phosphatase 97 10/25/2020 08:00 AM    Bilirubin, total 1.2 (H) 10/25/2020 08:00 AM     WBC 11,100   (403 (405 (689 (452 (565  Ferritin 1,519 (1,084 (1,187  CRP <0.29 (0.43 (12.40    Sputum culture (10/20) MSSA and Klebsiella pneumoniae  Blood cultures (10/20) No growth FINAL    Imaging Last 24 Hours:  No results found. Assessment//Plan   Active Problems:    Acute respiratory failure due to COVID-19 [facility]) (10/15/2020)      Pneumonia (10/15/2020)      Acute respiratory failure ([facility] (10/15/2020)      Assessment & Plan  1. Probable Covid-19 pneumonitis, with indeterminate result, status post Remdesivir, Decadron, Azithromycin, Actemra, uclinically resolved. 2. Acute respiratory failure, on nasal O2  3. Elevated CRP, LDH and ferritin, secondary to #1, decreasing. 4. HCAP with purulent sputum, secondary to MSSA and Klebsiella pneumoniae, status post 14 days of IV Zosyn.     1. No further treatment recommendations at this time. 2. Follow-up LDH, ferritin  3.  Will continue to follow      Electronically signed by Greer Noyola MD on 11/5/2020 at 1:43 PM

## 2020-11-05 NOTE — PROGRESS NOTES
Nutrition Assessment     Type and Reason for Visit: RD nutrition re-screen/LOS    Nutrition Recommendations/Plan:   Continue Regular diet     Honor pt food preferences  Continue Ensure Enlive daily    Document PO intakes in EMR    Nutrition Assessment: Body aches, emesis pta. Concern for COVID pna, however COVID negative x2. Rapid response (10/28) pt requiring 100% O2 supplementation. Previously in ICU, transferred to med floor (10/20). Pt continues to have elevated O2 needs, however improving. Currently on 6L NC. Regular diet ordered. Pt previously on Full Lq diet 5x days d/t pt anxiety over desatting with PO per SLP report. RD ordered supplementation of Ensure Enlive TID on Full Lq day 5 d/t concern for inadequate nutrition, however diet advanced to Regular same day per RN. Reduced Ensure Enlive to 1x day on assessment 2x weeks ago. Per EMR and RN, appetite and intakes remain good. Documented as 100% on 11/2, limited intakes documented but RN confirms intake >75% consistent. PO intakes consistently >75%, typically 100%. Noted pt food preferences for items like fruit trays. Hyperglycemia noted, now resolved. Labs and meds reviewed. Malnutrition Assessment:  Malnutrition Status: No malnutrition     Nutrition Related Findings:  Unable to perform NFPE 2/2 COVID unit. Last BM 11/4, soft. No documented n/v or c/d. No dysphagia per SLP eval. No edema. Current Nutrition Therapies:  DIET NUTRITIONAL SUPPLEMENTS Lunch;  Ensure Enlive  DIET REGULAR    Anthropometric Measures:  · Height:  5' 9\" (175.3 cm)  · Current Body Wt:  81.8 kg (180 lb 5.4 oz)  · BMI: 26.6    Nutrition Diagnosis:   No nutrition diagnosis at this time     Nutrition Intervention:  Food and/or Nutrient Delivery: Continue current diet, Continue oral nutrition supplement  Nutrition Education and Counseling: No recommendations at this time  Coordination of Nutrition Care: Continue to monitor while inpatient    Nutrition Monitoring and Evaluation: Behavioral-Environmental Outcomes:  N/A  Food/Nutrient Intake Outcomes: Food and nutrient intake  Physical Signs/Symptoms Outcomes:  N/A    Discharge Planning:    No discharge needs at this time     Electronically signed by Ngozi Waldron on 11/5/2020 at 1:14 PM    Contact Number:

## 2020-11-06 LAB
ALBUMIN SERPL-MCNC: 3.6 G/DL (ref 3.5–5)
ANION GAP SERPL CALC-SCNC: 9 MMOL/L (ref 5–15)
APTT PPP: 24.5 SEC (ref 23–35.7)
BASOPHILS # BLD: 0 K/UL (ref 0–0.1)
BASOPHILS NFR BLD: 0 % (ref 0–1)
BUN SERPL-MCNC: 35 MG/DL (ref 6–20)
BUN/CREAT SERPL: 34 (ref 12–20)
CA-I BLD-MCNC: 9.2 MG/DL (ref 8.5–10.1)
CHLORIDE SERPL-SCNC: 101 MMOL/L (ref 97–108)
CO2 SERPL-SCNC: 25 MMOL/L (ref 21–32)
CREAT SERPL-MCNC: 1.04 MG/DL (ref 0.7–1.3)
CRP SERPL-MCNC: <0.29 MG/DL (ref 0–0.6)
DIFFERENTIAL METHOD BLD: ABNORMAL
EOSINOPHIL # BLD: 0.2 K/UL (ref 0–0.4)
EOSINOPHIL NFR BLD: 2 % (ref 0–7)
ERYTHROCYTE [DISTWIDTH] IN BLOOD BY AUTOMATED COUNT: 14.3 % (ref 11.5–14.5)
GLUCOSE SERPL-MCNC: 155 MG/DL (ref 65–100)
HCT VFR BLD AUTO: 49.4 % (ref 36.6–50.3)
HGB BLD-MCNC: 17.5 G/DL (ref 12.1–17)
IMM GRANULOCYTES # BLD AUTO: 0.1 K/UL (ref 0–0.04)
IMM GRANULOCYTES NFR BLD AUTO: 1 % (ref 0–0.5)
LYMPHOCYTES # BLD: 2.8 K/UL (ref 0.8–3.5)
LYMPHOCYTES NFR BLD: 25 % (ref 12–49)
MCH RBC QN AUTO: 32.4 PG (ref 26–34)
MCHC RBC AUTO-ENTMCNC: 35.4 G/DL (ref 30–36.5)
MCV RBC AUTO: 91.5 FL (ref 80–99)
MONOCYTES # BLD: 0.8 K/UL (ref 0–1)
MONOCYTES NFR BLD: 7 % (ref 5–13)
NEUTS SEG # BLD: 7.3 K/UL (ref 1.8–8)
NEUTS SEG NFR BLD: 65 % (ref 32–75)
PHOSPHATE SERPL-MCNC: 3.4 MG/DL (ref 2.6–4.7)
PLATELET # BLD AUTO: 128 K/UL (ref 150–400)
PMV BLD AUTO: 10.4 FL (ref 8.9–12.9)
POTASSIUM SERPL-SCNC: 3.4 MMOL/L (ref 3.5–5.1)
RBC # BLD AUTO: 5.4 M/UL (ref 4.1–5.7)
SARS-COV-2, COV2: NORMAL
SODIUM SERPL-SCNC: 135 MMOL/L (ref 136–145)
THERAPEUTIC RANGE,PTTT: NORMAL SEC (ref 68–109)
WBC # BLD AUTO: 11.1 K/UL (ref 4.1–11.1)

## 2020-11-06 PROCEDURE — 94640 AIRWAY INHALATION TREATMENT: CPT

## 2020-11-06 PROCEDURE — 74011250636 HC RX REV CODE- 250/636: Performed by: INTERNAL MEDICINE

## 2020-11-06 PROCEDURE — 36415 COLL VENOUS BLD VENIPUNCTURE: CPT

## 2020-11-06 PROCEDURE — 80069 RENAL FUNCTION PANEL: CPT

## 2020-11-06 PROCEDURE — 74011250636 HC RX REV CODE- 250/636: Performed by: HOSPITALIST

## 2020-11-06 PROCEDURE — 86140 C-REACTIVE PROTEIN: CPT

## 2020-11-06 PROCEDURE — 77010033678 HC OXYGEN DAILY

## 2020-11-06 PROCEDURE — 99231 SBSQ HOSP IP/OBS SF/LOW 25: CPT | Performed by: INTERNAL MEDICINE

## 2020-11-06 PROCEDURE — 85730 THROMBOPLASTIN TIME PARTIAL: CPT

## 2020-11-06 PROCEDURE — 74011000250 HC RX REV CODE- 250: Performed by: INTERNAL MEDICINE

## 2020-11-06 PROCEDURE — 94762 N-INVAS EAR/PLS OXIMTRY CONT: CPT

## 2020-11-06 PROCEDURE — 74011250637 HC RX REV CODE- 250/637: Performed by: INTERNAL MEDICINE

## 2020-11-06 PROCEDURE — 85025 COMPLETE CBC W/AUTO DIFF WBC: CPT

## 2020-11-06 PROCEDURE — 74011250637 HC RX REV CODE- 250/637: Performed by: HOSPITALIST

## 2020-11-06 PROCEDURE — 87635 SARS-COV-2 COVID-19 AMP PRB: CPT

## 2020-11-06 PROCEDURE — 65270000029 HC RM PRIVATE

## 2020-11-06 RX ORDER — IPRATROPIUM BROMIDE AND ALBUTEROL SULFATE 2.5; .5 MG/3ML; MG/3ML
3 SOLUTION RESPIRATORY (INHALATION)
Status: DISCONTINUED | OUTPATIENT
Start: 2020-11-06 | End: 2020-11-09 | Stop reason: HOSPADM

## 2020-11-06 RX ORDER — FUROSEMIDE 40 MG/1
40 TABLET ORAL DAILY
Status: DISCONTINUED | OUTPATIENT
Start: 2020-11-06 | End: 2020-11-09 | Stop reason: HOSPADM

## 2020-11-06 RX ADMIN — FAMOTIDINE 20 MG: 20 TABLET ORAL at 21:21

## 2020-11-06 RX ADMIN — ENOXAPARIN SODIUM 40 MG: 40 INJECTION SUBCUTANEOUS at 17:55

## 2020-11-06 RX ADMIN — SALINE NASAL SPRAY 2 SPRAY: 1.5 SOLUTION NASAL at 08:00

## 2020-11-06 RX ADMIN — SALINE NASAL SPRAY 2 SPRAY: 1.5 SOLUTION NASAL at 18:00

## 2020-11-06 RX ADMIN — Medication 10 ML: at 14:00

## 2020-11-06 RX ADMIN — IPRATROPIUM BROMIDE AND ALBUTEROL SULFATE 3 ML: .5; 3 SOLUTION RESPIRATORY (INHALATION) at 02:15

## 2020-11-06 RX ADMIN — IPRATROPIUM BROMIDE AND ALBUTEROL SULFATE 3 ML: .5; 3 SOLUTION RESPIRATORY (INHALATION) at 13:58

## 2020-11-06 RX ADMIN — SALINE NASAL SPRAY 2 SPRAY: 1.5 SOLUTION NASAL at 21:22

## 2020-11-06 RX ADMIN — DEXAMETHASONE 2 MG: 4 TABLET ORAL at 09:29

## 2020-11-06 RX ADMIN — FUROSEMIDE 40 MG: 10 INJECTION, SOLUTION INTRAMUSCULAR; INTRAVENOUS at 09:29

## 2020-11-06 RX ADMIN — SALINE NASAL SPRAY 2 SPRAY: 1.5 SOLUTION NASAL at 10:00

## 2020-11-06 RX ADMIN — MENTHOL, METHYL SALICYLATE: 10; 15 CREAM TOPICAL at 19:28

## 2020-11-06 RX ADMIN — Medication 10 ML: at 08:00

## 2020-11-06 RX ADMIN — FUROSEMIDE 40 MG: 40 TABLET ORAL at 11:59

## 2020-11-06 RX ADMIN — Medication 10 ML: at 21:21

## 2020-11-06 RX ADMIN — FAMOTIDINE 20 MG: 20 TABLET ORAL at 09:29

## 2020-11-06 RX ADMIN — ENOXAPARIN SODIUM 40 MG: 40 INJECTION SUBCUTANEOUS at 09:29

## 2020-11-06 RX ADMIN — SALINE NASAL SPRAY 2 SPRAY: 1.5 SOLUTION NASAL at 14:00

## 2020-11-06 NOTE — ROUTINE PROCESS
Dr. Thuy Paul notified that pt refused bloo work to be drawn;  Report given to KAEL Obregon RN oncoming nurse from [facility], [facility] off going nurse to include and kardex; sbar, mar, recent changes,

## 2020-11-06 NOTE — PROGRESS NOTES
Hospitalist Progress Note               Daily Progress Note: 11/6/2020      Subjective: The patient is seen for follow  up. Patient seen and evaluated at bedside, no acute events overnight, of note patient successfully titrated down from high flow yesterday, doing well on NC, discussed with RN events at bedside. Medications reviewed  Current Facility-Administered Medications   Medication Dose Route Frequency    dexAMETHasone (DECADRON) tablet 2 mg  2 mg Oral DAILY    benzonatate (TESSALON) capsule 200 mg  200 mg Oral TID PRN    famotidine (PEPCID) tablet 20 mg  20 mg Oral BID    enoxaparin (LOVENOX) injection 40 mg  40 mg SubCUTAneous U83Y    methyl salicylate-menthol (BENGAY) 15-10 % cream   Topical TID PRN    furosemide (LASIX) injection 40 mg  40 mg IntraVENous DAILY    dextromethorphan (DELSYM) 30 mg/5 mL syrup 30 mg  30 mg Oral Q12H PRN    albuterol-ipratropium (DUO-NEB) 2.5 MG-0.5 MG/3 ML  3 mL Nebulization Q6H RT    0.9% sodium chloride infusion 250 mL  250 mL IntraVENous PRN    sodium chloride (OCEAN) 0.65 % nasal squeeze bottle 2 Spray  2 [place] Both Nostrils Q4HWA    hydrOXYzine pamoate (VISTARIL) capsule 25 mg  25 mg Oral TID PRN    melatonin tablet 10 mg  10 mg Oral QHS PRN    acetaminophen (TYLENOL) tablet 650 mg  650 mg Oral Q6H PRN    Or    acetaminophen (TYLENOL) suppository 650 mg  650 mg Rectal Q6H PRN    sodium chloride (NS) flush 5-40 mL  5-40 mL IntraVENous Q8H    sodium chloride (NS) flush 5-40 mL  5-40 mL IntraVENous PRN    ondansetron (ZOFRAN ODT) tablet 4 mg  4 mg Oral Q6H PRN    Or    ondansetron (ZOFRAN) injection 4 mg  4 mg IntraVENous Q6H PRN       Review of Systems:   A comprehensive review of systems was negative except for that written in the HPI.     Objective:   Physical Exam:     Visit Vitals  /89 (BP 1 Location: Right arm, BP Patient Position: At rest)   Pulse 74   Temp 97.8 °F (36.6 °C)   Resp 18   Ht 5' 9\" (1.753 m)   Wt 81.8 kg (180 lb 5.4 oz) SpO2 97%   BMI 26.63 kg/m²    O2 Flow Rate (L/min): 6 l/min O2 Device: Nasal cannula    Temp (24hrs), Av.8 °F (36.6 °C), Min:97.4 °F (36.3 °C), Max:98.2 °F (36.8 °C)    No intake/output data recorded.  1901 -  0700  In: -   Out: 4600 [Urine:4600]    PHYSICAL EXAM:  General: Alert and awake. Skin: Extremities and face reveal no rashes. HEENT: Sclerae anicteric. Extra-occular muscles are intact. No oral ulcers. No ENT discharge. The neck is supple. Cardiovascular: Regular rate and rhythm. No murmurs, gallops, or rubs. PMI nondisplaced. Carotids without bruits. Respiratory: Comfortable breathing with no accessory muscle use. Clear breath sounds with no wheezes, rales, or rhonchi. GI: Abdomen nondistended, soft, and nontender. Normal active bowel sounds. No enlargement of the liver or spleen. No masses palpable. Rectal: Deferred   Musculoskeletal: No pitting edema of the lower legs. Extremities have good range of motion. No costovertebral tenderness. Neurological: Gross memory appears intact. Patient is alert and oriented. Power 5/5, Cranial nerves II-XII intact  Psychiatric: Mood appears appropriate with judgement intact. Data Review:       Recent Days:  No results for input(s): WBC, HGB, HCT, PLT, HGBEXT, HCTEXT, PLTEXT, HGBEXT, HCTEXT, PLTEXT in the last 72 hours. No results for input(s): NA, K, CL, CO2, GLU, BUN, CREA, CA, MG, PHOS, ALB, TBIL, TBILI, ALT, INR, INREXT, INREXT in the last 72 hours. No lab exists for component: SGOT  No results for input(s): PH, PCO2, PO2, HCO3, FIO2 in the last 72 hours. 24 Hour Results:  No results found for this or any previous visit (from the past 24 hour(s)). XR CHEST PORT   Final Result      XR CHEST PORT   Final Result   Impression: No significant interval change. XR CHEST PORT   Final Result   IMPRESSION: Shallower depth of inspiration with worsening bilateral airspace   disease/edema.       XR CHEST PORT   Final Result Impression: No significant interval change. XR CHEST PORT   Final Result   Findings/impression: A portable upright view of the chest demonstrates   persistent cardiomegaly and patchy interstitial and mild airspace disease which   has improved slightly on the right. No pneumothorax or effusion is identified. The osseous structures are unchanged. XR CHEST PORT   Final Result      XR CHEST PORT   Final Result   IMPRESSION: Improving bilateral airspace disease. XR CHEST PORT   Final Result      XR CHEST PORT   Final Result      XR CHEST PORT   Final Result      XR CHEST PORT   Final Result      CTA CHEST W OR W WO CONT   Final Result   IMPRESSION: Widespread patchy bilateral pneumonitis      XR CHEST SNGL V   Final Result             Assessment/     Problem List:  Hospital Problems  Date Reviewed: 10/15/2020          Codes Class Noted POA    Acute respiratory failure due to COVID-19 [facility]) ICD-10-CM: U07.1, J96.00  ICD-9-CM: 518.81, 079.89  10/15/2020 Yes        Pneumonia ICD-10-CM: J18.9  ICD-9-CM: 580  10/15/2020 Yes        Acute respiratory failure ([address] ICD-10-CM: J96.00  ICD-9-CM: 518.81  10/15/2020 Unknown                     Plan:    (1) Acute hypoxic respiratory failurelikely multifactorial, secondary to COVID-19 pneumonia/pneumonitis, currently patient successfully titrated off of high flow yesterday, remains on nasal cannula 4 L, likely secondary to scarring from pneumonitis/pneumonia  Continue methylprednisolone twice daily  Continue nasal cannula at this time  Continue Lasix 40 mg once daily, patient is diuresing well at this time, patient is net -2.1 L in past 24 hours.   Pulmonology recommendations appreciated, will continue to follow    (2) HCAP pneumonia and pneumonitis:Status post 14 days of antibiotic coverage  Continue to monitor  Infectious disease recommendations appreciated, will continue to follow    (3) high likelihood of COVID-19 pneumonitis: status post Remdesivir, Decadron, Azithromycin, Actemra,   Currently repeat COVID-19 test negative  Continue to monitor    Dehydrationcurrently resolved    PpxLovenox  Full code, patient surrogate decision-maker is his wife, updated patient's family dispositionFor IRF placement (discussed with pt/daughter/CM/Specialists), likely in 25 to 48 hours     Care Plan discussed with: Patient/Family and Nurse    Called Patients daughter Valeriy Lujan Ph- [phone]    Total non-critical care time 35 minutes    January Dc MD

## 2020-11-06 NOTE — PROGRESS NOTES
Pulmonary Note      Name: Ekaterina Cesar MRN: [id number]   : 1960 Hospital: [facility]   Date: 2020  Admission date: 10/15/2020 Hospital Day:        Subjective/Interval History:   Patient seen in the ICU on high flow nasal oxygen at 70% with oxygen saturation 99. He gives a history of gradual worsening illness over the last week to 10 days with nonproductive cough fever generalized malaise nausea denies any vomiting although the ER note states that he had some vomiting. He seems awake and alert. Does not have any Covid contacts that he is aware of. States that his sense of taste and smell have been normal but terribly anorexic has not eaten anything in 3 or 4 days. 10/17 still feels bad Cough shortness of breath but no nausea today   10/18 still complains of severe cough nonproductive although in general feels a little better  10/20 remains 100% high flow nasal oxygen. Continues to complain of it irritating him. Requested nasal saline last evening but it has not been started will reorder it. We will let him try nonrebreather mask with nasal high flow for meals  10/26 high flow nasal oxygen back up to 70% unable to safely switch to low flow nasal oxygen at this point. He states he feels better sense of taste is normal with a good appetite  10/28 patient had a rapid response last night now he is on 100% high flow nasal cannula  11/2 remains on nasal high flow down to 50% today but with getting out of bed to work with physical therapy he immediately desaturates into the 70s very slow to recover  11/6 remains on 6 L nasal oxygen with relatively well-maintained oxygen saturation although I am not sure what he does with physical therapy. Have been ordering labs every day for the last 3 days with no results being obtained. I am now told that he has been refusing them.   Have talked with him today about allowing blood test  Patient Active Problem List   Diagnosis Code    Acute respiratory failure due to COVID-19 ([facility]) U07.1, J96.00    Pneumonia J18.9    Acute respiratory failure ([facility]) J96.00       IMPRESSION:   1. Acute hypoxic respiratory failure  maintain nasal oxygen 6 L today  2. History of asthma  3. Community-acquired versus aspiration with diffuse pulmonary infiltrates questionable atypical pneumonia possible COVID-19 repeat testing Negative  4. Nausea probable due to COVID-19 infection  5. Unremitting cough secondary to pneumonia or reflux resolved  Body mass index is 26.63 kg/m². RECOMMENDATIONS/PLAN:   1. Covid pneumonitis  has completed 2 doses Actemra and 5 days of Remdesivir  decreased Decadron to 2 mg oral twice daily repeat CRP pending   2. He is still hypoxic but tolerating 6 L nasal oxygen  3. X-ray showed bilateral infiltrate improved on 11/2 x-ray will continue Lasix  4. Labs have been ordered daily for the last several days with no results being obtained I have asked him to allow it to be drawn today  5. Sputum culture with MSSA and Klebsiella both should be covered by Zosyn discontinued after 14 days  6. Continue nebulizer treatment  7. Repeat COVID-19 test negative         Subjective/Initial History:   I have reviewed the flowsheet and previous days notes. Seen earlier today on rounds. I was asked by Canelo Palacio MD to see An Osullivan Mall a 56 y.o.  male  in consultation for a chief complaint of acute hypoxic respiratory failure cough and community-acquired pneumonia          Pt now in CCU. Patient PCP: UNKNOWN  PMH:  has no past medical history on file. PSH:   has no past surgical history on file. FHX: family history includes No Known Problems in his father and mother. SHX:  reports that he has never smoked. He has never used smokeless tobacco. He reports that he does not drink alcohol or use drugs.     Systemic review:  General no chronic illness but over the last week he has had fever generalized malaise weight loss no appetite  Eyes no double vision or momentary blindness  ENT no sinus congestion drainage or facial pain  Skeletal has diffuse aches and pains no swollen tender joints  Endocrinologic no polyuria polydipsia  Neurologic no seizures or syncope  Gastrointestinal normally he has no problems but over this last week he has had nausea anorexia marked weight loss has not had any alteration of his sense of taste or smell  Genitourinary no discomfort or drainage on urination  Cardiovascular no history of heart disease chest pain has felt sweaty but no history of ankle edema.   Respiratory as mentioned above    No Known Allergies   MEDS:   Current Facility-Administered Medications   Medication    dexAMETHasone (DECADRON) tablet 2 mg    benzonatate (TESSALON) capsule 200 mg    famotidine (PEPCID) tablet 20 mg    enoxaparin (LOVENOX) injection 40 mg    methyl salicylate-menthol (BENGAY) 15-10 % cream    furosemide (LASIX) injection 40 mg    dextromethorphan (DELSYM) 30 mg/5 mL syrup 30 mg    albuterol-ipratropium (DUO-NEB) 2.5 MG-0.5 MG/3 ML    0.9% sodium chloride infusion 250 mL    sodium chloride (OCEAN) 0.65 % nasal squeeze bottle 2 Spray    hydrOXYzine pamoate (VISTARIL) capsule 25 mg    melatonin tablet 10 mg    acetaminophen (TYLENOL) tablet 650 mg    Or    acetaminophen (TYLENOL) suppository 650 mg    sodium chloride (NS) flush 5-40 mL    sodium chloride (NS) flush 5-40 mL    ondansetron (ZOFRAN ODT) tablet 4 mg    Or    ondansetron (ZOFRAN) injection 4 mg        Current Facility-Administered Medications:     dexAMETHasone (DECADRON) tablet 2 mg, 2 mg, Oral, DAILY, Omar Lemon MD, 2 mg at 11/06/20 3344    benzonatate (TESSALON) capsule 200 mg, 200 mg, Oral, TID PRN, Omar Lemon MD    famotidine (PEPCID) tablet 20 mg, 20 mg, Oral, BID, Darci Dubin, MD, 20 mg at 11/06/20 0929    enoxaparin (LOVENOX) injection 40 mg, 40 mg, SubCUTAneous, Q12H, Darci Dubin, MD, 40 mg at 11/06/20 0929   methyl salicylate-menthol (BENGAY) 15-10 % cream, , Topical, TID PRN, Ness Valle PA-C    furosemide (LASIX) injection 40 mg, 40 mg, IntraVENous, DAILY, Arnoldo Crump MD, 40 mg at 20 0929    dextromethorphan (DELSYM) 30 mg/5 mL syrup 30 mg, 30 mg, Oral, Q12H PRN, Arnoldo Crump MD, 30 mg at 20    albuterol-ipratropium (DUO-NEB) 2.5 MG-0.5 MG/3 ML, 3 mL, Nebulization, Q6H RT, Kannan Conde MD, 3 mL at 20 0215    0.9% sodium chloride infusion 250 mL, 250 mL, IntraVENous, PRN, Mary Payton MD, Last Rate: 15 mL/hr at 20 0156, 250 mL at 20 0156    sodium chloride (OCEAN) 0.65 % nasal squeeze bottle 2 Spray, 2 Spray, Both Nostrils, Q4HWA, Arnoldo Crump MD, 2 [place] at 20 1000    hydrOXYzine pamoate (VISTARIL) capsule 25 mg, 25 mg, Oral, TID PRN, Lou Cruz MD, 25 mg at 20    melatonin tablet 10 mg, 10 mg, Oral, QHS PRN, Lou Cruz MD, 10 mg at 20    acetaminophen (TYLENOL) tablet 650 mg, 650 mg, Oral, Q6H PRN, 650 mg at 10/18/20 2205 **OR** acetaminophen (TYLENOL) suppository 650 mg, 650 mg, Rectal, Q6H PRN, Reji Canales MD    sodium chloride (NS) flush 5-40 mL, 5-40 mL, IntraVENous, Q8H, Reji Canales MD, 10 mL at 20 0800    sodium chloride (NS) flush 5-40 mL, 5-40 mL, IntraVENous, PRN, Reji Canales MD, 10 mL at 10/22/20 0938    ondansetron (ZOFRAN ODT) tablet 4 mg, 4 mg, Oral, Q6H PRN **OR** ondansetron (ZOFRAN) injection 4 mg, 4 mg, IntraVENous, Q6H PRN, Reji Canales MD      Objective:     Vital Signs: Telemetry:    normal sinus rhythm Intake/Output:   Visit Vitals  /89 (BP 1 Location: Right arm, BP Patient Position: At rest)   Pulse 74   Temp 97.8 °F (36.6 °C)   Resp 18   Ht 5' 9\" (1.753 m)   Wt 81.8 kg (180 lb 5.4 oz)   SpO2 97%   BMI 26.63 kg/m²       Temp (24hrs), Av.8 °F (36.6 °C), Min:97.4 °F (36.3 °C), Max:98.2 °F (36.8 °C)        O2 Device: Nasal cannula O2 Flow Rate (L/min): 6 l/min         Body mass index is 26.63 kg/m². Wt Readings from Last 4 Encounters:   10/21/20 81.8 kg (180 lb 5.4 oz)          Intake/Output Summary (Last 24 hours) at 11/6/2020 1049  Last data filed at 11/5/2020 2350  Gross per 24 hour   Intake    Output 2100 ml   Net -2100 ml       Last shift:      No intake/output data recorded. Last 3 shifts: 11/04 1901 - 11/06 0700  In: -   Out: 4600 [Urine:4600]     Physical Exam:    General:  male; less distress no cough while I am in the room  HEENT: NCAT, oral mucosa clear  Eyes: anicteric; conjunctiva clear extraocular movements intact   neck: no node neck veins not visible  Chest: no deformity,   Cardiac: Regular rate and rhythm no murmurs trace ankle edema  Lungs: Clear anteriorly laterally still has rales posteriorly improved  Abd: Soft nontender normal bowel sounds no organomegaly  Ext: Mild edema; no joint swelling;  No clubbing  :clear urine  Neuro: Awake alert speech is clear moves all 4 extremities well  Psych- no agitation, oriented to person;   Skin: warm,, no cyanosis; very mildly diaphoretic  Pulses: Pulses intact  Capillary: Normal capillary refill      Labs:  11/250% nasal high flow with PO2 81 PCO2 42 pH 7.45  11/2 with standing and walking from bed to chair with physical therapy oxygen saturation drops to 72%  10/14 COVID-19 test negative  10/17 COVID-19 test indeterminate  Ferritin 1510, previous 2180  , previous 615, 754  Procalcitonin 0.14  CRP 0.87, previous 1.39, 3.0, 12.4  Lab Results   Component Value Date/Time    Culture result: No growth 7 days 10/20/2020 11:30 AM    Culture result: Heavy Staphylococcus aureus 10/20/2020 08:15 AM    Culture result: Light Klebsiella pneumoniae 10/20/2020 08:15 AM    Culture result: Heavy  Normal respiratory smita   10/20/2020 08:15 AM      Lab Results   Component Value Date/Time     10/15/2020 11:47 PM       Imaging:  I have personally reviewed the patients radiographs and have reviewed the reports:    CXR Results  (Last 48 hours)  11/2 chest x-ray continued diffuse accentuated interstitial markings  10/20 chest x-ray with diffuse patchy infiltrates may be slightly less dense  10/17 chest x-ray unchanged diffuse bilateral infiltrate               10/15/20 1927  XR CHEST SNGL V Final result    Narrative:  1       Mild atelectasis in the bases with upper lungs clear. No effusion or   pneumothorax. Normal heart and no congestion  Disagree to me there is bilateral infiltrate           Results from Hospital Encounter encounter on 10/15/20   XR CHEST PORT    Narrative Chest single view. Comparison single view chest November 2, 2020    Patchy coarse reticular markings through lungs appear similar compared to prior  imaging. Cardiac and mediastinal structures magnified on this AP view. No  pneumothorax or sizable pleural effusion. XR CHEST PORT    Narrative Chest, frontal view, 11/2/2020    History: Covid-19. Comparison: Including chest 10/27/2020. Findings: The cardiac silhouette is stable. The lungs remain underexpanded. Diffuse opacities in the lungs are not significantly changed. Hydrostatic edema  is possible. No pleural effusion is definitively noted on this single view. No  pneumothorax is identified. The osseous structures are stable. Impression Impression: No significant interval change. XR CHEST PORT    Narrative Portable upright radiograph chest 7:34 AM compared to October 26, 2020. INDICATION: Cough, pneumonia. Patient has taken a shallow depth of inspiration. Heart size is stable. Bilateral airspace disease/edema shows worsening compared to prior study. No  pneumothorax. Cannot exclude small effusions. No acute bony changes. Impression IMPRESSION: Shallower depth of inspiration with worsening bilateral airspace  disease/edema.      Results from [place] encounter on 10/15/20   CTA CHEST W OR W WO CONT    Narrative CT dose reduction was achieved through use of a standardized protocol tailored  for this examination and automatic exposure control for dose modulation. Contrast study maximum intensity projections shows pronounced groundglass  opacification, of variable density, mainly in the dependent portions of each  lung, apex to base. Air bronchograms are preserved in the densest portions. Mild  geographic groundglass opacification of lower density in the nondependent  portions. Central airways are open    Pulmonary arteries are well-opacified and show no filling defect or distortion. Aorta shows normal dimensions, without dissection. Tiny middle mediastinal lymph  nodes. Cardiac finding. No pleural pericardial effusion. No significant upper  abdominal or chest wall finding      Impression IMPRESSION: Widespread patchy bilateral pneumonitis       Clinical picture consistent with COVID-19 infection with pneumonitis with diffuse patchy pulmonary infiltrate elevation in ferritin LDH and CRP nonproductive cough and GI symptoms and yet initial COVID-19 test negative repeat testing indeterminate              Time spent 30 min       Thank you for allowing us to participate in the care of this patient.   We will follow along with you    Jennifer Lopez MD

## 2020-11-06 NOTE — PROGRESS NOTES
Physician Progress Note      PATIENT:               Preeti Gustafson  CSN #:                  [id number]  :                       1960  ADMIT DATE:       10/15/2020 6:33 PM  [address] [place] DATE:  RESPONDING  PROVIDER #:        Yves Huertas MD          QUERY TEXT:    Patient admitted with fever, SOB, and hypoxia. Noted initial testing negative for COVID-19 on 10/15 and 10/20 with indeterminate test result on 10/17. If possible, please document in the progress notes and discharge summary if COVID-19 was: The medical record reflects the following:  Risk Factors: 56 M, fever with body aches x 10 days PTA  Clinical Indicators: c/o SOB with nonproductive cough, tiredness, and nausea on admission. Per  PN \"high likelihood of COVID-19 pneumonitis:\"  Treatment: s/p Remdesivir, decadron, azithromycin, actemra, ID consult and pulm consult    Note: Possible (or similar verbiage reflecting an uncertain diagnosis) KLSOL-03 infection may not be reported as confirmed. However, a diagnosis may be considered as confirmed based on the clinical impression of the provider, regardless of the presence of a confirmatory test or availability of a test result. At present, a negative test result does not exclude the diagnosis of COVID-19 infection. Please call with questions. Thank you. [email or web address]  [phone]  Options provided:  -- COVID-19 ruled out after study  -- COVID-19 confirmed after study with initial testing suspected to be false negative  -- Other - I will add my own diagnosis  -- Disagree - Not applicable / Not valid  -- Disagree - Clinically unable to determine / Unknown  -- Refer to Clinical Documentation Reviewer    PROVIDER RESPONSE TEXT:    COVID-19 confirmed after study with initial testing suspected to be false negative.     Query created by: Connie Del Rio on 2020 6:57 AM      Electronically signed by:  Yves Huertas MD 2020 7:06 AM

## 2020-11-06 NOTE — PROGRESS NOTES
David Marr is pending with [facility] [address] [place] [address]. CM will continue to follow. Cm met with pt at the bedside. Cm updated him and informed him we are waiting on auth from insurance for rehab. Pt voiced understanding. Pt would like CM to continue to speak with his daughter Bertin Cantu. Cm will continue to follow.

## 2020-11-06 NOTE — PROGRESS NOTES
Progress Note  Date:2020       Room:[facility]– [place]  Patient Name:Ekaterina Finley     YOB: 1960     Age:60 y.o. Subjective    Subjective   Patient followed for presumptive Covid-19 pneumoniitis with indeterminate results, now status post Remdesivir and Actemra, still on Decadron. No CXR today. He is afebrile with leukocytosis attributed to steroids. He has completed course of Zosyn for HCAP. He is now on nasal O2 cannula. Objective         Vitals Last 24 Hours:  TEMPERATURE:  Temp  Av.8 °F (36.6 °C)  Min: 97.4 °F (36.3 °C)  Max: 98.2 °F (36.8 °C)  RESPIRATIONS RANGE: Resp  Av  Min: 18  Max: 20  PULSE OXIMETRY RANGE: SpO2  Av.9 %  Min: 92 %  Max: 98 %  PULSE RANGE: Pulse  Av.5  Min: 74  Max: 103  BLOOD PRESSURE RANGE: Systolic (49GGP), UGF:697 , Min:102 , RNX:145   ; Diastolic (59BKF), MQS:22, Min:79, Max:90    I/O (24Hr): Intake/Output Summary (Last 24 hours) at 2020 1243  Last data filed at 2020 2350  Gross per 24 hour   Intake    Output 1100 ml   Net -1100 ml     Objective:  Vital signs: (most recent): Blood pressure 121/89, pulse 74, temperature 97.8 °F (36.6 °C), resp. rate 18, height 5' 9\" (1.753 m), weight 180 lb 5.4 oz (81.8 kg), SpO2 95 %. General: Mildy ill-appearing male, NAD  HEENT: eyes open, nasal O2  Neck: supple  Lungs: clear bilaterally  Heart: RRR  : Granados catheter with moderate urine sediment    Labs/Imaging/Diagnostics    Labs:  CBC:  Recent Labs     20  1050   WBC 11.1   RBC 5.40   HGB 17.5*   HCT 49.4   MCV 91.5   RDW 14.3   *     CHEMISTRIES:  Recent Labs     20  1050   *   K 3.4*      CO2 25   BUN 35*   CA 9.2   PHOS 3.4   PT/INR:  No results for input(s): INR, INREXT, INREXT in the last 72 hours. No lab exists for component: PROTIME  APTT:  Recent Labs     20  1045   APTT 24.5     LIVER PROFILE:  No results for input(s): AST, ALT in the last 72 hours.     No lab exists for component: [facility]  Lab Results   Component Value Date/Time    ALT (SGPT) 42 10/25/2020 08:00 AM    AST (SGOT) 19 10/25/2020 08:00 AM    Alk. phosphatase 97 10/25/2020 08:00 AM    Bilirubin, total 1.2 (H) 10/25/2020 08:00 AM     WBC 11,100   (403 (405 (689 (452 (565  Ferritin 1,519 (1,084 (1,187  CRP <0.29 (0.43 (12.40    Sputum culture (10/20) MSSA and Klebsiella pneumoniae  Blood cultures (10/20) No growth FINAL    Imaging Last 24 Hours:  No results found. Assessment//Plan   Active Problems:    Acute respiratory failure due to COVID-19 [facility]) (10/15/2020)      Pneumonia (10/15/2020)      Acute respiratory failure ([facility] [address] (10/15/2020)      Assessment & Plan  1. Probable Covid-19 pneumonitis, with indeterminate result, status post Remdesivir, Decadron, Azithromycin, Actemra, clinically resolved. 2. Acute respiratory failure, on nasal O2  3. Elevated CRP, LDH and ferritin, secondary to #1, decreasing. 4. HCAP with purulent sputum, secondary to MSSA and Klebsiella pneumoniae, status post 14 days of IV Zosyn.     1. No further treatment recommendations at this time. 2. Follow-up LDH, ferritin  3.  Will continue to follow      Electronically signed by Jostin Angel MD on 11/6/2020 at 1:43 PM

## 2020-11-06 NOTE — PROGRESS NOTES
PT treatment attempted at 08:37am and 09:17 am however, pt declining participation with therapy at both times. First attempt patient eating breakfast and requesting therapy return at later time. Second attempt patient on [facility] and requesting therapy return at later time. When educating patient therapist could assist with [facility] and then start therapy session patient continued to say no and to come back later. Pt desaturating into low 80's while sitting on BSC, RN and RT notified. Will continue to follow pt and will attempt treatment at a later time.  Thank you

## 2020-11-07 LAB
GLUCOSE BLD STRIP.AUTO-MCNC: 170 MG/DL (ref 65–100)
PERFORMED BY, TECHID: ABNORMAL

## 2020-11-07 PROCEDURE — 74011250637 HC RX REV CODE- 250/637: Performed by: HOSPITALIST

## 2020-11-07 PROCEDURE — 82962 GLUCOSE BLOOD TEST: CPT

## 2020-11-07 PROCEDURE — 74011250636 HC RX REV CODE- 250/636: Performed by: HOSPITALIST

## 2020-11-07 PROCEDURE — 94640 AIRWAY INHALATION TREATMENT: CPT

## 2020-11-07 PROCEDURE — 74011250636 HC RX REV CODE- 250/636: Performed by: INTERNAL MEDICINE

## 2020-11-07 PROCEDURE — 94762 N-INVAS EAR/PLS OXIMTRY CONT: CPT

## 2020-11-07 PROCEDURE — 74011000250 HC RX REV CODE- 250: Performed by: INTERNAL MEDICINE

## 2020-11-07 PROCEDURE — 74011250637 HC RX REV CODE- 250/637: Performed by: PHYSICIAN ASSISTANT

## 2020-11-07 PROCEDURE — 65270000029 HC RM PRIVATE

## 2020-11-07 PROCEDURE — 74011250637 HC RX REV CODE- 250/637: Performed by: INTERNAL MEDICINE

## 2020-11-07 PROCEDURE — 77010033678 HC OXYGEN DAILY

## 2020-11-07 PROCEDURE — 97530 THERAPEUTIC ACTIVITIES: CPT

## 2020-11-07 PROCEDURE — 99231 SBSQ HOSP IP/OBS SF/LOW 25: CPT | Performed by: INTERNAL MEDICINE

## 2020-11-07 RX ORDER — POTASSIUM CHLORIDE 750 MG/1
40 TABLET, FILM COATED, EXTENDED RELEASE ORAL DAILY
Status: DISCONTINUED | OUTPATIENT
Start: 2020-11-07 | End: 2020-11-09 | Stop reason: HOSPADM

## 2020-11-07 RX ORDER — POTASSIUM CHLORIDE 20 MEQ/1
40 TABLET, EXTENDED RELEASE ORAL
Status: COMPLETED | OUTPATIENT
Start: 2020-11-07 | End: 2020-11-07

## 2020-11-07 RX ADMIN — MENTHOL, METHYL SALICYLATE: 10; 15 CREAM TOPICAL at 15:15

## 2020-11-07 RX ADMIN — IPRATROPIUM BROMIDE AND ALBUTEROL SULFATE 3 ML: .5; 3 SOLUTION RESPIRATORY (INHALATION) at 14:20

## 2020-11-07 RX ADMIN — IPRATROPIUM BROMIDE AND ALBUTEROL SULFATE 3 ML: .5; 3 SOLUTION RESPIRATORY (INHALATION) at 19:13

## 2020-11-07 RX ADMIN — SALINE NASAL SPRAY 2 SPRAY: 1.5 SOLUTION NASAL at 09:33

## 2020-11-07 RX ADMIN — SALINE NASAL SPRAY 2 SPRAY: 1.5 SOLUTION NASAL at 21:56

## 2020-11-07 RX ADMIN — POTASSIUM CHLORIDE 40 MEQ: 1500 TABLET, EXTENDED RELEASE ORAL at 09:31

## 2020-11-07 RX ADMIN — FAMOTIDINE 20 MG: 20 TABLET ORAL at 08:50

## 2020-11-07 RX ADMIN — IPRATROPIUM BROMIDE AND ALBUTEROL SULFATE 3 ML: .5; 3 SOLUTION RESPIRATORY (INHALATION) at 09:04

## 2020-11-07 RX ADMIN — ENOXAPARIN SODIUM 40 MG: 40 INJECTION SUBCUTANEOUS at 17:22

## 2020-11-07 RX ADMIN — SALINE NASAL SPRAY 2 SPRAY: 1.5 SOLUTION NASAL at 17:23

## 2020-11-07 RX ADMIN — Medication 10 ML: at 22:02

## 2020-11-07 RX ADMIN — SALINE NASAL SPRAY 2 SPRAY: 1.5 SOLUTION NASAL at 06:06

## 2020-11-07 RX ADMIN — FAMOTIDINE 20 MG: 20 TABLET ORAL at 21:56

## 2020-11-07 RX ADMIN — Medication 10 ML: at 13:16

## 2020-11-07 RX ADMIN — FUROSEMIDE 40 MG: 40 TABLET ORAL at 08:49

## 2020-11-07 RX ADMIN — SALINE NASAL SPRAY 2 SPRAY: 1.5 SOLUTION NASAL at 13:16

## 2020-11-07 RX ADMIN — DEXAMETHASONE 2 MG: 4 TABLET ORAL at 08:50

## 2020-11-07 RX ADMIN — ENOXAPARIN SODIUM 40 MG: 40 INJECTION SUBCUTANEOUS at 06:06

## 2020-11-07 NOTE — PROGRESS NOTES
Hospitalist Progress Note               Daily Progress Note: 11/7/2020      Subjective: The patient is seen for follow  up. Patient seen and evaluated at bedside, no acute events overnight, of note patient successfully titrated down from high flow yesterday, doing well on NC, discussed with RN events at bedside. Medications reviewed  Current Facility-Administered Medications   Medication Dose Route Frequency    potassium chloride (K-DUR, KLOR-CON) SR tablet 40 mEq  40 mEq Oral NOW    albuterol-ipratropium (DUO-NEB) 2.5 MG-0.5 MG/3 ML  3 mL Nebulization Q6HWA RT    furosemide (LASIX) tablet 40 mg  40 mg Oral DAILY    dexAMETHasone (DECADRON) tablet 2 mg  2 mg Oral DAILY    benzonatate (TESSALON) capsule 200 mg  200 mg Oral TID PRN    famotidine (PEPCID) tablet 20 mg  20 mg Oral BID    enoxaparin (LOVENOX) injection 40 mg  40 mg SubCUTAneous S86Y    methyl salicylate-menthol (BENGAY) 15-10 % cream   Topical TID PRN    dextromethorphan (DELSYM) 30 mg/5 mL syrup 30 mg  30 mg Oral Q12H PRN    0.9% sodium chloride infusion 250 mL  250 mL IntraVENous PRN    sodium chloride (OCEAN) 0.65 % nasal squeeze bottle 2 Spray  2 [place] Both Nostrils Q4HWA    hydrOXYzine pamoate (VISTARIL) capsule 25 mg  25 mg Oral TID PRN    melatonin tablet 10 mg  10 mg Oral QHS PRN    acetaminophen (TYLENOL) tablet 650 mg  650 mg Oral Q6H PRN    Or    acetaminophen (TYLENOL) suppository 650 mg  650 mg Rectal Q6H PRN    sodium chloride (NS) flush 5-40 mL  5-40 mL IntraVENous Q8H    sodium chloride (NS) flush 5-40 mL  5-40 mL IntraVENous PRN    ondansetron (ZOFRAN ODT) tablet 4 mg  4 mg Oral Q6H PRN    Or    ondansetron (ZOFRAN) injection 4 mg  4 mg IntraVENous Q6H PRN       Review of Systems:   A comprehensive review of systems was negative except for that written in the HPI.     Objective:   Physical Exam:     Visit Vitals  /85   Pulse 77   Temp 97.8 °F (36.6 °C)   Resp 20   Ht 5' 9\" (1.753 m)   Wt 81.8 kg (180 lb 5.4 oz)   SpO2 97%   BMI 26.63 kg/m²    O2 Flow Rate (L/min): 6 l/min O2 Device: Nasal cannula    Temp (24hrs), Av.7 °F (36.5 °C), Min:97.5 °F (36.4 °C), Max:97.8 °F (36.6 °C)    701 - 1900  In: -   Out: 645 [XWKSW:870]   1901 - 700  In: -   Out: 1500 [Urine:1500]    PHYSICAL EXAM:  General: Alert and awake. Skin: Extremities and face reveal no rashes. HEENT: Sclerae anicteric. Extra-occular muscles are intact. No oral ulcers. No ENT discharge. The neck is supple. Cardiovascular: Regular rate and rhythm. No murmurs, gallops, or rubs. PMI nondisplaced. Carotids without bruits. Respiratory: Comfortable breathing with no accessory muscle use. Clear breath sounds with no wheezes, rales, or rhonchi. GI: Abdomen nondistended, soft, and nontender. Normal active bowel sounds. No enlargement of the liver or spleen. No masses palpable. Rectal: Deferred   Musculoskeletal: No pitting edema of the lower legs. Extremities have good range of motion. No costovertebral tenderness. Neurological: Gross memory appears intact. Patient is alert and oriented. Power 5/5, Cranial nerves II-XII intact  Psychiatric: Mood appears appropriate with judgement intact. Data Review:       Recent Days:  Recent Labs     20  1050   WBC 11.1   HGB 17.5*   HCT 49.4   *     Recent Labs     20  1050   *   K 3.4*      CO2 25   *   BUN 35*   CREA 1.04   CA 9.2   PHOS 3.4   ALB 3.6     No results for input(s): PH, PCO2, PO2, HCO3, FIO2 in the last 72 hours.     24 Hour Results:  Recent Results (from the past 24 hour(s))   SARS-COV-2    Collection Time: 20  9:45 AM   Result Value Ref Range    SARS-CoV-2 Please find results under separate order     PTT    Collection Time: 20 10:45 AM   Result Value Ref Range    aPTT 24.5 23.0 - 35.7 sec    aPTT, therapeutic range   68 - 109 sec   CBC WITH AUTOMATED DIFF    Collection Time: 20 10:50 AM   Result Value Ref Range WBC 11.1 4.1 - 11.1 K/uL    RBC 5.40 4. 10 - 5.70 M/uL    HGB 17.5 (H) 12.1 - 17.0 g/dL    HCT 49.4 36.6 - 50.3 %    MCV 91.5 80.0 - 99.0 FL    MCH 32.4 26.0 - 34.0 PG    MCHC 35.4 30.0 - 36.5 g/dL    RDW 14.3 11.5 - 14.5 %    PLATELET 188 (L) 983 - 400 K/uL    MPV 10.4 8.9 - 12.9 FL    NEUTROPHILS 65 32 - 75 %    LYMPHOCYTES 25 12 - 49 %    MONOCYTES 7 5 - 13 %    EOSINOPHILS 2 0 - 7 %    BASOPHILS 0 0 - 1 %    IMMATURE GRANULOCYTES 1 (H) 0.0 - 0.5 %    ABS. NEUTROPHILS 7.3 1.8 - 8.0 K/UL    ABS. LYMPHOCYTES 2.8 0.8 - 3.5 K/UL    ABS. MONOCYTES 0.8 0.0 - 1.0 K/UL    ABS. EOSINOPHILS 0.2 0.0 - 0.4 K/UL    ABS. BASOPHILS 0.0 0.0 - 0.1 K/UL    ABS. IMM. GRANS. 0.1 (H) 0.00 - 0.04 K/UL    DF AUTOMATED     C REACTIVE PROTEIN, QT    Collection Time: 11/06/20 10:50 AM   Result Value Ref Range    C-Reactive protein <0.29 0.00 - 0.60 mg/dL   RENAL FUNCTION PANEL    Collection Time: 11/06/20 10:50 AM   Result Value Ref Range    Sodium 135 (L) 136 - 145 mmol/L    Potassium 3.4 (L) 3.5 - 5.1 mmol/L    Chloride 101 97 - 108 mmol/L    CO2 25 21 - 32 mmol/L    Anion gap 9 5 - 15 mmol/L    Glucose 155 (H) 65 - 100 mg/dL    BUN 35 (H) 6 - 20 mg/dL    Creatinine 1.04 0.70 - 1.30 mg/dL    BUN/Creatinine ratio 34 (H) 12 - 20      GFR est AA >60 >60 ml/min/1.73m2    GFR est non-AA >60 >60 ml/min/1.73m2    Calcium 9.2 8.5 - 10.1 mg/dL    Phosphorus 3.4 2.6 - 4.7 mg/dL    Albumin 3.6 3.5 - 5.0 g/dL       XR CHEST PORT   Final Result      XR CHEST PORT   Final Result   Impression: No significant interval change. XR CHEST PORT   Final Result   IMPRESSION: Shallower depth of inspiration with worsening bilateral airspace   disease/edema. XR CHEST PORT   Final Result   Impression: No significant interval change.       XR CHEST PORT   Final Result   Findings/impression: A portable upright view of the chest demonstrates   persistent cardiomegaly and patchy interstitial and mild airspace disease which   has improved slightly on the right. No pneumothorax or effusion is identified. The osseous structures are unchanged. XR CHEST PORT   Final Result      XR CHEST PORT   Final Result   IMPRESSION: Improving bilateral airspace disease. XR CHEST PORT   Final Result      XR CHEST PORT   Final Result      XR CHEST PORT   Final Result      XR CHEST PORT   Final Result      CTA CHEST W OR W WO CONT   Final Result   IMPRESSION: Widespread patchy bilateral pneumonitis      XR CHEST SNGL V   Final Result             Assessment/     Problem List:  Hospital Problems  Date Reviewed: 10/15/2020          Codes Class Noted POA    Acute respiratory failure due to COVID-19 [facility]) ICD-10-CM: U07.1, J96.00  ICD-9-CM: 518.81, 079.89  10/15/2020 Yes        Pneumonia ICD-10-CM: J18.9  ICD-9-CM: 836  10/15/2020 Yes        Acute respiratory failure ([facility] [address] ICD-10-CM: J96.00  ICD-9-CM: 518.81  10/15/2020 Unknown                     Plan:    (1) Acute hypoxic respiratory failurelikely multifactorial, secondary to COVID-19 pneumonia/pneumonitis, currently patient successfully titrated off of high flow yesterday, remains on nasal cannula 4 L, likely secondary to scarring from pneumonitis/pneumonia  Continue methylprednisolone twice daily  Continue nasal cannula at this time  Continue Lasix 40 mg once daily, patient is diuresing well at this time, patient is net -2.1 L in past 24 hours.   Pulmonology recommendations appreciated, will continue to follow    (2) HCAP pneumonia and pneumonitis:Status post 14 days of antibiotic coverage  Continue to monitor  Infectious disease recommendations appreciated, will continue to follow    (3) high likelihood of COVID-19 pneumonitis: status post Remdesivir, Decadron, Azithromycin, Actemra,   Currently repeat COVID-19 test negative  Continue to monitor    Dehydrationcurrently resolved    Hypokalemia- replete potassium and recheck potassium    PpxLovenox  Full code, patient surrogate decision-maker is his wife, updated patient's family dispositionFor IRF placement (discussed with pt/daughter/CM/Specialists), likely in 24 to 48 hours     Care Plan discussed with: Patient/Family and Nurse    Called Patients daughter Charlie Mahajan Ph- [phone]    Total non-critical care time 35 minutes    Carl Weldon MD

## 2020-11-07 NOTE — PROGRESS NOTES
Progress Note  Date:2020       Room:[facility]  Patient Name:Ekaterina Naranjo     YOB: 1960     Age:60 y.o. Subjective    Subjective   Patient followed for presumptive Covid-19 pneumoniitis with indeterminate results, now status post Remdesivir and Actemra, still on Decadron. No CXR today. He is afebrile with leukocytosis attributed to steroids. Still hypoxic but tolerating 5L nasal O2. Objective         Vitals Last 24 Hours:  TEMPERATURE:  Temp  Av °F (36.7 °C)  Min: 97.5 °F (36.4 °C)  Max: 98.6 °F (37 °C)  RESPIRATIONS RANGE: Resp  Av  Min: 20  Max: 20  PULSE OXIMETRY RANGE: SpO2  Av.6 %  Min: 96 %  Max: 97 %  PULSE RANGE: Pulse  Av.3  Min: 77  Max: 103  BLOOD PRESSURE RANGE: Systolic (46ZAK), PPC:011 , Min:110 , EZU:599   ; Diastolic (68USY), URU:21, Min:80, Max:85    I/O (24Hr): Intake/Output Summary (Last 24 hours) at 2020 1750  Last data filed at 2020 0749  Gross per 24 hour   Intake    Output 1100 ml   Net -1100 ml     Objective:  Vital signs: (most recent): Blood pressure 130/80, pulse (!) 103, temperature 98.6 °F (37 °C), resp. rate 20, height 5' 9\" (1.753 m), weight 180 lb 5.4 oz (81.8 kg), SpO2 97 %. General: Mildy ill-appearing male, NAD  HEENT: eyes open, nasal O2  Neck: supple  Lungs: clear bilaterally  Heart: RRR  : Granados catheter with moderate urine sediment    Labs/Imaging/Diagnostics    Labs:  CBC:  Recent Labs     20  1050   WBC 11.1   RBC 5.40   HGB 17.5*   HCT 49.4   MCV 91.5   RDW 14.3   *     CHEMISTRIES:  Recent Labs     20  1050   *   K 3.4*      CO2 25   BUN 35*   CA 9.2   PHOS 3.4   PT/INR:  No results for input(s): INR, INREXT, INREXT in the last 72 hours. No lab exists for component: PROTIME  APTT:  Recent Labs     20  1045   APTT 24.5     LIVER PROFILE:  No results for input(s): AST, ALT in the last 72 hours.     No lab exists for component: BILIDIR, BILITOT, [facility]  Lab Results   Component Value Date/Time    ALT (SGPT) 42 10/25/2020 08:00 AM    AST (SGOT) 19 10/25/2020 08:00 AM    Alk. phosphatase 97 10/25/2020 08:00 AM    Bilirubin, total 1.2 (H) 10/25/2020 08:00 AM     WBC 11,100   (403 (405 (689 (452 (565  Ferritin 1,519 (1,084 (1,187  CRP <0.29 (0.43 (12.40    Sputum culture (10/20) MSSA and Klebsiella pneumoniae  Blood cultures (10/20) No growth FINAL    Imaging Last 24 Hours:  No results found. Assessment//Plan   Active Problems:    Acute respiratory failure due to COVID-19 [facility]) (10/15/2020)      Pneumonia (10/15/2020)      [facility] (10/15/2020)      Assessment & Plan  1. Probable Covid-19 pneumonitis, with indeterminate result, status post Remdesivir, Decadron, Azithromycin, Actemra, clinically resolved. 2. Acute respiratory failure, on nasal O2  3. Elevated CRP, LDH and ferritin, secondary to #1, decreasing. 4. HCAP with purulent sputum, secondary to MSSA and Klebsiella pneumoniae, status post 14 days of IV Zosyn.     1. No further treatment recommendations at this time. 2. Follow-up LDH, ferritin  3.  Will continue to follow      Electronically signed by Joellen Cruz MD on 11/7/2020 at 1:43 PM

## 2020-11-07 NOTE — PROGRESS NOTES
Problem: Mobility Impaired (Adult and Pediatric)  Goal: *Acute Goals and Plan of Care (Insert Text)  Description: Bed mobility with ind within 7 days   Transfers with Ind within 7 days   Amb approx 15-30 ft with LRAD and SBA with normal O2 Sats within 7 days   Increase dynamic standing tolerance to 4 min with normal O2 Sats within 7 days   Ind with HEP for LE within 7 days       Outcome: Progressing Towards Goal   PHYSICAL THERAPY TREATMENT  Patient: Ekaterina Bradford (56 y.o. male)  Date: 11/7/2020  Diagnosis: Pneumonia [J18.9]  Acute respiratory failure ([facility] [address] [J96.00]   <principal problem not specified>       Precautions: Other (comment)  Chart, physical therapy assessment, plan of care and goals were reviewed. ASSESSMENT  Patient continues with skilled PT services and is progressing towards goals. Pt continued c PT services today. Today he did well with mobility and tranfers with SBA. His O2 dropped below 90% with STS so he was unsafe to amb today. Returned to supine which O2 returned to WNL. He tolerated exercises well. Current Level of Function Impacting Discharge (mobility/balance): Dec functional mobility    Other factors to consider for discharge: dropping O2         PLAN :  Patient continues to benefit from skilled intervention to address the above impairments. Continue treatment per established plan of care. to address goals. Recommendation for discharge: (in order for the patient to meet his/her long term goals)  Therapy up to 5 days/week in SNF setting or intensive home health therapy program    This discharge recommendation:  Has been made in collaboration with the attending provider and/or case management    IF patient discharges home will need the following DME: none       SUBJECTIVE:   Patient stated pt stated he is doing okay .     OBJECTIVE DATA SUMMARY:   Critical Behavior:  Neurologic State: Alert  Orientation Level: Oriented X4  Cognition: Appropriate decision making     Functional Mobility Training:  Bed Mobility:  Rolling: Stand-by assistance  Supine to Sit: Stand-by assistance  Sit to Supine: Stand-by assistance  Scooting: Stand-by assistance        Transfers:  Sit to Stand: Contact guard assistance  Stand to Sit: Contact guard assistance        Bed to Chair: Contact guard assistance                    Balance:  Sitting: Intact; Without support  Standing: Intact; Without support  Ambulation/Gait Training:                                                      Unsafe to amb today. Stairs: Therapeutic Exercises:   Seated marching, AP and LAQ  Pain Ratin/10    Activity Tolerance:   Fair, desaturates with exertion and requires oxygen, requires rest breaks, and observed SOB with activity  Please refer to the flowsheet for vital signs taken during this treatment. After treatment patient left in no apparent distress:   Supine in bed and Call bell within reach    COMMUNICATION/COLLABORATION:   The patients plan of care was discussed with: Physical therapist, Physician, and Case management.      [place]   Time Calculation: 16 mins

## 2020-11-07 NOTE — PROGRESS NOTES
Pulmonary Note      Name: Ekaterina Henry MRN: [id number]   : 1960 Hospital: [facility]   Date: 2020  Admission date: 10/15/2020 Hospital Day: 24       Subjective/Interval History:   Patient seen in the ICU on high flow nasal oxygen at 70% with oxygen saturation 99. He gives a history of gradual worsening illness over the last week to 10 days with nonproductive cough fever generalized malaise nausea denies any vomiting although the ER note states that he had some vomiting. He seems awake and alert. Does not have any Covid contacts that he is aware of. States that his sense of taste and smell have been normal but terribly anorexic has not eaten anything in 3 or 4 days. 10/17 still feels bad Cough shortness of breath but no nausea today   10/18 still complains of severe cough nonproductive although in general feels a little better  10/20 remains 100% high flow nasal oxygen. Continues to complain of it irritating him. Requested nasal saline last evening but it has not been started will reorder it. We will let him try nonrebreather mask with nasal high flow for meals  10/26 high flow nasal oxygen back up to 70% unable to safely switch to low flow nasal oxygen at this point. He states he feels better sense of taste is normal with a good appetite  10/28 patient had a rapid response last night now he is on 100% high flow nasal cannula   remains on nasal high flow down to 50% today but with getting out of bed to work with physical therapy he immediately desaturates into the 70s very slow to recover   nasal oxygen down to 5 L with good oxygen saturation 97%. He states when he sat up on the side of the bed and use the toilet but that its stayed in the low 90s  Patient Active Problem List   Diagnosis Code    Acute respiratory failure due to COVID-19 ([facility] [address] U07.1, J96.00    Pneumonia J18.9    [facility] J96.00       IMPRESSION:   1.  Acute hypoxic respiratory failure continue to taper nasal oxygen if oxygen saturation allows  2. History of asthma  3. Community-acquired versus aspiration with diffuse pulmonary infiltrates questionable atypical pneumonia possible COVID-19 repeat testing Negative  4. Nausea probable due to COVID-19 infection  5. Unremitting cough secondary to pneumonia or reflux resolved  Body mass index is 26.63 kg/m². RECOMMENDATIONS/PLAN:   1. Covid pneumonitis  has completed 2 doses Actemra and 5 days of Remdesivir  decreased Decadron to 2 mg oral twice daily repeat CRP normal  2. He is still hypoxic but tolerating 5 L nasal oxygen  3. X-ray showed bilateral infiltrate improved on 11/2 x-ray will continue Lasix  4. Labs do show hypokalemia and replete month dosing ordered  5. Sputum culture with MSSA and Klebsiella both should be covered by Zosyn discontinued after 14 days  6. Continue nebulizer treatment  7. Repeat COVID-19 test negative         Subjective/Initial History:   I have reviewed the flowsheet and previous days notes. Seen earlier today on rounds. I was asked by Isamar Mendosa MD to see An Shahida Rash a 56 y.o.  male  in consultation for a chief complaint of acute hypoxic respiratory failure cough and community-acquired pneumonia          Pt now in CCU. Patient PCP: UNKNOWN  PMH:  has no past medical history on file. PSH:   has no past surgical history on file. FHX: family history includes No Known Problems in his father and mother. SHX:  reports that he has never smoked. He has never used smokeless tobacco. He reports that he does not drink alcohol or use drugs.     Systemic review:  General no chronic illness but over the last week he has had fever generalized malaise weight loss no appetite  Eyes no double vision or momentary blindness  ENT no sinus congestion drainage or facial pain  Skeletal has diffuse aches and pains no swollen tender joints  Endocrinologic no polyuria polydipsia  Neurologic no seizures or syncope  Gastrointestinal normally he has no problems but over this last week he has had nausea anorexia marked weight loss has not had any alteration of his sense of taste or smell  Genitourinary no discomfort or drainage on urination  Cardiovascular no history of heart disease chest pain has felt sweaty but no history of ankle edema.   Respiratory as mentioned above    No Known Allergies   MEDS:   Current Facility-Administered Medications   Medication    potassium chloride SR (KLOR-CON 10) tablet 40 mEq    albuterol-ipratropium (DUO-NEB) 2.5 MG-0.5 MG/3 ML    furosemide (LASIX) tablet 40 mg    benzonatate (TESSALON) capsule 200 mg    famotidine (PEPCID) tablet 20 mg    enoxaparin (LOVENOX) injection 40 mg    methyl salicylate-menthol (BENGAY) 15-10 % cream    dextromethorphan (DELSYM) 30 mg/5 mL syrup 30 mg    0.9% sodium chloride infusion 250 mL    sodium chloride (OCEAN) 0.65 % nasal squeeze bottle 2 Spray    hydrOXYzine pamoate (VISTARIL) capsule 25 mg    melatonin tablet 10 mg    acetaminophen (TYLENOL) tablet 650 mg    Or    acetaminophen (TYLENOL) suppository 650 mg    sodium chloride (NS) flush 5-40 mL    sodium chloride (NS) flush 5-40 mL    ondansetron (ZOFRAN ODT) tablet 4 mg    Or    ondansetron (ZOFRAN) injection 4 mg        Current Facility-Administered Medications:     potassium chloride SR (KLOR-CON 10) tablet 40 mEq, 40 mEq, Oral, DAILY, Darrell Bailey MD, Stopped at 11/07/20 1200    albuterol-ipratropium (DUO-NEB) 2.5 MG-0.5 MG/3 ML, 3 mL, Nebulization, Q6HWA RT, Darrell Bailey MD, 3 mL at 11/07/20 1420    furosemide (LASIX) tablet 40 mg, 40 mg, Oral, DAILY, Darrell Bailey MD, 40 mg at 11/07/20 0849    benzonatate (TESSALON) capsule 200 mg, 200 mg, Oral, TID PRN, Darrell Bailey MD    famotidine (PEPCID) tablet 20 mg, 20 mg, Oral, BID, Beth Butts MD, 20 mg at 11/07/20 0850    enoxaparin (LOVENOX) injection 40 mg, 40 mg, SubCUTAneous, Q12H, Beth Butts MD, 40 mg at 20 4419    methyl salicylate-menthol (BENGAY) 15-10 % cream, , Topical, TID PRN, Ness Valle PA-C    dextromethorphan (DELSYM) 30 mg/5 mL syrup 30 mg, 30 mg, Oral, Q12H PRN, Michelle Celeste MD, 30 mg at 20    0.9% sodium chloride infusion 250 mL, 250 mL, IntraVENous, PRN, Isha Clark MD, Last Rate: 15 mL/hr at 20 0156, 250 mL at 20 0156    sodium chloride (OCEAN) 0.65 % nasal squeeze bottle 2 Spray, 2 Spray, Both Nostrils, Q4HWA, Michelle Celeste MD, 2 [place] at 20 131    hydrOXYzine pamoate (VISTARIL) capsule 25 mg, 25 mg, Oral, TID PRN, Manuel Garcia MD, 25 mg at 20    melatonin tablet 10 mg, 10 mg, Oral, QHS PRN, Manuel Garcia MD, 10 mg at 20    acetaminophen (TYLENOL) tablet 650 mg, 650 mg, Oral, Q6H PRN, 650 mg at 10/18/20 2205 **OR** acetaminophen (TYLENOL) suppository 650 mg, 650 mg, Rectal, Q6H PRN, Apryl Claudio MD    sodium chloride (NS) flush 5-40 mL, 5-40 mL, IntraVENous, Q8H, Apryl Claudio MD, 10 mL at 20 1316    sodium chloride (NS) flush 5-40 mL, 5-40 mL, IntraVENous, PRN, Apryl Claudio MD, 10 mL at 10/22/20 0938    ondansetron (ZOFRAN ODT) tablet 4 mg, 4 mg, Oral, Q6H PRN **OR** ondansetron (ZOFRAN) injection 4 mg, 4 mg, IntraVENous, Q6H PRN, Apryl Claudio MD      Objective:     Vital Signs: Telemetry:    normal sinus rhythm Intake/Output:   Visit Vitals  /85   Pulse 77   Temp 97.8 °F (36.6 °C)   Resp 20   Ht 5' 9\" (1.753 m)   Wt 81.8 kg (180 lb 5.4 oz)   SpO2 97%   BMI 26.63 kg/m²       Temp (24hrs), Av.7 °F (36.5 °C), Min:97.5 °F (36.4 °C), Max:97.8 °F (36.6 °C)        O2 Device: Nasal cannula O2 Flow Rate (L/min): 5 l/min         Body mass index is 26.63 kg/m².     Wt Readings from Last 4 Encounters:   10/21/20 81.8 kg (180 lb 5.4 oz)          Intake/Output Summary (Last 24 hours) at 2020 1426  Last data filed at 2020 0749  Gross per 24 hour   Intake  Output 1100 ml   Net -1100 ml       Last shift:      11/07 0701 - 11/07 1900  In: -   Out: 700 [Urine:700]  Last 3 shifts: 11/05 1901 - 11/07 0700  In: -   Out: 1500 [Urine:1500]     Physical Exam:    General:  male; less distress no cough while I am in the room  HEENT: NCAT, oral mucosa clear  Eyes: anicteric; conjunctiva clear extraocular movements intact   neck: no node neck veins not visible  Chest: no deformity,   Cardiac: Regular rate and rhythm no murmurs trace ankle edema  Lungs: Clear anteriorly laterally still has rales posteriorly improved  Abd: Soft nontender normal bowel sounds no organomegaly  Ext: Mild edema; no joint swelling;  No clubbing  :clear urine  Neuro: Awake alert speech is clear moves all 4 extremities well  Psych- no agitation, oriented to person;   Skin: warm,, no cyanosis; very mildly diaphoretic  Pulses: Pulses intact  Capillary: Normal capillary refill      Labs:  11/250% nasal high flow with PO2 81 PCO2 42 pH 7.45  11/2 with standing and walking from bed to chair with physical therapy oxygen saturation drops to 72%  10/14 COVID-19 test negative  10/17 COVID-19 test indeterminate  Ferritin 1510, previous 2180  , previous 615, 754  Procalcitonin 0.14  CRP 0.87, previous 1.39, 3.0, 12.4  Lab Results   Component Value Date/Time    Culture result: No growth 7 days 10/20/2020 11:30 AM    Culture result: Heavy Staphylococcus aureus 10/20/2020 08:15 AM    Culture result: Light Klebsiella pneumoniae 10/20/2020 08:15 AM    Culture result: Heavy  Normal respiratory smita   10/20/2020 08:15 AM      Lab Results   Component Value Date/Time     10/15/2020 11:47 PM       Imaging:  I have personally reviewed the patients radiographs and have reviewed the reports:    CXR Results  (Last 48 hours)  11/2 chest x-ray continued diffuse accentuated interstitial markings  10/20 chest x-ray with diffuse patchy infiltrates may be slightly less dense  10/17 chest x-ray unchanged diffuse bilateral infiltrate               10/15/20 1927  XR CHEST SNGL V Final result    Narrative:  1       Mild atelectasis in the bases with upper lungs clear. No effusion or   pneumothorax. Normal heart and no congestion  Disagree to me there is bilateral infiltrate           Results from Hospital Encounter encounter on 10/15/20   XR CHEST PORT    Narrative Chest single view. Comparison single view chest November 2, 2020    Patchy coarse reticular markings through lungs appear similar compared to prior  imaging. Cardiac and mediastinal structures magnified on this AP view. No  pneumothorax or sizable pleural effusion. XR CHEST PORT    Narrative Chest, frontal view, 11/2/2020    History: Covid-19. Comparison: Including chest 10/27/2020. Findings: The cardiac silhouette is stable. The lungs remain underexpanded. Diffuse opacities in the lungs are not significantly changed. Hydrostatic edema  is possible. No pleural effusion is definitively noted on this single view. No  pneumothorax is identified. The osseous structures are stable. Impression Impression: No significant interval change. XR CHEST PORT    Narrative Portable upright radiograph chest 7:34 AM compared to October 26, 2020. INDICATION: Cough, pneumonia. Patient has taken a shallow depth of inspiration. Heart size is stable. Bilateral airspace disease/edema shows worsening compared to prior study. No  pneumothorax. Cannot exclude small effusions. No acute bony changes. Impression IMPRESSION: Shallower depth of inspiration with worsening bilateral airspace  disease/edema. Results from [place] encounter on 10/15/20   CTA CHEST W OR W WO CONT    Narrative CT dose reduction was achieved through use of a standardized protocol tailored  for this examination and automatic exposure control for dose modulation.     Contrast study maximum intensity projections shows pronounced groundglass  opacification, of variable density, mainly in the dependent portions of each  lung, apex to base. Air bronchograms are preserved in the densest portions. Mild  geographic groundglass opacification of lower density in the nondependent  portions. Central airways are open    Pulmonary arteries are well-opacified and show no filling defect or distortion. Aorta shows normal dimensions, without dissection. Tiny middle mediastinal lymph  nodes. Cardiac finding. No pleural pericardial effusion. No significant upper  abdominal or chest wall finding      Impression IMPRESSION: Widespread patchy bilateral pneumonitis       Clinical picture consistent with COVID-19 infection with pneumonitis with diffuse patchy pulmonary infiltrate elevation in ferritin LDH and CRP nonproductive cough and GI symptoms and yet initial COVID-19 test negative repeat testing indeterminate              Time spent 30 min       Thank you for allowing us to participate in the care of this patient.   We will follow along with you    Chyna Nielson MD

## 2020-11-08 LAB — SARS-COV-2, COV2NT: NOT DETECTED

## 2020-11-08 PROCEDURE — 94640 AIRWAY INHALATION TREATMENT: CPT

## 2020-11-08 PROCEDURE — 74011250636 HC RX REV CODE- 250/636: Performed by: HOSPITALIST

## 2020-11-08 PROCEDURE — 74011000250 HC RX REV CODE- 250: Performed by: INTERNAL MEDICINE

## 2020-11-08 PROCEDURE — 94762 N-INVAS EAR/PLS OXIMTRY CONT: CPT

## 2020-11-08 PROCEDURE — 74011250637 HC RX REV CODE- 250/637: Performed by: INTERNAL MEDICINE

## 2020-11-08 PROCEDURE — 77010033678 HC OXYGEN DAILY

## 2020-11-08 PROCEDURE — 74011250637 HC RX REV CODE- 250/637: Performed by: HOSPITALIST

## 2020-11-08 PROCEDURE — 65270000029 HC RM PRIVATE

## 2020-11-08 RX ADMIN — POTASSIUM CHLORIDE 40 MEQ: 750 TABLET, FILM COATED, EXTENDED RELEASE ORAL at 08:45

## 2020-11-08 RX ADMIN — IPRATROPIUM BROMIDE AND ALBUTEROL SULFATE 3 ML: .5; 3 SOLUTION RESPIRATORY (INHALATION) at 14:04

## 2020-11-08 RX ADMIN — SALINE NASAL SPRAY 2 SPRAY: 1.5 SOLUTION NASAL at 17:50

## 2020-11-08 RX ADMIN — FUROSEMIDE 40 MG: 40 TABLET ORAL at 08:45

## 2020-11-08 RX ADMIN — FAMOTIDINE 20 MG: 20 TABLET ORAL at 08:44

## 2020-11-08 RX ADMIN — MENTHOL, METHYL SALICYLATE: 10; 15 CREAM TOPICAL at 09:07

## 2020-11-08 RX ADMIN — ENOXAPARIN SODIUM 40 MG: 40 INJECTION SUBCUTANEOUS at 22:33

## 2020-11-08 RX ADMIN — FAMOTIDINE 20 MG: 20 TABLET ORAL at 22:33

## 2020-11-08 RX ADMIN — ENOXAPARIN SODIUM 40 MG: 40 INJECTION SUBCUTANEOUS at 09:05

## 2020-11-08 RX ADMIN — Medication 10 ML: at 14:02

## 2020-11-08 RX ADMIN — SALINE NASAL SPRAY 2 SPRAY: 1.5 SOLUTION NASAL at 14:01

## 2020-11-08 RX ADMIN — Medication 10 ML: at 22:34

## 2020-11-08 RX ADMIN — IPRATROPIUM BROMIDE AND ALBUTEROL SULFATE 3 ML: .5; 3 SOLUTION RESPIRATORY (INHALATION) at 19:11

## 2020-11-08 RX ADMIN — IPRATROPIUM BROMIDE AND ALBUTEROL SULFATE 3 ML: .5; 3 SOLUTION RESPIRATORY (INHALATION) at 08:26

## 2020-11-08 RX ADMIN — Medication 10 ML: at 05:28

## 2020-11-08 RX ADMIN — SALINE NASAL SPRAY 2 SPRAY: 1.5 SOLUTION NASAL at 09:06

## 2020-11-08 NOTE — PROGRESS NOTES
Hospitalist Progress Note               Daily Progress Note: 11/8/2020      Subjective: The patient is seen for follow  up. Patient seen and evaluated at bedside, no acute events overnight, of note patient currently doing well on NC, discussed with RN events at bedside. Medications reviewed  Current Facility-Administered Medications   Medication Dose Route Frequency    potassium chloride SR (KLOR-CON 10) tablet 40 mEq  40 mEq Oral DAILY    albuterol-ipratropium (DUO-NEB) 2.5 MG-0.5 MG/3 ML  3 mL Nebulization Q6HWA RT    furosemide (LASIX) tablet 40 mg  40 mg Oral DAILY    benzonatate (TESSALON) capsule 200 mg  200 mg Oral TID PRN    famotidine (PEPCID) tablet 20 mg  20 mg Oral BID    enoxaparin (LOVENOX) injection 40 mg  40 mg SubCUTAneous [id number]    methyl salicylate-menthol (BENGAY) 15-10 % cream   Topical TID PRN    dextromethorphan (DELSYM) 30 mg/5 mL syrup 30 mg  30 mg Oral Q12H PRN    0.9% sodium chloride infusion 250 mL  250 mL IntraVENous PRN    sodium chloride (OCEAN) 0.65 % nasal squeeze bottle 2 Spray  2 [place] Both Nostrils Q4HWA    hydrOXYzine pamoate (VISTARIL) capsule 25 mg  25 mg Oral TID PRN    melatonin tablet 10 mg  10 mg Oral QHS PRN    acetaminophen (TYLENOL) tablet 650 mg  650 mg Oral Q6H PRN    Or    acetaminophen (TYLENOL) suppository 650 mg  650 mg Rectal Q6H PRN    sodium chloride (NS) flush 5-40 mL  5-40 mL IntraVENous Q8H    sodium chloride (NS) flush 5-40 mL  5-40 mL IntraVENous PRN    ondansetron (ZOFRAN ODT) tablet 4 mg  4 mg Oral Q6H PRN    Or    ondansetron (ZOFRAN) injection 4 mg  4 mg IntraVENous Q6H PRN       Review of Systems:   A comprehensive review of systems was negative except for that written in the HPI.     Objective:   Physical Exam:     Visit Vitals  BP (!) 143/93 (BP 1 Location: Right arm, BP Patient Position: At rest)   Pulse 92   Temp 97.8 °F (36.6 °C)   Resp 18   Ht 5' 9\" (1.753 m)   Wt 81.8 kg (180 lb 5.4 oz)   SpO2 96%   BMI 26.63 kg/m² O2 Flow Rate (L/min): 4 l/min O2 Device: Nasal cannula    Temp (24hrs), Av °F (36.7 °C), Min:97.8 °F (36.6 °C), Max:98.6 °F (37 °C)    No intake/output data recorded.  1901 -  0700  In: -   Out: 1100 [Urine:1100]    PHYSICAL EXAM:  General: Alert and awake. Skin: Extremities and face reveal no rashes. HEENT: Sclerae anicteric. Extra-occular muscles are intact. No oral ulcers. No ENT discharge. The neck is supple. Cardiovascular: Regular rate and rhythm. No murmurs, gallops, or rubs. PMI nondisplaced. Carotids without bruits. Respiratory: Comfortable breathing with no accessory muscle use. Clear breath sounds with no wheezes, rales, or rhonchi. GI: Abdomen nondistended, soft, and nontender. Normal active bowel sounds. No enlargement of the liver or spleen. No masses palpable. Rectal: Deferred   Musculoskeletal: No pitting edema of the lower legs. Extremities have good range of motion. No costovertebral tenderness. Neurological: Gross memory appears intact. Patient is alert and oriented. Power 5/5, Cranial nerves II-XII intact  Psychiatric: Mood appears appropriate with judgement intact. Data Review:       Recent Days:  Recent Labs     20  1050   WBC 11.1   HGB 17.5*   HCT 49.4   *     Recent Labs     20  1050   *   K 3.4*      CO2 25   *   BUN 35*   CREA 1.04   CA 9.2   PHOS 3.4   ALB 3.6     No results for input(s): PH, PCO2, PO2, HCO3, FIO2 in the last 72 hours. 24 Hour Results:  Recent Results (from the past 24 hour(s))   GLUCOSE, POC    Collection Time: 20 11:54 AM   Result Value Ref Range    Glucose (POC) 170 (H) 65 - 100 mg/dL    Performed by Kylie Nevarez        XR CHEST PORT   Final Result      XR CHEST PORT   Final Result   Impression: No significant interval change. XR CHEST PORT   Final Result   IMPRESSION: Shallower depth of inspiration with worsening bilateral airspace   disease/edema.       XR CHEST PORT   Final Result   Impression: No significant interval change. XR CHEST PORT   Final Result   Findings/impression: A portable upright view of the chest demonstrates   persistent cardiomegaly and patchy interstitial and mild airspace disease which   has improved slightly on the right. No pneumothorax or effusion is identified. The osseous structures are unchanged. XR CHEST PORT   Final Result      XR CHEST PORT   Final Result   IMPRESSION: Improving bilateral airspace disease. XR CHEST PORT   Final Result      XR CHEST PORT   Final Result      XR CHEST PORT   Final Result      XR CHEST PORT   Final Result      CTA CHEST W OR W WO CONT   Final Result   IMPRESSION: Widespread patchy bilateral pneumonitis      XR CHEST SNGL V   Final Result             Assessment/     Problem List:  Hospital Problems  Date Reviewed: 10/15/2020          Codes Class Noted POA    Acute respiratory failure due to COVID-19 [facility]) ICD-10-CM: U07.1, J96.00  ICD-9-CM: 518.81, 079.89  10/15/2020 Yes        Pneumonia ICD-10-CM: J18.9  ICD-9-CM: 163  10/15/2020 Yes        Acute respiratory failure ([address] ICD-10-CM: J96.00  ICD-9-CM: 518.81  10/15/2020 Unknown                     Plan:    (1) Acute hypoxic respiratory failurelikely multifactorial, secondary to COVID-19 pneumonia/pneumonitis, currently patient successfully titrated off of high flow yesterday, remains on nasal cannula 4 L, likely secondary to scarring from pneumonitis/pneumonia  Continue methylprednisolone twice daily  Continue nasal cannula at this time  Continue Lasix 40 mg once daily, patient is diuresing well at this time, patient is net -700cc in past 24 hours.   Pulmonology recommendations appreciated, will continue to follow    (2) HCAP pneumonia and pneumonitis:Status post 14 days of antibiotic coverage  Continue to monitor  Infectious disease recommendations appreciated, will continue to follow    (3) high likelihood of COVID-19 pneumonitis: status post Remdesivir, Decadron, Azithromycin, Actemra,   Currently repeat COVID-19 test negative  Continue to monitor    Dehydrationcurrently resolved    Hypokalemia- follow up repeat potassium to assess for resolution    PpxLovenox  Full code, patient surrogate decision-maker is his wife, updated patient's family dispositionFor IRF placement (discussed with pt/daughter/CM/Specialists), likely in 25 to 48 hours     Care Plan discussed with: Patient/Family and Nurse    Called Patients daughter Elissa Pina Ph- [phone]    Total non-critical care time 35 minutes    Leyla Kern MD

## 2020-11-08 NOTE — PROGRESS NOTES
Pulmonary Note      Name: Ekatreina Gilliland MRN: [id number]   : 1960 Hospital: [address] [place] [address]   Date: 2020  Admission date: 10/15/2020 Hospital Day:        Subjective/Interval History:   Patient seen in the ICU on high flow nasal oxygen at 70% with oxygen saturation 99. He gives a history of gradual worsening illness over the last week to 10 days with nonproductive cough fever generalized malaise nausea denies any vomiting although the ER note states that he had some vomiting. He seems awake and alert. Does not have any Covid contacts that he is aware of. States that his sense of taste and smell have been normal but terribly anorexic has not eaten anything in 3 or 4 days. 10/17 still feels bad Cough shortness of breath but no nausea today   10/18 still complains of severe cough nonproductive although in general feels a little better  10/20 remains 100% high flow nasal oxygen. Continues to complain of it irritating him. Requested nasal saline last evening but it has not been started will reorder it. We will let him try nonrebreather mask with nasal high flow for meals  10/26 high flow nasal oxygen back up to 70% unable to safely switch to low flow nasal oxygen at this point. He states he feels better sense of taste is normal with a good appetite  10/28 patient had a rapid response last night now he is on 100% high flow nasal cannula   remains on nasal high flow down to 50% today but with getting out of bed to work with physical therapy he immediately desaturates into the 70s very slow to recover   nasal oxygen down to 5 L with good oxygen saturation 97%. He states when he sat up on the side of the bed and use the toilet but that its stayed in the low 90s  Patient Active Problem List   Diagnosis Code    Acute respiratory failure due to COVID-19 ([facility] U07.1, J96.00    Pneumonia J18.9    Acute respiratory failure ([facility] J96.00       IMPRESSION:   1.  Acute hypoxic respiratory failure continue to taper nasal oxygen if oxygen saturation allows  2. History of asthma  3. Community-acquired versus aspiration with diffuse pulmonary infiltrates questionable atypical pneumonia possible COVID-19 repeat testing Negative  4. Nausea probable due to COVID-19 infection  5. Unremitting cough secondary to pneumonia or reflux resolved  Body mass index is 26.63 kg/m². RECOMMENDATIONS/PLAN:   1. Covid pneumonitis  has completed 2 doses Actemra and 5 days of Remdesivir  decreased Decadron to 2 mg oral twice daily repeat CRP normal  2. He is still hypoxic but tolerating 5 L nasal oxygen  3. X-ray showed bilateral infiltrate improved on 11/2 x-ray will continue Lasix  4. Labs do show hypokalemia and replete month dosing ordered  5. Sputum culture with MSSA and Klebsiella both should be covered by Zosyn discontinued after 14 days  6. Continue nebulizer treatment  7. Repeat COVID-19 test negative         Subjective/Initial History:   I have reviewed the flowsheet and previous days notes. Seen earlier today on rounds. I was asked by Susana Torres MD to see An Nusrat Burkett a 56 y.o.  male  in consultation for a chief complaint of acute hypoxic respiratory failure cough and community-acquired pneumonia          Pt now in CCU. Patient PCP: UNKNOWN  PMH:  has no past medical history on file. PSH:   has no past surgical history on file. FHX: family history includes No Known Problems in his father and mother. SHX:  reports that he has never smoked. He has never used smokeless tobacco. He reports that he does not drink alcohol or use drugs.     Systemic review:  General no chronic illness but over the last week he has had fever generalized malaise weight loss no appetite  Eyes no double vision or momentary blindness  ENT no sinus congestion drainage or facial pain  Skeletal has diffuse aches and pains no swollen tender joints  Endocrinologic no polyuria polydipsia  Neurologic no seizures or syncope  Gastrointestinal normally he has no problems but over this last week he has had nausea anorexia marked weight loss has not had any alteration of his sense of taste or smell  Genitourinary no discomfort or drainage on urination  Cardiovascular no history of heart disease chest pain has felt sweaty but no history of ankle edema.   Respiratory as mentioned above    No Known Allergies   MEDS:   Current Facility-Administered Medications   Medication    potassium chloride SR (KLOR-CON 10) tablet 40 mEq    albuterol-ipratropium (DUO-NEB) 2.5 MG-0.5 MG/3 ML    furosemide (LASIX) tablet 40 mg    benzonatate (TESSALON) capsule 200 mg    famotidine (PEPCID) tablet 20 mg    enoxaparin (LOVENOX) injection 40 mg    methyl salicylate-menthol (BENGAY) 15-10 % cream    dextromethorphan (DELSYM) 30 mg/5 mL syrup 30 mg    0.9% sodium chloride infusion 250 mL    sodium chloride (OCEAN) 0.65 % nasal squeeze bottle 2 Spray    hydrOXYzine pamoate (VISTARIL) capsule 25 mg    melatonin tablet 10 mg    acetaminophen (TYLENOL) tablet 650 mg    Or    acetaminophen (TYLENOL) suppository 650 mg    sodium chloride (NS) flush 5-40 mL    sodium chloride (NS) flush 5-40 mL    ondansetron (ZOFRAN ODT) tablet 4 mg    Or    ondansetron (ZOFRAN) injection 4 mg        Current Facility-Administered Medications:     potassium chloride SR (KLOR-CON 10) tablet 40 mEq, 40 mEq, Oral, DAILY, bAi Ontiveros MD, 40 mEq at 11/08/20 0845    albuterol-ipratropium (DUO-NEB) 2.5 MG-0.5 MG/3 ML, 3 mL, Nebulization, Q6HWA RT, Abi Ontiveros MD, 3 mL at 11/08/20 1404    furosemide (LASIX) tablet 40 mg, 40 mg, Oral, DAILY, Abi Ontiveros MD, 40 mg at 11/08/20 0845    benzonatate (TESSALON) capsule 200 mg, 200 mg, Oral, TID PRN, Abi Ontiveros MD    famotidine (PEPCID) tablet 20 mg, 20 mg, Oral, BID, Martita Pearson MD, 20 mg at 11/08/20 0844    enoxaparin (LOVENOX) injection 40 mg, 40 mg, SubCUTAneous, Q12H, Martita Pearson MD, 40 mg at 20 0193    methyl salicylate-menthol (BENGAY) 15-10 % cream, , Topical, TID PRN, Ness Valle PA-C    dextromethorphan (DELSYM) 30 mg/5 mL syrup 30 mg, 30 mg, Oral, Q12H PRN, Danny Baires MD, 30 mg at 20    0.9% sodium chloride infusion 250 mL, 250 mL, IntraVENous, PRN, Melissa Cervantes MD, Last Rate: 15 mL/hr at 20 0156, 250 mL at 20 0156    sodium chloride (OCEAN) 0.65 % nasal squeeze bottle 2 Spray, 2 Spray, Both Nostrils, Q4HWA, Danny Baires MD, 2 Spray at 20 140    hydrOXYzine pamoate (VISTARIL) capsule 25 mg, 25 mg, Oral, TID PRN, Elzbieta Durbin MD, 25 mg at 20    melatonin tablet 10 mg, 10 mg, Oral, QHS PRN, Elzbieta Durbin MD, 10 mg at 20    acetaminophen (TYLENOL) tablet 650 mg, 650 mg, Oral, Q6H PRN, 650 mg at 10/18/20 2205 **OR** acetaminophen (TYLENOL) suppository 650 mg, 650 mg, Rectal, Q6H PRN, Burrell Curling, MD    sodium chloride (NS) flush 5-40 mL, 5-40 mL, IntraVENous, Q8H, Burrell Curling, MD, 10 mL at 20 1402    sodium chloride (NS) flush 5-40 mL, 5-40 mL, IntraVENous, PRN, Burrell Curling, MD, 10 mL at 10/22/20 0938    ondansetron (ZOFRAN ODT) tablet 4 mg, 4 mg, Oral, Q6H PRN **OR** ondansetron (ZOFRAN) injection 4 mg, 4 mg, IntraVENous, Q6H PRN, Burrell Curling, MD      Objective:     Vital Signs: Telemetry:    normal sinus rhythm Intake/Output:   Visit Vitals  BP (!) 148/97   Pulse 87   Temp 98.1 °F (36.7 °C)   Resp 20   Ht 5' 9\" (1.753 m)   Wt 81.8 kg (180 lb 5.4 oz)   SpO2 96%   BMI 26.63 kg/m²       Temp (24hrs), Av.9 °F (36.6 °C), Min:97.8 °F (36.6 °C), Max:98.1 °F (36.7 °C)        O2 Device: Nasal cannula O2 Flow Rate (L/min): 95 l/min         Body mass index is 26.63 kg/m². Wt Readings from Last 4 Encounters:   10/21/20 81.8 kg (180 lb 5.4 oz)        No intake or output data in the 24 hours ending 20 1435    Last shift:      No intake/output data recorded.   Last 3 shifts: 11/06 1901 - 11/08 0700  In: -   Out: 1100 [Urine:1100]     Physical Exam:    General:  male; less distress no cough while I am in the room  HEENT: NCAT, oral mucosa clear  Eyes: anicteric; conjunctiva clear extraocular movements intact   neck: no node neck veins not visible  Chest: no deformity,   Cardiac: Regular rate and rhythm no murmurs trace ankle edema  Lungs: Clear anteriorly laterally still has rales posteriorly improved  Abd: Soft nontender normal bowel sounds no organomegaly  Ext: Mild edema; no joint swelling;  No clubbing  :clear urine  Neuro: Awake alert speech is clear moves all 4 extremities well  Psych- no agitation, oriented to person;   Skin: warm,, no cyanosis; very mildly diaphoretic  Pulses: Pulses intact  Capillary: Normal capillary refill      Labs:  11/250% nasal high flow with PO2 81 PCO2 42 pH 7.45  11/2 with standing and walking from bed to chair with physical therapy oxygen saturation drops to 72%  10/14 COVID-19 test negative  10/17 COVID-19 test indeterminate  Ferritin 1510, previous 2180  , previous 615, 754  Procalcitonin 0.14  CRP 0.87, previous 1.39, 3.0, 12.4  Lab Results   Component Value Date/Time    Culture result: No growth 7 days 10/20/2020 11:30 AM    Culture result: Heavy Staphylococcus aureus 10/20/2020 08:15 AM    Culture result: Light Klebsiella pneumoniae 10/20/2020 08:15 AM    Culture result: Heavy  Normal respiratory smita   10/20/2020 08:15 AM      Lab Results   Component Value Date/Time     10/15/2020 11:47 PM       Imaging:  I have personally reviewed the patients radiographs and have reviewed the reports:    CXR Results  (Last 48 hours)  11/2 chest x-ray continued diffuse accentuated interstitial markings  10/20 chest x-ray with diffuse patchy infiltrates may be slightly less dense  10/17 chest x-ray unchanged diffuse bilateral infiltrate               10/15/20 1927  XR CHEST SNGL V Final result    Narrative:  1       Mild atelectasis in the bases with upper lungs clear. No effusion or   pneumothorax. Normal heart and no congestion  Disagree to me there is bilateral infiltrate           Results from Hospital Encounter encounter on 10/15/20   XR CHEST PORT    Narrative Chest single view. Comparison single view chest November 2, 2020    Patchy coarse reticular markings through lungs appear similar compared to prior  imaging. Cardiac and mediastinal structures magnified on this AP view. No  pneumothorax or sizable pleural effusion. XR CHEST PORT    Narrative Chest, frontal view, 11/2/2020    History: Covid-19. Comparison: Including chest 10/27/2020. Findings: The cardiac silhouette is stable. The lungs remain underexpanded. Diffuse opacities in the lungs are not significantly changed. Hydrostatic edema  is possible. No pleural effusion is definitively noted on this single view. No  pneumothorax is identified. The osseous structures are stable. Impression Impression: No significant interval change. XR CHEST PORT    Narrative Portable upright radiograph chest 7:34 AM compared to October 26, 2020. INDICATION: Cough, pneumonia. Patient has taken a shallow depth of inspiration. Heart size is stable. Bilateral airspace disease/edema shows worsening compared to prior study. No  pneumothorax. Cannot exclude small effusions. No acute bony changes. Impression IMPRESSION: Shallower depth of inspiration with worsening bilateral airspace  disease/edema. Results from [place] encounter on 10/15/20   CTA CHEST W OR W WO CONT    Narrative CT dose reduction was achieved through use of a standardized protocol tailored  for this examination and automatic exposure control for dose modulation. Contrast study maximum intensity projections shows pronounced groundglass  opacification, of variable density, mainly in the dependent portions of each  lung, apex to base.  Air bronchograms are preserved in the densest portions. Mild  geographic groundglass opacification of lower density in the nondependent  portions. Central airways are open    Pulmonary arteries are well-opacified and show no filling defect or distortion. Aorta shows normal dimensions, without dissection. Tiny middle mediastinal lymph  nodes. Cardiac finding. No pleural pericardial effusion. No significant upper  abdominal or chest wall finding      Impression IMPRESSION: Widespread patchy bilateral pneumonitis       Clinical picture consistent with COVID-19 infection with pneumonitis with diffuse patchy pulmonary infiltrate elevation in ferritin LDH and CRP nonproductive cough and GI symptoms and yet initial COVID-19 test negative repeat testing indeterminate              Time spent 30 min       Thank you for allowing us to participate in the care of this patient.   We will follow along with you    Marie Camacho MD

## 2020-11-09 VITALS
OXYGEN SATURATION: 96 % | WEIGHT: 180.34 LBS | HEART RATE: 98 BPM | TEMPERATURE: 98.2 F | DIASTOLIC BLOOD PRESSURE: 82 MMHG | BODY MASS INDEX: 26.71 KG/M2 | SYSTOLIC BLOOD PRESSURE: 108 MMHG | RESPIRATION RATE: 20 BRPM | HEIGHT: 69 IN

## 2020-11-09 PROCEDURE — 94762 N-INVAS EAR/PLS OXIMTRY CONT: CPT

## 2020-11-09 PROCEDURE — 99231 SBSQ HOSP IP/OBS SF/LOW 25: CPT | Performed by: INTERNAL MEDICINE

## 2020-11-09 PROCEDURE — 77010033678 HC OXYGEN DAILY

## 2020-11-09 PROCEDURE — 74011250637 HC RX REV CODE- 250/637: Performed by: HOSPITALIST

## 2020-11-09 PROCEDURE — 74011250636 HC RX REV CODE- 250/636: Performed by: HOSPITALIST

## 2020-11-09 PROCEDURE — 94640 AIRWAY INHALATION TREATMENT: CPT

## 2020-11-09 PROCEDURE — 74011250637 HC RX REV CODE- 250/637: Performed by: INTERNAL MEDICINE

## 2020-11-09 PROCEDURE — 74011000250 HC RX REV CODE- 250: Performed by: INTERNAL MEDICINE

## 2020-11-09 RX ORDER — POTASSIUM CHLORIDE 1500 MG/1
40 TABLET, FILM COATED, EXTENDED RELEASE ORAL DAILY
Qty: 30 TAB | Refills: 0 | Status: SHIPPED
Start: 2020-11-10

## 2020-11-09 RX ORDER — BENZONATATE 200 MG/1
200 CAPSULE ORAL
Qty: 30 CAP | Refills: 0 | Status: SHIPPED | OUTPATIENT
Start: 2020-11-09 | End: 2020-11-16

## 2020-11-09 RX ORDER — HYDROXYZINE PAMOATE 25 MG/1
25 CAPSULE ORAL
Qty: 30 CAP | Refills: 0 | Status: SHIPPED
Start: 2020-11-09 | End: 2020-11-23

## 2020-11-09 RX ORDER — FUROSEMIDE 40 MG/1
40 TABLET ORAL DAILY
Qty: 30 TAB | Refills: 0 | Status: SHIPPED
Start: 2020-11-09

## 2020-11-09 RX ORDER — ACETAMINOPHEN, DIPHENHYDRAMINE HCL, PHENYLEPHRINE HCL 325; 25; 5 MG/1; MG/1; MG/1
10 TABLET ORAL
Qty: 30 TAB | Refills: 0 | Status: SHIPPED
Start: 2020-11-09

## 2020-11-09 RX ADMIN — FUROSEMIDE 40 MG: 40 TABLET ORAL at 10:23

## 2020-11-09 RX ADMIN — IPRATROPIUM BROMIDE AND ALBUTEROL SULFATE 3 ML: .5; 3 SOLUTION RESPIRATORY (INHALATION) at 13:20

## 2020-11-09 RX ADMIN — FAMOTIDINE 20 MG: 20 TABLET ORAL at 10:23

## 2020-11-09 RX ADMIN — IPRATROPIUM BROMIDE AND ALBUTEROL SULFATE 3 ML: .5; 3 SOLUTION RESPIRATORY (INHALATION) at 07:36

## 2020-11-09 RX ADMIN — SALINE NASAL SPRAY 2 SPRAY: 1.5 SOLUTION NASAL at 14:14

## 2020-11-09 RX ADMIN — SALINE NASAL SPRAY 2 SPRAY: 1.5 SOLUTION NASAL at 10:25

## 2020-11-09 RX ADMIN — POTASSIUM CHLORIDE 40 MEQ: 750 TABLET, FILM COATED, EXTENDED RELEASE ORAL at 10:23

## 2020-11-09 RX ADMIN — Medication 10 ML: at 14:17

## 2020-11-09 RX ADMIN — ENOXAPARIN SODIUM 40 MG: 40 INJECTION SUBCUTANEOUS at 05:55

## 2020-11-09 NOTE — PROGRESS NOTES
Visit attempted for patient in [address] [place] [address] oncology. I had followed up several time per patient's previous request and this time collaborated with the patient's nurse to see if patient might benefit a follow up visit. Nurse stated patient will be discharged today and his family will be here soon. I requested his nurse to inform me if it appears as if patient might want to be visited. I provided silent prayer and support. Chaplains will follow as able and/or needed. Chaplain Sindhu De La Rosa, M.Cecilio.    can be reached by calling the  at [facility] acute hospital  [phone]

## 2020-11-09 NOTE — ROUTINE PROCESS
Discharge order received. Vital signs stable. IV access and telemetry discontinued. Discharge instructions given to wife, no further questions at this time. Report called to Sanjay Steele at [facility]. Oxygen tank approved to transport with patient to [facility], liability waiver signed by wife. Tank arranged to be returned by Brody BROOKS.

## 2020-11-09 NOTE — DISCHARGE SUMMARY
Hospitalist Discharge Summary     Patient ID:  Ekaterina Serna  [id number]  56 y.o.  1960  10/15/2020    PCP on record: UNKNOWN    Admit date: 10/15/2020  Discharge date and time: 11/9/2020    DISCHARGE DIAGNOSIS:    Acute respiratory failure with hypoxia/COVID 19 PNA/Pneumonitis    CONSULTATIONS:  IP CONSULT TO INFECTIOUS DISEASES  IP CONSULT TO PULMONOLOGY    Excerpted HPI from H&P of Raquel Brown MD:  56 y.o. male with no significant medical problems presents to the emergency room complaining of shortness of breath. States for the last 10 days having fever associated with body aches. It is getting worse in intensity, started having nonproductive cough, tiredness and nausea. He also started having some vomiting. As started having difficulty breathing and no improvement in his symptoms went to the patient first, was found with hypoxia recommended to come to the emergency room. He is in significant respiratory distress, required high flow nasal cannula, patient did not realize how bad it is. He is stable when I seen him but still having temperature.     Past medical history no medical problems     Past surgical history: Denies any significant medical problems. ______________________________________________________________________  DISCHARGE SUMMARY/HOSPITAL COURSE:  for full details see H&P, daily progress notes, labs, consult notes.      Patient was subsequently admitted to [facility] with a clinical diagnosis of COVID-19 pneumonia/COVID-19 pneumonitis, patient was admitted to the ICU, patient was initially placed on high flow nasal cannula, patient was placed on IV steroids, initiated on IV antibiotics, patient was evaluated by pulmonology as well as infectious disease, patient received Actemra x2 as well as a full course of remdesivir, patient was subsequently down titrated on IV steroids, patient was initiated on IV diuretics, subsequent to which patient was evaluated by physical therapy and Occupational Therapy, patient's clinical course continued to improve during the course of the admission, following which patient steroids were discontinued, patient diuresed well on oral Lasix, patient was deemed stable for discharge to inpatient rehab facility, plan was discussed with case management as well as patient as well as patient's daughter were in agreement with the plan, patient to follow-up with pulmonology as an outpatient        _______________________________________________________________________  Patient seen and examined by me on discharge day. Pertinent Findings:  Gen:    Not in distress  Chest: Decreased air entry bilaterally in bilateral lower lung zones  CVS:   Regular rhythm. No edema  Abd:  Soft, not distended, not tender  Neuro:  Alert, oriented x 4  _______________________________________________________________________  DISCHARGE MEDICATIONS:   Current Discharge Medication List      START taking these medications    Details   benzonatate (TESSALON) 200 mg capsule Take 1 Cap by mouth three (3) times daily as needed for Cough for up to 7 days. Qty: 30 Cap, Refills: 0      furosemide (LASIX) 40 mg tablet Take 1 Tab by mouth daily. Qty: 30 Tab, Refills: 0      hydrOXYzine pamoate (VISTARIL) 25 mg capsule Take 1 Cap by mouth three (3) times daily as needed for Anxiety for up to 14 days. Qty: 30 Cap, Refills: 0      melatonin 10 mg tab Take 10 mg by mouth nightly. Qty: 30 Tab, Refills: 0      potassium chloride SR (K-TAB) 20 mEq tablet Take 2 Tabs by mouth daily. Qty: 30 Tab, Refills: 0               Patient Follow Up Instructions: Activity: PT/OT Eval and Treat  Diet: Cardiac Diet  Wound Care: As directed    Follow-up with PCP/Pulmonology in 2 weeks.   Follow-up tests/labs As per PCP/Pulmonology  Follow-up Information     Follow up With Specialties Details Why Contact Info    UNKNOWN        Emily Dugan MD Pulmonary Disease In 2 weeks  [address] [facility]  [phone]          ________________________________________________________________    Risk of deterioration: Moderate    Condition at Discharge:  Stable  __________________________________________________________________    Disposition  IP Rehab    ____________________________________________________________________    Code Status: Full Code  ___________________________________________________________________      Total time in minutes spent coordinating this discharge (includes going over instructions, follow-up, prescriptions, and preparing report for sign off to her PCP) :  40 minutes    Signed:   Leyla Kern MD

## 2020-11-09 NOTE — PROGRESS NOTES
Progress Note  Date:2020       Room:[facility]  Patient Name:Ekaterina Blanchard     YOB: 1960     Age:60 y.o. Subjective    Subjective   Patient followed for presumptive Covid-19 pneumoniitis with indeterminate results, now status post Remdesivir and Actemra, still on Decadron. No CXR today. He is afebrile with leukocytosis attributed to steroids. Patient apparently being discharged to [facility] today. He has been afebrile. Objective         Vitals Last 24 Hours:  TEMPERATURE:  Temp  Av.3 °F (36.8 °C)  Min: 97.9 °F (36.6 °C)  Max: 98.9 °F (37.2 °C)  RESPIRATIONS RANGE: Resp  Av.5  Min: 20  Max: 26  PULSE OXIMETRY RANGE: SpO2  Av %  Min: 95 %  Max: 97 %  PULSE RANGE: Pulse  Av  Min: 76  Max: 104  BLOOD PRESSURE RANGE: Systolic (81ASI), GXY:850 , Min:108 , AAY:876   ; Diastolic (73NWJ), NXD:83, Min:82, Max:88    I/O (24Hr): Intake/Output Summary (Last 24 hours) at 2020 1754  Last data filed at 2020 2301  Gross per 24 hour   Intake    Output 1 ml   Net -1 ml     Objective:  Vital signs: (most recent): Blood pressure 108/82, pulse 98, temperature 98.2 °F (36.8 °C), resp. rate 20, height 5' 9\" (1.753 m), weight 180 lb 5.4 oz (81.8 kg), SpO2 96 %. General: Mildy ill-appearing male, NAD  HEENT: eyes open, nasal O2  Neck: supple  Lungs: clear bilaterally  Heart: RRR  : Granados catheter with moderate urine sediment    Labs/Imaging/Diagnostics    Labs:  CBC:  No results for input(s): WBC, RBC, HGB, HCT, MCV, RDW, PLT, HGBEXT, HCTEXT, PLTEXT, HGBEXT, HCTEXT, PLTEXT in the last 72 hours. CHEMISTRIES:  No results for input(s): NA, K, CL, CO2, BUN, CA, PHOS, MG in the last 72 hours. No lab exists for component: CREATININE, GLUCOSEPT/INR:  No results for input(s): INR, INREXT, INREXT in the last 72 hours. No lab exists for component: PROTIME  APTT:  No results for input(s): APTT in the last 72 hours.   LIVER PROFILE:  No results for input(s): AST, ALT in the last 72 hours. No lab exists for component: Idalmis Witt ALKPHOS  Lab Results   Component Value Date/Time    ALT (SGPT) 42 10/25/2020 08:00 AM    AST (SGOT) 19 10/25/2020 08:00 AM    Alk. phosphatase 97 10/25/2020 08:00 AM    Bilirubin, total 1.2 (H) 10/25/2020 08:00 AM     WBC 11,100   (403 (405 (689 (452 (565  Ferritin 1,519 (1,084 (1,187  CRP <0.29 (0.43 (12.40    Sputum culture (10/20) MSSA and Klebsiella pneumoniae  Blood cultures (10/20) No growth FINAL    Imaging Last 24 Hours:  No results found. Assessment//Plan   Active Problems:    Acute respiratory failure due to COVID-19 [facility]) (10/15/2020)      Pneumonia (10/15/2020)      Acute respiratory failure ([facility] (10/15/2020)      Assessment & Plan  1. Probable Covid-19 pneumonitis, with indeterminate result, status post Remdesivir, Decadron, Azithromycin, Actemra, clinically resolved. 2. Acute respiratory failure, on nasal O2  3. Elevated CRP, LDH and ferritin, secondary to #1, decreasing. 4. HCAP with purulent sputum, secondary to MSSA and Klebsiella pneumoniae, status post 14 days of IV Zosyn.     1. No further treatment recommendations at this time.   2. Cleared for discharge from ID standpoint    Electronically signed by Santos Alfaro MD on 11/9/2020 at 1:43 PM

## 2020-11-09 NOTE — PROGRESS NOTES
Pulmonary Note      Name: Ekaterina Dneny MRN: [id number]   : 1960 Hospital: [address] [place] [address]   Date: 2020  Admission date: 10/15/2020 Hospital Day: 32       Subjective/Interval History:   Patient seen in the ICU on high flow nasal oxygen at 70% with oxygen saturation 99. He gives a history of gradual worsening illness over the last week to 10 days with nonproductive cough fever generalized malaise nausea denies any vomiting although the ER note states that he had some vomiting. He seems awake and alert. Does not have any Covid contacts that he is aware of. States that his sense of taste and smell have been normal but terribly anorexic has not eaten anything in 3 or 4 days. 10/17 still feels bad Cough shortness of breath but no nausea today   10/18 still complains of severe cough nonproductive although in general feels a little better  10/20 remains 100% high flow nasal oxygen. Continues to complain of it irritating him. Requested nasal saline last evening but it has not been started will reorder it. We will let him try nonrebreather mask with nasal high flow for meals  10/26 high flow nasal oxygen back up to 70% unable to safely switch to low flow nasal oxygen at this point. He states he feels better sense of taste is normal with a good appetite  10/28 patient had a rapid response last night now he is on 100% high flow nasal cannula   remains on nasal high flow down to 50% today but with getting out of bed to work with physical therapy he immediately desaturates into the 70s very slow to recover   nasal oxygen down to 3 L with oxygen saturation 93%. With him standing and walking in place next to the bed the saturation stays 92%  Patient Active Problem List   Diagnosis Code    Acute respiratory failure due to COVID-19 ([facility] [address] U07.1, J96.00    Pneumonia J18.9    Acute respiratory failure ([facility] [address] J96.00       IMPRESSION:   1.  Acute hypoxic respiratory failure continue to taper nasal oxygen if oxygen saturation allows  2. History of asthma  3. Community-acquired versus aspiration with diffuse pulmonary infiltrates questionable atypical pneumonia possible COVID-19 repeat testing Negative  4. Nausea probable due to COVID-19 infection  5. Unremitting cough secondary to pneumonia or reflux resolved  Body mass index is 26.63 kg/m². RECOMMENDATIONS/PLAN:   1. Covid pneumonitis  has completed 2 doses Actemra and 5 days of Remdesivir  decreased Decadron to 2 mg oral twice daily repeat CRP normal  2. He is still hypoxic but tolerating 5 L nasal oxygen  3. X-ray showed bilateral infiltrate improved on 11/2 x-ray will continue Lasix  4. Labs do show hypokalemia and replete month dosing ordered  5. Sputum culture with MSSA and Klebsiella both should be covered by Zosyn discontinued after 14 days  6. Continue nebulizer treatment  7. Repeat COVID-19 test negative         Subjective/Initial History:   I have reviewed the flowsheet and previous days notes. Seen earlier today on rounds. I was asked by Huber Mendez MD to see An Crescencio Vazquezet a 56 y.o.  male  in consultation for a chief complaint of acute hypoxic respiratory failure cough and community-acquired pneumonia          Pt now in CCU. Patient PCP: UNKNOWN  PMH:  has no past medical history on file. PSH:   has no past surgical history on file. FHX: family history includes No Known Problems in his father and mother. SHX:  reports that he has never smoked. He has never used smokeless tobacco. He reports that he does not drink alcohol or use drugs.     Systemic review:  General no chronic illness but over the last week he has had fever generalized malaise weight loss no appetite  Eyes no double vision or momentary blindness  ENT no sinus congestion drainage or facial pain  Skeletal has diffuse aches and pains no swollen tender joints  Endocrinologic no polyuria polydipsia  Neurologic no seizures or syncope  Gastrointestinal normally he has no problems but over this last week he has had nausea anorexia marked weight loss has not had any alteration of his sense of taste or smell  Genitourinary no discomfort or drainage on urination  Cardiovascular no history of heart disease chest pain has felt sweaty but no history of ankle edema.   Respiratory as mentioned above    No Known Allergies   MEDS:   Current Facility-Administered Medications   Medication    potassium chloride SR (KLOR-CON 10) tablet 40 mEq    albuterol-ipratropium (DUO-NEB) 2.5 MG-0.5 MG/3 ML    furosemide (LASIX) tablet 40 mg    benzonatate (TESSALON) capsule 200 mg    famotidine (PEPCID) tablet 20 mg    enoxaparin (LOVENOX) injection 40 mg    methyl salicylate-menthol (BENGAY) 15-10 % cream    dextromethorphan (DELSYM) 30 mg/5 mL syrup 30 mg    0.9% sodium chloride infusion 250 mL    sodium chloride (OCEAN) 0.65 % nasal squeeze bottle 2 Spray    hydrOXYzine pamoate (VISTARIL) capsule 25 mg    melatonin tablet 10 mg    acetaminophen (TYLENOL) tablet 650 mg    Or    acetaminophen (TYLENOL) suppository 650 mg    sodium chloride (NS) flush 5-40 mL    sodium chloride (NS) flush 5-40 mL    ondansetron (ZOFRAN ODT) tablet 4 mg    Or    ondansetron (ZOFRAN) injection 4 mg        Current Facility-Administered Medications:     potassium chloride SR (KLOR-CON 10) tablet 40 mEq, 40 mEq, Oral, DAILY, Court Lunsford MD, 40 mEq at 11/09/20 1023    albuterol-ipratropium (DUO-NEB) 2.5 MG-0.5 MG/3 ML, 3 mL, Nebulization, Q6HWA RT, Court Lunsford MD, 3 mL at 11/09/20 0736    furosemide (LASIX) tablet 40 mg, 40 mg, Oral, DAILY, Court Lunsford MD, 40 mg at 11/09/20 1023    benzonatate (TESSALON) capsule 200 mg, 200 mg, Oral, TID PRN, Court Lunsford MD    famotidine (PEPCID) tablet 20 mg, 20 mg, Oral, BID, Lakisha Lancaster MD, 20 mg at 11/09/20 1023    enoxaparin (LOVENOX) injection 40 mg, 40 mg, SubCUTAneous, Q12H, Lakisha Lancaster MD, 40 mg at 20 3580    methyl salicylate-menthol (BENGAY) 15-10 % cream, , Topical, TID PRN, Ness Valle PA-C    dextromethorphan (DELSYM) 30 mg/5 mL syrup 30 mg, 30 mg, Oral, Q12H PRN, Phyllis Harrington MD, 30 mg at 20    0.9% sodium chloride infusion 250 mL, 250 mL, IntraVENous, PRN, aPmela Stokes MD, Last Rate: 15 mL/hr at 20 0156, 250 mL at 20 0156    sodium chloride (OCEAN) 0.65 % nasal squeeze bottle 2 Spray, 2 Spray, Both Nostrils, Q4HWA, Phyllis Harrington MD, 2 [place] at 20 1025    hydrOXYzine pamoate (VISTARIL) capsule 25 mg, 25 mg, Oral, TID PRN, Gildardo Dakin, MD, 25 mg at 20    melatonin tablet 10 mg, 10 mg, Oral, QHS PRN, Gildardo Dakin, MD, 10 mg at 20    acetaminophen (TYLENOL) tablet 650 mg, 650 mg, Oral, Q6H PRN, 650 mg at 10/18/20 2205 **OR** acetaminophen (TYLENOL) suppository 650 mg, 650 mg, Rectal, Q6H PRN, Grant Roach MD    sodium chloride (NS) flush 5-40 mL, 5-40 mL, IntraVENous, Q8H, Grant Roach MD, 10 mL at 20 2234    sodium chloride (NS) flush 5-40 mL, 5-40 mL, IntraVENous, PRN, Grant Roach MD, 10 mL at 10/22/20 0938    ondansetron (ZOFRAN ODT) tablet 4 mg, 4 mg, Oral, Q6H PRN **OR** ondansetron (ZOFRAN) injection 4 mg, 4 mg, IntraVENous, Q6H PRN, Grant Roach MD      Objective:     Vital Signs: Telemetry:    normal sinus rhythm Intake/Output:   Visit Vitals  /88   Pulse (!) 104   Temp 98.9 °F (37.2 °C)   Resp 26   Ht 5' 9\" (1.753 m)   Wt 81.8 kg (180 lb 5.4 oz)   SpO2 95%   BMI 26.63 kg/m²       Temp (24hrs), Av.4 °F (36.9 °C), Min:97.9 °F (36.6 °C), Max:98.9 °F (37.2 °C)        O2 Device: Nasal cannula O2 Flow Rate (L/min): 3 l/min         Body mass index is 26.63 kg/m².     Wt Readings from Last 4 Encounters:   10/21/20 81.8 kg (180 lb 5.4 oz)          Intake/Output Summary (Last 24 hours) at 2020 1248  Last data filed at 2020 2301  Gross per 24 hour   Intake  Output 1 ml   Net -1 ml       Last shift:      No intake/output data recorded. Last 3 shifts: 11/07 1901 - 11/09 0700  In: -   Out: 1      Physical Exam:    General:  male; less distress no cough while I am in the room  HEENT: NCAT, oral mucosa clear  Eyes: anicteric; conjunctiva clear extraocular movements intact   neck: no node neck veins not visible  Chest: no deformity,   Cardiac: Regular rate and rhythm no murmurs trace ankle edema  Lungs: Clear anteriorly laterally still has rales posteriorly improved  Abd: Soft nontender normal bowel sounds no organomegaly  Ext: Mild edema; no joint swelling;  No clubbing  :clear urine  Neuro: Awake alert speech is clear moves all 4 extremities well  Psych- no agitation, oriented to person;   Skin: warm,, no cyanosis; very mildly diaphoretic  Pulses: Pulses intact  Capillary: Normal capillary refill      Labs:  11/250% nasal high flow with PO2 81 PCO2 42 pH 7.45  11/2 with standing and walking from bed to chair with physical therapy oxygen saturation drops to 72%  10/14 COVID-19 test negative  10/17 COVID-19 test indeterminate  Ferritin 1510, previous 2180  , previous 615, 754  Procalcitonin 0.14  CRP 0.87, previous 1.39, 3.0, 12.4  Lab Results   Component Value Date/Time    Culture result: No growth 7 days 10/20/2020 11:30 AM    Culture result: Heavy Staphylococcus aureus 10/20/2020 08:15 AM    Culture result: Light Klebsiella pneumoniae 10/20/2020 08:15 AM    Culture result: Heavy  Normal respiratory smita   10/20/2020 08:15 AM      Lab Results   Component Value Date/Time     10/15/2020 11:47 PM       Imaging:  I have personally reviewed the patients radiographs and have reviewed the reports:    CXR Results  (Last 48 hours)  11/2 chest x-ray continued diffuse accentuated interstitial markings  10/20 chest x-ray with diffuse patchy infiltrates may be slightly less dense  10/17 chest x-ray unchanged diffuse bilateral infiltrate               10/15/20 1927  XR CHEST SNGL V Final result    Narrative:  1       Mild atelectasis in the bases with upper lungs clear. No effusion or   pneumothorax. Normal heart and no congestion  Disagree to me there is bilateral infiltrate           Results from Hospital Encounter encounter on 10/15/20   XR CHEST PORT    Narrative Chest single view. Comparison single view chest November 2, 2020    Patchy coarse reticular markings through lungs appear similar compared to prior  imaging. Cardiac and mediastinal structures magnified on this AP view. No  pneumothorax or sizable pleural effusion. XR CHEST PORT    Narrative Chest, frontal view, 11/2/2020    History: Covid-19. Comparison: Including chest 10/27/2020. Findings: The cardiac silhouette is stable. The lungs remain underexpanded. Diffuse opacities in the lungs are not significantly changed. Hydrostatic edema  is possible. No pleural effusion is definitively noted on this single view. No  pneumothorax is identified. The osseous structures are stable. Impression Impression: No significant interval change. XR CHEST PORT    Narrative Portable upright radiograph chest 7:34 AM compared to October 26, 2020. INDICATION: Cough, pneumonia. Patient has taken a shallow depth of inspiration. Heart size is stable. Bilateral airspace disease/edema shows worsening compared to prior study. No  pneumothorax. Cannot exclude small effusions. No acute bony changes. Impression IMPRESSION: Shallower depth of inspiration with worsening bilateral airspace  disease/edema. Results from [place] encounter on 10/15/20   CTA CHEST W OR W WO CONT    Narrative CT dose reduction was achieved through use of a standardized protocol tailored  for this examination and automatic exposure control for dose modulation.     Contrast study maximum intensity projections shows pronounced groundglass  opacification, of variable density, mainly in the dependent portions of each  lung, apex to base. Air bronchograms are preserved in the densest portions. Mild  geographic groundglass opacification of lower density in the nondependent  portions. Central airways are open    Pulmonary arteries are well-opacified and show no filling defect or distortion. Aorta shows normal dimensions, without dissection. Tiny middle mediastinal lymph  nodes. Cardiac finding. No pleural pericardial effusion. No significant upper  abdominal or chest wall finding      Impression IMPRESSION: Widespread patchy bilateral pneumonitis       Clinical picture consistent with COVID-19 infection with pneumonitis with diffuse patchy pulmonary infiltrate elevation in ferritin LDH and CRP nonproductive cough and GI symptoms and yet initial COVID-19 test negative repeat testing indeterminate              Time spent 30 min       Thank you for allowing us to participate in the care of this patient.   We will follow along with you    Jesús Schmid MD

## 2020-11-09 NOTE — DISCHARGE INSTRUCTIONS
Patient Education   Patient Education   Patient Education   Patient Education   Patient Education   Patient Education   ?? ? ?(Potassium Chloride) (???)   ?? ? ?(Potassium Chloride) (dzt-KRB-mm-um KLOR-rosita)  ?????? ???? ?????. ??? : K-Tab, Cas Mur, Klor-Con, Klor-Con 10, Klor-Con 8, Klor-Con M10, Klor-Con M15, Klor-Con M20, Klor-Con Sprinkle   ? ??? ?? ???? ?? ? ????.  ????? ? ?? ??:   ? ??? ?? ????? ??? ?? ????. ??? ?? ???? ??? ?? ??? ???? ????.   ? ??? ?? ??:   ??, ??? ???? ??, ??, ??, ??? ???? ??, ??  · ???? ??? ???? ??? ????. ??? ???? ? ?? ???? ????.  · ??? ??? ? ??? ? ??? ??? ?? ?? ??? ??????.  · ??? ?? ??:  ??? ??(4??)? ??? ??? ???? ???. ??? ??? ??? ???? ?? ???? ???? ?? ??? ? ? ????.  · ?? ?? ??: ???, ????, ?? ????. ? ? ?? ?? ??? ?????.  · ??? ??: ? ? ?? ?? ??? ?????. ??? ??? ?? ? ??? ??? ?? ??, ??? ?? ?? ??? ?? ???? ??? ?? ?? ?? ? ????. ? ???? ? ??? ?? ?? ?????.  · ??? ??: ? ? ?? ?? ??? ?????. ?? ? ???? ?? ??, ??? ??? ??? ??? ???? ??????. ? ??? ?? ???? ??? ??? ???, ??? ??? ? ?? ???? ????.  · ??? ??? ???? ??? ???? ??, ?? ??? ??? ? ??? ???? ????. ??? ??? ??? ??? ???? ???? ????.  · ?? ????? ??? ??? ??? ?? ?? ?????.  · ?? ???: ???? ?? ??? ?? ??????. ?? ?? ??? ?? ?? ??, ?? ?? ???? ??????. ?? ???? ?? ???? ??? ???? ????.  · ?, ?? ? ????? ?? ????? ??? ??????.  ??? ? ??? ??:   ?????, ??? ? ????? ???? ?? ??? ???? ?? ??? ?? ???? ??????.  · ?? ??? ??? ?? ??? ??? ??? ?? ? ????. ?? ??? ???? ?? ???? ????? ?????.  ¨ ?? ??? ???(???)  ¨ ACE ??? ???  ¨ ?? ???  ¨ ?? ?(Digoxin)  ?  ??? ??? ??:   · ?? ?? ?? ?? ????, ?? ??, ?? ??, ?? ???? ??? ?? ?? ????? ?????.  · ? ??? ?? ??? ??? ? ????.  ¨ ???? ?? ?? ??  ¨ ???? ?? ?? ??  · ? ??? ??? ???? ?? ?? ??? ???? ?? ??? ?????. ?? ??? ?????.  · ?? ??? ??? ?? ?? ?? ?? ????. ??? ?? ??? ?? ???? ????.  ? ?? ?? ?? ??? ? ?? ???:   ?? ???? ???? ?? ????? ?????.  · ???? ??: ??? ?? ????, ?? ?? ?? ??, ? ?? ??? ?? ?? ????, ?? ???, ?? ??.  · ?? ?? ?? ?? ?  · ?????, ???, ???? ?? ??, ?? ?? ?? ?, ?, ??? ??  · ?? ?? ?? ??  · ??? ??, ??? ???? ?? ?? ??  ??? ?? ? ??? ???? ???? ???? ??????.   · ??? ???, ??, ??  ? ??? ?? ??? ???? ???? ???? ???? ?????.   ???? ????? ???? ?? ??? ??? ?????. ???? ???? FDA ? ? 1-800-FDA-1088??? ???? ? ????.  © 2017 [facility] [place] Information is for End User's use only and may not be sold, redistributed or otherwise used for commercial purposes. The above information is an  only. It is not intended as medical advice for individual conditions or treatments. Talk to your doctor, nurse or pharmacist before following any medical regimen to see if it is safe and effective for you. Melatonin (By mouth)   Melatonin (shonda-a-TOE-sukhdev)  Treats insomnia. Brand Name(s): Advanced Sleep Melatonin, Basic's Melatonin, Finest Nutrition Melatonin, [employer] Melatonin, Nature's Blend Melatonin, Optimum Melatonin, PharmAssure Melatonin, Rite Aid Melatonin, Sund[employer] Naturals Melatonin   There may be other brand names for this medicine. When This Medicine Should Not Be Used: You should not use this medicine if you have had an allergic reaction to melatonin. How to Use This Medicine:   Capsule, Long Acting Capsule, Liquid, Tablet, Long Acting Tablet  · Your doctor will tell you how much medicine to use. Do not use more than directed. · Follow the instructions on the medicine label if you are using this medicine without a prescription. · Take your dose 20 minutes before your bedtime. You may take this medicine with or without food. · The liquid may be taken directly or combined with water or juice. If a dose is missed:   · If you miss a dose or forget to use your medicine, call your doctor or pharmacist for instructions. How to Store and Dispose of This Medicine:   · Store the medicine in a closed container at room temperature, away from heat, moisture, and direct light. · Keep all medicine out of the reach of children. Never share your medicine with anyone.   · Ask your pharmacist, doctor, or health caregiver about the best way to dispose of any outdated medicine or medicine no longer needed. Drugs and Foods to Avoid:   Ask your doctor or pharmacist before using any other medicine, including over-the-counter medicines, vitamins, and herbal products. · Make sure your doctor knows if you are also using any tranquilizer medicines, or if you are also using any sedative medicines. Warnings While Using This Medicine:   · Make sure your doctor knows if you are pregnant or breast feeding, or if you have an autoimmune condition. Make sure your doctor knows if you are feeling sad or depressed. · This medicine may make you drowsy. Avoid driving, using machines, or doing anything else that might be dangerous if you are not alert. Possible Side Effects While Using This Medicine: If you notice these less serious side effects, talk with your doctor:  · Feeling sluggish or tired in the morning. · Headache. If you notice other side effects that you think are caused by this medicine, tell your doctor. Call your doctor for medical advice about side effects. You may report side effects to FDA at 1-955-FDA-6422  © 2017 [address] Information is for End User's use only and may not be sold, redistributed or otherwise used for commercial purposes. The above information is an  only. It is not intended as medical advice for individual conditions or treatments. Talk to your doctor, nurse or pharmacist before following any medical regimen to see if it is safe and effective for you. ???? ?(Hydroxyzine) (???)   Hydroxyzine Pamoate(????????) (szz-XWNI-y-zeen WESLEY-oh-ate)  ??, ????, ??, ????, ?? ??, ???? ? ????? ?????. ??? ??? ?? ???? ?? ?? ?????.    ??? : Vistaril   ? ??? ?? ???? ?? ? ????.  ????? ? ?? ??:   ? ??? ?? ????? ??? ?? ????. ?????, ???? ?? ??????? ?? ???? ??? ?? ??? ???? ????. ?? ??? ?? QT ?? ??? ?? ??? ? ??? ???? ????.  ? ??? ?? ??:   ??, ??, ??  · ???? ??? ???? ??? ????. ??? ???? ? ?? ???? ????.  · ??: ??? ?? ?? ??, ?? ??? ?? ?? ??? ??? ?????. ?? ??? ? ?????.  · ??: ??? ?????. ??? ???? ?? ????.  · ?? ???: ???? ?? ??? ?? ??????. ?? ?? ??? ?? ?? ??, ?? ?? ???? ??????. ?? ???? ?? ???? ??? ???? ????.  · ?, ?? ? ????? ?? ???? ????? ??? ??????.  ??? ? ??? ??:   ?????, ??? ? ????? ???? ?? ??? ???? ?? ??? ?? ???? ??????.  · ?? ??? ?????? ??? ??? ?? ? ????. ?? ??? ???? ?? ???? ????? ?????.  ¨ ?????, ???, ?????, ????  ¨ ???(???????, ????????, ???????, ??????, ??????)  ¨ ??? ???(?????, ?????)  ¨ ?? ?? ?? ???(?????, ???????, ???, ??? )  ¨ ?? ?? ?? ???(???????, ????, ?????, ????, ?????)  · ??? ?? ?? ??? ???? ?? ????? ?????. ??? ???? ???? ??, ??? ???, ??? ???? ????.  ? ??? ??? ??:   · ?? ?? ?? ?? ????, ?? ??, ???, ?? ?? ??, ?? ?? ?? ?? ?? ??? ??? ???? ????? ?????.  · ? ??? ?? ??? ??? ? ????.  ¨ ?? ??? ??  ¨ ?? ??? ??? ???(AGEP)??? ?? ??? ?? ??  · ? ?? ??? ?? ? ????. ? ??? ???? ?? ??? ??? ???? ???? ????? ??? ?? ?? ????.  · ??? ???? ??? ???? ????? ??????.  · ?? ??? ??? ?? ?? ?? ?? ????. ??? ?? ??? ?? ???? ????.  ? ?? ?? ?? ??? ? ?? ???:   ?? ???? ???? ?? ????? ?????.  · ???? ??: ??? ?? ????, ?? ?? ?? ??, ? ?? ??? ?? ?? ????, ?? ???, ?? ??.  · ??, ?????, ???  · ????, ??????, ???? ?? ??  · ??, ?? ??  · ?? ???  ??? ?? ? ??? ???? ???? ???? ??????.   · ??, ??  · ?? ??  ? ??? ?? ??? ???? ???? ???? ???? ?????.   ???? ????? ???? ?? ??? ??? ?????. ???? ???? FDA ? ? 1-800-FDA-1088??? ???? ? ????.  © 2017 [facility] Information is for End User's use only and may not be sold, redistributed or otherwise used for commercial purposes. The above information is an  only. It is not intended as medical advice for individual conditions or treatments. Talk to your doctor, nurse or pharmacist before following any medical regimen to see if it is safe and effective for you. ?????(Furosemide) (???)   ?????(Furosemide) (ftcu-UJ-hd-mide)  ? ???(??) ? ???? ?????. ? ??? ???(\"?? ?\")? ??.    ??? : Active-Medicated Specimen Collection Kit, Diuscreen Multi-Drug Medicated Test Kit, Lasix, RX-Specimen Collection Kit, Specimen Collection Kit   ? ??? ?? ???? ?? ? ????.  ????? ? ?? ??:   ? ??? ?? ????? ??? ?? ????. ?????? ?? ???? ??? ?? ??? ???? ????.  ? ??? ?? ??:   ??, ??  · ??? ?? ??? ??????. ?? ??? ???? ???? ?? ???? ?? ? ??? ???? ? ? ????.  · ? ??? ??? ??? ? ?? ??? ?? ??? ? ????.  · ??: ??? ?? ?? ??, ?? ??? ?? ?? ??? ??? ?????.  · ??: ??? ?? ?????. ????, ???, ?? ????.  · ?? ???: ???? ?? ??? ?? ??????. ?? ?? ??? ?? ?? ??, ?? ?? ???? ??????. ?? ???? ?? ???? ??? ???? ????.  · ?, ?? ? ????? ?? ???? ????? ??? ??????.  ??? ? ??? ??:   ?????, ??? ? ????? ???? ?? ??? ???? ?? ??? ?? ???? ??????.  · ?? ??? ?????? ??? ??? ?? ? ????. ?? ??? ???? ?? ???? ????? ?????.  ¨ ?????, ???????, ???, ????? ?, ????, ??, ???????, ?? ????  ¨ ??????????? ???(ACTH)  ¨ ???  ¨ ?? ???  ¨ NSAID ??? ?? ???(????, ?????, ?????, ?????, ????)  ¨ ?? ???  ¨ ??????(?????, ????????, ????????, ??????, ?????)  ¨ ??? ??  · ??????? ?? ?? ??, ?????? ???? ??? ??????? ???? ?? ??? ??? 2??? ??? ????.  · ? ??? ?? ???, ??? ??? ?? ???? ???? ?? ????? ?? ???? ????? ??? ?? ????.  ? ??? ??? ??:   · ?? ?? ?? ?? ????, ?? ??, ? ??(??? ??), ???, ??, ???, ??, ??? ???, ?? ??, ?? ???? ?? ???? ??? ?? ??, ????? ?????. ?? ???? ??? ???? ????? ?????.  · ? ??? ?? ??? ??? ? ????.   ¨ ?? ? ???? ?? ?? ??? ??  ¨ ?? ??  ¨ ?? ??  · ?? ?? ?? ?? ???? ? ??? ?? ??? ?????.  · ? ??? ?? ?? ????? ???? ?? ??? ??? ???? ?? ? ????. ????? ???? ?? ???? ??? ????? ?????.  · ? ??? ?? ??? ??? ???? ??? ? ????. ??? ???? ?????. ??? ?? ?? ??? ???? ????.  · ? ??? ??? ???? ?? ?? ??? ???? ?? ??? ?????. ?? ??? ?????.  · ?? ??? ??? ?? ?? ?? ?? ????. ??? ?? ??? ?? ???? ????.  ? ?? ?? ?? ??? ? ?? ???:   ?? ???? ???? ?? ????? ?????.  · ???? ??: ??? ?? ????, ?? ?? ?? ??, ? ?? ??? ?? ?? ????, ?? ???, ?? ??.  · ??, ???, ?? ?? ??  · ?????, ?? ??, ?? ??  · ?? ?, ?? ??, ???, ???? ?? ??  · ????? ??? ??, ???, ??, ??, ???  · ?? ??, ???  · ???, ????, ??  · ??? ??  · ???? ?? ?? ?  · ?? ?? ? ??  ??? ?? ? ??? ???? ???? ???? ??????.   · ?? ??, ? ??  ? ??? ?? ??? ???? ???? ???? ???? ?????.   ???? ????? ???? ?? ??? ??? ?????. ???? ???? FDA ? ? 1-800-FDA-1088??? ???? ? ????.  © 2017 [facility] Information is for End User's use only and may not be sold, redistributed or otherwise used for commercial purposes. The above information is an  only. It is not intended as medical advice for individual conditions or treatments. Talk to your doctor, nurse or pharmacist before following any medical regimen to see if it is safe and effective for you. Benzonatate (By mouth)   Benzonatate (nmh-RNX-xh-mariano)  Treats cough. Brand Name(s): Jose Ritchie   There may be other brand names for this medicine. When This Medicine Should Not Be Used: You should not use this medicine if you have had an allergic reaction to benzonatate in the past.   How to Use This Medicine:   Capsule, Liquid Filled Capsule  · Your doctor will tell you how much medicine to take and how often. · Swallow the capsules whole; do not crush or chew. Chewing the capsule may numb your mouth and throat and you could choke. If a dose is missed:   · Take the missed dose as soon as possible. · If it is almost time for the next dose, wait until then to take your medicine and skip the missed dose. · You should not use two doses at the same time. How to Store and Dispose of This Medicine:   · Keep this medication in the original tightly closed, light-resistant container. · Store at room temperature, away from heat, moisture, and light. · Ask your pharmacist, doctor, or health caregiver about the best way to dispose of any outdated medicine or medicine no longer needed. · Keep all medicine away from children.   Drugs and Foods to Avoid:   Ask your doctor or pharmacist before using any other medicine, including over-the-counter medicines, vitamins, and herbal products. · Avoid drinking alcohol while taking this medicine. Warnings While Using This Medicine:   · If you are pregnant or breastfeeding, talk to your doctor before taking this medicine. · Benzonatate is not recommended for children younger than 8years old. Possible Side Effects While Using This Medicine:   Call your doctor right away if you notice any of these side effects:  · Trouble breathing  · Tightness in the chest or throat  · Tremors, shakiness, seizures  · Skin rash, itching  If you notice these less serious side effects, talk with your doctor:   · Nausea, upset stomach  · Headache  · Mild dizziness  · Drowsiness  · Constipation  · Stuffy nose  If you notice other side effects that you think are caused by this medicine, tell your doctor. Call your doctor for medical advice about side effects. You may report side effects to FDA at 5-271-FDA-4342  © 2017 [facility] Information is for End User's use only and may not be sold, redistributed or otherwise used for commercial purposes. The above information is an  only. It is not intended as medical advice for individual conditions or treatments. Talk to your doctor, nurse or pharmacist before following any medical regimen to see if it is safe and effective for you.           ??: ?? ??  Pneumonia: Care Instructions  ?? ??     ??? ?? ??? ????. ???? ?? ????? ???? ??? ????.  ?? ??? ????? ?? ??? ? ????. ???? ???? ??? ??? ???? ??? ?????. ??? ?? ??? ???? ?? ??? ?? ???? ??? ?????? ? ??? ? ??? ?? ? ????.  ??? ???? ?? ??? ??? ??? ???? ???. ?? ?? ??? ???? ??, ???? ??? ???? ?? ???? ??????. ?? ??? ?? ?? ?? ?? ?? ??? ??? ???? ?? ?? ?????.  ???? ??? ??? ? ?????  · ??? ??? ?? ??? ??? ???? ??????. ??? ?????? ?? ?? ??? ???? ????. ??? ???? ?? ????? ???.  · ??? ??? ?? ??? ??? ??? ??????. ??? ??? ???? ??? ??? ???? ?? ???? ??????.  · ??? ??? ??? ?????. ???? ???? ???? ?? ? ???, ??? ???? ??? ??? ????.  · ???? ??? ? ???, ??? ??? ???? ??? ?? ????? ??? ???? ?????. ??? ??? ??? ? ?? ????? ??? ??? ??????. ??, ?? ?? ? ??? ??? ?? ???? ???? ?? ???? ?? ???? ??? ?? ?? ??? ??????.  · ??? ?? ? ??? ??? ??????. ??? ??? ?? ?? ??? ??? ???? ?????. ??? ??? ???? ???? ??? ?????. ??? ?? ??? ??? ??? ???? ??? ???? ????? ???? ???? ?? ???? ?????. ?? ??? ??? ?? ? ?? ??? ??? ? ????.  · ??? ?? ???? ???? ??? ??? ?????. ???? ?? ??? ?????.  · ??? ??? ??? ?? ??? ????? ?? ????. ??? ?? ??? ? ?? ???. ??? ?????, ?? ???? ?? ???? ? ??? ?? ??????. ??? ?? ??? ??? ???? ??? ? ????.  · ???????(Tylenol), ???? ?(Advil, Motrin) ? ? ????(Aleve)? ?? ??? ???? ??????. ??? ?? ?? ?? ??? ?? ?????.  · ??? ??? ?? ? 2?? ??? ???? ???? ???? ????. ???? ????? Tylenol??? ?? ???????? ???? ????. ???????(Tylenol)? ?? ???? ??? ? ????.  · ? ??? ??? ? ? ??? ????? ???? ??, ?? ??? ?? ??????. ??? ???? ?? ??? ??? ?? ??? ? ??? ? ????.  · ??? ??? ?????, ?? ???? ?? ??(?? ??)? ?? ?? ??(???? ?? ??? ? ? ??)? ?? ?? ?? ??? ????.  ?? ??? ???? ????  ?? ??? ????? ????? ???? 911? ?? ????. ?? ??? ?? ?? ??? ????.  · ???? ???? ??? ??.  ?? ???? ?? ???? ????? ???? ?? ??? ????.  · ?? ?? ?? ??? ?? ??(??)? ?? ?? ??.  · ?? ?? ??? ?? ????? ???? ??.  · ????? ???? ???, ??? ? ?? ??? ???.  ??? ?? ??? ?? ?? ????, ?? ???? ?? ???? ??????.  · ??? ?? ?? ? ?? ??? ???? ??.  · ??? ?? ????? ? ???? ?? ??.  · 2?(48??)? ??? ??? ???? ?? ??.  · ????? ???? ?? ??.  ??? ??? ??? ??? ? ????  ??   [email or web address]  ? ? D336 ?? ??? ??? ??? ?? ?? \"??: ?? ??.\"  ?? ??: 2020? 2? 24? ????????????? ??: 12.6  © 6823-4959 [employer].    ?? ??? ???? ?? ?? ??? ????. ???: ??? ?? ???. ?? ???? ? ?? ??? ?? ??? ??? ???? ?? ?? ??? ????? ??????. [employer]? ??? ? ??? ???? ? ?? ?? ????? ???? ????.

## 2020-11-10 ENCOUNTER — HOSPITAL ENCOUNTER (OUTPATIENT)
Dept: LAB | Age: 60
Discharge: HOME | End: 2020-11-10

## 2020-11-10 LAB
ALBUMIN SERPL-MCNC: 3.5 G/DL (ref 3.5–5)
ALBUMIN/GLOB SERPL: 1.5 {RATIO} (ref 1.1–2.2)
ALP SERPL-CCNC: 79 U/L (ref 45–117)
ALT SERPL-CCNC: 102 U/L (ref 12–78)
ANION GAP SERPL CALC-SCNC: 7 MMOL/L (ref 5–15)
AST SERPL W P-5'-P-CCNC: 30 U/L (ref 15–37)
BASOPHILS # BLD: 0 K/UL (ref 0–0.1)
BASOPHILS NFR BLD: 0 % (ref 0–1)
BILIRUB SERPL-MCNC: 1.2 MG/DL (ref 0.2–1)
BUN SERPL-MCNC: 26 MG/DL (ref 6–20)
BUN/CREAT SERPL: 31 (ref 12–20)
CA-I BLD-MCNC: 8.9 MG/DL (ref 8.5–10.1)
CHLORIDE SERPL-SCNC: 103 MMOL/L (ref 97–108)
CO2 SERPL-SCNC: 30 MMOL/L (ref 21–32)
CREAT SERPL-MCNC: 0.83 MG/DL (ref 0.7–1.3)
DIFFERENTIAL METHOD BLD: ABNORMAL
EOSINOPHIL # BLD: 0.3 K/UL (ref 0–0.4)
EOSINOPHIL NFR BLD: 6 % (ref 0–7)
ERYTHROCYTE [DISTWIDTH] IN BLOOD BY AUTOMATED COUNT: 14.4 % (ref 11.5–14.5)
GLOBULIN SER CALC-MCNC: 2.4 G/DL (ref 2–4)
GLUCOSE SERPL-MCNC: 139 MG/DL (ref 65–100)
HCT VFR BLD AUTO: 45.9 % (ref 36.6–50.3)
HGB BLD-MCNC: 15.6 G/DL (ref 12.1–17)
IMM GRANULOCYTES # BLD AUTO: 0.1 K/UL (ref 0–0.04)
IMM GRANULOCYTES NFR BLD AUTO: 2 % (ref 0–0.5)
LYMPHOCYTES # BLD: 1.4 K/UL (ref 0.8–3.5)
LYMPHOCYTES NFR BLD: 28 % (ref 12–49)
MCH RBC QN AUTO: 31.6 PG (ref 26–34)
MCHC RBC AUTO-ENTMCNC: 34 G/DL (ref 30–36.5)
MCV RBC AUTO: 92.9 FL (ref 80–99)
MONOCYTES # BLD: 0.6 K/UL (ref 0–1)
MONOCYTES NFR BLD: 11 % (ref 5–13)
NEUTS SEG # BLD: 2.8 K/UL (ref 1.8–8)
NEUTS SEG NFR BLD: 56 % (ref 32–75)
PLATELET # BLD AUTO: 111 K/UL (ref 150–400)
PMV BLD AUTO: 10 FL (ref 8.9–12.9)
POTASSIUM SERPL-SCNC: 4.2 MMOL/L (ref 3.5–5.1)
PROT SERPL-MCNC: 5.9 G/DL (ref 6.4–8.2)
RBC # BLD AUTO: 4.94 M/UL (ref 4.1–5.7)
SODIUM SERPL-SCNC: 140 MMOL/L (ref 136–145)
WBC # BLD AUTO: 5.1 K/UL (ref 4.1–11.1)

## 2020-11-10 PROCEDURE — 80053 COMPREHEN METABOLIC PANEL: CPT

## 2020-11-10 PROCEDURE — 85025 COMPLETE CBC W/AUTO DIFF WBC: CPT

## 2020-11-11 NOTE — PROGRESS NOTES
Late entry for 11/09/2020    Insurance approved auth for [facility] IRF. [facility] can accept pt today. Room# 208 and report to call: [phone]. Accepting physician is Dr. Kwesi White. Cm notified Dr. Tammy Salazar of approval for IRF. MD indicated pt is cleared for discharge. Cm attempted to meet with pt to notify him of dc, however CM observed him getting up from the bedside commode. CM apologized and shut the door. CM called pt's daughter Ariadna Lara and informed her of acceptance to IRF and dc today. Mayra Humphrey would like to know what time pt will be transported to IRF so she can stop by [facility] and drop off pt's clothing. CM called [phone] [facility] ambulance to arrange transportation. Pt is on 3L continuous oxygen and was not already established with oxygen prior to hospitalization. Cm was informed insurance will not pay for transportation because pt only needs oxygen. Pt is not bed bound and pt did not have anything in his past medical history to support stretcher transportation. Cm did ask about wheelchair [place] [facility], however again insurance will not pay for it. Case discussed with CM director. Pt will dc to IRF with POC supplied by the hospital, if pt's family is okay with arrangements and bringing back the POC to the hospital.     Cm called [facility] and spoke with [facility], liaison. Cm explained to her pt's O2 situation. [facility] indicated she will call writer back if it will be okay with switching pt to their POC once pt gets to their facility and giving the hospital POC back to family. Vamsi received a phone call from [facility] with [facility]. [facility] confirmed they can make those arrangements, however pt's primary nurse at the hospital will need to notify [facility] IRF nurse when she calls for report regarding the oxygen situation. Vamsi spoke with pt's daughter Ariadna Lara and explained to her transportation situation.  Daughter indicated they can provide pt transportation to IRF and is okay with bringing back pt's POC to the hospital. Daughter indicated she will be at the hospital around 4-4:30 to  her father. Case discussed with pt's primary nurse. Pt's nurse discussed case with 4E charge nurse. Liability form for POC was discussed. Cm notified CM Director. Pt's primary nurse will have family sign liability form for POC once they  the patient.

## 2020-11-12 ENCOUNTER — HOSPITAL ENCOUNTER (OUTPATIENT)
Dept: LAB | Age: 60
Discharge: HOME | End: 2020-11-12

## 2020-11-12 LAB
BASOPHILS # BLD: 0 K/UL (ref 0–0.1)
BASOPHILS NFR BLD: 0 % (ref 0–1)
DIFFERENTIAL METHOD BLD: ABNORMAL
EOSINOPHIL # BLD: 0.2 K/UL (ref 0–0.4)
EOSINOPHIL NFR BLD: 5 % (ref 0–7)
ERYTHROCYTE [DISTWIDTH] IN BLOOD BY AUTOMATED COUNT: 14.3 % (ref 11.5–14.5)
HCT VFR BLD AUTO: 38.2 % (ref 36.6–50.3)
HGB BLD-MCNC: 12.8 G/DL (ref 12.1–17)
IMM GRANULOCYTES # BLD AUTO: 0 K/UL (ref 0–0.04)
IMM GRANULOCYTES NFR BLD AUTO: 1 % (ref 0–0.5)
LYMPHOCYTES # BLD: 1.2 K/UL (ref 0.8–3.5)
LYMPHOCYTES NFR BLD: 31 % (ref 12–49)
MCH RBC QN AUTO: 30.7 PG (ref 26–34)
MCHC RBC AUTO-ENTMCNC: 33.5 G/DL (ref 30–36.5)
MCV RBC AUTO: 91.6 FL (ref 80–99)
MONOCYTES # BLD: 0.6 K/UL (ref 0–1)
MONOCYTES NFR BLD: 15 % (ref 5–13)
NEUTS SEG # BLD: 1.8 K/UL (ref 1.8–8)
NEUTS SEG NFR BLD: 48 % (ref 32–75)
PLATELET # BLD AUTO: 113 K/UL (ref 150–400)
PMV BLD AUTO: 10.1 FL (ref 8.9–12.9)
RBC # BLD AUTO: 4.17 M/UL (ref 4.1–5.7)
WBC # BLD AUTO: 3.7 K/UL (ref 4.1–11.1)

## 2020-11-12 PROCEDURE — 85025 COMPLETE CBC W/AUTO DIFF WBC: CPT

## 2022-03-19 PROBLEM — J96.00 ACUTE RESPIRATORY FAILURE DUE TO COVID-19 (HCC): Status: ACTIVE | Noted: 2020-10-15

## 2022-03-19 PROBLEM — U07.1 ACUTE RESPIRATORY FAILURE DUE TO COVID-19 (HCC): Status: ACTIVE | Noted: 2020-10-15

## 2022-03-19 PROBLEM — J18.9 PNEUMONIA: Status: ACTIVE | Noted: 2020-10-15

## 2022-03-19 PROBLEM — J96.00 ACUTE RESPIRATORY FAILURE (HCC): Status: ACTIVE | Noted: 2020-10-15

## 2023-05-15 RX ORDER — POTASSIUM CHLORIDE 20 MEQ/1
40 TABLET, EXTENDED RELEASE ORAL DAILY
COMMUNITY
Start: 2020-11-10

## 2023-05-15 RX ORDER — FUROSEMIDE 40 MG/1
40 TABLET ORAL DAILY
COMMUNITY
Start: 2020-11-09

## 2023-05-15 RX ORDER — PHENOL 1.4 %
10 AEROSOL, SPRAY (ML) MUCOUS MEMBRANE NIGHTLY
COMMUNITY
Start: 2020-11-09